# Patient Record
Sex: MALE | Race: WHITE | URBAN - METROPOLITAN AREA
[De-identification: names, ages, dates, MRNs, and addresses within clinical notes are randomized per-mention and may not be internally consistent; named-entity substitution may affect disease eponyms.]

---

## 2017-02-28 ENCOUNTER — TELEPHONE (OUTPATIENT)
Dept: CARDIOLOGY CLINIC | Age: 72
End: 2017-02-28

## 2017-03-01 ENCOUNTER — NURSE ONLY (OUTPATIENT)
Dept: CARDIOLOGY CLINIC | Age: 72
End: 2017-03-01

## 2017-03-01 DIAGNOSIS — Z95.810 AUTOMATIC IMPLANTABLE CARDIOVERTER-DEFIBRILLATOR IN SITU: ICD-10-CM

## 2017-03-01 DIAGNOSIS — I42.9 CARDIOMYOPATHY (HCC): ICD-10-CM

## 2017-03-01 DIAGNOSIS — Z95.810 CARDIAC DEFIBRILLATOR IN SITU: ICD-10-CM

## 2017-03-01 PROCEDURE — 93296 REM INTERROG EVL PM/IDS: CPT | Performed by: INTERNAL MEDICINE

## 2017-03-01 PROCEDURE — 93295 DEV INTERROG REMOTE 1/2/MLT: CPT | Performed by: INTERNAL MEDICINE

## 2019-07-01 ENCOUNTER — HISTORICAL (OUTPATIENT)
Dept: RESPIRATORY THERAPY | Facility: HOSPITAL | Age: 74
End: 2019-07-01

## 2020-01-10 ENCOUNTER — HISTORICAL (OUTPATIENT)
Dept: RADIOLOGY | Facility: HOSPITAL | Age: 75
End: 2020-01-10

## 2021-05-20 ENCOUNTER — HISTORICAL (OUTPATIENT)
Dept: RADIOLOGY | Facility: HOSPITAL | Age: 76
End: 2021-05-20

## 2021-06-21 ENCOUNTER — HISTORICAL (OUTPATIENT)
Dept: RESPIRATORY THERAPY | Facility: HOSPITAL | Age: 76
End: 2021-06-21

## 2022-01-01 ENCOUNTER — HOSPITAL ENCOUNTER (OUTPATIENT)
Facility: HOSPITAL | Age: 77
Discharge: HOME | End: 2022-08-02
Attending: INTERNAL MEDICINE | Admitting: INTERNAL MEDICINE
Payer: MEDICARE

## 2022-01-01 ENCOUNTER — HOSPITAL ENCOUNTER (OUTPATIENT)
Dept: RADIOLOGY | Facility: HOSPITAL | Age: 77
Discharge: HOME | End: 2022-07-15
Attending: FAMILY MEDICINE
Payer: MEDICARE

## 2022-01-01 ENCOUNTER — HOSPITAL ENCOUNTER (OUTPATIENT)
Dept: RADIOLOGY | Facility: HOSPITAL | Age: 77
Discharge: HOME | End: 2022-08-18
Attending: SPECIALIST
Payer: MEDICARE

## 2022-01-01 ENCOUNTER — HOSPITAL ENCOUNTER (OUTPATIENT)
Dept: RADIOLOGY | Facility: HOSPITAL | Age: 77
Discharge: HOME | End: 2022-07-15
Attending: INTERNAL MEDICINE
Payer: MEDICARE

## 2022-01-01 ENCOUNTER — OFFICE VISIT (OUTPATIENT)
Dept: CARDIAC SURGERY | Facility: CLINIC | Age: 77
End: 2022-01-01
Payer: MEDICARE

## 2022-01-01 ENCOUNTER — HOSPITAL ENCOUNTER (INPATIENT)
Facility: HOSPITAL | Age: 77
LOS: 18 days | Discharge: HOSPICE | DRG: 219 | End: 2022-09-09
Attending: SPECIALIST | Admitting: SPECIALIST
Payer: MEDICARE

## 2022-01-01 ENCOUNTER — ANESTHESIA EVENT (OUTPATIENT)
Dept: SURGERY | Facility: HOSPITAL | Age: 77
DRG: 219 | End: 2022-01-01
Payer: MEDICARE

## 2022-01-01 ENCOUNTER — ANESTHESIA (OUTPATIENT)
Dept: SURGERY | Facility: HOSPITAL | Age: 77
DRG: 219 | End: 2022-01-01
Payer: MEDICARE

## 2022-01-01 ENCOUNTER — HISTORICAL (OUTPATIENT)
Dept: ADMINISTRATIVE | Facility: HOSPITAL | Age: 77
End: 2022-01-01

## 2022-01-01 VITALS
HEIGHT: 68 IN | OXYGEN SATURATION: 96 % | WEIGHT: 216 LBS | BODY MASS INDEX: 32.74 KG/M2 | SYSTOLIC BLOOD PRESSURE: 128 MMHG | DIASTOLIC BLOOD PRESSURE: 84 MMHG

## 2022-01-01 VITALS
BODY MASS INDEX: 28.44 KG/M2 | HEART RATE: 60 BPM | SYSTOLIC BLOOD PRESSURE: 97 MMHG | DIASTOLIC BLOOD PRESSURE: 61 MMHG | HEIGHT: 69 IN | WEIGHT: 192 LBS

## 2022-01-01 VITALS
RESPIRATION RATE: 20 BRPM | SYSTOLIC BLOOD PRESSURE: 94 MMHG | WEIGHT: 220.44 LBS | HEIGHT: 69 IN | TEMPERATURE: 97 F | DIASTOLIC BLOOD PRESSURE: 56 MMHG | BODY MASS INDEX: 32.65 KG/M2 | OXYGEN SATURATION: 92 % | HEART RATE: 60 BPM

## 2022-01-01 VITALS
BODY MASS INDEX: 29.37 KG/M2 | SYSTOLIC BLOOD PRESSURE: 105 MMHG | TEMPERATURE: 98 F | RESPIRATION RATE: 17 BRPM | HEIGHT: 68 IN | WEIGHT: 193.81 LBS | HEART RATE: 62 BPM | DIASTOLIC BLOOD PRESSURE: 55 MMHG | OXYGEN SATURATION: 94 %

## 2022-01-01 DIAGNOSIS — R06.02 SOB (SHORTNESS OF BREATH): ICD-10-CM

## 2022-01-01 DIAGNOSIS — R20.2 NUMBNESS AND TINGLING OF BOTH LOWER EXTREMITIES: ICD-10-CM

## 2022-01-01 DIAGNOSIS — I25.5 ISCHEMIC CARDIOMYOPATHY: ICD-10-CM

## 2022-01-01 DIAGNOSIS — Z79.01 ADMISSION FOR LONG-TERM (CURRENT) USE OF ANTICOAGULANTS: ICD-10-CM

## 2022-01-01 DIAGNOSIS — I49.9 ABNORMAL HEART RHYTHM: ICD-10-CM

## 2022-01-01 DIAGNOSIS — I25.10 CAD IN NATIVE ARTERY: ICD-10-CM

## 2022-01-01 DIAGNOSIS — K91.89 ILEUS, POSTOPERATIVE: ICD-10-CM

## 2022-01-01 DIAGNOSIS — L89.320 PRESSURE INJURY OF LEFT BUTTOCK, UNSTAGEABLE: ICD-10-CM

## 2022-01-01 DIAGNOSIS — R29.898 WEAKNESS OF BOTH LOWER EXTREMITIES: ICD-10-CM

## 2022-01-01 DIAGNOSIS — R19.7 DIARRHEA, UNSPECIFIED TYPE: ICD-10-CM

## 2022-01-01 DIAGNOSIS — I25.10 CORONARY ARTERY DISEASE INVOLVING NATIVE HEART, UNSPECIFIED VESSEL OR LESION TYPE, UNSPECIFIED WHETHER ANGINA PRESENT: Primary | ICD-10-CM

## 2022-01-01 DIAGNOSIS — R06.02 SHORTNESS OF BREATH: Primary | ICD-10-CM

## 2022-01-01 DIAGNOSIS — L89.300 PRESSURE INJURY OF BUTTOCK, UNSTAGEABLE, UNSPECIFIED LATERALITY: ICD-10-CM

## 2022-01-01 DIAGNOSIS — Z78.9 PACED RHYTHM ON CARDIAC MONITOR: ICD-10-CM

## 2022-01-01 DIAGNOSIS — R06.02 SHORTNESS OF BREATH: ICD-10-CM

## 2022-01-01 DIAGNOSIS — I95.9 HYPOTENSION: ICD-10-CM

## 2022-01-01 DIAGNOSIS — Z51.81 ADMISSION FOR LONG-TERM (CURRENT) USE OF ANTICOAGULANTS: ICD-10-CM

## 2022-01-01 DIAGNOSIS — I25.118 CORONARY ARTERY DISEASE OF NATIVE ARTERY OF NATIVE HEART WITH STABLE ANGINA PECTORIS: ICD-10-CM

## 2022-01-01 DIAGNOSIS — Z95.2 S/P MVR (MITRAL VALVE REPLACEMENT): ICD-10-CM

## 2022-01-01 DIAGNOSIS — I25.10 CORONARY ARTERY DISEASE INVOLVING NATIVE HEART, UNSPECIFIED VESSEL OR LESION TYPE, UNSPECIFIED WHETHER ANGINA PRESENT: ICD-10-CM

## 2022-01-01 DIAGNOSIS — M54.50 LUMBOSACRAL PAIN: ICD-10-CM

## 2022-01-01 DIAGNOSIS — K56.7 ILEUS, POSTOPERATIVE: ICD-10-CM

## 2022-01-01 DIAGNOSIS — R20.0 NUMBNESS AND TINGLING OF BOTH LOWER EXTREMITIES: ICD-10-CM

## 2022-01-01 DIAGNOSIS — I10 HYPERTENSION, UNSPECIFIED TYPE: ICD-10-CM

## 2022-01-01 DIAGNOSIS — R42 DYSEQUILIBRIUM: ICD-10-CM

## 2022-01-01 DIAGNOSIS — A41.9 SEPSIS, DUE TO UNSPECIFIED ORGANISM, UNSPECIFIED WHETHER ACUTE ORGAN DYSFUNCTION PRESENT: ICD-10-CM

## 2022-01-01 DIAGNOSIS — M79.602 LEFT ARM PAIN: ICD-10-CM

## 2022-01-01 DIAGNOSIS — E78.5 HYPERLIPIDEMIA, UNSPECIFIED HYPERLIPIDEMIA TYPE: Primary | ICD-10-CM

## 2022-01-01 DIAGNOSIS — I25.10 CAD (CORONARY ARTERY DISEASE): ICD-10-CM

## 2022-01-01 DIAGNOSIS — R54 FRAILTY: ICD-10-CM

## 2022-01-01 DIAGNOSIS — I34.0 NONRHEUMATIC MITRAL VALVE REGURGITATION: ICD-10-CM

## 2022-01-01 DIAGNOSIS — R91.1 SOLITARY PULMONARY NODULE: Primary | ICD-10-CM

## 2022-01-01 DIAGNOSIS — M54.50 LUMBOSACRAL PAIN: Primary | ICD-10-CM

## 2022-01-01 DIAGNOSIS — R25.1 TREMOR: Primary | ICD-10-CM

## 2022-01-01 LAB
ABO + RH BLD: NORMAL
ABS NEUT (OLG): 19.41 X10(3)/MCL (ref 2.1–9.2)
ABS NEUT (OLG): 20.95 X10(3)/MCL (ref 2.1–9.2)
ABS NEUT (OLG): 23.42 X10(3)/MCL (ref 2.1–9.2)
ABS NEUT (OLG): 23.5 X10(3)/MCL (ref 2.1–9.2)
ABS NEUT (OLG): 31.33 X10(3)/MCL (ref 2.1–9.2)
ABS NEUT (OLG): 7.28 X10(3)/MCL (ref 2.1–9.2)
ABS NEUT (OLG): 8.4 X10(3)/MCL (ref 2.1–9.2)
ALBUMIN SERPL-MCNC: 1.6 GM/DL (ref 3.4–4.8)
ALBUMIN SERPL-MCNC: 1.7 GM/DL (ref 3.4–4.8)
ALBUMIN SERPL-MCNC: 1.7 GM/DL (ref 3.4–4.8)
ALBUMIN SERPL-MCNC: 1.8 GM/DL (ref 3.4–4.8)
ALBUMIN SERPL-MCNC: 1.9 GM/DL (ref 3.4–4.8)
ALBUMIN SERPL-MCNC: 2 GM/DL (ref 3.4–4.8)
ALBUMIN SERPL-MCNC: 2.5 GM/DL (ref 3.4–4.8)
ALBUMIN SERPL-MCNC: 2.6 GM/DL (ref 3.4–4.8)
ALBUMIN SERPL-MCNC: 2.8 GM/DL (ref 3.4–4.8)
ALBUMIN SERPL-MCNC: 2.9 GM/DL (ref 3.4–4.8)
ALBUMIN/GLOB SERPL: 0.5 RATIO (ref 1.1–2)
ALBUMIN/GLOB SERPL: 0.5 RATIO (ref 1.1–2)
ALBUMIN/GLOB SERPL: 0.6 RATIO (ref 1.1–2)
ALBUMIN/GLOB SERPL: 0.6 RATIO (ref 1.1–2)
ALBUMIN/GLOB SERPL: 0.8 RATIO (ref 1.1–2)
ALBUMIN/GLOB SERPL: 0.8 RATIO (ref 1.1–2)
ALBUMIN/GLOB SERPL: 0.9 RATIO (ref 1.1–2)
ALBUMIN/GLOB SERPL: 0.9 RATIO (ref 1.1–2)
ALBUMIN/GLOB SERPL: 1 RATIO (ref 1.1–2)
ALBUMIN/GLOB SERPL: 1.1 RATIO (ref 1.1–2)
ALBUMIN/GLOB SERPL: 1.5 RATIO (ref 1.1–2)
ALP SERPL-CCNC: 110 UNIT/L (ref 40–150)
ALP SERPL-CCNC: 115 UNIT/L (ref 40–150)
ALP SERPL-CCNC: 70 UNIT/L (ref 40–150)
ALP SERPL-CCNC: 71 UNIT/L (ref 40–150)
ALP SERPL-CCNC: 72 UNIT/L (ref 40–150)
ALP SERPL-CCNC: 76 UNIT/L (ref 40–150)
ALP SERPL-CCNC: 85 UNIT/L (ref 40–150)
ALP SERPL-CCNC: 86 UNIT/L (ref 40–150)
ALP SERPL-CCNC: 87 UNIT/L (ref 40–150)
ALP SERPL-CCNC: 88 UNIT/L (ref 40–150)
ALP SERPL-CCNC: 94 UNIT/L (ref 40–150)
ALP SERPL-CCNC: 97 UNIT/L (ref 40–150)
ALP SERPL-CCNC: 98 UNIT/L (ref 40–150)
ALP SERPL-CCNC: 99 UNIT/L (ref 40–150)
ALT SERPL-CCNC: 36 UNIT/L (ref 0–55)
ALT SERPL-CCNC: 42 UNIT/L (ref 0–55)
ALT SERPL-CCNC: 43 UNIT/L (ref 0–55)
ALT SERPL-CCNC: 47 UNIT/L (ref 0–55)
ALT SERPL-CCNC: 50 UNIT/L (ref 0–55)
ALT SERPL-CCNC: 52 UNIT/L (ref 0–55)
ALT SERPL-CCNC: 52 UNIT/L (ref 0–55)
ALT SERPL-CCNC: 53 UNIT/L (ref 0–55)
ALT SERPL-CCNC: 66 UNIT/L (ref 0–55)
ALT SERPL-CCNC: 72 UNIT/L (ref 0–55)
ALT SERPL-CCNC: 75 UNIT/L (ref 0–55)
ALT SERPL-CCNC: 80 UNIT/L (ref 0–55)
ALT SERPL-CCNC: 84 UNIT/L (ref 0–55)
ALT SERPL-CCNC: 88 UNIT/L (ref 0–55)
AMYLASE SERPL-CCNC: 22 UNIT/L (ref 20–160)
ANION GAP SERPL CALC-SCNC: 10 MEQ/L
ANION GAP SERPL CALC-SCNC: 12 MEQ/L
ANION GAP SERPL CALC-SCNC: 5 MEQ/L
ANION GAP SERPL CALC-SCNC: 6 MEQ/L
ANION GAP SERPL CALC-SCNC: 6 MEQ/L
ANION GAP SERPL CALC-SCNC: 7 MEQ/L
ANION GAP SERPL CALC-SCNC: 8 MEQ/L
ANION GAP SERPL CALC-SCNC: 8 MEQ/L
ANION GAP SERPL CALC-SCNC: 9 MEQ/L
ANISOCYTOSIS BLD QL SMEAR: ABNORMAL
APTT PPP: 35.4 SECONDS (ref 23.2–33.7)
AST SERPL-CCNC: 100 UNIT/L (ref 5–34)
AST SERPL-CCNC: 32 UNIT/L (ref 5–34)
AST SERPL-CCNC: 32 UNIT/L (ref 5–34)
AST SERPL-CCNC: 33 UNIT/L (ref 5–34)
AST SERPL-CCNC: 38 UNIT/L (ref 5–34)
AST SERPL-CCNC: 40 UNIT/L (ref 5–34)
AST SERPL-CCNC: 47 UNIT/L (ref 5–34)
AST SERPL-CCNC: 47 UNIT/L (ref 5–34)
AST SERPL-CCNC: 54 UNIT/L (ref 5–34)
AST SERPL-CCNC: 56 UNIT/L (ref 5–34)
AST SERPL-CCNC: 80 UNIT/L (ref 5–34)
AST SERPL-CCNC: 82 UNIT/L (ref 5–34)
AST SERPL-CCNC: 83 UNIT/L (ref 5–34)
AST SERPL-CCNC: 90 UNIT/L (ref 5–34)
AV INDEX (PROSTH): 1.11
AV MEAN GRADIENT: 3 MMHG
AV PEAK GRADIENT: 6 MMHG
AV VALVE AREA: 3.84 CM2
AV VELOCITY RATIO: 1.06
BACTERIA BLD CULT: NORMAL
BACTERIA SPEC CULT: ABNORMAL
BASE EXCESS ARTERIAL: -2.1 MMOL/L (ref -2–2)
BASOPHILS # BLD AUTO: 0.02 X10(3)/MCL (ref 0–0.2)
BASOPHILS # BLD AUTO: 0.03 X10(3)/MCL (ref 0–0.2)
BASOPHILS # BLD AUTO: 0.04 X10(3)/MCL (ref 0–0.2)
BASOPHILS # BLD AUTO: 0.04 X10(3)/MCL (ref 0–0.2)
BASOPHILS # BLD AUTO: 0.05 X10(3)/MCL (ref 0–0.2)
BASOPHILS # BLD AUTO: 0.06 X10(3)/MCL (ref 0–0.2)
BASOPHILS # BLD AUTO: 0.07 X10(3)/MCL (ref 0–0.2)
BASOPHILS NFR BLD AUTO: 0.1 %
BASOPHILS NFR BLD AUTO: 0.2 %
BASOPHILS NFR BLD AUTO: 0.3 %
BASOPHILS NFR BLD MANUAL: 0.25 X10(3)/MCL (ref 0–0.2)
BASOPHILS NFR BLD MANUAL: 1 %
BILIRUBIN DIRECT+TOT PNL SERPL-MCNC: 0.3 MG/DL (ref 0–0.8)
BILIRUBIN DIRECT+TOT PNL SERPL-MCNC: 0.7 MG/DL
BILIRUBIN DIRECT+TOT PNL SERPL-MCNC: 0.7 MG/DL
BILIRUBIN DIRECT+TOT PNL SERPL-MCNC: 0.8 MG/DL
BILIRUBIN DIRECT+TOT PNL SERPL-MCNC: 0.9 MG/DL
BILIRUBIN DIRECT+TOT PNL SERPL-MCNC: 0.9 MG/DL (ref 0–0.5)
BILIRUBIN DIRECT+TOT PNL SERPL-MCNC: 1.2 MG/DL
BILIRUBIN DIRECT+TOT PNL SERPL-MCNC: 1.3 MG/DL
BILIRUBIN DIRECT+TOT PNL SERPL-MCNC: 1.3 MG/DL
BILIRUBIN DIRECT+TOT PNL SERPL-MCNC: 1.4 MG/DL
BILIRUBIN DIRECT+TOT PNL SERPL-MCNC: 1.6 MG/DL
BILIRUBIN DIRECT+TOT PNL SERPL-MCNC: 1.7 MG/DL
BLD PROD TYP BPU: NORMAL
BLOOD UNIT EXPIRATION DATE: NORMAL
BLOOD UNIT TYPE CODE: 6200
BSA FOR ECHO PROCEDURE: 2.04 M2
BSA FOR ECHO PROCEDURE: 2.09 M2
BSA FOR ECHO PROCEDURE: 2.09 M2
BUN SERPL-MCNC: 12.8 MG/DL (ref 8.4–25.7)
BUN SERPL-MCNC: 14.6 MG/DL (ref 8.4–25.7)
BUN SERPL-MCNC: 18.8 MG/DL (ref 8.4–25.7)
BUN SERPL-MCNC: 20.3 MG/DL (ref 8.4–25.7)
BUN SERPL-MCNC: 26.8 MG/DL (ref 8.4–25.7)
BUN SERPL-MCNC: 27 MG/DL (ref 8.4–25.7)
BUN SERPL-MCNC: 27.4 MG/DL (ref 8.4–25.7)
BUN SERPL-MCNC: 28.9 MG/DL (ref 8.4–25.7)
BUN SERPL-MCNC: 28.9 MG/DL (ref 8.4–25.7)
BUN SERPL-MCNC: 30.5 MG/DL (ref 8.4–25.7)
BUN SERPL-MCNC: 30.6 MG/DL (ref 8.4–25.7)
BUN SERPL-MCNC: 31.1 MG/DL (ref 8.4–25.7)
BUN SERPL-MCNC: 31.2 MG/DL (ref 8.4–25.7)
BUN SERPL-MCNC: 33.4 MG/DL (ref 8.4–25.7)
BUN SERPL-MCNC: 34.1 MG/DL (ref 8.4–25.7)
BUN SERPL-MCNC: 35.2 MG/DL (ref 8.4–25.7)
BUN SERPL-MCNC: 35.5 MG/DL (ref 8.4–25.7)
BUN SERPL-MCNC: 37.9 MG/DL (ref 8.4–25.7)
BUN SERPL-MCNC: 39.1 MG/DL (ref 8.4–25.7)
BUN SERPL-MCNC: 41 MG/DL (ref 8.4–25.7)
BUN SERPL-MCNC: 44 MG/DL (ref 8.4–25.7)
BUN SERPL-MCNC: 55 MG/DL (ref 8.4–25.7)
BURR CELLS (OLG): ABNORMAL
C DIFF TOX GENS STL QL NAA+PROBE: NOT DETECTED
C DIFF TOX GENS STL QL NAA+PROBE: NOT DETECTED
CALCIUM SERPL-MCNC: 10 MG/DL (ref 8.8–10)
CALCIUM SERPL-MCNC: 7.2 MG/DL (ref 8.8–10)
CALCIUM SERPL-MCNC: 7.3 MG/DL (ref 8.8–10)
CALCIUM SERPL-MCNC: 7.6 MG/DL (ref 8.8–10)
CALCIUM SERPL-MCNC: 7.7 MG/DL (ref 8.8–10)
CALCIUM SERPL-MCNC: 7.8 MG/DL (ref 8.8–10)
CALCIUM SERPL-MCNC: 7.9 MG/DL (ref 8.8–10)
CALCIUM SERPL-MCNC: 8 MG/DL (ref 8.8–10)
CALCIUM SERPL-MCNC: 8.1 MG/DL (ref 8.8–10)
CALCIUM SERPL-MCNC: 8.1 MG/DL (ref 8.8–10)
CALCIUM SERPL-MCNC: 8.4 MG/DL (ref 8.8–10)
CALCIUM SERPL-MCNC: 8.5 MG/DL (ref 8.8–10)
CALCIUM SERPL-MCNC: 8.6 MG/DL (ref 8.8–10)
CALCIUM SERPL-MCNC: 8.6 MG/DL (ref 8.8–10)
CALCIUM SERPL-MCNC: 8.7 MG/DL (ref 8.8–10)
CALCIUM SERPL-MCNC: 8.7 MG/DL (ref 8.8–10)
CHLORIDE SERPL-SCNC: 102 MMOL/L (ref 98–107)
CHLORIDE SERPL-SCNC: 104 MMOL/L (ref 98–107)
CHLORIDE SERPL-SCNC: 105 MMOL/L (ref 98–107)
CHLORIDE SERPL-SCNC: 105 MMOL/L (ref 98–107)
CHLORIDE SERPL-SCNC: 106 MMOL/L (ref 98–107)
CHLORIDE SERPL-SCNC: 106 MMOL/L (ref 98–107)
CHLORIDE SERPL-SCNC: 107 MMOL/L (ref 98–107)
CHLORIDE SERPL-SCNC: 107 MMOL/L (ref 98–107)
CHLORIDE SERPL-SCNC: 108 MMOL/L (ref 98–107)
CHLORIDE SERPL-SCNC: 108 MMOL/L (ref 98–107)
CHLORIDE SERPL-SCNC: 109 MMOL/L (ref 98–107)
CHLORIDE SERPL-SCNC: 110 MMOL/L (ref 98–107)
CHLORIDE SERPL-SCNC: 111 MMOL/L (ref 98–107)
CHLORIDE SERPL-SCNC: 112 MMOL/L (ref 98–107)
CHLORIDE SERPL-SCNC: 112 MMOL/L (ref 98–107)
CLOSTRIDIUM DIFFICILE GDH ANTIGEN (OHS): POSITIVE
CLOSTRIDIUM DIFFICILE GDH ANTIGEN (OHS): POSITIVE
CLOSTRIDIUM DIFFICILE TOXIN A/B (OHS): NEGATIVE
CLOSTRIDIUM DIFFICILE TOXIN A/B (OHS): NEGATIVE
CO2 SERPL-SCNC: 20 MMOL/L (ref 23–31)
CO2 SERPL-SCNC: 21 MMOL/L (ref 23–31)
CO2 SERPL-SCNC: 22 MMOL/L (ref 23–31)
CO2 SERPL-SCNC: 22 MMOL/L (ref 23–31)
CO2 SERPL-SCNC: 23 MMOL/L (ref 23–31)
CO2 SERPL-SCNC: 24 MMOL/L (ref 23–31)
CO2 SERPL-SCNC: 25 MMOL/L (ref 23–31)
CO2 SERPL-SCNC: 25 MMOL/L (ref 23–31)
CO2 SERPL-SCNC: 26 MMOL/L (ref 23–31)
CO2 SERPL-SCNC: 27 MMOL/L (ref 23–31)
CO2 SERPL-SCNC: 27 MMOL/L (ref 23–31)
CORRECTED TEMPERATURE (PCO2): 43 MMHG (ref 35–45)
CORRECTED TEMPERATURE (PCO2): 47 MMHG (ref 35–45)
CORRECTED TEMPERATURE (PH): 7.32 (ref 7.35–7.45)
CORRECTED TEMPERATURE (PH): 7.44 (ref 7.35–7.45)
CORRECTED TEMPERATURE (PO2): 62 MMHG (ref 80–100)
CORRECTED TEMPERATURE (PO2): 68 MMHG (ref 80–100)
CREAT SERPL-MCNC: 0.77 MG/DL (ref 0.73–1.18)
CREAT SERPL-MCNC: 0.78 MG/DL (ref 0.73–1.18)
CREAT SERPL-MCNC: 0.79 MG/DL (ref 0.73–1.18)
CREAT SERPL-MCNC: 0.81 MG/DL (ref 0.73–1.18)
CREAT SERPL-MCNC: 0.85 MG/DL (ref 0.73–1.18)
CREAT SERPL-MCNC: 0.89 MG/DL (ref 0.73–1.18)
CREAT SERPL-MCNC: 0.91 MG/DL (ref 0.73–1.18)
CREAT SERPL-MCNC: 0.91 MG/DL (ref 0.73–1.18)
CREAT SERPL-MCNC: 0.92 MG/DL (ref 0.73–1.18)
CREAT SERPL-MCNC: 0.93 MG/DL (ref 0.73–1.18)
CREAT SERPL-MCNC: 0.98 MG/DL (ref 0.73–1.18)
CREAT SERPL-MCNC: 0.98 MG/DL (ref 0.73–1.18)
CREAT SERPL-MCNC: 1 MG/DL (ref 0.73–1.18)
CREAT SERPL-MCNC: 1.03 MG/DL (ref 0.73–1.18)
CREAT SERPL-MCNC: 1.04 MG/DL (ref 0.73–1.18)
CREAT SERPL-MCNC: 1.05 MG/DL (ref 0.73–1.18)
CREAT SERPL-MCNC: 1.06 MG/DL (ref 0.73–1.18)
CREAT SERPL-MCNC: 1.1 MG/DL (ref 0.73–1.18)
CREAT SERPL-MCNC: 1.14 MG/DL (ref 0.73–1.18)
CREAT SERPL-MCNC: 1.15 MG/DL (ref 0.73–1.18)
CREAT SERPL-MCNC: 1.42 MG/DL (ref 0.73–1.18)
CREAT SERPL-MCNC: 1.6 MG/DL (ref 0.73–1.18)
CREAT/UREA NIT SERPL: 15
CREAT/UREA NIT SERPL: 17
CREAT/UREA NIT SERPL: 21
CREAT/UREA NIT SERPL: 23
CREAT/UREA NIT SERPL: 29
CREAT/UREA NIT SERPL: 34
CREAT/UREA NIT SERPL: 34
CREAT/UREA NIT SERPL: 37
CREAT/UREA NIT SERPL: 37
CROSSMATCH INTERPRETATION: NORMAL
CV ECHO LV RWT: 0.49 CM
DISPENSE STATUS: NORMAL
DOP CALC AO PEAK VEL: 1.23 M/S
DOP CALC AO VTI: 15.7 CM
DOP CALC LVOT AREA: 3.5 CM2
DOP CALC LVOT DIAMETER: 2.1 CM
DOP CALC LVOT PEAK VEL: 1.3 M/S
DOP CALC LVOT STROKE VOLUME: 60.24 CM3
DOP CALC MV VTI: 39.9 CM
DOP CALC MV VTI: 58.5 CM
DOP CALCLVOT PEAK VEL VTI: 17.4 CM
E WAVE DECELERATION TIME: 330 MSEC
E/A RATIO: 0.83
E/E' RATIO: 21.5 M/S
ECHO LV POSTERIOR WALL: 1.37 CM (ref 0.6–1.1)
EJECTION FRACTION: 40 %
EOSINOPHIL # BLD AUTO: 0 X10(3)/MCL (ref 0–0.9)
EOSINOPHIL # BLD AUTO: 0.03 X10(3)/MCL (ref 0–0.9)
EOSINOPHIL # BLD AUTO: 0.04 X10(3)/MCL (ref 0–0.9)
EOSINOPHIL # BLD AUTO: 0.04 X10(3)/MCL (ref 0–0.9)
EOSINOPHIL NFR BLD AUTO: 0 %
EOSINOPHIL NFR BLD AUTO: 0.1 %
EOSINOPHIL NFR BLD AUTO: 0.1 %
EOSINOPHIL NFR BLD AUTO: 0.2 %
EOSINOPHIL NFR BLD AUTO: 0.3 %
EOSINOPHIL NFR BLD MANUAL: 0.29 X10(3)/MCL (ref 0–0.9)
EOSINOPHIL NFR BLD MANUAL: 1 %
ERYTHROCYTE [DISTWIDTH] IN BLOOD BY AUTOMATED COUNT: 15.4 % (ref 11.5–17)
ERYTHROCYTE [DISTWIDTH] IN BLOOD BY AUTOMATED COUNT: 15.9 % (ref 11.5–17)
ERYTHROCYTE [DISTWIDTH] IN BLOOD BY AUTOMATED COUNT: 16.5 % (ref 11.5–17)
ERYTHROCYTE [DISTWIDTH] IN BLOOD BY AUTOMATED COUNT: 16.8 % (ref 11.5–17)
ERYTHROCYTE [DISTWIDTH] IN BLOOD BY AUTOMATED COUNT: 17 % (ref 11.5–17)
ERYTHROCYTE [DISTWIDTH] IN BLOOD BY AUTOMATED COUNT: 17 % (ref 11.5–17)
ERYTHROCYTE [DISTWIDTH] IN BLOOD BY AUTOMATED COUNT: 17.5 % (ref 11.5–17)
ERYTHROCYTE [DISTWIDTH] IN BLOOD BY AUTOMATED COUNT: 17.7 % (ref 11.5–17)
ERYTHROCYTE [DISTWIDTH] IN BLOOD BY AUTOMATED COUNT: 17.8 % (ref 11.5–17)
ERYTHROCYTE [DISTWIDTH] IN BLOOD BY AUTOMATED COUNT: 18.1 % (ref 11.5–17)
ERYTHROCYTE [DISTWIDTH] IN BLOOD BY AUTOMATED COUNT: 18.2 % (ref 11.5–17)
ERYTHROCYTE [DISTWIDTH] IN BLOOD BY AUTOMATED COUNT: 18.9 % (ref 11.5–17)
ERYTHROCYTE [DISTWIDTH] IN BLOOD BY AUTOMATED COUNT: 19 % (ref 11.5–17)
ERYTHROCYTE [DISTWIDTH] IN BLOOD BY AUTOMATED COUNT: 19.1 % (ref 11.5–17)
ERYTHROCYTE [DISTWIDTH] IN BLOOD BY AUTOMATED COUNT: 19.9 % (ref 11.5–17)
ESTROGEN SERPL-MCNC: NORMAL PG/ML
FIO2: 100
FIO2: 60
FIO2: 60
FRACTIONAL SHORTENING: 17 % (ref 28–44)
GFR SERPLBLD CREATININE-BSD FMLA CKD-EPI: 45 MLS/MIN/1.73/M2
GFR SERPLBLD CREATININE-BSD FMLA CKD-EPI: 52 MLS/MIN/1.73/M2
GFR SERPLBLD CREATININE-BSD FMLA CKD-EPI: >60 MLS/MIN/1.73/M2
GLOBULIN SER-MCNC: 1.6 GM/DL (ref 2.4–3.5)
GLOBULIN SER-MCNC: 1.8 GM/DL (ref 2.4–3.5)
GLOBULIN SER-MCNC: 1.9 GM/DL (ref 2.4–3.5)
GLOBULIN SER-MCNC: 2 GM/DL (ref 2.4–3.5)
GLOBULIN SER-MCNC: 2.3 GM/DL (ref 2.4–3.5)
GLOBULIN SER-MCNC: 2.4 GM/DL (ref 2.4–3.5)
GLOBULIN SER-MCNC: 2.4 GM/DL (ref 2.4–3.5)
GLOBULIN SER-MCNC: 2.5 GM/DL (ref 2.4–3.5)
GLOBULIN SER-MCNC: 2.6 GM/DL (ref 2.4–3.5)
GLOBULIN SER-MCNC: 2.6 GM/DL (ref 2.4–3.5)
GLOBULIN SER-MCNC: 2.7 GM/DL (ref 2.4–3.5)
GLOBULIN SER-MCNC: 3 GM/DL (ref 2.4–3.5)
GLOBULIN SER-MCNC: 3.7 GM/DL (ref 2.4–3.5)
GLUCOSE SERPL-MCNC: 100 MG/DL (ref 82–115)
GLUCOSE SERPL-MCNC: 101 MG/DL (ref 82–115)
GLUCOSE SERPL-MCNC: 101 MG/DL (ref 82–115)
GLUCOSE SERPL-MCNC: 105 MG/DL (ref 82–115)
GLUCOSE SERPL-MCNC: 106 MG/DL (ref 82–115)
GLUCOSE SERPL-MCNC: 109 MG/DL (ref 82–115)
GLUCOSE SERPL-MCNC: 123 MG/DL (ref 82–115)
GLUCOSE SERPL-MCNC: 124 MG/DL (ref 70–110)
GLUCOSE SERPL-MCNC: 125 MG/DL (ref 82–115)
GLUCOSE SERPL-MCNC: 134 MG/DL (ref 70–110)
GLUCOSE SERPL-MCNC: 140 MG/DL (ref 82–115)
GLUCOSE SERPL-MCNC: 141 MG/DL (ref 70–110)
GLUCOSE SERPL-MCNC: 142 MG/DL (ref 82–115)
GLUCOSE SERPL-MCNC: 147 MG/DL (ref 82–115)
GLUCOSE SERPL-MCNC: 150 MG/DL (ref 82–115)
GLUCOSE SERPL-MCNC: 153 MG/DL (ref 82–115)
GLUCOSE SERPL-MCNC: 173 MG/DL (ref 82–115)
GLUCOSE SERPL-MCNC: 642 MG/DL (ref 82–115)
GLUCOSE SERPL-MCNC: 72 MG/DL (ref 82–115)
GLUCOSE SERPL-MCNC: 72 MG/DL (ref 82–115)
GLUCOSE SERPL-MCNC: 73 MG/DL (ref 70–110)
GLUCOSE SERPL-MCNC: 75 MG/DL (ref 82–115)
GLUCOSE SERPL-MCNC: 774 MG/DL (ref 82–115)
GLUCOSE SERPL-MCNC: 90 MG/DL (ref 82–115)
GLUCOSE SERPL-MCNC: 93 MG/DL (ref 82–115)
GLUCOSE SERPL-MCNC: 97 MG/DL (ref 82–115)
GRAM STN SPEC: ABNORMAL
GROUP & RH: NORMAL
HCO3 ARTERIAL: 24.2 MMOL/L (ref 18–23)
HCO3 UR-SCNC: 24.2 MMOL/L (ref 22–26)
HCO3 UR-SCNC: 27.2 MMOL/L (ref 24–28)
HCO3 UR-SCNC: 27.7 MMOL/L (ref 24–28)
HCO3 UR-SCNC: 29.2 MMOL/L (ref 22–26)
HCO3 UR-SCNC: 31.7 MMOL/L (ref 24–28)
HCT VFR BLD AUTO: 16.7 % (ref 42–52)
HCT VFR BLD AUTO: 27.3 % (ref 42–52)
HCT VFR BLD AUTO: 28.7 % (ref 42–52)
HCT VFR BLD AUTO: 29 % (ref 42–52)
HCT VFR BLD AUTO: 29.4 % (ref 42–52)
HCT VFR BLD AUTO: 29.6 % (ref 42–52)
HCT VFR BLD AUTO: 30.1 % (ref 42–52)
HCT VFR BLD AUTO: 30.1 % (ref 42–52)
HCT VFR BLD AUTO: 31.1 % (ref 42–52)
HCT VFR BLD AUTO: 31.4 % (ref 42–52)
HCT VFR BLD AUTO: 32.3 % (ref 42–52)
HCT VFR BLD AUTO: 32.4 % (ref 42–52)
HCT VFR BLD AUTO: 32.6 % (ref 42–52)
HCT VFR BLD AUTO: 33 % (ref 42–52)
HCT VFR BLD AUTO: 33.1 % (ref 42–52)
HCT VFR BLD AUTO: 33.2 % (ref 42–52)
HCT VFR BLD AUTO: 35.2 % (ref 42–52)
HCT VFR BLD AUTO: 35.8 % (ref 42–52)
HCT VFR BLD AUTO: 35.9 % (ref 42–52)
HCT VFR BLD CALC: 24 %PCV (ref 36–54)
HCT VFR BLD CALC: 25 %PCV (ref 36–54)
HCT VFR BLD CALC: 34 %PCV (ref 36–54)
HGB BLD-MCNC: 10.1 GM/DL (ref 14–18)
HGB BLD-MCNC: 10.2 GM/DL (ref 14–18)
HGB BLD-MCNC: 10.2 GM/DL (ref 14–18)
HGB BLD-MCNC: 10.3 G/DL (ref 12–16)
HGB BLD-MCNC: 10.4 GM/DL (ref 14–18)
HGB BLD-MCNC: 11 GM/DL (ref 14–18)
HGB BLD-MCNC: 11.1 GM/DL (ref 14–18)
HGB BLD-MCNC: 11.8 G/DL (ref 12–16)
HGB BLD-MCNC: 12 G/DL
HGB BLD-MCNC: 4.6 GM/DL (ref 14–18)
HGB BLD-MCNC: 8 G/DL
HGB BLD-MCNC: 8.3 GM/DL (ref 14–18)
HGB BLD-MCNC: 8.7 GM/DL (ref 14–18)
HGB BLD-MCNC: 8.8 GM/DL (ref 14–18)
HGB BLD-MCNC: 8.8 GM/DL (ref 14–18)
HGB BLD-MCNC: 9 G/DL
HGB BLD-MCNC: 9 GM/DL (ref 14–18)
HGB BLD-MCNC: 9 GM/DL (ref 14–18)
HGB BLD-MCNC: 9.1 GM/DL (ref 14–18)
HGB BLD-MCNC: 9.6 GM/DL (ref 14–18)
HGB BLD-MCNC: 9.7 GM/DL (ref 14–18)
HGB BLD-MCNC: 9.9 GM/DL (ref 14–18)
HGB BLD-MCNC: ABNORMAL G/DL
HR MV ECHO: 82 BPM
IMM GRANULOCYTES # BLD AUTO: 0.15 X10(3)/MCL (ref 0–0.04)
IMM GRANULOCYTES # BLD AUTO: 0.22 X10(3)/MCL (ref 0–0.04)
IMM GRANULOCYTES # BLD AUTO: 0.22 X10(3)/MCL (ref 0–0.04)
IMM GRANULOCYTES # BLD AUTO: 0.37 X10(3)/MCL (ref 0–0.04)
IMM GRANULOCYTES # BLD AUTO: 0.4 X10(3)/MCL (ref 0–0.04)
IMM GRANULOCYTES # BLD AUTO: 0.51 X10(3)/MCL (ref 0–0.04)
IMM GRANULOCYTES # BLD AUTO: 0.52 X10(3)/MCL (ref 0–0.04)
IMM GRANULOCYTES # BLD AUTO: 0.65 X10(3)/MCL (ref 0–0.04)
IMM GRANULOCYTES # BLD AUTO: 0.69 X10(3)/MCL (ref 0–0.04)
IMM GRANULOCYTES # BLD AUTO: 0.72 X10(3)/MCL (ref 0–0.04)
IMM GRANULOCYTES # BLD AUTO: 0.79 X10(3)/MCL (ref 0–0.04)
IMM GRANULOCYTES # BLD AUTO: 0.83 X10(3)/MCL (ref 0–0.04)
IMM GRANULOCYTES # BLD AUTO: 0.85 X10(3)/MCL (ref 0–0.04)
IMM GRANULOCYTES # BLD AUTO: 1.08 X10(3)/MCL (ref 0–0.04)
IMM GRANULOCYTES # BLD AUTO: 1.13 X10(3)/MCL (ref 0–0.04)
IMM GRANULOCYTES NFR BLD AUTO: 0.9 %
IMM GRANULOCYTES NFR BLD AUTO: 1.2 %
IMM GRANULOCYTES NFR BLD AUTO: 1.3 %
IMM GRANULOCYTES NFR BLD AUTO: 1.5 %
IMM GRANULOCYTES NFR BLD AUTO: 2.2 %
IMM GRANULOCYTES NFR BLD AUTO: 2.4 %
IMM GRANULOCYTES NFR BLD AUTO: 2.6 %
IMM GRANULOCYTES NFR BLD AUTO: 3.5 %
IMM GRANULOCYTES NFR BLD AUTO: 3.8 %
IMM GRANULOCYTES NFR BLD AUTO: 3.9 %
IMM GRANULOCYTES NFR BLD AUTO: 3.9 %
IMM GRANULOCYTES NFR BLD AUTO: 4.5 %
IMM GRANULOCYTES NFR BLD AUTO: 4.7 %
IMM GRANULOCYTES NFR BLD AUTO: 4.7 %
IMM GRANULOCYTES NFR BLD AUTO: 4.8 %
IMM GRANULOCYTES NFR BLD AUTO: 5 %
IMM GRANULOCYTES NFR BLD AUTO: 5.4 %
INDIRECT COOMBS GEL: NORMAL
INR BLD: 1.44 (ref 0–1.3)
INSTRUMENT WBC (OLG): 10 X10(3)/MCL
INSTRUMENT WBC (OLG): 21.1 X10(3)/MCL
INSTRUMENT WBC (OLG): 23.9 X10(3)/MCL
INSTRUMENT WBC (OLG): 25 X10(3)/MCL
INSTRUMENT WBC (OLG): 29.1 X10(3)/MCL
INSTRUMENT WBC (OLG): 32.3 X10(3)/MCL
INSTRUMENT WBC (OLG): 8 X10(3)/MCL
INSULIN SERPL-ACNC: NORMAL U[IU]/ML
INTERVENTRICULAR SEPTUM: 1.2 CM (ref 0.6–1.1)
LAB AP CLINICAL INFORMATION: NORMAL
LAB AP GROSS DESCRIPTION: NORMAL
LAB AP REPORT FOOTNOTES: NORMAL
LACTATE SERPL-SCNC: 1.2 MMOL/L (ref 0.5–2.2)
LEFT ATRIUM SIZE: 4.1 CM
LEFT ATRIUM VOLUME INDEX MOD: 21.6 ML/M2
LEFT ATRIUM VOLUME MOD: 44.4 CM3
LEFT INTERNAL DIMENSION IN SYSTOLE: 4.68 CM (ref 2.1–4)
LEFT VENTRICLE DIASTOLIC VOLUME INDEX: 62.5 ML/M2
LEFT VENTRICLE DIASTOLIC VOLUME INDEX: 75.73 ML/M2
LEFT VENTRICLE DIASTOLIC VOLUME: 130 ML
LEFT VENTRICLE DIASTOLIC VOLUME: 156 ML
LEFT VENTRICLE MASS INDEX: 151 G/M2
LEFT VENTRICLE SYSTOLIC VOLUME INDEX: 46.5 ML/M2
LEFT VENTRICLE SYSTOLIC VOLUME INDEX: 49 ML/M2
LEFT VENTRICLE SYSTOLIC VOLUME: 101 ML
LEFT VENTRICLE SYSTOLIC VOLUME: 96.7 ML
LEFT VENTRICULAR INTERNAL DIMENSION IN DIASTOLE: 5.64 CM (ref 3.5–6)
LEFT VENTRICULAR MASS: 311.75 G
LIPASE SERPL-CCNC: 19 U/L
LV LATERAL E/E' RATIO: 21.5 M/S
LV SEPTAL E/E' RATIO: 21.5 M/S
LVOT MG: 3 MMHG
LVOT MV: 0.78 CM/S
LYMPHOCYTES # BLD AUTO: 0.44 X10(3)/MCL (ref 0.6–4.6)
LYMPHOCYTES # BLD AUTO: 0.75 X10(3)/MCL (ref 0.6–4.6)
LYMPHOCYTES # BLD AUTO: 0.76 X10(3)/MCL (ref 0.6–4.6)
LYMPHOCYTES # BLD AUTO: 0.79 X10(3)/MCL (ref 0.6–4.6)
LYMPHOCYTES # BLD AUTO: 0.8 X10(3)/MCL (ref 0.6–4.6)
LYMPHOCYTES # BLD AUTO: 0.82 X10(3)/MCL (ref 0.6–4.6)
LYMPHOCYTES # BLD AUTO: 0.82 X10(3)/MCL (ref 0.6–4.6)
LYMPHOCYTES # BLD AUTO: 0.88 X10(3)/MCL (ref 0.6–4.6)
LYMPHOCYTES # BLD AUTO: 0.89 X10(3)/MCL (ref 0.6–4.6)
LYMPHOCYTES # BLD AUTO: 1.07 X10(3)/MCL (ref 0.6–4.6)
LYMPHOCYTES NFR BLD AUTO: 2.5 %
LYMPHOCYTES NFR BLD AUTO: 3.1 %
LYMPHOCYTES NFR BLD AUTO: 3.6 %
LYMPHOCYTES NFR BLD AUTO: 3.9 %
LYMPHOCYTES NFR BLD AUTO: 4.1 %
LYMPHOCYTES NFR BLD AUTO: 4.1 %
LYMPHOCYTES NFR BLD AUTO: 4.7 %
LYMPHOCYTES NFR BLD AUTO: 5.3 %
LYMPHOCYTES NFR BLD AUTO: 5.7 %
LYMPHOCYTES NFR BLD AUTO: 6 %
LYMPHOCYTES NFR BLD MANUAL: 0.16 X10(3)/MCL
LYMPHOCYTES NFR BLD MANUAL: 0.24 X10(3)/MCL
LYMPHOCYTES NFR BLD MANUAL: 0.4 X10(3)/MCL
LYMPHOCYTES NFR BLD MANUAL: 0.5 X10(3)/MCL
LYMPHOCYTES NFR BLD MANUAL: 0.65 X10(3)/MCL
LYMPHOCYTES NFR BLD MANUAL: 1 %
LYMPHOCYTES NFR BLD MANUAL: 2 %
LYMPHOCYTES NFR BLD MANUAL: 25 %
LYMPHOCYTES NFR BLD MANUAL: 4 %
LYMPHOCYTES NFR BLD MANUAL: 7.28 X10(3)/MCL
MACROCYTES BLD QL SMEAR: ABNORMAL
MAGNESIUM SERPL-MCNC: 2 MG/DL (ref 1.6–2.6)
MAGNESIUM SERPL-MCNC: 2.1 MG/DL (ref 1.6–2.6)
MAGNESIUM SERPL-MCNC: 2.2 MG/DL (ref 1.6–2.6)
MAGNESIUM SERPL-MCNC: 2.3 MG/DL (ref 1.6–2.6)
MAGNESIUM SERPL-MCNC: 2.4 MG/DL (ref 1.6–2.6)
MAGNESIUM SERPL-MCNC: 2.5 MG/DL (ref 1.6–2.6)
MAGNESIUM SERPL-MCNC: 3.3 MG/DL (ref 1.6–2.6)
MCH RBC QN AUTO: 29.7 PG (ref 27–31)
MCH RBC QN AUTO: 29.8 PG (ref 27–31)
MCH RBC QN AUTO: 29.9 PG (ref 27–31)
MCH RBC QN AUTO: 30 PG (ref 27–31)
MCH RBC QN AUTO: 30 PG (ref 27–31)
MCH RBC QN AUTO: 30.1 PG (ref 27–31)
MCH RBC QN AUTO: 30.2 PG (ref 27–31)
MCH RBC QN AUTO: 30.3 PG (ref 27–31)
MCH RBC QN AUTO: 30.3 PG (ref 27–31)
MCH RBC QN AUTO: 30.7 PG (ref 27–31)
MCH RBC QN AUTO: 30.9 PG (ref 27–31)
MCHC RBC AUTO-ENTMCNC: 27.5 MG/DL (ref 33–36)
MCHC RBC AUTO-ENTMCNC: 29 MG/DL (ref 33–36)
MCHC RBC AUTO-ENTMCNC: 29.6 MG/DL (ref 33–36)
MCHC RBC AUTO-ENTMCNC: 29.9 MG/DL (ref 33–36)
MCHC RBC AUTO-ENTMCNC: 30.2 MG/DL (ref 33–36)
MCHC RBC AUTO-ENTMCNC: 30.3 MG/DL (ref 33–36)
MCHC RBC AUTO-ENTMCNC: 30.4 MG/DL (ref 33–36)
MCHC RBC AUTO-ENTMCNC: 30.5 MG/DL (ref 33–36)
MCHC RBC AUTO-ENTMCNC: 30.6 MG/DL (ref 33–36)
MCHC RBC AUTO-ENTMCNC: 30.6 MG/DL (ref 33–36)
MCHC RBC AUTO-ENTMCNC: 30.7 MG/DL (ref 33–36)
MCHC RBC AUTO-ENTMCNC: 30.7 MG/DL (ref 33–36)
MCHC RBC AUTO-ENTMCNC: 31.2 MG/DL (ref 33–36)
MCHC RBC AUTO-ENTMCNC: 31.3 MG/DL (ref 33–36)
MCHC RBC AUTO-ENTMCNC: 31.5 MG/DL (ref 33–36)
MCV RBC AUTO: 100 FL (ref 80–94)
MCV RBC AUTO: 101.4 FL (ref 80–94)
MCV RBC AUTO: 104.1 FL (ref 80–94)
MCV RBC AUTO: 111.3 FL (ref 80–94)
MCV RBC AUTO: 95 FL (ref 80–94)
MCV RBC AUTO: 96.6 FL (ref 80–94)
MCV RBC AUTO: 97.3 FL (ref 80–94)
MCV RBC AUTO: 97.9 FL (ref 80–94)
MCV RBC AUTO: 98.4 FL (ref 80–94)
MCV RBC AUTO: 98.7 FL (ref 80–94)
MCV RBC AUTO: 98.7 FL (ref 80–94)
MCV RBC AUTO: 98.8 FL (ref 80–94)
MCV RBC AUTO: 99.3 FL (ref 80–94)
MCV RBC AUTO: 99.6 FL (ref 80–94)
MCV RBC AUTO: 99.7 FL (ref 80–94)
MONOCYTES # BLD AUTO: 1.31 X10(3)/MCL (ref 0.1–1.3)
MONOCYTES # BLD AUTO: 1.35 X10(3)/MCL (ref 0.1–1.3)
MONOCYTES # BLD AUTO: 1.41 X10(3)/MCL (ref 0.1–1.3)
MONOCYTES # BLD AUTO: 1.53 X10(3)/MCL (ref 0.1–1.3)
MONOCYTES # BLD AUTO: 1.55 X10(3)/MCL (ref 0.1–1.3)
MONOCYTES # BLD AUTO: 1.63 X10(3)/MCL (ref 0.1–1.3)
MONOCYTES # BLD AUTO: 1.78 X10(3)/MCL (ref 0.1–1.3)
MONOCYTES # BLD AUTO: 2.02 X10(3)/MCL (ref 0.1–1.3)
MONOCYTES # BLD AUTO: 2.07 X10(3)/MCL (ref 0.1–1.3)
MONOCYTES # BLD AUTO: 2.74 X10(3)/MCL (ref 0.1–1.3)
MONOCYTES NFR BLD AUTO: 10 %
MONOCYTES NFR BLD AUTO: 10 %
MONOCYTES NFR BLD AUTO: 7.2 %
MONOCYTES NFR BLD AUTO: 8.4 %
MONOCYTES NFR BLD AUTO: 8.4 %
MONOCYTES NFR BLD AUTO: 8.8 %
MONOCYTES NFR BLD AUTO: 9.2 %
MONOCYTES NFR BLD AUTO: 9.3 %
MONOCYTES NFR BLD AUTO: 9.4 %
MONOCYTES NFR BLD AUTO: 9.5 %
MONOCYTES NFR BLD MANUAL: 0.24 X10(3)/MCL (ref 0.1–1.3)
MONOCYTES NFR BLD MANUAL: 0.56 X10(3)/MCL (ref 0.1–1.3)
MONOCYTES NFR BLD MANUAL: 0.58 X10(3)/MCL (ref 0.1–1.3)
MONOCYTES NFR BLD MANUAL: 0.65 X10(3)/MCL (ref 0.1–1.3)
MONOCYTES NFR BLD MANUAL: 1 %
MONOCYTES NFR BLD MANUAL: 1 X10(3)/MCL (ref 0.1–1.3)
MONOCYTES NFR BLD MANUAL: 1.3 X10(3)/MCL (ref 0.1–1.3)
MONOCYTES NFR BLD MANUAL: 1.69 X10(3)/MCL (ref 0.1–1.3)
MONOCYTES NFR BLD MANUAL: 13 %
MONOCYTES NFR BLD MANUAL: 2 %
MONOCYTES NFR BLD MANUAL: 2 %
MONOCYTES NFR BLD MANUAL: 4 %
MONOCYTES NFR BLD MANUAL: 7 %
MONOCYTES NFR BLD MANUAL: 8 %
MV MEAN GRADIENT: 4 MMHG
MV MEAN GRADIENT: 8 MMHG
MV PEAK A VEL: 1.56 M/S
MV PEAK E VEL: 1.29 M/S
MV PEAK GRADIENT: 19 MMHG
MV PEAK GRADIENT: 9 MMHG
MV STENOSIS PRESSURE HALF TIME: 115 MS
MV VALVE AREA BY CONTINUITY EQUATION: 1.51 CM2
MV VALVE AREA P 1/2 METHOD: 1.91 CM2
MYELOCYTES NFR BLD MANUAL: 1 %
MYELOCYTES NFR BLD MANUAL: 2 %
NEUTROPHILS # BLD AUTO: 11 X10(3)/MCL (ref 2.1–9.2)
NEUTROPHILS # BLD AUTO: 11.5 X10(3)/MCL (ref 2.1–9.2)
NEUTROPHILS # BLD AUTO: 13.4 X10(3)/MCL (ref 2.1–9.2)
NEUTROPHILS # BLD AUTO: 13.8 X10(3)/MCL (ref 2.1–9.2)
NEUTROPHILS # BLD AUTO: 15.3 X10(3)/MCL (ref 2.1–9.2)
NEUTROPHILS # BLD AUTO: 15.4 X10(3)/MCL (ref 2.1–9.2)
NEUTROPHILS # BLD AUTO: 16.4 X10(3)/MCL (ref 2.1–9.2)
NEUTROPHILS # BLD AUTO: 18.6 X10(3)/MCL (ref 2.1–9.2)
NEUTROPHILS # BLD AUTO: 21.6 X10(3)/MCL (ref 2.1–9.2)
NEUTROPHILS # BLD AUTO: 25.1 X10(3)/MCL (ref 2.1–9.2)
NEUTROPHILS NFR BLD AUTO: 79.7 %
NEUTROPHILS NFR BLD AUTO: 80.2 %
NEUTROPHILS NFR BLD AUTO: 80.4 %
NEUTROPHILS NFR BLD AUTO: 81.8 %
NEUTROPHILS NFR BLD AUTO: 81.8 %
NEUTROPHILS NFR BLD AUTO: 82.8 %
NEUTROPHILS NFR BLD AUTO: 85.6 %
NEUTROPHILS NFR BLD AUTO: 86 %
NEUTROPHILS NFR BLD AUTO: 86.2 %
NEUTROPHILS NFR BLD AUTO: 87.4 %
NEUTROPHILS NFR BLD MANUAL: 70 %
NEUTROPHILS NFR BLD MANUAL: 84 %
NEUTROPHILS NFR BLD MANUAL: 91 %
NEUTROPHILS NFR BLD MANUAL: 91 %
NEUTROPHILS NFR BLD MANUAL: 93 %
NEUTROPHILS NFR BLD MANUAL: 97 %
NEUTROPHILS NFR BLD MANUAL: 97 %
NRBC BLD AUTO-RTO: 0 %
NRBC BLD AUTO-RTO: 0.1 %
NRBC BLD AUTO-RTO: 0.2 %
NRBC BLD AUTO-RTO: 0.3 %
NRBC BLD AUTO-RTO: 0.3 %
NRBC BLD MANUAL-RTO: 1 %
PATH REV: NORMAL
PCO2 BLDA: 38 MMHG
PCO2 BLDA: 39 MMHG
PCO2 BLDA: 43 MMHG (ref 35–45)
PCO2 BLDA: 46.1 MMHG (ref 35–45)
PCO2 BLDA: 47 MMHG (ref 35–45)
PCO2 BLDA: 51.5 MMHG (ref 35–45)
PCO2 BLDA: 57.3 MMHG (ref 35–45)
PCO2 BLDA: ABNORMAL MM[HG]
PCO2 BLDA: ABNORMAL MM[HG]
PH SMN: 7.29 [PH] (ref 7.35–7.45)
PH SMN: 7.32 [PH] (ref 7.35–7.45)
PH SMN: 7.33 [PH] (ref 7.35–7.45)
PH SMN: 7.35 [PH] (ref 7.35–7.45)
PH SMN: 7.38 [PH]
PH SMN: 7.39 [PH] (ref 7.35–7.45)
PH SMN: 7.44 [PH] (ref 7.35–7.45)
PH SMN: ABNORMAL [PH]
PHOSPHATE SERPL-MCNC: 2.4 MG/DL (ref 2.3–4.7)
PHOSPHATE SERPL-MCNC: 2.9 MG/DL (ref 2.3–4.7)
PHOSPHATE SERPL-MCNC: 3.3 MG/DL (ref 2.3–4.7)
PHOSPHATE SERPL-MCNC: 3.4 MG/DL (ref 2.3–4.7)
PHOSPHATE SERPL-MCNC: 3.9 MG/DL (ref 2.3–4.7)
PHOSPHATE SERPL-MCNC: 4.5 MG/DL (ref 2.3–4.7)
PHOSPHATE SERPL-MCNC: 4.6 MG/DL (ref 2.3–4.7)
PHOSPHATE SERPL-MCNC: 4.7 MG/DL (ref 2.3–4.7)
PISA TR MAX VEL: 2.87 M/S
PLATELET # BLD AUTO: 106 X10(3)/MCL (ref 130–400)
PLATELET # BLD AUTO: 122 X10(3)/MCL (ref 130–400)
PLATELET # BLD AUTO: 123 X10(3)/MCL (ref 130–400)
PLATELET # BLD AUTO: 125 X10(3)/MCL (ref 130–400)
PLATELET # BLD AUTO: 129 X10(3)/MCL (ref 130–400)
PLATELET # BLD AUTO: 130 X10(3)/MCL (ref 130–400)
PLATELET # BLD AUTO: 131 X10(3)/MCL (ref 130–400)
PLATELET # BLD AUTO: 135 X10(3)/MCL (ref 130–400)
PLATELET # BLD AUTO: 136 X10(3)/MCL (ref 130–400)
PLATELET # BLD AUTO: 137 X10(3)/MCL (ref 130–400)
PLATELET # BLD AUTO: 148 X10(3)/MCL (ref 130–400)
PLATELET # BLD AUTO: 151 X10(3)/MCL (ref 130–400)
PLATELET # BLD AUTO: 152 X10(3)/MCL (ref 130–400)
PLATELET # BLD AUTO: 189 X10(3)/MCL (ref 130–400)
PLATELET # BLD AUTO: 211 X10(3)/MCL (ref 130–400)
PLATELET # BLD AUTO: 252 X10(3)/MCL (ref 130–400)
PLATELET # BLD AUTO: 64 X10(3)/MCL (ref 130–400)
PLATELET # BLD EST: ABNORMAL 10*3/UL
PLATELET # BLD EST: ADEQUATE 10*3/UL
PLATELET # BLD EST: NORMAL 10*3/UL
PMV BLD AUTO: 10.4 FL (ref 7.4–10.4)
PMV BLD AUTO: 10.6 FL (ref 7.4–10.4)
PMV BLD AUTO: 10.9 FL (ref 7.4–10.4)
PMV BLD AUTO: 11 FL (ref 7.4–10.4)
PMV BLD AUTO: 11.2 FL (ref 7.4–10.4)
PMV BLD AUTO: 11.2 FL (ref 7.4–10.4)
PMV BLD AUTO: 11.4 FL (ref 7.4–10.4)
PMV BLD AUTO: 11.5 FL (ref 7.4–10.4)
PMV BLD AUTO: 11.6 FL (ref 7.4–10.4)
PMV BLD AUTO: 11.9 FL (ref 7.4–10.4)
PMV BLD AUTO: 12 FL (ref 7.4–10.4)
PMV BLD AUTO: 12.2 FL (ref 7.4–10.4)
PMV BLD AUTO: 9.9 FL (ref 7.4–10.4)
PO2 BLDA: 102 MMHG
PO2 BLDA: 351 MMHG (ref 80–100)
PO2 BLDA: 36 MMHG (ref 40–60)
PO2 BLDA: 59 MMHG (ref 40–60)
PO2 BLDA: 62 MMHG (ref 80–100)
PO2 BLDA: 68 MMHG (ref 80–100)
PO2 BLDA: 92 MMHG
PO2 BLDA: ABNORMAL MM[HG]
POC BASE DEFICIT: -1.8 MMOL/L
POC BASE DEFICIT: -2.1 MMOL/L (ref -2–2)
POC BASE DEFICIT: -7.7 MMOL/L
POC BASE DEFICIT: 4.5 MMOL/L (ref -2–2)
POC BE: 1 MMOL/L
POC BE: 3 MMOL/L
POC BE: 6 MMOL/L
POC COHB: 1.5 %
POC COHB: 2.1 %
POC COHB: ABNORMAL
POC HCO3: 18.3 MMOL/L
POC HCO3: 23.1 MMOL/L
POC IONIZED CALCIUM: 0.96 MMOL/L (ref 1.06–1.42)
POC IONIZED CALCIUM: 1.09 MMOL/L (ref 1.12–1.23)
POC IONIZED CALCIUM: 1.13 MMOL/L (ref 1.06–1.42)
POC IONIZED CALCIUM: 1.14 MMOL/L
POC IONIZED CALCIUM: 1.15 MMOL/L (ref 1.06–1.42)
POC IONIZED CALCIUM: 1.15 MMOL/L (ref 1.12–1.23)
POC IONIZED CALCIUM: 1.3 MMOL/L (ref 1.12–1.23)
POC IONIZED CALCIUM: ABNORMAL
POC METHB: 0.4 % (ref 0.4–1.5)
POC METHB: 1.1 % (ref 0.4–1.5)
POC METHB: ABNORMAL
POC O2HB: 91.3 % (ref 94–97)
POC O2HB: 92.3 % (ref 94–97)
POC O2HB: ABNORMAL
POC PCO2 TEMP: 40.4 MMHG
POC PCO2 TEMP: 50.3 MMHG
POC PCO2 TEMP: 51.5 MMHG
POC PH TEMP: 7.33
POC PH TEMP: 7.39
POC PH TEMP: 7.43
POC PO2 TEMP: 29 MMHG
POC PO2 TEMP: 336 MMHG
POC PO2 TEMP: 59 MMHG
POC SATURATED O2: 100 % (ref 95–100)
POC SATURATED O2: 67 % (ref 95–100)
POC SATURATED O2: 88 % (ref 95–100)
POC SATURATED O2: 91.6 %
POC SATURATED O2: 92.3 %
POC SATURATED O2: 96 %
POC SATURATED O2: 98 %
POC TCO2: 29 MMOL/L (ref 24–29)
POC TCO2: 29 MMOL/L (ref 24–29)
POC TCO2: 33 MMOL/L (ref 23–27)
POC TEMPERATURE: 37 C
POC TEMPERATURE: 37 C
POC TEMPERATURE: 37 °C
POC TEMPERATURE: 37 °C
POC TEMPERATURE: ABNORMAL
POCT GLUCOSE: 100 MG/DL (ref 70–110)
POCT GLUCOSE: 107 MG/DL (ref 70–110)
POCT GLUCOSE: 108 MG/DL (ref 70–110)
POCT GLUCOSE: 114 MG/DL (ref 70–110)
POCT GLUCOSE: 118 MG/DL (ref 70–110)
POCT GLUCOSE: 125 MG/DL (ref 70–110)
POCT GLUCOSE: 129 MG/DL (ref 70–110)
POCT GLUCOSE: 138 MG/DL (ref 70–110)
POCT GLUCOSE: 142 MG/DL (ref 70–110)
POCT GLUCOSE: 147 MG/DL (ref 70–110)
POCT GLUCOSE: 149 MG/DL (ref 70–110)
POCT GLUCOSE: 151 MG/DL (ref 70–110)
POCT GLUCOSE: 152 MG/DL (ref 70–110)
POCT GLUCOSE: 153 MG/DL (ref 70–110)
POCT GLUCOSE: 153 MG/DL (ref 70–110)
POCT GLUCOSE: 155 MG/DL (ref 70–110)
POCT GLUCOSE: 159 MG/DL (ref 70–110)
POCT GLUCOSE: 165 MG/DL (ref 70–110)
POCT GLUCOSE: 172 MG/DL (ref 70–110)
POCT GLUCOSE: 188 MG/DL (ref 70–110)
POCT GLUCOSE: 197 MG/DL (ref 70–110)
POCT GLUCOSE: 205 MG/DL (ref 70–110)
POCT GLUCOSE: 73 MG/DL (ref 70–110)
POCT GLUCOSE: 82 MG/DL (ref 70–110)
POCT GLUCOSE: 87 MG/DL (ref 70–110)
POCT GLUCOSE: 90 MG/DL (ref 70–110)
POCT GLUCOSE: 91 MG/DL (ref 70–110)
POIKILOCYTOSIS BLD QL SMEAR: ABNORMAL
POLYCHROMASIA BLD QL SMEAR: ABNORMAL
POTASSIUM BLD-SCNC: 3.9 MMOL/L (ref 3.5–5)
POTASSIUM BLD-SCNC: 3.9 MMOL/L (ref 3.5–5.1)
POTASSIUM BLD-SCNC: 4.1 MMOL/L
POTASSIUM BLD-SCNC: 4.1 MMOL/L (ref 3.5–5)
POTASSIUM BLD-SCNC: 4.5 MMOL/L
POTASSIUM BLD-SCNC: 5.1 MMOL/L (ref 3.5–5.1)
POTASSIUM BLD-SCNC: 6.5 MMOL/L (ref 3.5–5.1)
POTASSIUM BLD-SCNC: ABNORMAL MMOL/L
POTASSIUM SERPL-SCNC: 3.8 MMOL/L (ref 3.5–5.1)
POTASSIUM SERPL-SCNC: 3.9 MMOL/L (ref 3.5–5.1)
POTASSIUM SERPL-SCNC: 3.9 MMOL/L (ref 3.5–5.1)
POTASSIUM SERPL-SCNC: 4 MMOL/L (ref 3.5–5.1)
POTASSIUM SERPL-SCNC: 4.3 MMOL/L (ref 3.5–5.1)
POTASSIUM SERPL-SCNC: 4.4 MMOL/L (ref 3.5–5.1)
POTASSIUM SERPL-SCNC: 4.5 MMOL/L (ref 3.5–5.1)
POTASSIUM SERPL-SCNC: 4.6 MMOL/L (ref 3.5–5.1)
POTASSIUM SERPL-SCNC: 4.7 MMOL/L (ref 3.5–5.1)
POTASSIUM SERPL-SCNC: 4.8 MMOL/L (ref 3.5–5.1)
POTASSIUM SERPL-SCNC: 5 MMOL/L (ref 3.5–5.1)
POTASSIUM SERPL-SCNC: 5 MMOL/L (ref 3.5–5.1)
POTASSIUM SERPL-SCNC: 5.1 MMOL/L (ref 3.5–5.1)
POTASSIUM SERPL-SCNC: 5.2 MMOL/L (ref 3.5–5.1)
POTASSIUM SERPL-SCNC: 5.2 MMOL/L (ref 3.5–5.1)
POTASSIUM SERPL-SCNC: 5.6 MMOL/L (ref 3.5–5.1)
PREALB SERPL-MCNC: 6.9 MG/DL (ref 16–42)
PROT SERPL-MCNC: 3.3 GM/DL (ref 5.8–7.6)
PROT SERPL-MCNC: 3.7 GM/DL (ref 5.8–7.6)
PROT SERPL-MCNC: 3.8 GM/DL (ref 5.8–7.6)
PROT SERPL-MCNC: 4.2 GM/DL (ref 5.8–7.6)
PROT SERPL-MCNC: 4.3 GM/DL (ref 5.8–7.6)
PROT SERPL-MCNC: 4.3 GM/DL (ref 5.8–7.6)
PROT SERPL-MCNC: 4.4 GM/DL (ref 5.8–7.6)
PROT SERPL-MCNC: 4.6 GM/DL (ref 5.8–7.6)
PROT SERPL-MCNC: 4.8 GM/DL (ref 5.8–7.6)
PROT SERPL-MCNC: 5.2 GM/DL (ref 5.8–7.6)
PROT SERPL-MCNC: 5.3 GM/DL (ref 5.8–7.6)
PROT SERPL-MCNC: 5.4 GM/DL (ref 5.8–7.6)
PROTHROMBIN TIME: 17.3 SECONDS (ref 12.5–14.5)
RBC # BLD AUTO: 1.5 X10(6)/MCL (ref 4.7–6.1)
RBC # BLD AUTO: 2.74 X10(6)/MCL (ref 4.7–6.1)
RBC # BLD AUTO: 2.9 X10(6)/MCL (ref 4.7–6.1)
RBC # BLD AUTO: 2.92 X10(6)/MCL (ref 4.7–6.1)
RBC # BLD AUTO: 2.95 X10(6)/MCL (ref 4.7–6.1)
RBC # BLD AUTO: 3 X10(6)/MCL (ref 4.7–6.1)
RBC # BLD AUTO: 3.01 X10(6)/MCL (ref 4.7–6.1)
RBC # BLD AUTO: 3.02 X10(6)/MCL (ref 4.7–6.1)
RBC # BLD AUTO: 3.18 X10(6)/MCL (ref 4.7–6.1)
RBC # BLD AUTO: 3.22 X10(6)/MCL (ref 4.7–6.1)
RBC # BLD AUTO: 3.27 X10(6)/MCL (ref 4.7–6.1)
RBC # BLD AUTO: 3.37 X10(6)/MCL (ref 4.7–6.1)
RBC # BLD AUTO: 3.38 X10(6)/MCL (ref 4.7–6.1)
RBC # BLD AUTO: 3.38 X10(6)/MCL (ref 4.7–6.1)
RBC # BLD AUTO: 3.39 X10(6)/MCL (ref 4.7–6.1)
RBC # BLD AUTO: 3.43 X10(6)/MCL (ref 4.7–6.1)
RBC # BLD AUTO: 3.65 X10(6)/MCL (ref 4.7–6.1)
RBC MORPH BLD: ABNORMAL
RIGHT VENTRICULAR END-DIASTOLIC DIMENSION: 3.79 CM
SAMPLE: ABNORMAL
SATURATED O2 ARTERIAL, I-STAT: ABNORMAL
SODIUM BLD-SCNC: 135 MMOL/L (ref 137–145)
SODIUM BLD-SCNC: 136 MMOL/L (ref 136–145)
SODIUM BLD-SCNC: 137 MMOL/L
SODIUM BLD-SCNC: 137 MMOL/L (ref 136–145)
SODIUM BLD-SCNC: 140 MMOL/L (ref 136–145)
SODIUM BLD-SCNC: ABNORMAL MMOL/L
SODIUM SERPL-SCNC: 133 MMOL/L (ref 136–145)
SODIUM SERPL-SCNC: 134 MMOL/L (ref 136–145)
SODIUM SERPL-SCNC: 135 MMOL/L (ref 136–145)
SODIUM SERPL-SCNC: 136 MMOL/L (ref 136–145)
SODIUM SERPL-SCNC: 136 MMOL/L (ref 136–145)
SODIUM SERPL-SCNC: 137 MMOL/L (ref 136–145)
SODIUM SERPL-SCNC: 138 MMOL/L (ref 136–145)
SODIUM SERPL-SCNC: 139 MMOL/L (ref 136–145)
SODIUM SERPL-SCNC: 140 MMOL/L (ref 136–145)
SODIUM SERPL-SCNC: 140 MMOL/L (ref 136–145)
SPECIMEN SOURCE: ABNORMAL
STOMATOCYTES (OLG): ABNORMAL
T3RU NFR SERPL: NORMAL %
TARGETS BLD QL SMEAR: ABNORMAL
TDI LATERAL: 0.06 M/S
TDI SEPTAL: 0.06 M/S
TDI: 0.06 M/S
TR MAX PG: 33 MMHG
TRICUSPID ANNULAR PLANE SYSTOLIC EXCURSION: 1.55 CM
UNIT NUMBER: NORMAL
WBC # SPEC AUTO: 10.3 X10(3)/MCL (ref 4.5–11.5)
WBC # SPEC AUTO: 13.7 X10(3)/MCL (ref 4.5–11.5)
WBC # SPEC AUTO: 14.4 X10(3)/MCL (ref 4.5–11.5)
WBC # SPEC AUTO: 16.7 X10(3)/MCL (ref 4.5–11.5)
WBC # SPEC AUTO: 16.9 X10(3)/MCL (ref 4.5–11.5)
WBC # SPEC AUTO: 17.8 X10(3)/MCL (ref 4.5–11.5)
WBC # SPEC AUTO: 18.4 X10(3)/MCL (ref 4.5–11.5)
WBC # SPEC AUTO: 20.1 X10(3)/MCL (ref 4.5–11.5)
WBC # SPEC AUTO: 21.1 X10(3)/MCL (ref 4.5–11.5)
WBC # SPEC AUTO: 21.6 X10(3)/MCL (ref 4.5–11.5)
WBC # SPEC AUTO: 23.9 X10(3)/MCL (ref 4.5–11.5)
WBC # SPEC AUTO: 24.5 X10(3)/MCL (ref 4.5–11.5)
WBC # SPEC AUTO: 24.7 X10(3)/MCL (ref 4.5–11.5)
WBC # SPEC AUTO: 29.1 X10(3)/MCL (ref 4.5–11.5)
WBC # SPEC AUTO: 29.4 X10(3)/MCL (ref 4.5–11.5)
WBC # SPEC AUTO: 32.3 X10(3)/MCL (ref 4.5–11.5)
WBC # SPEC AUTO: 8 X10(3)/MCL (ref 4.5–11.5)

## 2022-01-01 PROCEDURE — 94640 AIRWAY INHALATION TREATMENT: CPT

## 2022-01-01 PROCEDURE — 63600175 PHARM REV CODE 636 W HCPCS: Performed by: SPECIALIST

## 2022-01-01 PROCEDURE — 94761 N-INVAS EAR/PLS OXIMETRY MLT: CPT

## 2022-01-01 PROCEDURE — 85025 COMPLETE CBC W/AUTO DIFF WBC: CPT | Performed by: STUDENT IN AN ORGANIZED HEALTH CARE EDUCATION/TRAINING PROGRAM

## 2022-01-01 PROCEDURE — C1751 CATH, INF, PER/CENT/MIDLINE: HCPCS

## 2022-01-01 PROCEDURE — 63600175 PHARM REV CODE 636 W HCPCS: Performed by: INTERNAL MEDICINE

## 2022-01-01 PROCEDURE — C9113 INJ PANTOPRAZOLE SODIUM, VIA: HCPCS | Performed by: INTERNAL MEDICINE

## 2022-01-01 PROCEDURE — 99024 PR POST-OP FOLLOW-UP VISIT: ICD-10-PCS | Mod: POP,,, | Performed by: PHYSICIAN ASSISTANT

## 2022-01-01 PROCEDURE — 27000221 HC OXYGEN, UP TO 24 HOURS

## 2022-01-01 PROCEDURE — 97162 PT EVAL MOD COMPLEX 30 MIN: CPT

## 2022-01-01 PROCEDURE — 25000003 PHARM REV CODE 250: Performed by: INTERNAL MEDICINE

## 2022-01-01 PROCEDURE — 83735 ASSAY OF MAGNESIUM: CPT | Performed by: SPECIALIST

## 2022-01-01 PROCEDURE — 25500020 PHARM REV CODE 255: Performed by: SPECIALIST

## 2022-01-01 PROCEDURE — A4216 STERILE WATER/SALINE, 10 ML: HCPCS | Performed by: SPECIALIST

## 2022-01-01 PROCEDURE — 80053 COMPREHEN METABOLIC PANEL: CPT | Performed by: STUDENT IN AN ORGANIZED HEALTH CARE EDUCATION/TRAINING PROGRAM

## 2022-01-01 PROCEDURE — 25000003 PHARM REV CODE 250: Performed by: THORACIC SURGERY (CARDIOTHORACIC VASCULAR SURGERY)

## 2022-01-01 PROCEDURE — 63600175 PHARM REV CODE 636 W HCPCS: Performed by: NURSE PRACTITIONER

## 2022-01-01 PROCEDURE — 93460 R&L HRT ART/VENTRICLE ANGIO: CPT | Performed by: INTERNAL MEDICINE

## 2022-01-01 PROCEDURE — 36620 INSERTION CATHETER ARTERY: CPT | Performed by: ANESTHESIOLOGY

## 2022-01-01 PROCEDURE — 25000003 PHARM REV CODE 250: Performed by: NURSE PRACTITIONER

## 2022-01-01 PROCEDURE — 63600175 PHARM REV CODE 636 W HCPCS: Performed by: PHYSICIAN ASSISTANT

## 2022-01-01 PROCEDURE — 25000003 PHARM REV CODE 250

## 2022-01-01 PROCEDURE — 85025 COMPLETE CBC W/AUTO DIFF WBC: CPT | Performed by: SPECIALIST

## 2022-01-01 PROCEDURE — 33517 PR CABG, ARTERY-VEIN, SINGLE: ICD-10-PCS | Mod: AS,,, | Performed by: PHYSICIAN ASSISTANT

## 2022-01-01 PROCEDURE — 36415 COLL VENOUS BLD VENIPUNCTURE: CPT | Performed by: NURSE PRACTITIONER

## 2022-01-01 PROCEDURE — 83735 ASSAY OF MAGNESIUM: CPT | Performed by: STUDENT IN AN ORGANIZED HEALTH CARE EDUCATION/TRAINING PROGRAM

## 2022-01-01 PROCEDURE — 63600175 PHARM REV CODE 636 W HCPCS: Performed by: STUDENT IN AN ORGANIZED HEALTH CARE EDUCATION/TRAINING PROGRAM

## 2022-01-01 PROCEDURE — P9016 RBC LEUKOCYTES REDUCED: HCPCS | Performed by: SPECIALIST

## 2022-01-01 PROCEDURE — B4185 PARENTERAL SOL 10 GM LIPIDS: HCPCS | Performed by: THORACIC SURGERY (CARDIOTHORACIC VASCULAR SURGERY)

## 2022-01-01 PROCEDURE — 99153 MOD SED SAME PHYS/QHP EA: CPT | Performed by: INTERNAL MEDICINE

## 2022-01-01 PROCEDURE — 83605 ASSAY OF LACTIC ACID: CPT | Performed by: PHYSICIAN ASSISTANT

## 2022-01-01 PROCEDURE — C1769 GUIDE WIRE: HCPCS | Performed by: INTERNAL MEDICINE

## 2022-01-01 PROCEDURE — 25000242 PHARM REV CODE 250 ALT 637 W/ HCPCS: Performed by: NURSE PRACTITIONER

## 2022-01-01 PROCEDURE — 27201423 OPTIME MED/SURG SUP & DEVICES STERILE SUPPLY: Performed by: SPECIALIST

## 2022-01-01 PROCEDURE — 51798 US URINE CAPACITY MEASURE: CPT

## 2022-01-01 PROCEDURE — 82150 ASSAY OF AMYLASE: CPT | Performed by: PHYSICIAN ASSISTANT

## 2022-01-01 PROCEDURE — 92610 EVALUATE SWALLOWING FUNCTION: CPT

## 2022-01-01 PROCEDURE — 25000003 PHARM REV CODE 250: Performed by: SPECIALIST

## 2022-01-01 PROCEDURE — 31622 DX BRONCHOSCOPE/WASH: CPT

## 2022-01-01 PROCEDURE — 33430 PR REPLACEMENT OF MITRAL VALVE: ICD-10-PCS | Mod: AS,,, | Performed by: PHYSICIAN ASSISTANT

## 2022-01-01 PROCEDURE — 87040 BLOOD CULTURE FOR BACTERIA: CPT | Performed by: INTERNAL MEDICINE

## 2022-01-01 PROCEDURE — 93010 ELECTROCARDIOGRAM REPORT: CPT | Mod: ,,, | Performed by: INTERNAL MEDICINE

## 2022-01-01 PROCEDURE — 84100 ASSAY OF PHOSPHORUS: CPT | Performed by: THORACIC SURGERY (CARDIOTHORACIC VASCULAR SURGERY)

## 2022-01-01 PROCEDURE — 97116 GAIT TRAINING THERAPY: CPT

## 2022-01-01 PROCEDURE — 37799 UNLISTED PX VASCULAR SURGERY: CPT

## 2022-01-01 PROCEDURE — 11000001 HC ACUTE MED/SURG PRIVATE ROOM

## 2022-01-01 PROCEDURE — 25000242 PHARM REV CODE 250 ALT 637 W/ HCPCS: Performed by: INTERNAL MEDICINE

## 2022-01-01 PROCEDURE — C1894 INTRO/SHEATH, NON-LASER: HCPCS | Performed by: INTERNAL MEDICINE

## 2022-01-01 PROCEDURE — 63600175 PHARM REV CODE 636 W HCPCS

## 2022-01-01 PROCEDURE — 80048 BASIC METABOLIC PNL TOTAL CA: CPT | Performed by: SPECIALIST

## 2022-01-01 PROCEDURE — 25000003 PHARM REV CODE 250: Performed by: PHYSICIAN ASSISTANT

## 2022-01-01 PROCEDURE — 20000000 HC ICU ROOM

## 2022-01-01 PROCEDURE — 93010 EKG 12-LEAD: ICD-10-PCS | Mod: ,,, | Performed by: INTERNAL MEDICINE

## 2022-01-01 PROCEDURE — 83735 ASSAY OF MAGNESIUM: CPT | Performed by: NURSE PRACTITIONER

## 2022-01-01 PROCEDURE — 94799 UNLISTED PULMONARY SVC/PX: CPT

## 2022-01-01 PROCEDURE — 80048 BASIC METABOLIC PNL TOTAL CA: CPT | Performed by: THORACIC SURGERY (CARDIOTHORACIC VASCULAR SURGERY)

## 2022-01-01 PROCEDURE — 99204 OFFICE O/P NEW MOD 45 MIN: CPT | Mod: ,,, | Performed by: SPECIALIST

## 2022-01-01 PROCEDURE — 86920 COMPATIBILITY TEST SPIN: CPT | Performed by: SPECIALIST

## 2022-01-01 PROCEDURE — 99222 1ST HOSP IP/OBS MODERATE 55: CPT | Mod: ,,, | Performed by: NURSE PRACTITIONER

## 2022-01-01 PROCEDURE — 94003 VENT MGMT INPAT SUBQ DAY: CPT

## 2022-01-01 PROCEDURE — 94660 CPAP INITIATION&MGMT: CPT

## 2022-01-01 PROCEDURE — 71046 X-RAY EXAM CHEST 2 VIEWS: CPT | Mod: TC

## 2022-01-01 PROCEDURE — 99024 POSTOP FOLLOW-UP VISIT: CPT | Mod: POP,,, | Performed by: PHYSICIAN ASSISTANT

## 2022-01-01 PROCEDURE — 36415 COLL VENOUS BLD VENIPUNCTURE: CPT | Performed by: STUDENT IN AN ORGANIZED HEALTH CARE EDUCATION/TRAINING PROGRAM

## 2022-01-01 PROCEDURE — P9045 ALBUMIN (HUMAN), 5%, 250 ML: HCPCS | Mod: JG | Performed by: SPECIALIST

## 2022-01-01 PROCEDURE — C1887 CATHETER, GUIDING: HCPCS | Performed by: SPECIALIST

## 2022-01-01 PROCEDURE — 83690 ASSAY OF LIPASE: CPT | Performed by: PHYSICIAN ASSISTANT

## 2022-01-01 PROCEDURE — 97530 THERAPEUTIC ACTIVITIES: CPT | Mod: CQ

## 2022-01-01 PROCEDURE — 99900026 HC AIRWAY MAINTENANCE (STAT)

## 2022-01-01 PROCEDURE — 99900035 HC TECH TIME PER 15 MIN (STAT)

## 2022-01-01 PROCEDURE — 93010 EKG 12-LEAD: ICD-10-PCS | Mod: ,,, | Performed by: STUDENT IN AN ORGANIZED HEALTH CARE EDUCATION/TRAINING PROGRAM

## 2022-01-01 PROCEDURE — 99152 MOD SED SAME PHYS/QHP 5/>YRS: CPT | Performed by: INTERNAL MEDICINE

## 2022-01-01 PROCEDURE — 80076 HEPATIC FUNCTION PANEL: CPT | Performed by: NURSE PRACTITIONER

## 2022-01-01 PROCEDURE — 25000003 PHARM REV CODE 250: Performed by: STUDENT IN AN ORGANIZED HEALTH CARE EDUCATION/TRAINING PROGRAM

## 2022-01-01 PROCEDURE — 82803 BLOOD GASES ANY COMBINATION: CPT

## 2022-01-01 PROCEDURE — 85007 BL SMEAR W/DIFF WBC COUNT: CPT

## 2022-01-01 PROCEDURE — 27201423 OPTIME MED/SURG SUP & DEVICES STERILE SUPPLY: Performed by: INTERNAL MEDICINE

## 2022-01-01 PROCEDURE — 72040 X-RAY EXAM NECK SPINE 2-3 VW: CPT | Mod: TC

## 2022-01-01 PROCEDURE — S0030 INJECTION, METRONIDAZOLE: HCPCS | Performed by: INTERNAL MEDICINE

## 2022-01-01 PROCEDURE — C1729 CATH, DRAINAGE: HCPCS | Performed by: SPECIALIST

## 2022-01-01 PROCEDURE — 37000009 HC ANESTHESIA EA ADD 15 MINS: Performed by: SPECIALIST

## 2022-01-01 PROCEDURE — 80053 COMPREHEN METABOLIC PANEL: CPT | Performed by: NURSE PRACTITIONER

## 2022-01-01 PROCEDURE — 27000190 HC CPAP FULL FACE MASK W/VALVE

## 2022-01-01 PROCEDURE — 83735 ASSAY OF MAGNESIUM: CPT | Performed by: THORACIC SURGERY (CARDIOTHORACIC VASCULAR SURGERY)

## 2022-01-01 PROCEDURE — 36415 COLL VENOUS BLD VENIPUNCTURE: CPT | Performed by: SPECIALIST

## 2022-01-01 PROCEDURE — 97164 PT RE-EVAL EST PLAN CARE: CPT

## 2022-01-01 PROCEDURE — 84134 ASSAY OF PREALBUMIN: CPT | Performed by: SPECIALIST

## 2022-01-01 PROCEDURE — 33430 REPLACEMENT OF MITRAL VALVE: CPT | Mod: AS,,, | Performed by: PHYSICIAN ASSISTANT

## 2022-01-01 PROCEDURE — 93571 IV DOP VEL&/PRESS C FLO 1ST: CPT | Mod: 74,LD | Performed by: INTERNAL MEDICINE

## 2022-01-01 PROCEDURE — 84132 ASSAY OF SERUM POTASSIUM: CPT | Performed by: SPECIALIST

## 2022-01-01 PROCEDURE — 21400001 HC TELEMETRY ROOM

## 2022-01-01 PROCEDURE — 87493 C DIFF AMPLIFIED PROBE: CPT | Performed by: SPECIALIST

## 2022-01-01 PROCEDURE — 36415 COLL VENOUS BLD VENIPUNCTURE: CPT | Performed by: PHYSICIAN ASSISTANT

## 2022-01-01 PROCEDURE — 36000713 HC OR TIME LEV V EA ADD 15 MIN: Performed by: SPECIALIST

## 2022-01-01 PROCEDURE — 63600175 PHARM REV CODE 636 W HCPCS: Mod: JG | Performed by: PHYSICIAN ASSISTANT

## 2022-01-01 PROCEDURE — 99900025 HC BRONCHOSCOPY-ASST (STAT)

## 2022-01-01 PROCEDURE — 86318 IA INFECTIOUS AGENT ANTIBODY: CPT | Performed by: INTERNAL MEDICINE

## 2022-01-01 PROCEDURE — 33533 PR CABG, ARTERIAL, SINGLE: ICD-10-PCS | Mod: 51,,, | Performed by: SPECIALIST

## 2022-01-01 PROCEDURE — 25500020 PHARM REV CODE 255: Performed by: INTERNAL MEDICINE

## 2022-01-01 PROCEDURE — 36000712 HC OR TIME LEV V 1ST 15 MIN: Performed by: SPECIALIST

## 2022-01-01 PROCEDURE — 63600175 PHARM REV CODE 636 W HCPCS: Performed by: SURGERY

## 2022-01-01 PROCEDURE — 93005 ELECTROCARDIOGRAM TRACING: CPT

## 2022-01-01 PROCEDURE — 33517 CABG ARTERY-VEIN SINGLE: CPT | Mod: AS,,, | Performed by: PHYSICIAN ASSISTANT

## 2022-01-01 PROCEDURE — 33508 ENDOSCOPIC VEIN HARVEST: CPT | Mod: 59,,, | Performed by: SPECIALIST

## 2022-01-01 PROCEDURE — 27202055 HC BRONCHOSCOPE, DISP

## 2022-01-01 PROCEDURE — 63600175 PHARM REV CODE 636 W HCPCS: Mod: JG | Performed by: SPECIALIST

## 2022-01-01 PROCEDURE — 85025 COMPLETE CBC W/AUTO DIFF WBC: CPT | Performed by: NURSE PRACTITIONER

## 2022-01-01 PROCEDURE — 99900031 HC PATIENT EDUCATION (STAT)

## 2022-01-01 PROCEDURE — C1887 CATHETER, GUIDING: HCPCS | Performed by: INTERNAL MEDICINE

## 2022-01-01 PROCEDURE — 33517 CABG ARTERY-VEIN SINGLE: CPT | Mod: ,,, | Performed by: SPECIALIST

## 2022-01-01 PROCEDURE — 84100 ASSAY OF PHOSPHORUS: CPT | Performed by: STUDENT IN AN ORGANIZED HEALTH CARE EDUCATION/TRAINING PROGRAM

## 2022-01-01 PROCEDURE — 84075 ASSAY ALKALINE PHOSPHATASE: CPT | Performed by: PHYSICIAN ASSISTANT

## 2022-01-01 PROCEDURE — 85610 PROTHROMBIN TIME: CPT | Performed by: SPECIALIST

## 2022-01-01 PROCEDURE — 84100 ASSAY OF PHOSPHORUS: CPT | Performed by: SPECIALIST

## 2022-01-01 PROCEDURE — 85014 HEMATOCRIT: CPT | Performed by: STUDENT IN AN ORGANIZED HEALTH CARE EDUCATION/TRAINING PROGRAM

## 2022-01-01 PROCEDURE — 51702 INSERT TEMP BLADDER CATH: CPT

## 2022-01-01 PROCEDURE — 99204 PR OFFICE/OUTPT VISIT, NEW, LEVL IV, 45-59 MIN: ICD-10-PCS | Mod: ,,, | Performed by: SPECIALIST

## 2022-01-01 PROCEDURE — 99222 PR INITIAL HOSPITAL CARE,LEVL II: ICD-10-PCS | Mod: ,,, | Performed by: NURSE PRACTITIONER

## 2022-01-01 PROCEDURE — 36410 VNPNXR 3YR/> PHY/QHP DX/THER: CPT

## 2022-01-01 PROCEDURE — 80048 BASIC METABOLIC PNL TOTAL CA: CPT | Performed by: NURSE PRACTITIONER

## 2022-01-01 PROCEDURE — 86850 RBC ANTIBODY SCREEN: CPT | Performed by: SPECIALIST

## 2022-01-01 PROCEDURE — 33517 PR CABG, ARTERY-VEIN, SINGLE: ICD-10-PCS | Mod: ,,, | Performed by: SPECIALIST

## 2022-01-01 PROCEDURE — 25500020 PHARM REV CODE 255: Performed by: STUDENT IN AN ORGANIZED HEALTH CARE EDUCATION/TRAINING PROGRAM

## 2022-01-01 PROCEDURE — 36415 COLL VENOUS BLD VENIPUNCTURE: CPT | Performed by: THORACIC SURGERY (CARDIOTHORACIC VASCULAR SURGERY)

## 2022-01-01 PROCEDURE — 33430 PR REPLACEMENT OF MITRAL VALVE: ICD-10-PCS | Mod: ,,, | Performed by: SPECIALIST

## 2022-01-01 PROCEDURE — 97530 THERAPEUTIC ACTIVITIES: CPT

## 2022-01-01 PROCEDURE — 27200966 HC CLOSED SUCTION SYSTEM

## 2022-01-01 PROCEDURE — 33430 REPLACEMENT OF MITRAL VALVE: CPT | Mod: ,,, | Performed by: SPECIALIST

## 2022-01-01 PROCEDURE — 80048 BASIC METABOLIC PNL TOTAL CA: CPT | Performed by: STUDENT IN AN ORGANIZED HEALTH CARE EDUCATION/TRAINING PROGRAM

## 2022-01-01 PROCEDURE — 51701 INSERT BLADDER CATHETER: CPT

## 2022-01-01 PROCEDURE — 36569 INSJ PICC 5 YR+ W/O IMAGING: CPT

## 2022-01-01 PROCEDURE — 80053 COMPREHEN METABOLIC PANEL: CPT | Performed by: PHYSICIAN ASSISTANT

## 2022-01-01 PROCEDURE — 27800903 OPTIME MED/SURG SUP & DEVICES OTHER IMPLANTS: Performed by: SPECIALIST

## 2022-01-01 PROCEDURE — 93010 ELECTROCARDIOGRAM REPORT: CPT | Mod: ,,, | Performed by: STUDENT IN AN ORGANIZED HEALTH CARE EDUCATION/TRAINING PROGRAM

## 2022-01-01 PROCEDURE — 33533 CABG ARTERIAL SINGLE: CPT | Mod: 51,AS,, | Performed by: PHYSICIAN ASSISTANT

## 2022-01-01 PROCEDURE — 36415 COLL VENOUS BLD VENIPUNCTURE: CPT

## 2022-01-01 PROCEDURE — 85025 COMPLETE CBC W/AUTO DIFF WBC: CPT | Performed by: THORACIC SURGERY (CARDIOTHORACIC VASCULAR SURGERY)

## 2022-01-01 PROCEDURE — G0278 ILIAC ART ANGIO,CARDIAC CATH: HCPCS | Performed by: INTERNAL MEDICINE

## 2022-01-01 PROCEDURE — 27000207 HC ISOLATION

## 2022-01-01 PROCEDURE — 33533 CABG ARTERIAL SINGLE: CPT | Mod: 51,,, | Performed by: SPECIALIST

## 2022-01-01 PROCEDURE — 33508 PR ENDOSCOPY W/VIDEO-ASST VEIN HARVEST,CABG: ICD-10-PCS | Mod: 59,,, | Performed by: SPECIALIST

## 2022-01-01 PROCEDURE — 85027 COMPLETE CBC AUTOMATED: CPT

## 2022-01-01 PROCEDURE — 86318 IA INFECTIOUS AGENT ANTIBODY: CPT | Performed by: SPECIALIST

## 2022-01-01 PROCEDURE — 94002 VENT MGMT INPAT INIT DAY: CPT

## 2022-01-01 PROCEDURE — 87493 C DIFF AMPLIFIED PROBE: CPT | Performed by: INTERNAL MEDICINE

## 2022-01-01 PROCEDURE — C1894 INTRO/SHEATH, NON-LASER: HCPCS | Performed by: SPECIALIST

## 2022-01-01 PROCEDURE — P9045 ALBUMIN (HUMAN), 5%, 250 ML: HCPCS | Mod: JG | Performed by: PHYSICIAN ASSISTANT

## 2022-01-01 PROCEDURE — 72100 X-RAY EXAM L-S SPINE 2/3 VWS: CPT | Mod: TC

## 2022-01-01 PROCEDURE — 37000008 HC ANESTHESIA 1ST 15 MINUTES: Performed by: SPECIALIST

## 2022-01-01 PROCEDURE — 87077 CULTURE AEROBIC IDENTIFY: CPT | Performed by: NURSE PRACTITIONER

## 2022-01-01 PROCEDURE — 85014 HEMATOCRIT: CPT | Performed by: SPECIALIST

## 2022-01-01 PROCEDURE — 85730 THROMBOPLASTIN TIME PARTIAL: CPT | Performed by: SPECIALIST

## 2022-01-01 PROCEDURE — 33533 PR CABG, ARTERIAL, SINGLE: ICD-10-PCS | Mod: 51,AS,, | Performed by: PHYSICIAN ASSISTANT

## 2022-01-01 DEVICE — VALVE MOSAIC MITRAL CINCH 29MM: Type: IMPLANTABLE DEVICE | Site: HEART | Status: FUNCTIONAL

## 2022-01-01 RX ORDER — PHENYLEPHRINE HYDROCHLORIDE 10 MG/ML
INJECTION INTRAVENOUS
Status: DISCONTINUED | OUTPATIENT
Start: 2022-01-01 | End: 2022-01-01

## 2022-01-01 RX ORDER — FAMOTIDINE 10 MG/ML
20 INJECTION INTRAVENOUS 2 TIMES DAILY
Status: DISCONTINUED | OUTPATIENT
Start: 2022-01-01 | End: 2022-01-01

## 2022-01-01 RX ORDER — HYDROCODONE BITARTRATE AND ACETAMINOPHEN 5; 325 MG/1; MG/1
1 TABLET ORAL EVERY 4 HOURS PRN
Status: DISCONTINUED | OUTPATIENT
Start: 2022-01-01 | End: 2022-01-01

## 2022-01-01 RX ORDER — MUPIROCIN 20 MG/G
OINTMENT TOPICAL 2 TIMES DAILY
Status: DISPENSED | OUTPATIENT
Start: 2022-01-01 | End: 2022-01-01

## 2022-01-01 RX ORDER — SODIUM CHLORIDE 450 MG/100ML
75 INJECTION, SOLUTION INTRAVENOUS CONTINUOUS
Status: DISCONTINUED | OUTPATIENT
Start: 2022-01-01 | End: 2022-01-01

## 2022-01-01 RX ORDER — SODIUM CHLORIDE 0.9 % (FLUSH) 0.9 %
10 SYRINGE (ML) INJECTION EVERY 6 HOURS
Status: DISCONTINUED | OUTPATIENT
Start: 2022-01-01 | End: 2022-01-01 | Stop reason: HOSPADM

## 2022-01-01 RX ORDER — PAPAVERINE HYDROCHLORIDE 30 MG/ML
INJECTION INTRAMUSCULAR; INTRAVENOUS
Status: DISCONTINUED | OUTPATIENT
Start: 2022-01-01 | End: 2022-01-01 | Stop reason: HOSPADM

## 2022-01-01 RX ORDER — LIDOCAINE HYDROCHLORIDE 40 MG/ML
4 SOLUTION TOPICAL
Status: DISCONTINUED | OUTPATIENT
Start: 2022-01-01 | End: 2022-01-01 | Stop reason: HOSPADM

## 2022-01-01 RX ORDER — METOPROLOL SUCCINATE 25 MG/1
1 TABLET, EXTENDED RELEASE ORAL DAILY
COMMUNITY
Start: 2022-01-01

## 2022-01-01 RX ORDER — SPIRONOLACTONE 25 MG/1
12.5 TABLET ORAL DAILY
COMMUNITY

## 2022-01-01 RX ORDER — SUCRALFATE 1 G/1
1 TABLET ORAL
Status: DISCONTINUED | OUTPATIENT
Start: 2022-01-01 | End: 2022-01-01

## 2022-01-01 RX ORDER — SODIUM CHLORIDE, SODIUM LACTATE, POTASSIUM CHLORIDE, CALCIUM CHLORIDE 600; 310; 30; 20 MG/100ML; MG/100ML; MG/100ML; MG/100ML
INJECTION, SOLUTION INTRAVENOUS CONTINUOUS
Status: ACTIVE | OUTPATIENT
Start: 2022-01-01 | End: 2022-01-01

## 2022-01-01 RX ORDER — DIPHENHYDRAMINE HCL 25 MG
25 CAPSULE ORAL ONCE
Status: COMPLETED | OUTPATIENT
Start: 2022-01-01 | End: 2022-01-01

## 2022-01-01 RX ORDER — ACETAMINOPHEN 10 MG/ML
1000 INJECTION, SOLUTION INTRAVENOUS EVERY 6 HOURS
Status: COMPLETED | OUTPATIENT
Start: 2022-01-01 | End: 2022-01-01

## 2022-01-01 RX ORDER — AMIODARONE HYDROCHLORIDE 200 MG/1
200 TABLET ORAL DAILY
Qty: 30 TABLET | Refills: 11 | Status: SHIPPED | OUTPATIENT
Start: 2022-09-10 | End: 2023-09-10

## 2022-01-01 RX ORDER — ETOMIDATE 2 MG/ML
INJECTION INTRAVENOUS
Status: DISCONTINUED | OUTPATIENT
Start: 2022-01-01 | End: 2022-01-01

## 2022-01-01 RX ORDER — AMIODARONE HYDROCHLORIDE 200 MG/1
200 TABLET ORAL DAILY
Status: DISCONTINUED | OUTPATIENT
Start: 2022-09-10 | End: 2022-01-01 | Stop reason: HOSPADM

## 2022-01-01 RX ORDER — HEPARIN SODIUM 1000 [USP'U]/ML
INJECTION, SOLUTION INTRAVENOUS; SUBCUTANEOUS
Status: DISCONTINUED | OUTPATIENT
Start: 2022-01-01 | End: 2022-01-01 | Stop reason: HOSPADM

## 2022-01-01 RX ORDER — ATORVASTATIN CALCIUM 80 MG/1
80 TABLET, FILM COATED ORAL NIGHTLY
COMMUNITY

## 2022-01-01 RX ORDER — ATORVASTATIN CALCIUM 40 MG/1
80 TABLET, FILM COATED ORAL NIGHTLY
Status: DISCONTINUED | OUTPATIENT
Start: 2022-01-01 | End: 2022-01-01 | Stop reason: HOSPADM

## 2022-01-01 RX ORDER — ALBUMIN HUMAN 50 G/1000ML
12.5 SOLUTION INTRAVENOUS ONCE
Status: COMPLETED | OUTPATIENT
Start: 2022-01-01 | End: 2022-01-01

## 2022-01-01 RX ORDER — DIPHENHYDRAMINE HCL 25 MG
50 CAPSULE ORAL
Status: DISPENSED | OUTPATIENT
Start: 2022-01-01

## 2022-01-01 RX ORDER — FENTANYL CITRATE 50 UG/ML
50 INJECTION, SOLUTION INTRAMUSCULAR; INTRAVENOUS ONCE AS NEEDED
Status: COMPLETED | OUTPATIENT
Start: 2022-01-01 | End: 2022-01-01

## 2022-01-01 RX ORDER — METHYLPREDNISOLONE SOD SUCC 125 MG
125 VIAL (EA) INJECTION ONCE
Status: COMPLETED | OUTPATIENT
Start: 2022-01-01 | End: 2022-01-01

## 2022-01-01 RX ORDER — MUPIROCIN 20 MG/G
OINTMENT TOPICAL 2 TIMES DAILY
Status: DISCONTINUED | OUTPATIENT
Start: 2022-01-01 | End: 2022-01-01

## 2022-01-01 RX ORDER — ASPIRIN 81 MG/1
81 TABLET ORAL DAILY
Status: DISCONTINUED | OUTPATIENT
Start: 2022-01-01 | End: 2022-01-01

## 2022-01-01 RX ORDER — AMIODARONE HYDROCHLORIDE 200 MG/1
400 TABLET ORAL 2 TIMES DAILY
Status: COMPLETED | OUTPATIENT
Start: 2022-01-01 | End: 2022-01-01

## 2022-01-01 RX ORDER — FUROSEMIDE 10 MG/ML
40 INJECTION INTRAMUSCULAR; INTRAVENOUS ONCE
Status: COMPLETED | OUTPATIENT
Start: 2022-01-01 | End: 2022-01-01

## 2022-01-01 RX ORDER — FENTANYL CITRATE 50 UG/ML
INJECTION, SOLUTION INTRAMUSCULAR; INTRAVENOUS
Status: DISCONTINUED | OUTPATIENT
Start: 2022-01-01 | End: 2022-01-01 | Stop reason: HOSPADM

## 2022-01-01 RX ORDER — AMIODARONE HYDROCHLORIDE 200 MG/1
200 TABLET ORAL 2 TIMES DAILY
Status: DISCONTINUED | OUTPATIENT
Start: 2022-01-01 | End: 2022-01-01

## 2022-01-01 RX ORDER — DIAZEPAM 5 MG/1
10 TABLET ORAL ONCE
Status: COMPLETED | OUTPATIENT
Start: 2022-01-01 | End: 2022-01-01

## 2022-01-01 RX ORDER — FENTANYL CITRATE 50 UG/ML
INJECTION, SOLUTION INTRAMUSCULAR; INTRAVENOUS
Status: DISCONTINUED | OUTPATIENT
Start: 2022-01-01 | End: 2022-01-01

## 2022-01-01 RX ORDER — MUPIROCIN 20 MG/G
OINTMENT TOPICAL
Status: CANCELLED | OUTPATIENT
Start: 2022-01-01 | End: 2022-01-01

## 2022-01-01 RX ORDER — FUROSEMIDE 10 MG/ML
40 INJECTION INTRAMUSCULAR; INTRAVENOUS DAILY
Status: DISCONTINUED | OUTPATIENT
Start: 2022-01-01 | End: 2022-01-01 | Stop reason: HOSPADM

## 2022-01-01 RX ORDER — MIDODRINE HYDROCHLORIDE 5 MG/1
10 TABLET ORAL EVERY 8 HOURS
Status: DISCONTINUED | OUTPATIENT
Start: 2022-01-01 | End: 2022-01-01 | Stop reason: HOSPADM

## 2022-01-01 RX ORDER — LIDOCAINE HYDROCHLORIDE 40 MG/ML
4 INJECTION, SOLUTION RETROBULBAR ONCE
Status: COMPLETED | OUTPATIENT
Start: 2022-01-01 | End: 2022-01-01

## 2022-01-01 RX ORDER — AMIODARONE HYDROCHLORIDE 200 MG/1
200 TABLET ORAL 2 TIMES DAILY
Status: ON HOLD | COMMUNITY
End: 2022-01-01 | Stop reason: HOSPADM

## 2022-01-01 RX ORDER — LIDOCAINE HYDROCHLORIDE 20 MG/ML
15 SOLUTION OROPHARYNGEAL ONCE
Status: COMPLETED | OUTPATIENT
Start: 2022-01-01 | End: 2022-01-01

## 2022-01-01 RX ORDER — METOCLOPRAMIDE HYDROCHLORIDE 5 MG/ML
5 INJECTION INTRAMUSCULAR; INTRAVENOUS EVERY 6 HOURS PRN
Status: DISCONTINUED | OUTPATIENT
Start: 2022-01-01 | End: 2022-01-01

## 2022-01-01 RX ORDER — DOCUSATE SODIUM 100 MG/1
100 CAPSULE, LIQUID FILLED ORAL 2 TIMES DAILY
Status: DISCONTINUED | OUTPATIENT
Start: 2022-01-01 | End: 2022-01-01 | Stop reason: HOSPADM

## 2022-01-01 RX ORDER — PROTAMINE SULFATE 10 MG/ML
INJECTION, SOLUTION INTRAVENOUS
Status: DISCONTINUED | OUTPATIENT
Start: 2022-01-01 | End: 2022-01-01

## 2022-01-01 RX ORDER — LOPERAMIDE HYDROCHLORIDE 2 MG/1
4 CAPSULE ORAL ONCE
Status: DISCONTINUED | OUTPATIENT
Start: 2022-01-01 | End: 2022-01-01 | Stop reason: HOSPADM

## 2022-01-01 RX ORDER — ALBUMIN HUMAN 50 G/1000ML
25 SOLUTION INTRAVENOUS ONCE
Status: COMPLETED | OUTPATIENT
Start: 2022-01-01 | End: 2022-01-01

## 2022-01-01 RX ORDER — 3% SODIUM CHLORIDE 3 G/100ML
INJECTION, SOLUTION INTRAVENOUS
Status: COMPLETED | OUTPATIENT
Start: 2022-01-01 | End: 2022-01-01

## 2022-01-01 RX ORDER — FOLIC ACID 1 MG/1
1 TABLET ORAL DAILY
Status: DISCONTINUED | OUTPATIENT
Start: 2022-01-01 | End: 2022-01-01 | Stop reason: HOSPADM

## 2022-01-01 RX ORDER — METOPROLOL SUCCINATE 25 MG/1
25 TABLET, EXTENDED RELEASE ORAL DAILY
Status: DISCONTINUED | OUTPATIENT
Start: 2022-01-01 | End: 2022-01-01

## 2022-01-01 RX ORDER — EPHEDRINE SULFATE 50 MG/ML
INJECTION, SOLUTION INTRAVENOUS
Status: DISCONTINUED | OUTPATIENT
Start: 2022-01-01 | End: 2022-01-01

## 2022-01-01 RX ORDER — GLYCOPYRROLATE 0.2 MG/ML
INJECTION INTRAMUSCULAR; INTRAVENOUS
Status: DISCONTINUED | OUTPATIENT
Start: 2022-01-01 | End: 2022-01-01

## 2022-01-01 RX ORDER — NAPROXEN SODIUM 220 MG/1
81 TABLET, FILM COATED ORAL DAILY
Qty: 30 TABLET | Refills: 11 | Status: SHIPPED | OUTPATIENT
Start: 2022-09-10 | End: 2023-09-10

## 2022-01-01 RX ORDER — SODIUM CHLORIDE, SODIUM LACTATE, POTASSIUM CHLORIDE, CALCIUM CHLORIDE 600; 310; 30; 20 MG/100ML; MG/100ML; MG/100ML; MG/100ML
INJECTION, SOLUTION INTRAVENOUS CONTINUOUS
Status: DISCONTINUED | OUTPATIENT
Start: 2022-01-01 | End: 2022-01-01

## 2022-01-01 RX ORDER — ENOXAPARIN SODIUM 100 MG/ML
1 INJECTION SUBCUTANEOUS
Status: DISCONTINUED | OUTPATIENT
Start: 2022-01-01 | End: 2022-01-01 | Stop reason: HOSPADM

## 2022-01-01 RX ORDER — CALCIUM CHLORIDE INJECTION 100 MG/ML
INJECTION, SOLUTION INTRAVENOUS
Status: DISCONTINUED | OUTPATIENT
Start: 2022-01-01 | End: 2022-01-01

## 2022-01-01 RX ORDER — METRONIDAZOLE 500 MG/100ML
500 INJECTION, SOLUTION INTRAVENOUS
Status: DISCONTINUED | OUTPATIENT
Start: 2022-01-01 | End: 2022-01-01

## 2022-01-01 RX ORDER — SODIUM CHLORIDE 0.9 % (FLUSH) 0.9 %
10 SYRINGE (ML) INJECTION
Status: ACTIVE | OUTPATIENT
Start: 2022-01-01

## 2022-01-01 RX ORDER — VERAPAMIL HYDROCHLORIDE 2.5 MG/ML
INJECTION, SOLUTION INTRAVENOUS
Status: DISCONTINUED | OUTPATIENT
Start: 2022-01-01 | End: 2022-01-01 | Stop reason: HOSPADM

## 2022-01-01 RX ORDER — ACETAMINOPHEN 325 MG/1
650 TABLET ORAL EVERY 4 HOURS PRN
Status: DISCONTINUED | OUTPATIENT
Start: 2022-01-01 | End: 2022-01-01 | Stop reason: HOSPADM

## 2022-01-01 RX ORDER — BUDESONIDE 0.5 MG/2ML
0.5 INHALANT ORAL EVERY 12 HOURS
Status: DISCONTINUED | OUTPATIENT
Start: 2022-01-01 | End: 2022-01-01 | Stop reason: HOSPADM

## 2022-01-01 RX ORDER — HYDRALAZINE HYDROCHLORIDE 20 MG/ML
10 INJECTION INTRAMUSCULAR; INTRAVENOUS EVERY 6 HOURS PRN
Status: DISCONTINUED | OUTPATIENT
Start: 2022-01-01 | End: 2022-01-01 | Stop reason: HOSPADM

## 2022-01-01 RX ORDER — BISACODYL 10 MG
10 SUPPOSITORY, RECTAL RECTAL ONCE
Status: COMPLETED | OUTPATIENT
Start: 2022-01-01 | End: 2022-01-01

## 2022-01-01 RX ORDER — LOPERAMIDE HYDROCHLORIDE 2 MG/1
4 CAPSULE ORAL ONCE
Qty: 30 CAPSULE | Refills: 0 | Status: SHIPPED | OUTPATIENT
Start: 2022-01-01 | End: 2022-01-01

## 2022-01-01 RX ORDER — POTASSIUM CHLORIDE 14.9 MG/ML
20 INJECTION INTRAVENOUS
Status: DISCONTINUED | OUTPATIENT
Start: 2022-01-01 | End: 2022-01-01 | Stop reason: HOSPADM

## 2022-01-01 RX ORDER — MORPHINE SULFATE 4 MG/ML
2 INJECTION, SOLUTION INTRAMUSCULAR; INTRAVENOUS
Status: DISCONTINUED | OUTPATIENT
Start: 2022-01-01 | End: 2022-01-01 | Stop reason: HOSPADM

## 2022-01-01 RX ORDER — CALCIUM GLUCONATE 20 MG/ML
1 INJECTION, SOLUTION INTRAVENOUS
Status: DISCONTINUED | OUTPATIENT
Start: 2022-01-01 | End: 2022-01-01 | Stop reason: HOSPADM

## 2022-01-01 RX ORDER — METOPROLOL TARTRATE 25 MG/1
25 TABLET, FILM COATED ORAL 2 TIMES DAILY
Qty: 60 TABLET | Refills: 11 | Status: SHIPPED | OUTPATIENT
Start: 2022-01-01 | End: 2023-09-09

## 2022-01-01 RX ORDER — NAPROXEN SODIUM 220 MG/1
81 TABLET, FILM COATED ORAL DAILY
Status: DISCONTINUED | OUTPATIENT
Start: 2022-01-01 | End: 2022-01-01 | Stop reason: HOSPADM

## 2022-01-01 RX ORDER — METOPROLOL TARTRATE 25 MG/1
12.5 TABLET ORAL 2 TIMES DAILY
Status: DISCONTINUED | OUTPATIENT
Start: 2022-01-01 | End: 2022-01-01

## 2022-01-01 RX ORDER — VANCOMYCIN HYDROCHLORIDE 125 MG/1
125 CAPSULE ORAL EVERY 6 HOURS
Status: DISCONTINUED | OUTPATIENT
Start: 2022-01-01 | End: 2022-01-01

## 2022-01-01 RX ORDER — VANCOMYCIN HCL IN 5 % DEXTROSE 1G/250ML
1000 PLASTIC BAG, INJECTION (ML) INTRAVENOUS
Status: DISCONTINUED | OUTPATIENT
Start: 2022-01-01 | End: 2022-01-01

## 2022-01-01 RX ORDER — MUPIROCIN 20 MG/G
OINTMENT TOPICAL
Status: COMPLETED | OUTPATIENT
Start: 2022-01-01 | End: 2022-01-01

## 2022-01-01 RX ORDER — ALBUMIN HUMAN 50 G/1000ML
50 SOLUTION INTRAVENOUS ONCE
Status: COMPLETED | OUTPATIENT
Start: 2022-01-01 | End: 2022-01-01

## 2022-01-01 RX ORDER — VANCOMYCIN HYDROCHLORIDE 125 MG/1
500 CAPSULE ORAL EVERY 6 HOURS
Status: DISCONTINUED | OUTPATIENT
Start: 2022-01-01 | End: 2022-01-01

## 2022-01-01 RX ORDER — ROCURONIUM BROMIDE 10 MG/ML
INJECTION, SOLUTION INTRAVENOUS
Status: DISCONTINUED | OUTPATIENT
Start: 2022-01-01 | End: 2022-01-01

## 2022-01-01 RX ORDER — ONDANSETRON 4 MG/1
8 TABLET, ORALLY DISINTEGRATING ORAL EVERY 8 HOURS PRN
Status: DISCONTINUED | OUTPATIENT
Start: 2022-01-01 | End: 2022-01-01 | Stop reason: HOSPADM

## 2022-01-01 RX ORDER — METOPROLOL TARTRATE 25 MG/1
25 TABLET, FILM COATED ORAL 2 TIMES DAILY
Status: DISCONTINUED | OUTPATIENT
Start: 2022-01-01 | End: 2022-01-01 | Stop reason: HOSPADM

## 2022-01-01 RX ORDER — POLYETHYLENE GLYCOL 3350 17 G/17G
17 POWDER, FOR SOLUTION ORAL 2 TIMES DAILY
Status: DISCONTINUED | OUTPATIENT
Start: 2022-01-01 | End: 2022-01-01 | Stop reason: HOSPADM

## 2022-01-01 RX ORDER — MORPHINE SULFATE 10 MG/ML
2 INJECTION INTRAMUSCULAR; INTRAVENOUS; SUBCUTANEOUS
Status: DISCONTINUED | OUTPATIENT
Start: 2022-01-01 | End: 2022-01-01

## 2022-01-01 RX ORDER — MAGNESIUM SULFATE HEPTAHYDRATE 40 MG/ML
4 INJECTION, SOLUTION INTRAVENOUS
Status: DISCONTINUED | OUTPATIENT
Start: 2022-01-01 | End: 2022-01-01 | Stop reason: HOSPADM

## 2022-01-01 RX ORDER — ACETYLCYSTEINE 200 MG/ML
2 SOLUTION ORAL; RESPIRATORY (INHALATION) 3 TIMES DAILY
Status: DISCONTINUED | OUTPATIENT
Start: 2022-01-01 | End: 2022-01-01

## 2022-01-01 RX ORDER — UMECLIDINIUM BROMIDE AND VILANTEROL TRIFENATATE 62.5; 25 UG/1; UG/1
1 POWDER RESPIRATORY (INHALATION) DAILY
COMMUNITY

## 2022-01-01 RX ORDER — SODIUM CHLORIDE 0.9 % (FLUSH) 0.9 %
10 SYRINGE (ML) INJECTION
Status: DISCONTINUED | OUTPATIENT
Start: 2022-01-01 | End: 2022-01-01

## 2022-01-01 RX ORDER — IPRATROPIUM BROMIDE AND ALBUTEROL SULFATE 2.5; .5 MG/3ML; MG/3ML
3 SOLUTION RESPIRATORY (INHALATION) EVERY 6 HOURS
Status: DISCONTINUED | OUTPATIENT
Start: 2022-01-01 | End: 2022-01-01 | Stop reason: HOSPADM

## 2022-01-01 RX ORDER — TRANEXAMIC ACID 100 MG/ML
INJECTION, SOLUTION INTRAVENOUS
Status: DISCONTINUED | OUTPATIENT
Start: 2022-01-01 | End: 2022-01-01

## 2022-01-01 RX ORDER — FENTANYL CITRATE 50 UG/ML
25 INJECTION, SOLUTION INTRAMUSCULAR; INTRAVENOUS ONCE
Status: COMPLETED | OUTPATIENT
Start: 2022-01-01 | End: 2022-01-01

## 2022-01-01 RX ORDER — SODIUM CHLORIDE 0.9 % (FLUSH) 0.9 %
10 SYRINGE (ML) INJECTION EVERY 6 HOURS
Status: DISCONTINUED | OUTPATIENT
Start: 2022-01-01 | End: 2022-01-01

## 2022-01-01 RX ORDER — VANCOMYCIN HYDROCHLORIDE 1 G/20ML
INJECTION, POWDER, LYOPHILIZED, FOR SOLUTION INTRAVENOUS
Status: DISCONTINUED | OUTPATIENT
Start: 2022-01-01 | End: 2022-01-01 | Stop reason: HOSPADM

## 2022-01-01 RX ORDER — POLYETHYLENE GLYCOL 3350 17 G/17G
17 POWDER, FOR SOLUTION ORAL DAILY
Status: DISCONTINUED | OUTPATIENT
Start: 2022-01-01 | End: 2022-01-01

## 2022-01-01 RX ORDER — ALBUMIN HUMAN 50 G/1000ML
12.5 SOLUTION INTRAVENOUS
Status: COMPLETED | OUTPATIENT
Start: 2022-01-01 | End: 2022-01-01

## 2022-01-01 RX ORDER — LIDOCAINE HYDROCHLORIDE 10 MG/ML
1 INJECTION, SOLUTION EPIDURAL; INFILTRATION; INTRACAUDAL; PERINEURAL ONCE
Status: CANCELLED | OUTPATIENT
Start: 2022-01-01 | End: 2022-01-01

## 2022-01-01 RX ORDER — MIDAZOLAM HYDROCHLORIDE 5 MG/ML
5 INJECTION INTRAMUSCULAR; INTRAVENOUS ONCE AS NEEDED
Status: DISCONTINUED | OUTPATIENT
Start: 2022-01-01 | End: 2022-01-01

## 2022-01-01 RX ORDER — DEXMEDETOMIDINE HYDROCHLORIDE 4 UG/ML
0-1.4 INJECTION, SOLUTION INTRAVENOUS CONTINUOUS
Status: DISCONTINUED | OUTPATIENT
Start: 2022-01-01 | End: 2022-01-01

## 2022-01-01 RX ORDER — FUROSEMIDE 10 MG/ML
20 INJECTION INTRAMUSCULAR; INTRAVENOUS
Status: COMPLETED | OUTPATIENT
Start: 2022-01-01 | End: 2022-01-01

## 2022-01-01 RX ORDER — CALCIUM GLUCONATE 20 MG/ML
3 INJECTION, SOLUTION INTRAVENOUS
Status: DISCONTINUED | OUTPATIENT
Start: 2022-01-01 | End: 2022-01-01 | Stop reason: HOSPADM

## 2022-01-01 RX ORDER — ENOXAPARIN SODIUM 100 MG/ML
40 INJECTION SUBCUTANEOUS EVERY 24 HOURS
Status: DISCONTINUED | OUTPATIENT
Start: 2022-01-01 | End: 2022-01-01

## 2022-01-01 RX ORDER — POTASSIUM CHLORIDE 14.9 MG/ML
40 INJECTION INTRAVENOUS
Status: DISCONTINUED | OUTPATIENT
Start: 2022-01-01 | End: 2022-01-01 | Stop reason: HOSPADM

## 2022-01-01 RX ORDER — ONDANSETRON 2 MG/ML
4 INJECTION INTRAMUSCULAR; INTRAVENOUS EVERY 12 HOURS PRN
Status: DISCONTINUED | OUTPATIENT
Start: 2022-01-01 | End: 2022-01-01 | Stop reason: HOSPADM

## 2022-01-01 RX ORDER — MAGNESIUM SULFATE HEPTAHYDRATE 40 MG/ML
2 INJECTION, SOLUTION INTRAVENOUS
Status: DISCONTINUED | OUTPATIENT
Start: 2022-01-01 | End: 2022-01-01 | Stop reason: HOSPADM

## 2022-01-01 RX ORDER — LIDOCAINE HYDROCHLORIDE 20 MG/ML
INJECTION, SOLUTION INFILTRATION; PERINEURAL
Status: DISCONTINUED | OUTPATIENT
Start: 2022-01-01 | End: 2022-01-01 | Stop reason: HOSPADM

## 2022-01-01 RX ORDER — ONDANSETRON 2 MG/ML
INJECTION INTRAMUSCULAR; INTRAVENOUS
Status: DISCONTINUED | OUTPATIENT
Start: 2022-01-01 | End: 2022-01-01 | Stop reason: HOSPADM

## 2022-01-01 RX ORDER — MIDODRINE HYDROCHLORIDE 10 MG/1
10 TABLET ORAL EVERY 8 HOURS
Qty: 90 TABLET | Refills: 11 | Status: SHIPPED | OUTPATIENT
Start: 2022-01-01 | End: 2023-09-09

## 2022-01-01 RX ORDER — PROPOFOL 10 MG/ML
VIAL (ML) INTRAVENOUS
Status: DISCONTINUED | OUTPATIENT
Start: 2022-01-01 | End: 2022-01-01

## 2022-01-01 RX ORDER — METOCLOPRAMIDE HYDROCHLORIDE 5 MG/ML
5 INJECTION INTRAMUSCULAR; INTRAVENOUS EVERY 6 HOURS
Status: COMPLETED | OUTPATIENT
Start: 2022-01-01 | End: 2022-01-01

## 2022-01-01 RX ORDER — HYDROCODONE BITARTRATE AND ACETAMINOPHEN 500; 5 MG/1; MG/1
TABLET ORAL
Status: DISCONTINUED | OUTPATIENT
Start: 2022-01-01 | End: 2022-01-01 | Stop reason: HOSPADM

## 2022-01-01 RX ORDER — MIDODRINE HYDROCHLORIDE 5 MG/1
10 TABLET ORAL
Status: DISCONTINUED | OUTPATIENT
Start: 2022-01-01 | End: 2022-01-01

## 2022-01-01 RX ORDER — NALOXONE HCL 0.4 MG/ML
0.4 VIAL (ML) INJECTION
Status: DISCONTINUED | OUTPATIENT
Start: 2022-01-01 | End: 2022-01-01 | Stop reason: HOSPADM

## 2022-01-01 RX ORDER — EZETIMIBE 10 MG/1
10 TABLET ORAL NIGHTLY
COMMUNITY

## 2022-01-01 RX ORDER — SODIUM CHLORIDE, SODIUM GLUCONATE, SODIUM ACETATE, POTASSIUM CHLORIDE AND MAGNESIUM CHLORIDE 30; 37; 368; 526; 502 MG/100ML; MG/100ML; MG/100ML; MG/100ML; MG/100ML
1000 INJECTION, SOLUTION INTRAVENOUS CONTINUOUS
Status: ACTIVE | OUTPATIENT
Start: 2022-01-01 | End: 2022-01-01

## 2022-01-01 RX ORDER — LIDOCAINE HYDROCHLORIDE 20 MG/ML
5 SOLUTION OROPHARYNGEAL ONCE
Status: COMPLETED | OUTPATIENT
Start: 2022-01-01 | End: 2022-01-01

## 2022-01-01 RX ORDER — ACETAMINOPHEN 650 MG/20.3ML
650 LIQUID ORAL EVERY 6 HOURS PRN
Status: DISCONTINUED | OUTPATIENT
Start: 2022-01-01 | End: 2022-01-01 | Stop reason: HOSPADM

## 2022-01-01 RX ORDER — SODIUM CHLORIDE 9 MG/ML
INJECTION, SOLUTION INTRAVENOUS CONTINUOUS
Status: ACTIVE | OUTPATIENT
Start: 2022-01-01 | End: 2022-01-01

## 2022-01-01 RX ORDER — MIDAZOLAM HYDROCHLORIDE 1 MG/ML
INJECTION INTRAMUSCULAR; INTRAVENOUS
Status: COMPLETED
Start: 2022-01-01 | End: 2022-01-01

## 2022-01-01 RX ORDER — SODIUM CHLORIDE, SODIUM GLUCONATE, SODIUM ACETATE, POTASSIUM CHLORIDE AND MAGNESIUM CHLORIDE 30; 37; 368; 526; 502 MG/100ML; MG/100ML; MG/100ML; MG/100ML; MG/100ML
1000 INJECTION, SOLUTION INTRAVENOUS CONTINUOUS
Status: CANCELLED | OUTPATIENT
Start: 2022-01-01 | End: 2022-10-07

## 2022-01-01 RX ORDER — CLOPIDOGREL BISULFATE 75 MG/1
75 TABLET ORAL NIGHTLY
COMMUNITY

## 2022-01-01 RX ORDER — NOREPINEPHRINE BITARTRATE/D5W 8 MG/250ML
0-3 PLASTIC BAG, INJECTION (ML) INTRAVENOUS CONTINUOUS
Status: DISCONTINUED | OUTPATIENT
Start: 2022-01-01 | End: 2022-01-01

## 2022-01-01 RX ORDER — HEPARIN SODIUM 1000 [USP'U]/ML
INJECTION, SOLUTION INTRAVENOUS; SUBCUTANEOUS
Status: DISCONTINUED | OUTPATIENT
Start: 2022-01-01 | End: 2022-01-01

## 2022-01-01 RX ORDER — IODIXANOL 320 MG/ML
INJECTION, SOLUTION INTRAVASCULAR
Status: DISCONTINUED | OUTPATIENT
Start: 2022-01-01 | End: 2022-01-01 | Stop reason: HOSPADM

## 2022-01-01 RX ORDER — CEFAZOLIN SODIUM 2 G/50ML
2 SOLUTION INTRAVENOUS
Status: DISCONTINUED | OUTPATIENT
Start: 2022-01-01 | End: 2022-01-01

## 2022-01-01 RX ORDER — SODIUM CHLORIDE 450 MG/100ML
INJECTION, SOLUTION INTRAVENOUS CONTINUOUS
Status: DISCONTINUED | OUTPATIENT
Start: 2022-01-01 | End: 2022-01-01

## 2022-01-01 RX ORDER — PANTOPRAZOLE SODIUM 40 MG/10ML
40 INJECTION, POWDER, LYOPHILIZED, FOR SOLUTION INTRAVENOUS 2 TIMES DAILY
Status: DISCONTINUED | OUTPATIENT
Start: 2022-01-01 | End: 2022-01-01 | Stop reason: HOSPADM

## 2022-01-01 RX ORDER — VASOPRESSIN 20 [USP'U]/ML
INJECTION, SOLUTION INTRAMUSCULAR; SUBCUTANEOUS
Status: DISCONTINUED | OUTPATIENT
Start: 2022-01-01 | End: 2022-01-01

## 2022-01-01 RX ORDER — ASPIRIN 81 MG/1
81 TABLET ORAL NIGHTLY
Status: ON HOLD | COMMUNITY
End: 2022-01-01 | Stop reason: HOSPADM

## 2022-01-01 RX ORDER — FLUTICASONE PROPIONATE 50 MCG
1 SPRAY, SUSPENSION (ML) NASAL DAILY PRN
COMMUNITY

## 2022-01-01 RX ORDER — MIDAZOLAM HYDROCHLORIDE 1 MG/ML
INJECTION INTRAMUSCULAR; INTRAVENOUS
Status: DISCONTINUED | OUTPATIENT
Start: 2022-01-01 | End: 2022-01-01

## 2022-01-01 RX ORDER — HEPARIN SODIUM 5000 [USP'U]/ML
INJECTION, SOLUTION INTRAVENOUS; SUBCUTANEOUS
Status: DISCONTINUED | OUTPATIENT
Start: 2022-01-01 | End: 2022-01-01 | Stop reason: HOSPADM

## 2022-01-01 RX ORDER — MIDODRINE HYDROCHLORIDE 5 MG/1
5 TABLET ORAL
Status: DISCONTINUED | OUTPATIENT
Start: 2022-01-01 | End: 2022-01-01

## 2022-01-01 RX ORDER — MIDAZOLAM HYDROCHLORIDE 1 MG/ML
INJECTION, SOLUTION INTRAMUSCULAR; INTRAVENOUS
Status: DISCONTINUED | OUTPATIENT
Start: 2022-01-01 | End: 2022-01-01 | Stop reason: HOSPADM

## 2022-01-01 RX ORDER — AMIODARONE HYDROCHLORIDE 200 MG/1
200 TABLET ORAL 2 TIMES DAILY
Status: DISCONTINUED | OUTPATIENT
Start: 2022-01-01 | End: 2022-01-01 | Stop reason: HOSPADM

## 2022-01-01 RX ORDER — FUROSEMIDE 20 MG/1
20 TABLET ORAL DAILY
COMMUNITY
Start: 2022-01-01

## 2022-01-01 RX ORDER — HYDROCODONE BITARTRATE AND ACETAMINOPHEN 500; 5 MG/1; MG/1
TABLET ORAL
Status: CANCELLED | OUTPATIENT
Start: 2022-01-01

## 2022-01-01 RX ORDER — SODIUM BICARBONATE 1 MEQ/ML
SYRINGE (ML) INTRAVENOUS
Status: COMPLETED
Start: 2022-01-01 | End: 2022-01-01

## 2022-01-01 RX ORDER — METOCLOPRAMIDE HYDROCHLORIDE 5 MG/5ML
5 SOLUTION ORAL EVERY 8 HOURS
Status: DISCONTINUED | OUTPATIENT
Start: 2022-01-01 | End: 2022-01-01 | Stop reason: HOSPADM

## 2022-01-01 RX ORDER — SODIUM CHLORIDE 9 MG/ML
INJECTION, SOLUTION INTRAVENOUS ONCE
Status: COMPLETED | OUTPATIENT
Start: 2022-01-01 | End: 2022-01-01

## 2022-01-01 RX ORDER — OXYCODONE HYDROCHLORIDE 5 MG/1
5 TABLET ORAL EVERY 4 HOURS PRN
Status: DISCONTINUED | OUTPATIENT
Start: 2022-01-01 | End: 2022-01-01

## 2022-01-01 RX ADMIN — SUCRALFATE 1 G: 1 TABLET ORAL at 11:08

## 2022-01-01 RX ADMIN — PROTAMINE SULFATE 400 MG: 10 INJECTION, SOLUTION INTRAVENOUS at 02:08

## 2022-01-01 RX ADMIN — METOCLOPRAMIDE HYDROCHLORIDE 5 MG: 5 SOLUTION ORAL at 05:09

## 2022-01-01 RX ADMIN — ACETYLCYSTEINE 2 ML: 200 INHALANT RESPIRATORY (INHALATION) at 08:09

## 2022-01-01 RX ADMIN — IPRATROPIUM BROMIDE AND ALBUTEROL SULFATE 3 ML: 2.5; .5 SOLUTION RESPIRATORY (INHALATION) at 02:09

## 2022-01-01 RX ADMIN — MIDODRINE HYDROCHLORIDE 10 MG: 5 TABLET ORAL at 09:09

## 2022-01-01 RX ADMIN — SODIUM CHLORIDE, PRESERVATIVE FREE 10 ML: 5 INJECTION INTRAVENOUS at 06:09

## 2022-01-01 RX ADMIN — FOLIC ACID 1 MG: 1 TABLET ORAL at 10:09

## 2022-01-01 RX ADMIN — PANTOPRAZOLE SODIUM 40 MG: 40 INJECTION, POWDER, FOR SOLUTION INTRAVENOUS at 08:08

## 2022-01-01 RX ADMIN — DOCUSATE SODIUM 100 MG: 100 CAPSULE, LIQUID FILLED ORAL at 09:09

## 2022-01-01 RX ADMIN — POTASSIUM CHLORIDE AND SODIUM CHLORIDE 125 ML/HR: 450; 150 INJECTION, SOLUTION INTRAVENOUS at 08:08

## 2022-01-01 RX ADMIN — IPRATROPIUM BROMIDE AND ALBUTEROL SULFATE 3 ML: 2.5; .5 SOLUTION RESPIRATORY (INHALATION) at 07:09

## 2022-01-01 RX ADMIN — HYDROCODONE BITARTRATE AND ACETAMINOPHEN 1 TABLET: 5; 325 TABLET ORAL at 01:09

## 2022-01-01 RX ADMIN — SODIUM CHLORIDE, PRESERVATIVE FREE 10 ML: 5 INJECTION INTRAVENOUS at 12:09

## 2022-01-01 RX ADMIN — METOPROLOL TARTRATE 25 MG: 25 TABLET, FILM COATED ORAL at 09:09

## 2022-01-01 RX ADMIN — CEFEPIME 2 G: 2 INJECTION, POWDER, FOR SOLUTION INTRAVENOUS at 04:09

## 2022-01-01 RX ADMIN — SODIUM CHLORIDE, PRESERVATIVE FREE 10 ML: 5 INJECTION INTRAVENOUS at 12:08

## 2022-01-01 RX ADMIN — HYDROCODONE BITARTRATE AND ACETAMINOPHEN 1 TABLET: 5; 325 TABLET ORAL at 08:09

## 2022-01-01 RX ADMIN — ENOXAPARIN SODIUM 90 MG: 100 INJECTION SUBCUTANEOUS at 05:09

## 2022-01-01 RX ADMIN — BUDESONIDE 0.5 MG: 0.5 INHALANT ORAL at 07:09

## 2022-01-01 RX ADMIN — ETOMIDATE 20 MG: 2 INJECTION INTRAVENOUS at 11:08

## 2022-01-01 RX ADMIN — ENOXAPARIN SODIUM 90 MG: 100 INJECTION SUBCUTANEOUS at 06:09

## 2022-01-01 RX ADMIN — AMIODARONE HYDROCHLORIDE 200 MG: 200 TABLET ORAL at 09:08

## 2022-01-01 RX ADMIN — BUDESONIDE 0.5 MG: 0.5 INHALANT ORAL at 08:08

## 2022-01-01 RX ADMIN — BUDESONIDE 0.5 MG: 0.5 INHALANT ORAL at 07:08

## 2022-01-01 RX ADMIN — ASPIRIN 81 MG: 81 TABLET, COATED ORAL at 08:08

## 2022-01-01 RX ADMIN — CEFEPIME 2 G: 2 INJECTION, POWDER, FOR SOLUTION INTRAVENOUS at 08:09

## 2022-01-01 RX ADMIN — HYDROCODONE BITARTRATE AND ACETAMINOPHEN 1 TABLET: 5; 325 TABLET ORAL at 11:09

## 2022-01-01 RX ADMIN — ALBUMIN (HUMAN) 50 G: 2.5 SOLUTION INTRAVENOUS at 08:08

## 2022-01-01 RX ADMIN — ENOXAPARIN SODIUM 90 MG: 100 INJECTION SUBCUTANEOUS at 04:09

## 2022-01-01 RX ADMIN — BUDESONIDE 0.5 MG: 0.5 INHALANT ORAL at 09:09

## 2022-01-01 RX ADMIN — ASPIRIN 81 MG: 81 TABLET, COATED ORAL at 10:09

## 2022-01-01 RX ADMIN — SODIUM CHLORIDE 3 UNITS/HR: 9 INJECTION, SOLUTION INTRAVENOUS at 12:08

## 2022-01-01 RX ADMIN — ASPIRIN 81 MG: 81 TABLET, COATED ORAL at 11:09

## 2022-01-01 RX ADMIN — DEXTROSE MONOHYDRATE 12.5 G: 25 INJECTION, SOLUTION INTRAVENOUS at 10:08

## 2022-01-01 RX ADMIN — METOCLOPRAMIDE 5 MG: 5 INJECTION, SOLUTION INTRAMUSCULAR; INTRAVENOUS at 12:08

## 2022-01-01 RX ADMIN — METHYLNALTREXONE BROMIDE 12 MG: 12 INJECTION, SOLUTION SUBCUTANEOUS at 08:09

## 2022-01-01 RX ADMIN — VANCOMYCIN HYDROCHLORIDE 125 MG: 125 CAPSULE ORAL at 10:09

## 2022-01-01 RX ADMIN — SODIUM CHLORIDE, SODIUM GLUCONATE, SODIUM ACETATE, POTASSIUM CHLORIDE AND MAGNESIUM CHLORIDE: 526; 502; 368; 37; 30 INJECTION, SOLUTION INTRAVENOUS at 11:08

## 2022-01-01 RX ADMIN — SODIUM CHLORIDE, PRESERVATIVE FREE 10 ML: 5 INJECTION INTRAVENOUS at 06:08

## 2022-01-01 RX ADMIN — PHENYLEPHRINE HYDROCHLORIDE 100 MCG: 10 INJECTION INTRAVENOUS at 11:08

## 2022-01-01 RX ADMIN — BUDESONIDE 0.5 MG: 0.5 INHALANT ORAL at 08:09

## 2022-01-01 RX ADMIN — CEFEPIME 2 G: 2 INJECTION, POWDER, FOR SOLUTION INTRAVENOUS at 12:09

## 2022-01-01 RX ADMIN — AMIODARONE HYDROCHLORIDE 200 MG: 200 TABLET ORAL at 09:09

## 2022-01-01 RX ADMIN — MUPIROCIN: 20 OINTMENT TOPICAL at 08:08

## 2022-01-01 RX ADMIN — SODIUM CHLORIDE, PRESERVATIVE FREE 10 ML: 5 INJECTION INTRAVENOUS at 05:08

## 2022-01-01 RX ADMIN — ENOXAPARIN SODIUM 40 MG: 40 INJECTION SUBCUTANEOUS at 05:08

## 2022-01-01 RX ADMIN — POLYETHYLENE GLYCOL 3350 17 G: 17 POWDER, FOR SOLUTION ORAL at 10:09

## 2022-01-01 RX ADMIN — MUPIROCIN: 20 OINTMENT TOPICAL at 09:08

## 2022-01-01 RX ADMIN — METOCLOPRAMIDE HYDROCHLORIDE 5 MG: 5 SOLUTION ORAL at 09:09

## 2022-01-01 RX ADMIN — MIDODRINE HYDROCHLORIDE 10 MG: 5 TABLET ORAL at 04:09

## 2022-01-01 RX ADMIN — FOLIC ACID 1 MG: 1 TABLET ORAL at 08:08

## 2022-01-01 RX ADMIN — PANTOPRAZOLE SODIUM 40 MG: 40 INJECTION, POWDER, FOR SOLUTION INTRAVENOUS at 09:09

## 2022-01-01 RX ADMIN — COLLAGENASE SANTYL: 250 OINTMENT TOPICAL at 08:08

## 2022-01-01 RX ADMIN — IPRATROPIUM BROMIDE AND ALBUTEROL SULFATE 3 ML: 2.5; .5 SOLUTION RESPIRATORY (INHALATION) at 02:08

## 2022-01-01 RX ADMIN — ONDANSETRON 4 MG: 2 INJECTION INTRAMUSCULAR; INTRAVENOUS at 01:08

## 2022-01-01 RX ADMIN — EPHEDRINE SULFATE 5 MG: 50 INJECTION INTRAVENOUS at 12:08

## 2022-01-01 RX ADMIN — NOREPINEPHRINE BITARTRATE 0.02 MCG/KG/MIN: 8 INJECTION, SOLUTION INTRAVENOUS at 02:09

## 2022-01-01 RX ADMIN — IPRATROPIUM BROMIDE AND ALBUTEROL SULFATE 3 ML: 2.5; .5 SOLUTION RESPIRATORY (INHALATION) at 07:08

## 2022-01-01 RX ADMIN — ALBUMIN (HUMAN) 12.5 G: 2.5 SOLUTION INTRAVENOUS at 07:08

## 2022-01-01 RX ADMIN — LIDOCAINE HYDROCHLORIDE 4 ML: 40 INJECTION, SOLUTION RETROBULBAR; TOPICAL at 10:09

## 2022-01-01 RX ADMIN — PANTOPRAZOLE SODIUM 40 MG: 40 INJECTION, POWDER, FOR SOLUTION INTRAVENOUS at 08:09

## 2022-01-01 RX ADMIN — POLYETHYLENE GLYCOL 3350 17 G: 17 POWDER, FOR SOLUTION ORAL at 08:09

## 2022-01-01 RX ADMIN — SODIUM CHLORIDE, POTASSIUM CHLORIDE, SODIUM LACTATE AND CALCIUM CHLORIDE 500 ML: 600; 310; 30; 20 INJECTION, SOLUTION INTRAVENOUS at 04:08

## 2022-01-01 RX ADMIN — FENTANYL CITRATE 200 MCG: 50 INJECTION, SOLUTION INTRAMUSCULAR; INTRAVENOUS at 11:08

## 2022-01-01 RX ADMIN — IPRATROPIUM BROMIDE AND ALBUTEROL SULFATE 3 ML: 2.5; .5 SOLUTION RESPIRATORY (INHALATION) at 08:08

## 2022-01-01 RX ADMIN — DOCUSATE SODIUM 100 MG: 100 CAPSULE, LIQUID FILLED ORAL at 10:09

## 2022-01-01 RX ADMIN — POLYETHYLENE GLYCOL 3350 17 G: 17 POWDER, FOR SOLUTION ORAL at 09:09

## 2022-01-01 RX ADMIN — DIATRIZOATE MEGLUMINE AND DIATRIZOATE SODIUM 360 ML: 660; 100 LIQUID ORAL; RECTAL at 09:08

## 2022-01-01 RX ADMIN — FOLIC ACID 1 MG: 1 TABLET ORAL at 09:09

## 2022-01-01 RX ADMIN — DOCUSATE SODIUM 100 MG: 100 CAPSULE, LIQUID FILLED ORAL at 08:08

## 2022-01-01 RX ADMIN — CEFUROXIME SODIUM 1.5 G: 1.5 INJECTION, POWDER, FOR SOLUTION INTRAVENOUS at 07:08

## 2022-01-01 RX ADMIN — METOCLOPRAMIDE HYDROCHLORIDE 5 MG: 5 SOLUTION ORAL at 03:09

## 2022-01-01 RX ADMIN — MORPHINE SULFATE 2 MG: 10 INJECTION, SOLUTION INTRAMUSCULAR; INTRAVENOUS at 08:08

## 2022-01-01 RX ADMIN — ACETAMINOPHEN 1000 MG: 10 INJECTION, SOLUTION INTRAVENOUS at 05:08

## 2022-01-01 RX ADMIN — I.V. FAT EMULSION 250 ML: 20 EMULSION INTRAVENOUS at 08:09

## 2022-01-01 RX ADMIN — IPRATROPIUM BROMIDE AND ALBUTEROL SULFATE 3 ML: 2.5; .5 SOLUTION RESPIRATORY (INHALATION) at 08:09

## 2022-01-01 RX ADMIN — METOCLOPRAMIDE HYDROCHLORIDE 5 MG: 5 SOLUTION ORAL at 02:09

## 2022-01-01 RX ADMIN — AMIODARONE HYDROCHLORIDE 400 MG: 200 TABLET ORAL at 08:08

## 2022-01-01 RX ADMIN — AMIODARONE HYDROCHLORIDE 400 MG: 200 TABLET ORAL at 11:08

## 2022-01-01 RX ADMIN — AMIODARONE HYDROCHLORIDE 200 MG: 200 TABLET ORAL at 10:09

## 2022-01-01 RX ADMIN — SODIUM CHLORIDE, POTASSIUM CHLORIDE, SODIUM LACTATE AND CALCIUM CHLORIDE 500 ML: 600; 310; 30; 20 INJECTION, SOLUTION INTRAVENOUS at 11:08

## 2022-01-01 RX ADMIN — METOCLOPRAMIDE 5 MG: 5 INJECTION, SOLUTION INTRAMUSCULAR; INTRAVENOUS at 06:08

## 2022-01-01 RX ADMIN — PROPOFOL 10 MG: 10 INJECTION, EMULSION INTRAVENOUS at 11:08

## 2022-01-01 RX ADMIN — Medication 81 MG: at 09:09

## 2022-01-01 RX ADMIN — CEFUROXIME SODIUM 1.5 G: 1.5 INJECTION, POWDER, FOR SOLUTION INTRAVENOUS at 11:08

## 2022-01-01 RX ADMIN — SODIUM CHLORIDE, POTASSIUM CHLORIDE, SODIUM LACTATE AND CALCIUM CHLORIDE 1000 ML: 600; 310; 30; 20 INJECTION, SOLUTION INTRAVENOUS at 09:08

## 2022-01-01 RX ADMIN — MIDODRINE HYDROCHLORIDE 10 MG: 5 TABLET ORAL at 06:09

## 2022-01-01 RX ADMIN — AMIODARONE HYDROCHLORIDE 200 MG: 200 TABLET ORAL at 08:08

## 2022-01-01 RX ADMIN — DIPHENHYDRAMINE HYDROCHLORIDE 50 MG: 25 CAPSULE ORAL at 07:08

## 2022-01-01 RX ADMIN — NOREPINEPHRINE BITARTRATE 0.02 MCG/KG/MIN: 8 INJECTION, SOLUTION INTRAVENOUS at 11:08

## 2022-01-01 RX ADMIN — ONDANSETRON 4 MG: 2 INJECTION INTRAMUSCULAR; INTRAVENOUS at 03:08

## 2022-01-01 RX ADMIN — CEFEPIME 2 G: 2 INJECTION, POWDER, FOR SOLUTION INTRAVENOUS at 07:09

## 2022-01-01 RX ADMIN — MIDODRINE HYDROCHLORIDE 10 MG: 5 TABLET ORAL at 03:09

## 2022-01-01 RX ADMIN — FAMOTIDINE 20 MG: 10 INJECTION, SOLUTION INTRAVENOUS at 08:08

## 2022-01-01 RX ADMIN — AMIODARONE HYDROCHLORIDE 400 MG: 200 TABLET ORAL at 08:09

## 2022-01-01 RX ADMIN — PHENYLEPHRINE HYDROCHLORIDE 200 MCG: 10 INJECTION INTRAVENOUS at 12:08

## 2022-01-01 RX ADMIN — AMIODARONE HYDROCHLORIDE 0.5 MG/MIN: 1.8 INJECTION, SOLUTION INTRAVENOUS at 12:08

## 2022-01-01 RX ADMIN — PHENYLEPHRINE HYDROCHLORIDE 50 MCG: 10 INJECTION INTRAVENOUS at 12:08

## 2022-01-01 RX ADMIN — METOPROLOL TARTRATE 25 MG: 25 TABLET, FILM COATED ORAL at 10:09

## 2022-01-01 RX ADMIN — OXYCODONE 5 MG: 5 TABLET ORAL at 08:08

## 2022-01-01 RX ADMIN — FOLIC ACID 1 MG: 1 TABLET ORAL at 08:09

## 2022-01-01 RX ADMIN — INSULIN HUMAN 3 UNITS/HR: 1 INJECTION, SOLUTION INTRAVENOUS at 04:08

## 2022-01-01 RX ADMIN — DOCUSATE SODIUM 100 MG: 100 CAPSULE, LIQUID FILLED ORAL at 08:09

## 2022-01-01 RX ADMIN — IPRATROPIUM BROMIDE AND ALBUTEROL SULFATE 3 ML: 2.5; .5 SOLUTION RESPIRATORY (INHALATION) at 12:08

## 2022-01-01 RX ADMIN — CEFEPIME 2 G: 2 INJECTION, POWDER, FOR SOLUTION INTRAVENOUS at 06:09

## 2022-01-01 RX ADMIN — METOCLOPRAMIDE HYDROCHLORIDE 5 MG: 5 SOLUTION ORAL at 06:09

## 2022-01-01 RX ADMIN — SODIUM CHLORIDE, POTASSIUM CHLORIDE, SODIUM LACTATE AND CALCIUM CHLORIDE: 600; 310; 30; 20 INJECTION, SOLUTION INTRAVENOUS at 04:09

## 2022-01-01 RX ADMIN — COLLAGENASE SANTYL: 250 OINTMENT TOPICAL at 08:09

## 2022-01-01 RX ADMIN — AMIODARONE HYDROCHLORIDE 200 MG: 200 TABLET ORAL at 11:09

## 2022-01-01 RX ADMIN — DOCUSATE SODIUM 100 MG: 100 CAPSULE, LIQUID FILLED ORAL at 09:08

## 2022-01-01 RX ADMIN — AMIODARONE HYDROCHLORIDE 200 MG: 200 TABLET ORAL at 08:09

## 2022-01-01 RX ADMIN — CEFEPIME 2 G: 2 INJECTION, POWDER, FOR SOLUTION INTRAVENOUS at 11:09

## 2022-01-01 RX ADMIN — CEFEPIME 2 G: 2 INJECTION, POWDER, FOR SOLUTION INTRAVENOUS at 12:08

## 2022-01-01 RX ADMIN — ACETYLCYSTEINE 2 ML: 200 INHALANT RESPIRATORY (INHALATION) at 02:09

## 2022-01-01 RX ADMIN — HYDROCODONE BITARTRATE AND ACETAMINOPHEN 1 TABLET: 5; 325 TABLET ORAL at 06:08

## 2022-01-01 RX ADMIN — CALCIUM CHLORIDE INJECTION 250 MG: 100 INJECTION, SOLUTION INTRAVENOUS at 11:08

## 2022-01-01 RX ADMIN — PANTOPRAZOLE SODIUM 40 MG: 40 INJECTION, POWDER, FOR SOLUTION INTRAVENOUS at 11:09

## 2022-01-01 RX ADMIN — ATORVASTATIN CALCIUM 80 MG: 40 TABLET, FILM COATED ORAL at 08:09

## 2022-01-01 RX ADMIN — CEFEPIME 2 G: 2 INJECTION, POWDER, FOR SOLUTION INTRAVENOUS at 03:09

## 2022-01-01 RX ADMIN — NALXONE HYDROCHLORIDE 0.4 MG: 0.4 INJECTION INTRAMUSCULAR; INTRAVENOUS; SUBCUTANEOUS at 11:09

## 2022-01-01 RX ADMIN — SODIUM BICARBONATE 100 MEQ: 84 INJECTION INTRAVENOUS at 07:08

## 2022-01-01 RX ADMIN — IPRATROPIUM BROMIDE AND ALBUTEROL SULFATE 3 ML: 2.5; .5 SOLUTION RESPIRATORY (INHALATION) at 09:09

## 2022-01-01 RX ADMIN — ENOXAPARIN SODIUM 40 MG: 40 INJECTION SUBCUTANEOUS at 06:08

## 2022-01-01 RX ADMIN — NOREPINEPHRINE BITARTRATE 0.05 MCG/KG/MIN: 8 INJECTION, SOLUTION INTRAVENOUS at 08:08

## 2022-01-01 RX ADMIN — COLLAGENASE SANTYL: 250 OINTMENT TOPICAL at 09:09

## 2022-01-01 RX ADMIN — SUCRALFATE 1 G: 1 TABLET ORAL at 04:08

## 2022-01-01 RX ADMIN — CALCIUM CHLORIDE INJECTION 500 MG: 100 INJECTION, SOLUTION INTRAVENOUS at 02:08

## 2022-01-01 RX ADMIN — MORPHINE SULFATE 2 MG: 10 INJECTION, SOLUTION INTRAMUSCULAR; INTRAVENOUS at 02:09

## 2022-01-01 RX ADMIN — BISACODYL 10 MG: 10 SUPPOSITORY RECTAL at 08:08

## 2022-01-01 RX ADMIN — ONDANSETRON 4 MG: 2 INJECTION INTRAMUSCULAR; INTRAVENOUS at 12:08

## 2022-01-01 RX ADMIN — METOCLOPRAMIDE 5 MG: 5 INJECTION, SOLUTION INTRAMUSCULAR; INTRAVENOUS at 05:08

## 2022-01-01 RX ADMIN — ASCORBIC ACID, VITAMIN A PALMITATE, CHOLECALCIFEROL, THIAMINE HYDROCHLORIDE, RIBOFLAVIN-5 PHOSPHATE SODIUM, PYRIDOXINE HYDROCHLORIDE, NIACINAMIDE, DEXPANTHENOL, ALPHA-TOCOPHEROL ACETATE, VITAMIN K1, FOLIC ACID, BIOTIN, CYANOCOBALAMIN: 200; 3300; 200; 6; 3.6; 6; 40; 15; 10; 150; 600; 60; 5 INJECTION, SOLUTION INTRAVENOUS at 03:09

## 2022-01-01 RX ADMIN — FENTANYL CITRATE 25 MCG: 50 INJECTION, SOLUTION INTRAMUSCULAR; INTRAVENOUS at 11:09

## 2022-01-01 RX ADMIN — SODIUM CHLORIDE, POTASSIUM CHLORIDE, SODIUM LACTATE AND CALCIUM CHLORIDE: 600; 310; 30; 20 INJECTION, SOLUTION INTRAVENOUS at 11:09

## 2022-01-01 RX ADMIN — ASCORBIC ACID, VITAMIN A PALMITATE, CHOLECALCIFEROL, THIAMINE HYDROCHLORIDE, RIBOFLAVIN-5 PHOSPHATE SODIUM, PYRIDOXINE HYDROCHLORIDE, NIACINAMIDE, DEXPANTHENOL, ALPHA-TOCOPHEROL ACETATE, VITAMIN K1, FOLIC ACID, BIOTIN, CYANOCOBALAMIN: 200; 3300; 200; 6; 3.6; 6; 40; 15; 10; 150; 600; 60; 5 INJECTION, SOLUTION INTRAVENOUS at 08:09

## 2022-01-01 RX ADMIN — PANTOPRAZOLE SODIUM 40 MG: 40 INJECTION, POWDER, FOR SOLUTION INTRAVENOUS at 10:08

## 2022-01-01 RX ADMIN — VANCOMYCIN HYDROCHLORIDE 125 MG: 125 CAPSULE ORAL at 04:09

## 2022-01-01 RX ADMIN — ATORVASTATIN CALCIUM 80 MG: 40 TABLET, FILM COATED ORAL at 08:08

## 2022-01-01 RX ADMIN — IOPAMIDOL 100 ML: 755 INJECTION, SOLUTION INTRAVENOUS at 12:08

## 2022-01-01 RX ADMIN — ACETAMINOPHEN 1000 MG: 10 INJECTION, SOLUTION INTRAVENOUS at 11:08

## 2022-01-01 RX ADMIN — ALBUMIN (HUMAN) 12.5 G: 2.5 SOLUTION INTRAVENOUS at 10:08

## 2022-01-01 RX ADMIN — SUCRALFATE 1 G: 1 TABLET ORAL at 06:08

## 2022-01-01 RX ADMIN — ACETYLCYSTEINE 2 ML: 200 INHALANT RESPIRATORY (INHALATION) at 09:09

## 2022-01-01 RX ADMIN — NOREPINEPHRINE BITARTRATE 0.08 MCG/KG/MIN: 8 INJECTION, SOLUTION INTRAVENOUS at 03:09

## 2022-01-01 RX ADMIN — PANTOPRAZOLE SODIUM 40 MG: 40 INJECTION, POWDER, FOR SOLUTION INTRAVENOUS at 12:08

## 2022-01-01 RX ADMIN — MORPHINE SULFATE 2 MG: 10 INJECTION, SOLUTION INTRAMUSCULAR; INTRAVENOUS at 03:09

## 2022-01-01 RX ADMIN — ONDANSETRON 4 MG: 2 INJECTION INTRAMUSCULAR; INTRAVENOUS at 08:08

## 2022-01-01 RX ADMIN — AMIODARONE HYDROCHLORIDE 150 MG: 1.5 INJECTION, SOLUTION INTRAVENOUS at 10:08

## 2022-01-01 RX ADMIN — POTASSIUM CHLORIDE AND SODIUM CHLORIDE 125 ML/HR: 450; 150 INJECTION, SOLUTION INTRAVENOUS at 12:08

## 2022-01-01 RX ADMIN — BISACODYL 10 MG: 10 SUPPOSITORY RECTAL at 07:09

## 2022-01-01 RX ADMIN — CEFEPIME 2 G: 2 INJECTION, POWDER, FOR SOLUTION INTRAVENOUS at 08:08

## 2022-01-01 RX ADMIN — ENOXAPARIN SODIUM 40 MG: 40 INJECTION SUBCUTANEOUS at 04:08

## 2022-01-01 RX ADMIN — TRANEXAMIC ACID 850 MG: 100 INJECTION, SOLUTION INTRAVENOUS at 02:08

## 2022-01-01 RX ADMIN — AMIODARONE HYDROCHLORIDE 200 MG: 200 TABLET ORAL at 11:08

## 2022-01-01 RX ADMIN — FUROSEMIDE 40 MG: 10 INJECTION, SOLUTION INTRAMUSCULAR; INTRAVENOUS at 09:09

## 2022-01-01 RX ADMIN — SUCRALFATE 1 G: 1 TABLET ORAL at 08:08

## 2022-01-01 RX ADMIN — ALBUMIN (HUMAN) 12.5 G: 2.5 SOLUTION INTRAVENOUS at 05:08

## 2022-01-01 RX ADMIN — METOCLOPRAMIDE HYDROCHLORIDE 5 MG: 5 SOLUTION ORAL at 10:09

## 2022-01-01 RX ADMIN — AMIODARONE HYDROCHLORIDE 1 MG/MIN: 1.8 INJECTION, SOLUTION INTRAVENOUS at 10:08

## 2022-01-01 RX ADMIN — MIDODRINE HYDROCHLORIDE 10 MG: 5 TABLET ORAL at 05:09

## 2022-01-01 RX ADMIN — SUCRALFATE 1 G: 1 TABLET ORAL at 05:08

## 2022-01-01 RX ADMIN — MIDAZOLAM HYDROCHLORIDE 2 MG: 1 INJECTION, SOLUTION INTRAMUSCULAR; INTRAVENOUS at 11:09

## 2022-01-01 RX ADMIN — CEFEPIME 2 G: 2 INJECTION, POWDER, FOR SOLUTION INTRAVENOUS at 09:09

## 2022-01-01 RX ADMIN — OXYCODONE 5 MG: 5 TABLET ORAL at 03:09

## 2022-01-01 RX ADMIN — DEXMEDETOMIDINE HYDROCHLORIDE 0.4 MCG/KG/HR: 400 INJECTION INTRAVENOUS at 11:08

## 2022-01-01 RX ADMIN — SODIUM CHLORIDE, POTASSIUM CHLORIDE, SODIUM LACTATE AND CALCIUM CHLORIDE 1000 ML: 600; 310; 30; 20 INJECTION, SOLUTION INTRAVENOUS at 03:08

## 2022-01-01 RX ADMIN — HYDROCODONE BITARTRATE AND ACETAMINOPHEN 1 TABLET: 5; 325 TABLET ORAL at 02:08

## 2022-01-01 RX ADMIN — LIDOCAINE HYDROCHLORIDE 4 ML: 40 SOLUTION TOPICAL at 11:09

## 2022-01-01 RX ADMIN — GLYCOPYRROLATE 0.2 MG: 0.2 INJECTION INTRAMUSCULAR; INTRAVENOUS at 11:08

## 2022-01-01 RX ADMIN — NOREPINEPHRINE BITARTRATE 0.04 MCG/KG/MIN: 8 INJECTION, SOLUTION INTRAVENOUS at 08:08

## 2022-01-01 RX ADMIN — ASCORBIC ACID, VITAMIN A PALMITATE, CHOLECALCIFEROL, THIAMINE HYDROCHLORIDE, RIBOFLAVIN-5 PHOSPHATE SODIUM, PYRIDOXINE HYDROCHLORIDE, NIACINAMIDE, DEXPANTHENOL, ALPHA-TOCOPHEROL ACETATE, VITAMIN K1, FOLIC ACID, BIOTIN, CYANOCOBALAMIN: 200; 3300; 200; 6; 3.6; 6; 40; 15; 10; 150; 600; 60; 5 INJECTION, SOLUTION INTRAVENOUS at 06:09

## 2022-01-01 RX ADMIN — FUROSEMIDE 20 MG: 10 INJECTION, SOLUTION INTRAMUSCULAR; INTRAVENOUS at 10:09

## 2022-01-01 RX ADMIN — OXYCODONE 5 MG: 5 TABLET ORAL at 10:09

## 2022-01-01 RX ADMIN — ALBUMIN (HUMAN) 12.5 G: 12.5 SOLUTION INTRAVENOUS at 09:09

## 2022-01-01 RX ADMIN — AMIODARONE HYDROCHLORIDE 0.5 MG/MIN: 1.8 INJECTION, SOLUTION INTRAVENOUS at 02:08

## 2022-01-01 RX ADMIN — MUPIROCIN: 20 OINTMENT TOPICAL at 07:08

## 2022-01-01 RX ADMIN — OXYCODONE 5 MG: 5 TABLET ORAL at 05:08

## 2022-01-01 RX ADMIN — HYDROCODONE BITARTRATE AND ACETAMINOPHEN 1 TABLET: 5; 325 TABLET ORAL at 03:09

## 2022-01-01 RX ADMIN — IPRATROPIUM BROMIDE AND ALBUTEROL SULFATE 3 ML: 2.5; .5 SOLUTION RESPIRATORY (INHALATION) at 01:09

## 2022-01-01 RX ADMIN — MIDODRINE HYDROCHLORIDE 10 MG: 5 TABLET ORAL at 10:09

## 2022-01-01 RX ADMIN — MIDODRINE HYDROCHLORIDE 10 MG: 5 TABLET ORAL at 02:09

## 2022-01-01 RX ADMIN — VASOPRESSIN 1 UNITS: 20 INJECTION INTRAVENOUS at 11:08

## 2022-01-01 RX ADMIN — ENOXAPARIN SODIUM 90 MG: 100 INJECTION SUBCUTANEOUS at 09:09

## 2022-01-01 RX ADMIN — FAMOTIDINE 20 MG: 10 INJECTION, SOLUTION INTRAVENOUS at 09:08

## 2022-01-01 RX ADMIN — IPRATROPIUM BROMIDE AND ALBUTEROL SULFATE 3 ML: 2.5; .5 SOLUTION RESPIRATORY (INHALATION) at 01:08

## 2022-01-01 RX ADMIN — MIDAZOLAM HYDROCHLORIDE 2.5 MG: 1 INJECTION, SOLUTION INTRAMUSCULAR; INTRAVENOUS at 12:08

## 2022-01-01 RX ADMIN — HYDROCODONE BITARTRATE AND ACETAMINOPHEN 1 TABLET: 5; 325 TABLET ORAL at 12:09

## 2022-01-01 RX ADMIN — DIPHENHYDRAMINE HYDROCHLORIDE 25 MG: 25 CAPSULE ORAL at 08:08

## 2022-01-01 RX ADMIN — HEPARIN SODIUM 30000 UNITS: 1000 INJECTION, SOLUTION INTRAVENOUS; SUBCUTANEOUS at 12:08

## 2022-01-01 RX ADMIN — MIDAZOLAM HYDROCHLORIDE 2.5 MG: 1 INJECTION, SOLUTION INTRAMUSCULAR; INTRAVENOUS at 11:08

## 2022-01-01 RX ADMIN — EPINEPHRINE 0.1 MCG/KG/MIN: 1 INJECTION INTRAMUSCULAR; INTRAVENOUS; SUBCUTANEOUS at 08:08

## 2022-01-01 RX ADMIN — ROCURONIUM BROMIDE 25 MG: 10 INJECTION, SOLUTION INTRAVENOUS at 02:08

## 2022-01-01 RX ADMIN — SODIUM CHLORIDE: 4.5 INJECTION, SOLUTION INTRAVENOUS at 08:08

## 2022-01-01 RX ADMIN — METOCLOPRAMIDE 5 MG: 5 INJECTION, SOLUTION INTRAMUSCULAR; INTRAVENOUS at 08:08

## 2022-01-01 RX ADMIN — ALBUMIN (HUMAN) 25 G: 2.5 SOLUTION INTRAVENOUS at 11:08

## 2022-01-01 RX ADMIN — POLYETHYLENE GLYCOL 3350 17 G: 17 POWDER, FOR SOLUTION ORAL at 11:09

## 2022-01-01 RX ADMIN — TRANEXAMIC ACID 850 MG: 100 INJECTION, SOLUTION INTRAVENOUS at 11:08

## 2022-01-01 RX ADMIN — ROCURONIUM BROMIDE 70 MG: 10 INJECTION, SOLUTION INTRAVENOUS at 11:08

## 2022-01-01 RX ADMIN — CEFEPIME 2 G: 2 INJECTION, POWDER, FOR SOLUTION INTRAVENOUS at 05:09

## 2022-01-01 RX ADMIN — SODIUM CHLORIDE, PRESERVATIVE FREE 10 ML: 5 INJECTION INTRAVENOUS at 05:09

## 2022-01-01 RX ADMIN — PHENYLEPHRINE HYDROCHLORIDE 50 MCG: 10 INJECTION INTRAVENOUS at 11:08

## 2022-01-01 RX ADMIN — EPINEPHRINE 0.02 MCG/KG/MIN: 1 INJECTION INTRAMUSCULAR; INTRAVENOUS; SUBCUTANEOUS at 04:08

## 2022-01-01 RX ADMIN — ROCURONIUM BROMIDE 30 MG: 10 INJECTION, SOLUTION INTRAVENOUS at 11:08

## 2022-01-01 RX ADMIN — SODIUM CHLORIDE, SODIUM GLUCONATE, SODIUM ACETATE, POTASSIUM CHLORIDE AND MAGNESIUM CHLORIDE 1000 ML: 526; 502; 368; 37; 30 INJECTION, SOLUTION INTRAVENOUS at 06:08

## 2022-01-01 RX ADMIN — HYDROCODONE BITARTRATE AND ACETAMINOPHEN 1 TABLET: 5; 325 TABLET ORAL at 05:08

## 2022-01-01 RX ADMIN — PANTOPRAZOLE SODIUM 40 MG: 40 INJECTION, POWDER, FOR SOLUTION INTRAVENOUS at 09:08

## 2022-01-01 RX ADMIN — PANTOPRAZOLE SODIUM 40 MG: 40 INJECTION, POWDER, FOR SOLUTION INTRAVENOUS at 10:09

## 2022-01-01 RX ADMIN — SODIUM CHLORIDE, PRESERVATIVE FREE 10 ML: 5 INJECTION INTRAVENOUS at 11:09

## 2022-01-01 RX ADMIN — SUCRALFATE 1 G: 1 TABLET ORAL at 09:08

## 2022-01-01 RX ADMIN — ASPIRIN 81 MG: 81 TABLET, COATED ORAL at 09:09

## 2022-01-01 RX ADMIN — COLLAGENASE SANTYL: 250 OINTMENT TOPICAL at 01:08

## 2022-01-01 RX ADMIN — EPINEPHRINE 0.02 MCG/KG/MIN: 1 INJECTION INTRAMUSCULAR; INTRAVENOUS; SUBCUTANEOUS at 02:08

## 2022-01-01 RX ADMIN — MORPHINE SULFATE 2 MG: 10 INJECTION, SOLUTION INTRAMUSCULAR; INTRAVENOUS at 06:09

## 2022-01-01 RX ADMIN — CEFUROXIME SODIUM 1.5 G: 1.5 INJECTION, POWDER, FOR SOLUTION INTRAVENOUS at 03:08

## 2022-01-01 RX ADMIN — COLLAGENASE SANTYL: 250 OINTMENT TOPICAL at 10:09

## 2022-01-01 RX ADMIN — LIDOCAINE HYDROCHLORIDE 15 ML: 20 SOLUTION ORAL; TOPICAL at 10:09

## 2022-01-01 RX ADMIN — IPRATROPIUM BROMIDE AND ALBUTEROL SULFATE 3 ML: 2.5; .5 SOLUTION RESPIRATORY (INHALATION) at 12:09

## 2022-01-01 RX ADMIN — COLLAGENASE SANTYL: 250 OINTMENT TOPICAL at 09:08

## 2022-01-01 RX ADMIN — ATORVASTATIN CALCIUM 80 MG: 40 TABLET, FILM COATED ORAL at 09:09

## 2022-01-01 RX ADMIN — FOLIC ACID 1 MG: 1 TABLET ORAL at 11:09

## 2022-01-01 RX ADMIN — SODIUM CHLORIDE, POTASSIUM CHLORIDE, SODIUM LACTATE AND CALCIUM CHLORIDE: 600; 310; 30; 20 INJECTION, SOLUTION INTRAVENOUS at 10:08

## 2022-01-01 RX ADMIN — MIDODRINE HYDROCHLORIDE 5 MG: 5 TABLET ORAL at 04:08

## 2022-01-01 RX ADMIN — DOCUSATE SODIUM 100 MG: 100 CAPSULE, LIQUID FILLED ORAL at 11:09

## 2022-01-01 RX ADMIN — OXYCODONE 5 MG: 5 TABLET ORAL at 11:08

## 2022-01-01 RX ADMIN — METHYLPREDNISOLONE SODIUM SUCCINATE 125 MG: 125 INJECTION, POWDER, FOR SOLUTION INTRAMUSCULAR; INTRAVENOUS at 08:08

## 2022-01-01 RX ADMIN — ROCURONIUM BROMIDE 25 MG: 10 INJECTION, SOLUTION INTRAVENOUS at 12:08

## 2022-01-01 RX ADMIN — METOCLOPRAMIDE 5 MG: 5 INJECTION, SOLUTION INTRAMUSCULAR; INTRAVENOUS at 02:08

## 2022-01-01 RX ADMIN — HYDROCODONE BITARTRATE AND ACETAMINOPHEN 1 TABLET: 5; 325 TABLET ORAL at 04:09

## 2022-01-01 RX ADMIN — DIAZEPAM 10 MG: 5 TABLET ORAL at 07:08

## 2022-01-01 RX ADMIN — ASPIRIN 81 MG: 81 TABLET, COATED ORAL at 08:09

## 2022-01-01 RX ADMIN — MIDAZOLAM HYDROCHLORIDE 2 MG: 1 INJECTION, SOLUTION INTRAMUSCULAR; INTRAVENOUS at 10:08

## 2022-01-01 RX ADMIN — MORPHINE SULFATE 2 MG: 4 INJECTION INTRAVENOUS at 07:09

## 2022-01-01 RX ADMIN — LIDOCAINE HYDROCHLORIDE 4 ML: 40 SOLUTION TOPICAL at 08:09

## 2022-01-01 RX ADMIN — POTASSIUM CHLORIDE AND SODIUM CHLORIDE 125 ML/HR: 450; 150 INJECTION, SOLUTION INTRAVENOUS at 04:08

## 2022-01-01 RX ADMIN — SODIUM CHLORIDE, POTASSIUM CHLORIDE, SODIUM LACTATE AND CALCIUM CHLORIDE: 600; 310; 30; 20 INJECTION, SOLUTION INTRAVENOUS at 06:08

## 2022-01-01 RX ADMIN — CALCIUM CHLORIDE INJECTION 500 MG: 100 INJECTION, SOLUTION INTRAVENOUS at 12:08

## 2022-01-01 RX ADMIN — ENOXAPARIN SODIUM 90 MG: 100 INJECTION SUBCUTANEOUS at 04:08

## 2022-01-01 RX ADMIN — SODIUM CHLORIDE, POTASSIUM CHLORIDE, SODIUM LACTATE AND CALCIUM CHLORIDE 500 ML: 600; 310; 30; 20 INJECTION, SOLUTION INTRAVENOUS at 03:08

## 2022-01-01 RX ADMIN — LIDOCAINE HYDROCHLORIDE 15 ML: 20 SOLUTION ORAL; TOPICAL at 11:09

## 2022-01-01 RX ADMIN — ACETYLCYSTEINE 2 ML: 200 INHALANT RESPIRATORY (INHALATION) at 07:09

## 2022-01-01 RX ADMIN — ATORVASTATIN CALCIUM 80 MG: 40 TABLET, FILM COATED ORAL at 09:08

## 2022-01-01 RX ADMIN — SODIUM CHLORIDE, POTASSIUM CHLORIDE, SODIUM LACTATE AND CALCIUM CHLORIDE: 600; 310; 30; 20 INJECTION, SOLUTION INTRAVENOUS at 09:09

## 2022-01-01 RX ADMIN — FUROSEMIDE 40 MG: 10 INJECTION, SOLUTION INTRAMUSCULAR; INTRAVENOUS at 11:09

## 2022-01-01 RX ADMIN — EPINEPHRINE 0.02 MCG/KG/MIN: 1 INJECTION INTRAMUSCULAR; INTRAVENOUS; SUBCUTANEOUS at 06:08

## 2022-01-01 RX ADMIN — HYDROCODONE BITARTRATE AND ACETAMINOPHEN 1 TABLET: 5; 325 TABLET ORAL at 06:09

## 2022-01-01 RX ADMIN — AMIODARONE HYDROCHLORIDE 1 MG/MIN: 1.8 INJECTION, SOLUTION INTRAVENOUS at 03:08

## 2022-01-01 RX ADMIN — METOPROLOL TARTRATE 25 MG: 25 TABLET, FILM COATED ORAL at 08:09

## 2022-01-01 RX ADMIN — FENTANYL CITRATE 100 MCG: 50 INJECTION, SOLUTION INTRAMUSCULAR; INTRAVENOUS at 11:08

## 2022-01-01 RX ADMIN — HYDRALAZINE HYDROCHLORIDE 10 MG: 20 INJECTION, SOLUTION INTRAMUSCULAR; INTRAVENOUS at 03:09

## 2022-01-01 RX ADMIN — METHYLNALTREXONE BROMIDE 12 MG: 12 INJECTION, SOLUTION SUBCUTANEOUS at 10:09

## 2022-01-01 RX ADMIN — METRONIDAZOLE 500 MG: 500 INJECTION, SOLUTION INTRAVENOUS at 08:09

## 2022-01-01 RX ADMIN — AMIODARONE HYDROCHLORIDE 0.5 MG/MIN: 1.8 INJECTION, SOLUTION INTRAVENOUS at 03:08

## 2022-01-01 RX ADMIN — AMIODARONE HYDROCHLORIDE 400 MG: 200 TABLET ORAL at 10:09

## 2022-01-01 RX ADMIN — MIDODRINE HYDROCHLORIDE 5 MG: 5 TABLET ORAL at 03:08

## 2022-01-01 RX ADMIN — CEFEPIME 2 G: 2 INJECTION, POWDER, FOR SOLUTION INTRAVENOUS at 01:09

## 2022-01-01 RX ADMIN — MIDODRINE HYDROCHLORIDE 5 MG: 5 TABLET ORAL at 11:08

## 2022-01-01 RX ADMIN — MORPHINE SULFATE 2 MG: 10 INJECTION, SOLUTION INTRAMUSCULAR; INTRAVENOUS at 01:09

## 2022-01-01 RX ADMIN — SODIUM CHLORIDE, POTASSIUM CHLORIDE, SODIUM LACTATE AND CALCIUM CHLORIDE: 600; 310; 30; 20 INJECTION, SOLUTION INTRAVENOUS at 12:09

## 2022-01-01 RX ADMIN — MORPHINE SULFATE 2 MG: 10 INJECTION, SOLUTION INTRAMUSCULAR; INTRAVENOUS at 06:08

## 2022-01-01 RX ADMIN — LIDOCAINE HYDROCHLORIDE 15 ML: 20 SOLUTION ORAL; TOPICAL at 09:09

## 2022-01-01 RX ADMIN — HYDROCODONE BITARTRATE AND ACETAMINOPHEN 1 TABLET: 5; 325 TABLET ORAL at 05:09

## 2022-01-01 RX ADMIN — ACETAMINOPHEN 1000 MG: 10 INJECTION, SOLUTION INTRAVENOUS at 12:08

## 2022-01-01 RX ADMIN — METHYLNALTREXONE BROMIDE 12 MG: 12 INJECTION, SOLUTION SUBCUTANEOUS at 09:09

## 2022-01-01 RX ADMIN — SODIUM CHLORIDE, POTASSIUM CHLORIDE, SODIUM LACTATE AND CALCIUM CHLORIDE 1000 ML: 600; 310; 30; 20 INJECTION, SOLUTION INTRAVENOUS at 08:08

## 2022-01-01 RX ADMIN — METHYLNALTREXONE BROMIDE 8 MG: 12 INJECTION, SOLUTION SUBCUTANEOUS at 06:08

## 2022-01-01 RX ADMIN — SODIUM CHLORIDE 75 ML/HR: 4.5 INJECTION, SOLUTION INTRAVENOUS at 04:08

## 2022-01-01 RX ADMIN — MIDODRINE HYDROCHLORIDE 5 MG: 5 TABLET ORAL at 08:08

## 2022-01-01 RX ADMIN — FUROSEMIDE 20 MG: 10 INJECTION, SOLUTION INTRAMUSCULAR; INTRAVENOUS at 09:09

## 2022-01-01 RX ADMIN — SODIUM CHLORIDE 75 ML/HR: 4.5 INJECTION, SOLUTION INTRAVENOUS at 05:08

## 2022-01-01 RX ADMIN — SODIUM CHLORIDE: 9 INJECTION, SOLUTION INTRAVENOUS at 07:08

## 2022-01-01 RX ADMIN — POTASSIUM CHLORIDE AND SODIUM CHLORIDE 125 ML/HR: 450; 150 INJECTION, SOLUTION INTRAVENOUS at 03:08

## 2022-01-01 RX ADMIN — VANCOMYCIN HYDROCHLORIDE 500 MG: 125 CAPSULE ORAL at 12:09

## 2022-01-01 RX ADMIN — COLLAGENASE SANTYL: 250 OINTMENT TOPICAL at 11:09

## 2022-01-01 RX ADMIN — TRANEXAMIC ACID 851 MG: 100 INJECTION, SOLUTION INTRAVENOUS at 11:08

## 2022-01-01 RX ADMIN — ALBUMIN (HUMAN) 12.5 G: 2.5 SOLUTION INTRAVENOUS at 06:08

## 2022-01-01 RX ADMIN — ASCORBIC ACID, VITAMIN A PALMITATE, CHOLECALCIFEROL, THIAMINE HYDROCHLORIDE, RIBOFLAVIN-5 PHOSPHATE SODIUM, PYRIDOXINE HYDROCHLORIDE, NIACINAMIDE, DEXPANTHENOL, ALPHA-TOCOPHEROL ACETATE, VITAMIN K1, FOLIC ACID, BIOTIN, CYANOCOBALAMIN: 200; 3300; 200; 6; 3.6; 6; 40; 15; 10; 150; 600; 60; 5 INJECTION, SOLUTION INTRAVENOUS at 05:09

## 2022-08-02 NOTE — Clinical Note
The catheter was inserted into the, was removed from the and was inserted over the wire into the ostium   right coronary artery. Hemodynamics were performed.  An angiography was performed of the right coronary arteries. Multiple views were taken. The angiography was performed via power injection.

## 2022-08-02 NOTE — Clinical Note
The catheter was inserted into the, was removed from the and was inserted over the wire into the Illiacs. Hemodynamics were performed.  An angiography was performed of the Abd AO/ILLIACS.

## 2022-08-02 NOTE — Clinical Note
The catheter was inserted into the, was removed from the and was inserted over the wire into the left ventricle. Hemodynamics were performed.  and Pullback was recorded.

## 2022-08-02 NOTE — DISCHARGE SUMMARY
[facility] - Cath Lab Services  Discharge Note  Short Stay    Procedure(s) (LRB):  CATHETERIZATION, HEART, LEFT (Left)    OUTCOME: Patient tolerated treatment/procedure well without complication and is now ready for discharge.    DISPOSITION: Home or Self Care    FINAL DIAGNOSIS:  <principal problem not specified>    FOLLOWUP: In clinic    DISCHARGE INSTRUCTIONS:  No discharge procedures on file.     TIME SPENT ON DISCHARGE: 0 minutes

## 2022-08-02 NOTE — INTERVAL H&P NOTE
Patient name: Zackery Osullivan Jr.  MRN: [id number]  : 1945  Cath Lab Procedure H&P Update    Pre-Procedure Assessment:    I saw and examined the patient face to face. The patient has been re-evaluated and his condition is unchanged. The reason for admission, procedure and care is still present.  Based on the patients H&P, pre-procedure physical exam, relevant diagnostic studies, NPO status and information obtained from the patient, I determined the patient is an appropriate candidate for the proposed procedure and anesthesia planned. I further certify the anesthesia risks, benefits and options have been explained to the patient to which he agrees as documented on the procedural consent.

## 2022-08-02 NOTE — Clinical Note
The right groin, right radial and right brachial was prepped. The site was prepped with ChloraPrep. The site was clipped. The patient was draped. The patient was positioned supine.

## 2022-08-02 NOTE — DISCHARGE INSTRUCTIONS
Go to ER if unusual symptoms occur. Remove dressing & armboards in 24 hours; may also take a shower at this time. Do not apply any other bandage or any lotion, ointment, or powder to site. If either site starts bleeding, hold pressure, & call 911; do not attempt to drive. No heavy lifting with affected arm for 3-5 days. No driving for 48 hours.

## 2022-08-02 NOTE — Clinical Note
The catheter was inserted into the, was removed from the and was inserted over the wire into the ostium   left main. Hemodynamics were performed.  An angiography was performed of the left coronary arteries. Multiple views were taken. The angiography was performed via power injection.

## 2022-08-02 NOTE — Clinical Note
The catheter was inserted into the, was removed from the and was inserted over the wire into the ostium   left main. Hemodynamics were performed.  An angiography was performed of the left coronary arteries. Multiple views were taken.

## 2022-08-02 NOTE — CONSULTS
[facility]  Cardiothoracic Surgery  Consult Note    Patient Name: Zackery Osullivan Jr.  MRN: [id number]  Admission Date: 8/2/2022  Attending Physician: Reinaldo Gerardo MD  Referring Provider: Reinaldo Gerardo MD    Patient information was obtained from patient, past medical records, ER records and primary team.     Inpatient consult to Cardiothoracic Surgery  Consult performed by: NINA Alves  Consult ordered by: Reinaldo Gerardo MD        Subjective:     Chief Complaint/Reason for Admission: Cardiac Catherterization    History of Present Illness: Patient is a 78yo male with PMH of COPD, venous insufficiency of LE, current tobacco use, and SOB in CVSS for a cardiac cath. CV surgery was consutled after LHC found showed Left main-50% distal LM. Patient has been seen, examined, and set up for outpatient follow up for CABG eval. Currently on plavix.    No current facility-administered medications on file prior to encounter.     Current Outpatient Medications on File Prior to Encounter   Medication Sig    amiodarone (PACERONE) 200 MG Tab Take 100 mg by mouth 2 (two) times daily.    aspirin (ECOTRIN) 81 MG EC tablet Take 81 mg by mouth nightly.    atorvastatin (LIPITOR) 80 MG tablet Take 80 mg by mouth nightly.    clopidogreL (PLAVIX) 75 mg tablet Take 75 mg by mouth nightly.    ezetimibe (ZETIA) 10 mg tablet Take 10 mg by mouth nightly.    furosemide (LASIX) 20 MG tablet Take 20 mg by mouth once daily.    metoprolol succinate (TOPROL-XL) 25 MG 24 hr tablet Take 1 tablet by mouth once daily.    sacubitriL-valsartan (ENTRESTO) 24-26 mg per tablet Take 0.5 tablets by mouth 2 (two) times daily.    spironolactone (ALDACTONE) 25 MG tablet Take 25 mg by mouth once daily.    umeclidinium-vilanteroL (ANORO ELLIPTA) 62.5-25 mcg/actuation DsDv Inhale 1 puff into the lungs once daily. Controller       Review of patient's allergies indicates:  No Known Allergies    Past Medical  History:   Diagnosis Date    CAD (coronary artery disease)     COPD (chronic obstructive pulmonary disease)     HLD (hyperlipidemia)     HTN (hypertension)     Ischemic cardiomyopathy     Mitral regurgitation     HEATHER on CPAP     Venous insufficiency     Ventricular tachycardia      Past Surgical History:   Procedure Laterality Date    CATHETERIZATION OF BOTH LEFT AND RIGHT HEART  2022    Diagnostic Only; Dr. Gerardo    REMOVAL OF IMPLANTED CARDIOVERTER-DEFIBRILLATOR (ICD)      TONSILLECTOMY       Family History    None       Tobacco Use    Smoking status: Former Smoker     Years: 55.00     Start date:      Quit date:      Years since quittin.5    Smokeless tobacco: Never Used   Substance and Sexual Activity    Alcohol use: Yes     Alcohol/week: 3.0 standard drinks     Types: 3 Glasses of wine per week    Drug use: Never    Sexual activity: Not on file     Review of Systems   Constitutional: Negative.    HENT: Negative.    Respiratory: Negative for cough, chest tightness and shortness of breath.    Cardiovascular: Negative for chest pain, palpitations and leg swelling.   Gastrointestinal: Negative.    Genitourinary: Negative for difficulty urinating and dysuria.   Skin: Negative for color change, pallor and rash.     Objective:     Vital Signs (Most Recent):  Temp: 97.9 °F (36.6 °C) (22 0608)  Pulse: 61 (22 1330)  Resp: 17 (22 1115)  BP: (!) 105/55 (22 1330)  SpO2: (!) 93 % (22 1330) Vital Signs (24h Range):  Temp:  [97.9 °F (36.6 °C)] 97.9 °F (36.6 °C)  Pulse:  [60-64] 61  Resp:  [15-23] 17  SpO2:  [92 %-97 %] 93 %  BP: ()/(47-70) 105/55     Weight: 87.9 kg (193 lb 12.6 oz)  Body mass index is 29.46 kg/m².    SpO2: (!) 93 %  O2 Device (Oxygen Therapy): room air     Intake/Output - Last 3 Shifts        0700  08 0659  0700   0659  07/ 0659    Urine (mL/kg/hr)   325 (0.6)    Total Output   325    Net   -325                   Lines/Drains/Airways     Peripheral Intravenous Line  Duration                Peripheral IV - Single Lumen 08/02/22 0645 20 G Left;Posterior Hand <1 day                Physical Exam  Vitals and nursing note reviewed.   Constitutional:       General: He is awake. He is not in acute distress.     Appearance: Normal appearance. He is not toxic-appearing or diaphoretic.   HENT:      Head: Normocephalic and atraumatic.   Eyes:      Extraocular Movements: Extraocular movements intact.      Pupils: Pupils are equal, round, and reactive to light.   Cardiovascular:      Rate and Rhythm: Normal rate and regular rhythm.      Pulses: Normal pulses.           Radial pulses are 2+ on the right side and 2+ on the left side.        Dorsalis pedis pulses are 2+ on the right side and 2+ on the left side.      Heart sounds: Normal heart sounds.   Pulmonary:      Effort: Pulmonary effort is normal.      Breath sounds: Normal breath sounds.   Musculoskeletal:      Right lower leg: No edema.      Left lower leg: No edema.   Skin:     General: Skin is warm and dry.   Neurological:      General: No focal deficit present.      Mental Status: He is alert and oriented to person, place, and time.   Psychiatric:         Mood and Affect: Mood and affect normal.         Significant Labs:  All pertinent labs from the last 24 hours have been reviewed.    Significant Diagnostics:  I have reviewed all pertinent imaging results/findings within the past 24 hours.    Assessment/Plan:     NYHA Score: NYHA III: marked limitation of physical activity, comfortable at rest    CAD with LHC showing 50% LM disease  - Patient has been set up for outpatient evaluation for CABg    Thank you for your consult. I will follow-up with patient. Please contact us if you have any additional questions.    NINA Alves  Cardiothoracic Surgery  [facility] - Cath Lab Services

## 2022-08-02 NOTE — Clinical Note
The left radial pulse was 2+. The right radial pulse was +2 and allens test negative. The brachial pulses were 2+ bilaterally.

## 2022-08-09 NOTE — H&P (VIEW-ONLY)
[facility]  History & Physical  Cardiothoracic Surgery    SUBJECTIVE:     Chief Complaint/Reason for Visit:   Chief Complaint   Patient presents with    Follow-up     CVSS f/u        History of Present Illness:  Patient is a 77 y.o. male presents referred by Dr. Gerardo for evaluation for possible coronary bypass grafting.  He gets a fair amount of dyspnea on exertion and has a substantial amount of fatigue and weakness.  He also had a near syncopal episode not too long ago.  He has an AICD and has about a 40% EF.  Left heart cath reveals 60% lad stenosis with a significant IFR.  He has a completely occluded circ with a reasonable om collateral that fills.  The right coronary artery is completely occluded but the PDA is extremely small.  In 2018 he had a MARIJA that revealed mild-to-moderate MR.  His current transthoracic echo also suggest mild-to-moderate MR. At this point I do think he will benefit from coronary bypass grafting to the LAD and the obtuse marginal.  We would also ligated left atrial appendage as he has had a stroke in the past.  In addition we will evaluate his mitral valve at the time of surgery as he may benefit from a mitral valve replacement if this is significant.  I discussed the risk, benefits, and alternatives in great detail with the patient and his wife.  He is a higher than usual risk with a about a 5% mortality.  They understand and would like to proceed.  He may need a rehab stay afterwards due to his weakness.    Review of patient's allergies indicates:  No Known Allergies    Past Medical History:   Diagnosis Date    CAD (coronary artery disease)     COPD (chronic obstructive pulmonary disease)     HLD (hyperlipidemia)     HTN (hypertension)     Ischemic cardiomyopathy     Mitral regurgitation     HEATHER on CPAP     Venous insufficiency     Ventricular tachycardia      Past Surgical History:   Procedure Laterality Date    CATHETERIZATION OF BOTH LEFT  AND RIGHT HEART  2022    Diagnostic Only; Dr. Gerardo    LEFT HEART CATHETERIZATION Left 2022    Procedure: CATHETERIZATION, HEART, LEFT;  Surgeon: Reinaldo Gerardo MD;  Location: [facility] CATH LAB;  Service: Cardiology;  Laterality: Left;  LHC +/- PCI    REMOVAL OF IMPLANTED CARDIOVERTER-DEFIBRILLATOR (ICD)      TONSILLECTOMY       No family history on file.  Social History     Tobacco Use    Smoking status: Former Smoker     Years: 55.00     Start date:      Quit date:      Years since quittin.6    Smokeless tobacco: Never Used   Substance Use Topics    Alcohol use: Yes     Alcohol/week: 3.0 standard drinks     Types: 3 Glasses of wine per week    Drug use: Never        Review of Systems:  Review of Systems   Constitutional: Positive for malaise/fatigue.   HENT: Negative.    Eyes: Negative.    Cardiovascular: Positive for dyspnea on exertion.   Respiratory: Positive for shortness of breath.    Endocrine: Negative.    Skin: Negative.    Musculoskeletal: Positive for falls and muscle weakness.   Gastrointestinal: Negative.    Genitourinary: Negative.    Neurological: Positive for loss of balance and weakness.        Episode of near-syncope, frequent falls   Psychiatric/Behavioral: Negative.    Allergic/Immunologic: Negative.        OBJECTIVE:     Vital Signs (Most Recent):  Pulse: 60 (22 1025)  BP: 97/61 (22 1025)    Admission: Weight: 87.1 kg (192 lb) (22 1025)   Most Recent: Weight: 87.1 kg (192 lb) (22 1025)    Physical Exam:  Physical Exam  Vitals reviewed.   Constitutional:       Appearance: Normal appearance. He is normal weight.   HENT:      Head: Normocephalic and atraumatic.   Eyes:      Pupils: Pupils are equal, round, and reactive to light.   Neck:      Vascular: No carotid bruit.      Comments: Normal carotids per the patient  Cardiovascular:      Rate and Rhythm: Normal rate and regular rhythm.      Pulses: Normal pulses.      Heart sounds: Normal  heart sounds.   Pulmonary:      Effort: Pulmonary effort is normal.      Breath sounds: Normal breath sounds.   Abdominal:      General: Abdomen is flat.      Palpations: Abdomen is soft.   Musculoskeletal:         General: Normal range of motion.      Cervical back: Normal range of motion.   Skin:     General: Skin is warm.   Neurological:      General: No focal deficit present.      Mental Status: He is alert and oriented to person, place, and time.      Gait: Gait abnormal.   Psychiatric:         Mood and Affect: Mood normal.         Behavior: Behavior normal.         Diagnostic Results:  Echo: Reviewed  Cardiac Cath: Reviewed      ASSESSMENT/PLAN:     No diagnosis found.  Multivessel coronary artery disease  Ischemic cardiomyopathy  AICD in place  Previous cerebrovascular accident  Mild-to-moderate mitral regurgitation  Generalized weakness and frailty  Hypertension  Hyperlipidemia  History of sustained ventricular tachycardia  COPD  Planning coronary bypass grafting to the LAD and obtuse marginal, ligation of left atrial appendage, possible mitral valve replacement

## 2022-08-09 NOTE — PROGRESS NOTES
Heart and Vascular Center [facility]  History & Physical  Cardiothoracic Surgery    SUBJECTIVE:     Chief Complaint/Reason for Visit:   Chief Complaint   Patient presents with    Follow-up     CVSS f/u        History of Present Illness:  Patient is a 77 y.o. male presents referred by Dr. Gerardo for evaluation for possible coronary bypass grafting.  He gets a fair amount of dyspnea on exertion and has a substantial amount of fatigue and weakness.  He also had a near syncopal episode not too long ago.  He has an AICD and has about a 40% EF.  Left heart cath reveals 60% lad stenosis with a significant IFR.  He has a completely occluded circ with a reasonable om collateral that fills.  The right coronary artery is completely occluded but the PDA is extremely small.  In 2018 he had a MARIJA that revealed mild-to-moderate MR.  His current transthoracic echo also suggest mild-to-moderate MR. At this point I do think he will benefit from coronary bypass grafting to the LAD and the obtuse marginal.  We would also ligated left atrial appendage as he has had a stroke in the past.  In addition we will evaluate his mitral valve at the time of surgery as he may benefit from a mitral valve replacement if this is significant.  I discussed the risk, benefits, and alternatives in great detail with the patient and his wife.  He is a higher than usual risk with a about a 5% mortality.  They understand and would like to proceed.  He may need a rehab stay afterwards due to his weakness.    Review of patient's allergies indicates:  No Known Allergies    Past Medical History:   Diagnosis Date    CAD (coronary artery disease)     COPD (chronic obstructive pulmonary disease)     HLD (hyperlipidemia)     HTN (hypertension)     Ischemic cardiomyopathy     Mitral regurgitation     HEATHER on CPAP     Venous insufficiency     Ventricular tachycardia      Past Surgical History:   Procedure Laterality Date    CATHETERIZATION OF BOTH LEFT  AND RIGHT HEART  2022    Diagnostic Only; Dr. Gerardo    LEFT HEART CATHETERIZATION Left 2022    Procedure: CATHETERIZATION, HEART, LEFT;  Surgeon: Reinaldo Gerardo MD;  Location: [facility] CATH LAB;  Service: Cardiology;  Laterality: Left;  LHC +/- PCI    REMOVAL OF IMPLANTED CARDIOVERTER-DEFIBRILLATOR (ICD)      TONSILLECTOMY       No family history on file.  Social History     Tobacco Use    Smoking status: Former Smoker     Years: 55.00     Start date:      Quit date:      Years since quittin.6    Smokeless tobacco: Never Used   Substance Use Topics    Alcohol use: Yes     Alcohol/week: 3.0 standard drinks     Types: 3 Glasses of wine per week    Drug use: Never        Review of Systems:  Review of Systems   Constitutional: Positive for malaise/fatigue.   HENT: Negative.    Eyes: Negative.    Cardiovascular: Positive for dyspnea on exertion.   Respiratory: Positive for shortness of breath.    Endocrine: Negative.    Skin: Negative.    Musculoskeletal: Positive for falls and muscle weakness.   Gastrointestinal: Negative.    Genitourinary: Negative.    Neurological: Positive for loss of balance and weakness.        Episode of near-syncope, frequent falls   Psychiatric/Behavioral: Negative.    Allergic/Immunologic: Negative.        OBJECTIVE:     Vital Signs (Most Recent):  Pulse: 60 (22 1025)  BP: 97/61 (22 1025)    Admission: Weight: 87.1 kg (192 lb) (22 1025)   Most Recent: Weight: 87.1 kg (192 lb) (22 1025)    Physical Exam:  Physical Exam  Vitals reviewed.   Constitutional:       Appearance: Normal appearance. He is normal weight.   HENT:      Head: Normocephalic and atraumatic.   Eyes:      Pupils: Pupils are equal, round, and reactive to light.   Neck:      Vascular: No carotid bruit.      Comments: Normal carotids per the patient  Cardiovascular:      Rate and Rhythm: Normal rate and regular rhythm.      Pulses: Normal pulses.      Heart sounds: Normal  heart sounds.   Pulmonary:      Effort: Pulmonary effort is normal.      Breath sounds: Normal breath sounds.   Abdominal:      General: Abdomen is flat.      Palpations: Abdomen is soft.   Musculoskeletal:         General: Normal range of motion.      Cervical back: Normal range of motion.   Skin:     General: Skin is warm.   Neurological:      General: No focal deficit present.      Mental Status: He is alert and oriented to person, place, and time.      Gait: Gait abnormal.   Psychiatric:         Mood and Affect: Mood normal.         Behavior: Behavior normal.         Diagnostic Results:  Echo: Reviewed  Cardiac Cath: Reviewed      ASSESSMENT/PLAN:     No diagnosis found.  Multivessel coronary artery disease  Ischemic cardiomyopathy  AICD in place  Previous cerebrovascular accident  Mild-to-moderate mitral regurgitation  Generalized weakness and frailty  Hypertension  Hyperlipidemia  History of sustained ventricular tachycardia  COPD  Planning coronary bypass grafting to the LAD and obtuse marginal, ligation of left atrial appendage, possible mitral valve replacement

## 2022-08-22 NOTE — ANESTHESIA PROCEDURE NOTES
MARIJA    Diagnosis: Coronary artery disease  Patient location during procedure: OR  Surgery related to: Post-Operative Surgical Evaluation  Exam type: Final    Staffing  Performed: anesthesiologist     Anesthesiologist: Jyoti Taylor MD        Anesthesiologist Present  Yes      Setup & InductionDoppler Echo: color flow mapping, 2D and 3D.      After adequate de-airing of the left ventricle, the patient was weaned from cardiopulmonary bypass with trivial inotropic support using epinephrine.    1. Right ventricular function is normal, unchanged from the preoperative evaluation.      2. There remains a mild amount of tricuspid regurgitation, unchanged from preoperative evaluation     3. A bioprosthetic valve is noted in the mitral position, it appears to be functioning normally no paravalvular leak is noted no significant gradient across the valve.  Maximum gradient is 3 mmHg, mean less than 1.3    4. Left ventricular function is mildly improved ejection fraction is 50% distal anterior wall appears to be slightly improved.    After reversal of the heparin with protamine and closure of the chest with sternal wires, the exam was once again repeated.  No interval changes were noted at the conclusion of the operation the transesophageal echo probe was removed atraumatically and the patient was brought uneventfully to the intensive care unit for further management.

## 2022-08-22 NOTE — ANESTHESIA PROCEDURE NOTES
Central Line    Diagnosis: Coronary Artery Bypass  Patient location during procedure: done in OR  Timeout: 8/22/2022 11:14 AM  Procedure end time: 8/22/2022 11:20 AM    Staffing  Authorizing Provider: Jyoti Taylor MD  Performing Provider: Jyoti Taylor MD    Staffing  Performed: anesthesiologist   Anesthesiologist: Jyoti Taylor MD  Anesthesiologist was present at the time of the procedure.  Preanesthetic Checklist  Completed: patient identified, risks and benefits discussed, monitors and equipment checked, timeout performed and anesthesia consent given  Indication   Indication: hemodynamic monitoring, vascular access, med administration     Anesthesia   general anesthesia    Central Line   Skin Prep: skin prepped with ChloraPrep, skin prep agent completely dried prior to procedure  Sterile Barriers Followed: Yes    All five maximal barriers used- gloves, gown, cap, mask, and large sterile sheet    hand hygiene performed prior to central venous catheter insertion  Location: right internal jugular.   Catheter type: introducer  Catheter Size: 10 Fr  Inserted Catheter Length: 16 cm  Ultrasound: vascular probe with ultrasound   Vessel Caliber: medium, patent, compressibility normal  Needle advanced into vessel with real time Ultrasound guidance.  Image recorded and saved.  sterile gel and probe cover used in ultrasound-guided central venous catheter insertion  Manometry: none  Insertion Attempts: 1   Securement:line sutured, chlorhexidine patch, sterile dressing applied and blood return through all ports    Post-Procedure   X-Ray: successful placement   Adverse Events:none      Guidewire Guidewire removed intact.

## 2022-08-22 NOTE — ANESTHESIA PROCEDURE NOTES
Arterial    Diagnosis: CAD    Patient location during procedure: holding area  Procedure start time: 8/22/2022 10:11 AM  Timeout: 8/22/2022 10:11 AM  Procedure end time: 8/22/2022 10:13 AM    Staffing  Authorizing Provider: Jyoti Taylor MD  Performing Provider: Jyoti Taylor MD    Anesthesiologist was present at the time of the procedure.  Arterial  Skin Prep: chlorhexidine gluconate  Local Infiltration: lidocaine  Orientation: right  Location: radial    Catheter Size: 20 G  Catheter placement by Anatomical landmarks. Heme positive aspiration all ports. Insertion Attempts: 1  Assessment  Dressing: secured with tape and tegaderm  Patient: Tolerated well

## 2022-08-22 NOTE — ANESTHESIA PROCEDURE NOTES
Intubation    Date/Time: 8/22/2022 11:12 AM  Performed by: Misael Stewart CRNA  Authorized by: Jyoti Taylor MD     Intubation:     Induction:  Intravenous    Intubated:  Postinduction    Mask Ventilation:  Easy mask    Attempts:  1    Attempted By:  CRNA    Method of Intubation:  Direct    Blade:  Owen 3    Laryngeal View Grade: Grade I - full view of cords      Difficult Airway Encountered?: No      Complications:  None    Airway Device:  Oral endotracheal tube    Airway Device Size:  7.5    Style/Cuff Inflation:  Cuffed (inflated to minimal occlusive pressure)    Tube secured:  23    Secured at:  The teeth    Placement Verified By:  Capnometry    Complicating Factors:  None    Findings Post-Intubation:  BS equal bilateral and atraumatic/condition of teeth unchanged

## 2022-08-22 NOTE — H&P
[facility] - [facility]  Pulmonary/Critical Care - Medicine  History and Physical    Patient Name: Zackery Osullivan Jr.  MRN: [id number]  Admission Date: 8/22/2022  Hospital Length of Stay: 0 days  Code Status: Full Code  Attending Provider: Rowdy Davis IV, MD  Primary Care Provider: Isaiah Meeks MD     Subjective:     HPI:  This is a 77-year-old  male with past medical history of coronary disease, hypertension, hyperlipidemia, HFrEF who presented to [facility] for a CABG x2 and mitral valve replacement.  At this time patient is ventilated on PRVC (20 RR, 500 TV, PEEP 5, 60% fio2).  Patient has chest tube with approximately 40 cc bloody drainage and a urinary catheter with approximately 200 cc of bloody urine.  Patient is currently on minimal pressure support with epinephrine. S/p 2 prbc     Past Medical History:   Diagnosis Date    Arthritis     spine    CAD (coronary artery disease)     COPD (chronic obstructive pulmonary disease)     HLD (hyperlipidemia)     HTN (hypertension)     Ischemic cardiomyopathy     Mitral regurgitation     HEATHER on CPAP     Stroke     Venous insufficiency     Ventricular tachycardia        Past Surgical History:   Procedure Laterality Date    bilateral inguinal stents      CARDIAC DEFIBRILLATOR PLACEMENT Left     CATARACT EXTRACTION Bilateral     CATHETERIZATION OF BOTH LEFT AND RIGHT HEART  08/02/2022    Diagnostic Only; Dr. Gerardo    COLONOSCOPY      LEFT HEART CATHETERIZATION Left 08/02/2022    Procedure: CATHETERIZATION, HEART, LEFT;  Surgeon: Reinaldo Gerardo MD;  Location: [facility] CATH LAB;  Service: Cardiology;  Laterality: Left;  [facility] +/- PCI    REMOVAL OF IMPLANTED CARDIOVERTER-DEFIBRILLATOR (ICD)      TONSILLECTOMY         Social History:     Social History     Socioeconomic History    Marital status:    Tobacco Use    Smoking status: Former Smoker     Years: 55.00     Start date: 1965     Quit date: 2020     Years since  quittin.6    Smokeless tobacco: Never Used   Substance and Sexual Activity    Alcohol use: Yes     Alcohol/week: 3.0 standard drinks     Types: 3 Glasses of wine per week    Drug use: Never       History reviewed. No pertinent family history.      Objective:     Drug Allergies:   Review of patient's allergies indicates:  No Known Allergies    Current Infusions:   sodium chloride 0.45%      dexmedetomidine (PRECEDEX) infusion      insulin regular 1 units/mL infusion orderable (CTS POST-OP)         Scheduled Medications:     acetaminophen  1,000 mg Intravenous Q6H    aspirin  81 mg Oral Daily    cefUROXime (ZINACEF) IVPB  1.5 g Intravenous Q8H    docusate sodium  100 mg Oral BID    famotidine (PF)  20 mg Intravenous BID    folic acid  1 mg Oral Daily    loperamide  4 mg Oral Once    metoprolol tartrate  12.5 mg Oral BID    mupirocin   Nasal BID    sucralfate  1 g Oral QID (AC & HS)       PRN Medications:   sodium chloride, acetaminophen, albumin human 5%, calcium gluconate IVPB, calcium gluconate IVPB, dextrose 10%, dextrose 50%, HYDROcodone-acetaminophen, magnesium sulfate IVPB, magnesium sulfate IVPB, metoclopramide HCl, morphine, ondansetron, oxyCODONE, potassium chloride in water, potassium chloride in water, potassium chloride in water, sodium phosphate IVPB, sodium phosphate IVPB, sodium phosphate IVPB    Input/output:    Intake/Output Summary (Last 24 hours) at 2022 1612  Last data filed at 2022 1507  Gross per 24 hour   Intake 753 ml   Output 1125 ml   Net -372 ml       Vital Signs (Most Recent):  Temp: 97.8 °F (36.6 °C) (22 0700)  Pulse: 60 (22 0934)  Resp: 20 (22 0934)  BP: 123/64 (22 0934)  SpO2: 98 % (22 1545)  Body mass index is 27.71 kg/m².  Weight: 85.1 kg (187 lb 9.8 oz) Vital Signs (24h Range):  Temp:  [97.8 °F (36.6 °C)] 97.8 °F (36.6 °C)  Pulse:  [60-69] 60  Resp:  [20-21] 20  SpO2:  [94 %-98 %] 98 %  BP: (112-123)/(64-67) 123/64      Physical Exam:   General: Ventilated   HEENT: Ventilated unable to assess   Neck: Right sided central line   Cardiac: Regular rate, normal sinus rhythm, S1,S2 heard, no murmurs, rubs, or gallops, with brisk cap refill <2 sec, and symmetric pulses at 2+ in distal extremities.  Respiratory:  Clear to auscultation bilaterally with no wheezes or rhonchi.  Abdomen: Soft, NT/ND. +BS. No palpable masses. No hepatosplenomegaly.   Lymphatic: No palpable LAD.   Extremities: No visible atrophy. No clubbing or cyanosis on nail exam.   Skin: No visible rashes   Neuro: unable to assess         Vent:  Vent Mode: SIMV (PRVC) + PS (08/22/22 1545)  Ventilator Initiated: Yes (08/22/22 1545)  Set Rate: 20 BPM (08/22/22 1545)  Vt Set: 500 mL (08/22/22 1545)  Pressure Support: 10 cmH20 (08/22/22 1545)  PEEP/CPAP: 5 cmH20 (08/22/22 1545)  Oxygen Concentration (%): 60 (08/22/22 1545)  Peak Airway Pressure: 20 cmH2O (08/22/22 1545)  Total Ve: 11.7 mL (08/22/22 1545)    ABGs:  Lab Results   Component Value Date    PH 7.44 08/22/2022    PO2 62 (A) 08/22/2022    PCO2 43 08/22/2022           Significant Labs:    Laboratory Studies:   Recent Labs   Lab 08/22/22  1011   PH 7.44   PCO2 43   PO2 62*   HCO3 29.2*   POCSATURATED 92.3     Recent Labs   Lab 08/22/22  1519   WBC 29.1*   RBC 3.37*   HGB 10.4*   HCT 33.0*      MCV 97.9*   MCH 30.9   MCHC 31.5*     Recent Labs   Lab 08/22/22  1519   GLUCOSE 142*      K 4.5   CO2 24   BUN 12.8   CREATININE 0.77   MG 3.30*       Microbiology Data:   Microbiology Results (last 7 days)     ** No results found for the last 168 hours. **                Imaging:      GI prophylaxis: carafate, famotidine      Assessment/Plan:     Assessment:  Coronary artery disease (left circumflex complete total occlusion, RCA dominant complete total occlusion, lad proximal 30%, LAD main 50%.  CABG x 2 (LIMA to LAD, Reverse saphenous to obtuse marginal)  Mitral valve regurgitation (porcine  replacement)  COPD  HTN  HLD     Plan:  Continue ICU protocol for CABG  Will monitor H&H along with PT/INR .  Patient remains on pressor support wean as tolerated.       Greater than 30 minutes of critical care was time spent personally by me on the following activities: development of treatment plan with patient or surrogate and bedside caregivers, discussions with consultants, evaluation of patient's response to treatment, examination of patient, ordering and performing treatments and interventions, ordering and review of laboratory studies, ordering and review of radiographic studies, pulse oximetry, re-evaluation of patient's condition.  This critical care time did not overlap with that of any other provider or involve time for any procedures.     Eloina Fraser MD  Pulmonary/Critical Care  [facility] - [facility]

## 2022-08-22 NOTE — ANESTHESIA PROCEDURE NOTES
MARIJA    Diagnosis: Coronary artery disease  Patient location during procedure: OR  Exam type: Baseline    Staffing  Performed: anesthesiologist     Anesthesiologist: Jyoti Taylor MD        Anesthesiologist Present  Yes      Setup & Induction  Patient preparation: bite block inserted  Probe Insertion: easy  Exam: completeDoppler Echo: 2D, 3D and color flow mapping.      After induction of general endotracheal anesthesia in the operating room, transesophageal echo probe was placed atraumatically and the esophagus for evaluation of cardiovascular structures.      1. Ascending aorta transverse arch and descending thoracic aorta to the level of diaphragm are well visualized.  The ascending aorta is generous measuring 32 mm in diameter, arch and descending thoracic aorta are otherwise normal in caliber with only a few grade 2 atherosclerotic disease noted in the thoracic aorta only.      2. Right atrium, left atrium, left atrial appendage are well visualized.  There was no thrombus, tumor evidence of an interatrial septal defect.  There is moderate left atrial enlargement.      3. Tricuspid valve is normal trileaflet valve without stenosis and only a mild amount of insufficiency with a pacer/defibrillator lead noted.    4. Normal pulmonic valve without stenosis or insufficiency.      5. Normal right ventricular size and function with an RV pacemaker/defibrillator lead noted.    6. Borderline dilated mitral annulus, anterior leaflet appears normal.  The A1 A2 segment appears to be prolapsing and writing over the anterior leaflet with a failure to coapt.  This is resulting in significant regurgitant jet.  The E Cathy is 0.39 cm2, the regurgitant volume is measured at 77 mL.  The vena contracta is approximately 6 mm.  This is consistent with borderline to severe mitral regurgitation.  There is reversal of flow intermittently in the left upper pulmonary vein as well.  Can not exclude a small secondary cord ruptured subtending  the P2  segment.  No calcifications are noted to the annulus or leaflets.    7. Aortic valve is normal trileaflet valve without stenosis or insufficiency.      8. Asymmetric septal hypertrophy, as well as some inferior wall hypertrophy.  No outflow tract obstruction is seen.  Overall systolic function is mildly depressed with ejection fraction 45-50%.  There is some distal anterior wall hypokinesis seen.  There is some septal dyskinesis also seen likely due to right ventricular pacing.    9. Normal pericardium and pleura without effusions    The above findings were discussed with the surgeon and images reviewed prior to surgical incision.  Surgeon elected to proceed with a coronary artery bypass and mitral valve replacement.

## 2022-08-22 NOTE — OP NOTE
Date of service:  08/22/2022   Preop diagnosis: Coronary artery disease, mitral regurgitation  Postop diagnosis:  Same   Procedure:   mitral valve replacement with a 29 mm mosaic porcine valve; coronary bypass grafting x2; left internal mammary artery to left anterior descending; reverse saphenous vein to the obtuse marginal; endoscopic vein harvest of the left greater saphenous vein; ligation of left atrial appendage with Endo stapler  Surgeon:  Ryan   Assistant:  Jorge Alberto   Anesthesia:  General endotracheal with cardiopulmonary bypass   Drains:  Chest tube x1     Procedure in detail:  The patient was taken to the operating room under informed consent placed on the table in a supine position.  General endotracheal anesthesia was induced by the anesthesia department.  He was prepped and draped in usual sterile fashion from the chin to the toes.  Transesophageal echo placed by anesthesia during the case revealed a moderately severe mitral regurgitation.  As such decision made to go ahead and replace the mitral valve.  Incision made the left knee in the left greater saphenous vein exposed and harvested from the entire leg with an endoscopic vein harvester appears gently dilated and all branches ligated and noted to be of good caliber for bypass.  These wounds closed with 2-0 Vicryl the skin with 3-0 subcuticular stitch.  Incision made in the chest from the sternal notch to the xiphoid and carried down sharply to the sternum.  The sternum was transected with a sternal saw and the left internal mammary artery harvested along with the surrounding soft tissue pedicle.  The pericardium incised and the patient fully heparinized.  Aortic and venous cannulae were placed in the patient placed on bypass and cooled to 32° systemic.  The aorta was crossclamped and cold blood cardioplegia infused in an antegrade fashion to the aortic root.  Additional doses given about every 20-30 minutes.  The obtuse marginal was dissected and  entered.  This was a 1.75 mm vessel and a reversed saphenous vein anastomosed in end-to-side fashion with running 7 0 Prolene suture.  There was excellent flow through this vessel after completion anastomosis and the vein measured to reach the aorta and transected.  The posterior descending artery was too small to bypass.  The left anterior descending was dissected and entered this was a 2.25 mm vessel.  The left internal mammary artery was anastomosed in an end-to-side fashion with a running 7 0 Prolene suture.  There was brisk flow through the distal distribution of this vessel after completion of anastomosis and the pedicle sutured to the epicardium with an interrupted 5 0 Prolene suture.  The left atrial appendage ligated and completely excised with a Endo stapler.  CO2 infused into the pericardial well and the left atrium opened at the junction with the right superior pulmonary vein.  Mitral valve was examined.  There was some posterior leaflet prolapse but also some thickened and fused chordae from early rheumatic disease.  Decision made to replace the valve.  The 2-0 pledgeted Ethibond sutures were placed circumferentially around the annulus in a horizontal mattress fashion.  The anterior leaflet excised and the posterior leaflet preserved.  The annulus measured and a 29 mm mosaic porcine valve selected and washed.  Sutures were attached to the sewing ring on the valve the valve parachuted into position.  Sutures tied with the cor knot system.  It was noted to seat nicely.  The left atrium closed with a running 4-0 Prolene suture in multiple layers with de-airing carried out prior to completion of the suture line.  Cross-clamp was removed and additional de-airing carried out through the apex of the aorta with an aortic sump.  Aortic exclusion clamp was placed and a single aortotomy was made with an 11 blade and enlarged with a 4.8 mm punch.  The proximal venous anastomosis performed in an end-to-side fashion  with a running 5 0 Prolene suture.  Vein marker was placed on the ostium and vein graft.  The patient has been on returned to a paced rhythm and subsequently was easily weaned from cardiopulmonary bypass.  Protamine given to reverse the heparin and the arterial and venous cannulae removed and their pursestring sutures secured.  The wound was copiously irrigated with a 3% saline antibiotic solution.  A mediastinal tube was placed in both the anterior and posterior space brought out through a separate stab wound and secured to the skin.  Platelet concentrated aggregated growth factor was infused throughout the wound and sternum.  The sternum was closed with sternal wires and the linea alba and sternal fascia with a running 1. Vicryl.  The subcutaneous tissue closed with 2-0 Vicryl and the skin with a 3-0 subcuticular stitch.  Transesophageal echo and indications of a good result with no evidence of mitral regurgitation, no aortic insufficiency, and unchanged left ventricular function at about 40%.  The patient tolerated this procedure well and left operating room in stable condition.

## 2022-08-22 NOTE — ANESTHESIA PREPROCEDURE EVALUATION
08/22/2022  Zackery Osullivan Jr. is a 77 y.o., male.  Smoker, with h/o VT, now on AMio with AICD  ICM, EF 30-40%, mild-mid MR per cards  CAD  Here for CABG, eval MR in OR    Pre-op Assessment    I have reviewed the Patient Summary Reports.     I have reviewed the Nursing Notes. I have reviewed the NPO Status.   I have reviewed the Medications.     Review of Systems  Anesthesia Hx:  No problems with previous Anesthesia    Social:  Smoker    Cardiovascular:   Hypertension CAD   LEONARDO NYHA Classification III    Pulmonary:   COPD Sleep Apnea        Physical Exam  General: Well nourished, Cooperative, Alert and Oriented    Airway:  Mallampati: II / II  Mouth Opening: Normal  TM Distance: Normal  Tongue: Normal  Neck ROM: Normal ROM    Dental:  Intact    Heart:  Rate: Normal  Rhythm: Regular Rhythm        Anesthesia Plan  Type of Anesthesia, risks & benefits discussed:    Anesthesia Type: Gen ETT  Intra-op Monitoring Plan: Standard ASA Monitors, Art Line, Central Line, MARIJA and CO  Post Op Pain Control Plan: multimodal analgesia and IV/PO Opioids PRN  Induction:  IV  Airway Plan: Direct, Post-Induction  Informed Consent: Informed consent signed with the Patient and all parties understand the risks and agree with anesthesia plan.  All questions answered. Patient consented to blood products? Yes  ASA Score: 4    Ready For Surgery From Anesthesia Perspective.     .

## 2022-08-22 NOTE — ANESTHESIA POSTPROCEDURE EVALUATION
Anesthesia Post Evaluation    Patient: Zackery Osullivan Jr.    Procedure(s) Performed: Procedure(s) (LRB):  CORONARY ARTERY BYPASS GRAFT (CABG) (N/A)  REPLACEMENT, MITRAL VALVE (N/A)    Final Anesthesia Type: general      Patient location during evaluation: ICU  Patient participation: No - Unable to Participate, Intubation  Level of consciousness: sedated  Post-procedure vital signs: reviewed and stable  Pain management: adequate  Airway patency: patent  HEATHER mitigation strategies: Extubation while patient is awake  PONV status at discharge: No PONV  Anesthetic complications: no      Cardiovascular status: stable and hemodynamically stable  Respiratory status: ETT  Hydration status: euvolemic  Follow-up needed   Comments: Patient connected to monitors. Vent connected and reported to Respiratory for vent settings. Report to ICU nurse.          Vitals Value Taken Time   /82 08/22/22 1545   Temp 36.8 08/22/22 1551   Pulse 66 08/22/22 1550   Resp 18 08/22/22 1550   SpO2 98 % 08/22/22 1550   Vitals shown include unvalidated device data.      No case tracking events are documented in the log.      Pain/Juana Score: No data recorded

## 2022-08-23 NOTE — PROGRESS NOTES
Postop day 1  Patient remains intubated, fully responsive  Afebrile  Vital signs stable on some epinephrine  Chest is clear  Heart sinus  Abdomen is soft  Hematocrit 35%  Creatinine 0.9  A little acidotic on ABGs  Chest x-ray reveals some left upper lobe possible postobstructive consolidation  Review of preop chest x-ray reveals what appears to be a left hilar lung mass  Will give some volume to help wean from press SARs, also bicarb  Should be able to extubate today  Will get CT of chest prior to discharge to evaluate left upper lobe lung lesion, and right lower lobe lung nodule

## 2022-08-23 NOTE — PT/OT/SLP PROGRESS
Physical Therapy      Patient Name:  Zackery Osullivan Jr.   MRN:  [id number]    Patient not seen today for PT eval. Spoke to RN who reported that pt is not appropriate at this time for mobility. Will follow-up tomorrow.

## 2022-08-23 NOTE — PLAN OF CARE
08/23/22 1149   Discharge Assessment   Assessment Type Discharge Planning Assessment   Confirmed/corrected address, phone number and insurance Yes   Confirmed Demographics Correct on Facesheet   Source of Information patient   Reason For Admission s/p CAB   Lives With spouse;child(teri), adult  (pt lives with pt's wifeLia and 48y/o sonBraeden in a single story home w one step to enter)   Do you expect to return to your current living situation? Yes   Do you have help at home or someone to help you manage your care at home? Yes   Who are your caregiver(s) and their phone number(s)? pt's wifeLia--[phone] and 48y/o son   Prior to hospitilization cognitive status: Alert/Oriented   Current cognitive status: Alert/Oriented   Walking or Climbing Stairs Difficulty none   Dressing/Bathing Difficulty none   Home Layout Able to live on 1st floor   Equipment Currently Used at Home none  (pt has a RW, cane, and WC; does not use)   Patient currently being followed by outpatient case management? No   Do you currently have service(s) that help you manage your care at home? No   Who is going to help you get home at discharge? pt's wifeLia   How do you get to doctors appointments? car, drives self   Discharge Plan A   (Home with home health)   Discharge Plan B   (Home with home health)   DME Needed Upon Discharge  other (see comments)  (TBD)   Discharge Plan discussed with: Patient   Discharge Barriers Identified None   Relationship/Environment   Name(s) of Who Lives With Patient pt's wife, Lia and 48y/o sonBraeden       Pt's PCP is Dr Meeks. His  is his wife, Lia ([phone]). He has never HH services. He uses [facility] off [address] [facility].     Rec consult for HH services. FOC obtained. Kyra sent referral to [facility].

## 2022-08-23 NOTE — PROGRESS NOTES
[facility] - [facility]  Pulmonary Critical Care Note    Patient Name: Zackery Osullivan Jr.  MRN: [id number]  Admission Date: 8/22/2022  Hospital Length of Stay: 1 days  Code Status: Full Code  Attending Provider: Rowdy Davis IV, MD  Primary Care Provider: Isaiah Meeks MD     Subjective:     HPI:   77-year-old  male with past medical history of CAD, HTN, HLD, and HFrEF who presented to [facility] for a CABG x2 and mitral valve replacement. Patient presented intubated and  with chest tube with approximately 40 cc bloody drainage and a urinary catheter with approximately 200 cc of bloody urine.  Patient presented on minimal pressure support with epinephrine. S/p 2 prbc intraop.     Hospital Course/Significant events:  8/22/22: CABGx2    24 Hour Interval History:  Remains intubated PRVC 12/500/5/10/30%, requiring levophed for pressure support, I/Os +3075 cc, -2825 cc, +250 cc      Past Medical History:   Diagnosis Date    Arthritis     spine    CAD (coronary artery disease)     COPD (chronic obstructive pulmonary disease)     HLD (hyperlipidemia)     HTN (hypertension)     Ischemic cardiomyopathy     Mitral regurgitation     HEATHER on CPAP     Stroke     Venous insufficiency     Ventricular tachycardia        Past Surgical History:   Procedure Laterality Date    bilateral inguinal stents      CARDIAC DEFIBRILLATOR PLACEMENT Left     CATARACT EXTRACTION Bilateral     CATHETERIZATION OF BOTH LEFT AND RIGHT HEART  08/02/2022    Diagnostic Only; Dr. Gerardo    COLONOSCOPY      LEFT HEART CATHETERIZATION Left 08/02/2022    Procedure: CATHETERIZATION, HEART, LEFT;  Surgeon: Reinaldo Gerardo MD;  Location: [facility] CATH LAB;  Service: Cardiology;  Laterality: Left;  LHC +/- PCI    REMOVAL OF IMPLANTED CARDIOVERTER-DEFIBRILLATOR (ICD)      TONSILLECTOMY         Social History     Socioeconomic History    Marital status:    Tobacco Use    Smoking status: Former Smoker      Years: 55.00     Start date:      Quit date:      Years since quittin.6    Smokeless tobacco: Never Used   Substance and Sexual Activity    Alcohol use: Yes     Alcohol/week: 3.0 standard drinks     Types: 3 Glasses of wine per week    Drug use: Never           Current Outpatient Medications   Medication Instructions    amiodarone (PACERONE) 200 mg, Oral, 2 times daily    aspirin (ECOTRIN) 81 mg, Oral, Nightly    atorvastatin (LIPITOR) 80 mg, Oral, Nightly    clopidogreL (PLAVIX) 75 mg, Oral, Nightly    ezetimibe (ZETIA) 10 mg, Oral, Nightly    fluticasone propionate (FLONASE) 50 mcg/actuation nasal spray 1 spray, Each Nostril, Daily PRN    furosemide (LASIX) 20 mg, Oral, Daily    metoprolol succinate (TOPROL-XL) 25 MG 24 hr tablet 1 tablet, Oral, Daily    mometasone (ASMANEX TWISTHALER) 220 mcg/ actuation (30) inhaler 2 puffs, Inhalation, Daily, Controller    sacubitriL-valsartan (ENTRESTO) 49-51 mg per tablet 0.5 tablets, Oral, 2 times daily    spironolactone (ALDACTONE) 12.5 mg, Oral, Daily    umeclidinium-vilanteroL (ANORO ELLIPTA) 62.5-25 mcg/actuation DsDv 1 puff, Inhalation, Daily, Controller       Current Inpatient Medications   acetaminophen  1,000 mg Intravenous Q6H    aspirin  81 mg Oral Daily    cefUROXime (ZINACEF) IVPB  1.5 g Intravenous Q8H    docusate sodium  100 mg Oral BID    famotidine (PF)  20 mg Intravenous BID    folic acid  1 mg Oral Daily    loperamide  4 mg Oral Once    metoprolol tartrate  12.5 mg Oral BID    mupirocin   Nasal BID    sucralfate  1 g Oral QID (AC & HS)       Current Intravenous Infusions   sodium chloride 0.45% 75 mL/hr (22 1600)    dexmedetomidine (PRECEDEX) infusion      electrolyte-A 1,000 mL (22 181)    EPINEPHrine 0.1 mcg/kg/min (22 0415)    insulin regular 1 units/mL infusion orderable (CTS POST-OP) Stopped (22 190)    NORepinephrine bitartrate-D5W           Review of Systems   Unable to perform ROS:  Intubated          Objective:       Intake/Output Summary (Last 24 hours) at 8/23/2022 0519  Last data filed at 8/23/2022 0000  Gross per 24 hour   Intake 3075.28 ml   Output 2825 ml   Net 250.28 ml         Vital Signs (Most Recent):  Temp: 97.3 °F (36.3 °C) (08/23/22 0400)  Pulse: 61 (08/23/22 0420)  Resp: 16 (08/23/22 0516)  BP: 116/63 (08/23/22 0300)  SpO2: 97 % (08/23/22 0420)  Body mass index is 27.71 kg/m².  Weight: 85.1 kg (187 lb 9.8 oz) Vital Signs (24h Range):  Temp:  [96.1 °F (35.6 °C)-98.6 °F (37 °C)] 97.3 °F (36.3 °C)  Pulse:  [57-69] 61  Resp:  [7-25] 16  SpO2:  [87 %-100 %] 97 %  BP: ()/(55-82) 116/63  Arterial Line BP: ()/(38-66) 123/48       Physical Exam:  General: patient in bed, NAD, ET tube in place, sedated  Eye: PERRLA, clear conjunctiva, eyelids normal  HENT: Head-normocephalic and atraumatic, ET tube in place  Neck: supple, Right IJ in place  Respiratory: clear to auscultation bilaterally without wheezes, rales, rhonchi, mechanically ventelated  Cardiovascular: regular rate and rhythm without murmurs.  No gallops or rubs no JVD.  Capillary refill within normal limits.  Gastrointestinal: soft, non-tender, non-distended with normal bowel sounds, without masses to palpation  Musculoskeletal: no gross deformities, no peripheral edema  Integumentary: no rashes or skin lesions present  Neurologic: intubated, sedated      Lines/Drains/Airways     Central Venous Catheter Line  Duration            Introducer with Double Lumen 08/22/22 1454 <1 day          Drain  Duration                Chest Tube 08/22/22 1429 Mediastinal 24 Fr. <1 day         Urethral Catheter 08/22/22 1118 Temperature probe;Non-latex 16 Fr. <1 day          Airway  Duration                Airway - Non-Surgical 08/22/22 1112 <1 day          Arterial Line  Duration           Arterial Line 08/22/22 0953 Radial <1 day          Peripheral Intravenous Line  Duration                Peripheral IV - Single Lumen 08/22/22 0740 18  G Anterior;Distal;Left Upper Arm <1 day                Significant Labs:    Lab Results   Component Value Date    WBC 29.4 (H) 08/23/2022    HGB 11.0 (L) 08/23/2022    HCT 35.9 (L) 08/23/2022    MCV 98.4 (H) 08/23/2022     08/23/2022         BMP  Lab Results   Component Value Date     08/23/2022    K 5.0 08/23/2022    CO2 20 (L) 08/23/2022    BUN 14.6 08/23/2022    CREATININE 0.98 08/23/2022    CALCIUM 8.4 (L) 08/23/2022    EGFRNONAA >60 05/14/2020       ABG  Recent Labs   Lab 08/22/22  1627   PH 7.32*   PO2 68*   PCO2 47*   HCO3 24.2       Mechanical Ventilation Support:  Vent Mode: CPAP PSV (08/23/22 0420)  Ventilator Initiated: Yes (08/22/22 1545)  Set Rate: 12 BPM (08/23/22 0225)  Vt Set: 500 mL (08/23/22 0225)  Pressure Support: 10 cmH20 (08/23/22 0420)  PEEP/CPAP: 5 cmH20 (08/23/22 0420)  Oxygen Concentration (%): 30 (08/23/22 0420)  Peak Airway Pressure: 17 cmH2O (08/23/22 0420)  Total Ve: 10.3 mL (08/23/22 0420)  F/VT Ratio<105 (RSBI): (!) 16.46 (08/23/22 0420)      Significant Imaging:  I have reviewed the pertinent imaging within the past 24 hours.    CXR on 08/23/2022 to PACS by my read portable CXR, slightly rotated, ET tube placed mid trachea, the surgery sternotomy wires, bilateral opacification, mediastinal chest tube placed, cardiac device present to left chest,      Assessment/Plan:     Assessment  1. CAD s/p CABG x2 to LAD and obtuse marginal  2. MVR s/p porcine replacement   3. COPD   4. HTN   5. HLD      Plan  -continue ICU care post CABG protocol   -continue mechanical ventilation, can weakness tolerates   -continue norepinephrine, epinephrine for hemodynamic support, can weakness tolerates   -continue monitoring electrolytes, replete as needed   -CT surgery following, appreciate their assistance  -cardiology following, appreciate their assistance  -chest tube in place, draining bloody discharge  -continue Precedex for sedation, can weakness tolerates  -continue metoprolol tartrate  12.5 mg b.i.d.    DVT Prophylaxis: SCDs  GI Prophylaxis: famotidine     33 minutes of critical care was time spent personally by me on the following activities: development of treatment plan with patient or surrogate and bedside caregivers, discussions with consultants, evaluation of patient's response to treatment, examination of patient, ordering and performing treatments and interventions, ordering and review of laboratory studies, ordering and review of radiographic studies, pulse oximetry, re-evaluation of patient's condition.  This critical care time did not overlap with that of any other provider or involve time for any procedures.     Hina Lewis, DO  Pulmonary Critical Care Medicine  [facility] - [facility]

## 2022-08-24 NOTE — PT/OT/SLP EVAL
Physical Therapy Evaluation    Patient Name:  Zackery Osullivan Jr.   MRN:  [id number]    Recommendations:     Discharge Recommendations:  rehabilitation facility   Discharge Equipment Recommendations:     Barriers to discharge: medical diagnosis    Assessment:     Zackery Osullivan Jr. is a 77 y.o. male admitted with a medical diagnosis of s/p CABG x2.  He presents with the following impairments/functional limitations:  weakness, gait instability, impaired endurance, impaired balance, impaired functional mobility, impaired self care skills, decreased lower extremity function, impaired cardiopulmonary response to activity.    Rehab Prognosis: Good; patient would benefit from acute skilled PT services to address these deficits and reach maximum level of function.    Recent Surgery: Procedure(s) (LRB):  CORONARY ARTERY BYPASS GRAFT (CABG) (N/A)  REPLACEMENT, MITRAL VALVE (N/A) 2 Days Post-Op    Plan:     During this hospitalization, patient to be seen 6 x/week to address the identified rehab impairments via gait training, therapeutic activities, therapeutic exercises and progress toward the following goals:    · Plan of Care Expires:  09/24/22    Subjective     Chief Complaint: chest discomfort, LH, nausea  Patient/Family Comments/goals: to get stronger to get home  Pain/Comfort:  · Pain Rating 1: 5/10  · Location - Orientation 1: medial  · Location 1: chest    Patients cultural, spiritual, [employer] conflicts given the current situation: no    Living Environment:  Pt reports living in a [facility] with his spouse  Prior to admission, patients level of function was independent.   Equipment used at home: none.  DME owned (not currently used): rolling walker, single point cane and crutches.    Upon discharge, patient will have assistance from unknown-- pt reported that his spouse will not be able to assist him as she is awaiting TKA .    Objective:     Communicated with NSG prior to session.  Patient found up in chair with  blood pressure cuff, pulse ox (continuous), telemetry, chest tube  upon PT entry to room.    General Precautions: Standard, fall, sternal-- educated on and demonstrated understanding   Orthopedic Precautions:N/A   Braces: N/A  Respiratory Status: Nasal cannula, flow 3 L/min  Vitals   BP: 110/58 (prior to ax)   BP: 121/62 (after ax)   SpO2: 97% at rest   SpO2: 92% w ax    Exams:  · Cognitive Exam:  Patient is oriented to Person, Place, Time and Situation  · RLE Strength: grossly 4/5  · LLE Strength: grossly 4/5   · Of note, pt did complain of B LE and B finger numbness/tingling. Stated this was starting to happen prior to sx    Functional Mobility:  · Bed Mobility:     · Sit to Supine: maximum assistance  · Transfers:     · Sit to Stand:  moderate assistance with rolling walker  · Gait: pt ambulated approx 22 feet with use of RW and ModA; poor RW management and proximity toward it; decreased step length initially demo'd shuffle gait but able to progress to step-to pattern; VCs provided for all deficits; limited by c/o SOB and lightheadedness + nausea      AM-PAC 6 CLICK MOBILITY  Total Score:15     Patient left HOB elevated with all lines intact, call button in reach, RN notified and RN present.    GOALS:   Multidisciplinary Problems     Physical Therapy Goals        Problem: Physical Therapy    Goal Priority Disciplines Outcome Goal Variances Interventions   Physical Therapy Goal     PT, PT/OT Ongoing, Progressing     Description: Goals to be met by: 22     Patient will increase functional independence with mobility by performin. Supine to sit with MInimal Assistance  2. Sit to supine with MInimal Assistance  3. Sit to stand transfer with Minimal Assistance  4. Gait  x 150 feet with Minimal Assistance using Rolling Walker.                      History:     Past Medical History:   Diagnosis Date    Arthritis     spine    CAD (coronary artery disease)     COPD (chronic obstructive pulmonary disease)      HLD (hyperlipidemia)     HTN (hypertension)     Ischemic cardiomyopathy     Mitral regurgitation     HEATHER on CPAP     Stroke     Venous insufficiency     Ventricular tachycardia        Past Surgical History:   Procedure Laterality Date    bilateral inguinal stents      CARDIAC DEFIBRILLATOR PLACEMENT Left     CATARACT EXTRACTION Bilateral     CATHETERIZATION OF BOTH LEFT AND RIGHT HEART  08/02/2022    Diagnostic Only; Dr. Gerardo    COLONOSCOPY      CORONARY ARTERY BYPASS GRAFT (CABG) N/A 8/22/2022    Procedure: CORONARY ARTERY BYPASS GRAFT (CABG);  Surgeon: Rowdy Davis IV, MD;  Location: [facility] OR;  Service: Cardiovascular;  Laterality: N/A;  possible MVR & LLAA  //  ECHO NOTIFIED    LEFT HEART CATHETERIZATION Left 08/02/2022    Procedure: CATHETERIZATION, HEART, LEFT;  Surgeon: Reinaldo Gerardo MD;  Location: [facility] CATH LAB;  Service: Cardiology;  Laterality: Left;  LHC +/- PCI    MITRAL VALVE REPLACEMENT N/A 8/22/2022    Procedure: REPLACEMENT, MITRAL VALVE;  Surgeon: Rowdy Davis IV, MD;  Location: [facility] OR;  Service: Cardiovascular;  Laterality: N/A;  possible MVR and LLAA    REMOVAL OF IMPLANTED CARDIOVERTER-DEFIBRILLATOR (ICD)      TONSILLECTOMY         Time Tracking:     PT Received On: 08/24/22  PT Start Time: 1441     PT Stop Time: 1509  PT Total Time (min): 28 min     Billable Minutes: Evaluation mod      08/24/2022

## 2022-08-24 NOTE — PROGRESS NOTES
In chair  On epi  vss  Heart sinus  Wounds c/d/i  m ct 260cc  hct 31  Albumin, ambulate    As above  CT of chest prior to discharge

## 2022-08-24 NOTE — PROGRESS NOTES
[facility] - [facility]  Pulmonary Critical Care Note    Patient Name: Zackery Osullivan Jr.  MRN: [id number]  Admission Date: 8/22/2022  Hospital Length of Stay: 2 days  Code Status: Full Code  Attending Provider: Rowdy Davis IV, MD  Primary Care Provider: Isaiah Meeks MD     Subjective:     HPI:   77-year-old  male with past medical history of CAD, HTN, HLD, and HFrEF who presented to [facility] for a CABG x2 and mitral valve replacement. Patient presented intubated and  with chest tube with approximately 40 cc bloody drainage and a urinary catheter with approximately 200 cc of bloody urine.  Patient presented on minimal pressure support with epinephrine. S/p 2 prbc intraop.     Pre-op CXR revealed what appears to be a left hilar lung mass.     Hospital Course/Significant events:  8/22/22: CABGx2    24 Hour Interval History:  Extubated yesterday morning to 2 L nasal cannula. Remains on epinephrine, currently receiving albumin. Sitting up in a chair this AM.      Past Medical History:   Diagnosis Date    Arthritis     spine    CAD (coronary artery disease)     COPD (chronic obstructive pulmonary disease)     HLD (hyperlipidemia)     HTN (hypertension)     Ischemic cardiomyopathy     Mitral regurgitation     HEATHER on CPAP     Stroke     Venous insufficiency     Ventricular tachycardia        Past Surgical History:   Procedure Laterality Date    bilateral inguinal stents      CARDIAC DEFIBRILLATOR PLACEMENT Left     CATARACT EXTRACTION Bilateral     CATHETERIZATION OF BOTH LEFT AND RIGHT HEART  08/02/2022    Diagnostic Only; Dr. Gerardo    COLONOSCOPY      CORONARY ARTERY BYPASS GRAFT (CABG) N/A 8/22/2022    Procedure: CORONARY ARTERY BYPASS GRAFT (CABG);  Surgeon: Rowdy Davis IV, MD;  Location: [facility];  Service: Cardiovascular;  Laterality: N/A;  possible MVR & LLAA  //  ECHO NOTIFIED    LEFT HEART CATHETERIZATION Left 08/02/2022    Procedure: CATHETERIZATION,  HEART, LEFT;  Surgeon: Reinaldo Gerardo MD;  Location: [facility] CATH LAB;  Service: Cardiology;  Laterality: Left;  LHC +/- PCI    MITRAL VALVE REPLACEMENT N/A 2022    Procedure: REPLACEMENT, MITRAL VALVE;  Surgeon: Rowdy Davis IV, MD;  Location: [facility] OR;  Service: Cardiovascular;  Laterality: N/A;  possible MVR and LLAA    REMOVAL OF IMPLANTED CARDIOVERTER-DEFIBRILLATOR (ICD)      TONSILLECTOMY         Social History     Socioeconomic History    Marital status:    Tobacco Use    Smoking status: Former Smoker     Years: 55.00     Start date:      Quit date:      Years since quittin.6    Smokeless tobacco: Never Used   Substance and Sexual Activity    Alcohol use: Yes     Alcohol/week: 3.0 standard drinks     Types: 3 Glasses of wine per week    Drug use: Never           Current Outpatient Medications   Medication Instructions    amiodarone (PACERONE) 200 mg, Oral, 2 times daily    aspirin (ECOTRIN) 81 mg, Oral, Nightly    atorvastatin (LIPITOR) 80 mg, Oral, Nightly    clopidogreL (PLAVIX) 75 mg, Oral, Nightly    ezetimibe (ZETIA) 10 mg, Oral, Nightly    fluticasone propionate (FLONASE) 50 mcg/actuation nasal spray 1 spray, Each Nostril, Daily PRN    furosemide (LASIX) 20 mg, Oral, Daily    metoprolol succinate (TOPROL-XL) 25 MG 24 hr tablet 1 tablet, Oral, Daily    mometasone (ASMANEX TWISTHALER) 220 mcg/ actuation (30) inhaler 2 puffs, Inhalation, Daily, Controller    sacubitriL-valsartan (ENTRESTO) 49-51 mg per tablet 0.5 tablets, Oral, 2 times daily    spironolactone (ALDACTONE) 12.5 mg, Oral, Daily    umeclidinium-vilanteroL (ANORO ELLIPTA) 62.5-25 mcg/actuation DsDv 1 puff, Inhalation, Daily, Controller       Current Inpatient Medications   albuterol-ipratropium  3 mL Nebulization Q6H    aspirin  81 mg Oral Daily    budesonide  0.5 mg Nebulization Q12H    docusate sodium  100 mg Oral BID    enoxaparin  40 mg Subcutaneous Daily    famotidine (PF)  20 mg  Intravenous BID    folic acid  1 mg Oral Daily    loperamide  4 mg Oral Once    metoprolol tartrate  12.5 mg Oral BID    mupirocin   Nasal BID    sucralfate  1 g Oral QID (AC & HS)       Current Intravenous Infusions   sodium chloride 0.45% 75 mL/hr (08/23/22 0537)    EPINEPHrine 0.04 mcg/kg/min (08/24/22 0543)    insulin regular 1 units/mL infusion orderable (CTS POST-OP) Stopped (08/23/22 1400)    NORepinephrine bitartrate-D5W           Review of Systems   Unable to perform ROS: Intubated          Objective:       Intake/Output Summary (Last 24 hours) at 8/24/2022 1009  Last data filed at 8/24/2022 0800  Gross per 24 hour   Intake 1928.76 ml   Output 1170 ml   Net 758.76 ml         Vital Signs (Most Recent):  Temp: 97.9 °F (36.6 °C) (08/24/22 0800)  Pulse: 67 (08/24/22 0841)  Resp: (!) 22 (08/24/22 0841)  BP: (!) 114/59 (08/24/22 0800)  SpO2: 96 % (08/24/22 0841)  Body mass index is 27.25 kg/m².  Weight: 83.7 kg (184 lb 8.4 oz) Vital Signs (24h Range):  Temp:  [97.9 °F (36.6 °C)-98.6 °F (37 °C)] 97.9 °F (36.6 °C)  Pulse:  [59-67] 67  Resp:  [4-26] 22  SpO2:  [86 %-100 %] 96 %  BP: ()/(48-73) 114/59  Arterial Line BP: ()/(27-53) 82/47       Physical Exam:  General: Sitting up in chair, NAD  Eye: PERRLA, clear conjunctiva, eyelids normal  HENT: normocephalic and atraumatic  Neck: supple, Right IJ in place  Respiratory: Diminished throughout  Cardiovascular: regular rate and rhythm without murmurs.    Gastrointestinal: soft, non-tender, non-distended with normal bowel sounds, without masses to palpation  Neurologic: AAOx3      Lines/Drains/Airways     Central Venous Catheter Line  Duration            Introducer with Double Lumen 08/22/22 1454 1 day          Drain  Duration                Chest Tube 08/22/22 1429 Mediastinal 24 Fr. 1 day         Urethral Catheter 08/22/22 1118 Temperature probe;Non-latex 16 Fr. 1 day          Airway  Duration                Airway - Non-Surgical 08/22/22 1112 1  day          Arterial Line  Duration           Arterial Line 08/22/22 0953 Radial 2 days          Peripheral Intravenous Line  Duration                Peripheral IV - Single Lumen 08/22/22 0740 18 G Anterior;Distal;Left Upper Arm 2 days                Significant Labs:    Lab Results   Component Value Date    WBC 24.7 (H) 08/24/2022    HGB 9.7 (L) 08/24/2022    HCT 31.1 (L) 08/24/2022    MCV 96.6 (H) 08/24/2022     08/24/2022         BMP  Lab Results   Component Value Date     08/24/2022    K 4.6 08/24/2022    CO2 22 (L) 08/24/2022    BUN 18.8 08/24/2022    CREATININE 0.89 08/24/2022    CALCIUM 8.1 (L) 08/24/2022    EGFRNONAA >60 05/14/2020       ABG  Recent Labs   Lab 08/23/22  0745   PH 7.38   PO2 102*   PCO2 39   HCO3 23.1       Mechanical Ventilation Support:  Vent Mode: CPAP PSV (08/23/22 0550)  Ventilator Initiated: Yes (08/22/22 1545)  Set Rate: 12 BPM (08/23/22 0225)  Vt Set: 500 mL (08/23/22 0225)  Pressure Support: 10 cmH20 (08/23/22 0550)  PEEP/CPAP: 5 cmH20 (08/23/22 0550)  Oxygen Concentration (%): 30 (08/23/22 0550)  Peak Airway Pressure: 16 cmH2O (08/23/22 0550)  Total Ve: 13.6 mL (08/23/22 0550)  F/VT Ratio<105 (RSBI): (!) 18.89 (08/23/22 0550)      Significant Imaging:  I have reviewed the pertinent imaging within the past 24 hours.    CXR on 08/23/2022 to PACS by my read portable CXR, slightly rotated, ET tube placed mid trachea, the surgery sternotomy wires, bilateral opacification, mediastinal chest tube placed, cardiac device present to left chest,      Assessment/Plan:     Assessment  1. CAD s/p CABG x2 to LAD and obtuse marginal  2. MVR s/p porcine replacement   3. COPD   4. HTN   5. HLD      Plan  -Continue postoperative management per CABG protocol   -Continue supplemental oxygen, wean as tolerated  -Wean Epi as tolerated, receiving albumin with hopes to be able to wean off  - Monitor chest tube output  - Multimodal pain control  -Discontinued metoprolol, not appropriate at this  time.      DVT Prophylaxis: SCDs  GI Prophylaxis: famotidine     33 minutes of critical care was time spent personally by me on the following activities: development of treatment plan with patient or surrogate and bedside caregivers, discussions with consultants, evaluation of patient's response to treatment, examination of patient, ordering and performing treatments and interventions, ordering and review of laboratory studies, ordering and review of radiographic studies, pulse oximetry, re-evaluation of patient's condition.  This critical care time did not overlap with that of any other provider or involve time for any procedures.     KIMBERLI Leos  Pulmonary Critical Care Medicine  [facility] - [facility]

## 2022-08-24 NOTE — CONSULTS
Inpatient consult to Cardiology  Consult performed by: KIMBERLI Pratt  Consult ordered by: Rowdy Davis IV, MD  Reason for consult: CAD/CABG/MVR        [facility] - [facility]  Cardiology  Consult Note    Patient Name: Zackery Osullivan Jr.  MRN: [id number]  Admission Date: 8/22/2022  Hospital Length of Stay: 2 days  Code Status: Full Code   Attending Provider: Rowdy Davis IV, MD   Consulting Provider: KIMBERLI Pratt  Primary Care Physician: Isaiah Meeks MD  Principal Problem:<principal problem not specified>    Patient information was obtained from patient, past medical records and ER records.     Subjective:   Consultation Reason: CAD/CABG & MVR (LISA Ligation)    HPI:  Mr. Osullivan is a 77 year old male, known to Dr. Gerardo and Dr. Flowers, who presented to the hospital and underwent CAD/CABG and MVR due to diagnosis of MV CAD and significant MR. Also underwent LISA Ligation. Patient tolerated the surgery well, and was transferred to intensive care unit for further close post operative monitoring. [facility] is consulted for post op surgical cardiac management.    PMH: CAD (Multivessel), Ischemic Cardiomyopathy (ICD), VHD- MR, Hypertension, Hyperlipidemia, Chronic VI, COPD, Smoker, Obstructive Sleep Apnea, NSVT, Obesity  PSH: CABG/MVR, Skin Lesion, ICD Placement ([employer]), Tonsillectomy  Family History: Father- CAD, Brother- CAD, Sister- CAD  Social History: Tobacco- Former Smoker (Quit 3 Years Ago), Alcohol- Negative, Substance Abuse- Negative    Previous Cardiac Diagnostics:   CABG/MVR (8.22.22): MVR 29 mm Mosaic Porcine Valve; CABG LIMA to LAD & SVG to OM, LISA Ligation with Endo Stapler.    Left Heart Catheterization (8.2.22):  Left Main- 50% Distal Disease, LAD- 60% Proximal IFR 0.81, LCx- Nondominant (, Diagonal to OM Collateral), RCA- Dominant with , Bilateral Common Iliacs 30% Calcified Stenosis.    Echocardiogram (4.18.22):  The study quality is average.   The  left ventricle is normal in size. Global left ventricular systolic function is mildly decreased. The left ventricular ejection fraction is 40%. Left ventricular diastolic function is normal. Noted left ventricular hypertrophy. Asymmetric septal left ventricular hypertrophy is present. It is mild.   Mild to moderate (1-2+) mitral regurgitation. Somewhat eccentric jet noted, chordae appear hyperechoic and thickened.    Review of patient's allergies indicates:  No Known Allergies    Current Facility-Administered Medications on File Prior to Encounter   Medication    diphenhydrAMINE capsule 50 mg    sodium chloride 0.9% flush 10 mL     Current Outpatient Medications on File Prior to Encounter   Medication Sig    amiodarone (PACERONE) 200 MG Tab Take 200 mg by mouth 2 (two) times daily.    aspirin (ECOTRIN) 81 MG EC tablet Take 81 mg by mouth nightly.    atorvastatin (LIPITOR) 80 MG tablet Take 80 mg by mouth nightly.    ezetimibe (ZETIA) 10 mg tablet Take 10 mg by mouth nightly.    fluticasone propionate (FLONASE) 50 mcg/actuation nasal spray 1 spray by Each Nostril route daily as needed for Rhinitis.    furosemide (LASIX) 20 MG tablet Take 20 mg by mouth once daily.    metoprolol succinate (TOPROL-XL) 25 MG 24 hr tablet Take 1 tablet by mouth once daily.    sacubitriL-valsartan (ENTRESTO) 49-51 mg per tablet Take 0.5 tablets by mouth 2 (two) times daily.    spironolactone (ALDACTONE) 25 MG tablet Take 12.5 mg by mouth once daily.    umeclidinium-vilanteroL (ANORO ELLIPTA) 62.5-25 mcg/actuation DsDv Inhale 1 puff into the lungs once daily. Controller    clopidogreL (PLAVIX) 75 mg tablet Take 75 mg by mouth nightly.    mometasone (ASMANEX TWISTHALER) 220 mcg/ actuation (30) inhaler Inhale 2 puffs into the lungs once daily. Controller     Review of Systems   Respiratory: Negative for chest tightness and shortness of breath.    Cardiovascular: Negative for chest pain.   All other systems reviewed and are  negative.    Objective:     Vital Signs (Most Recent):  Temp: 98.3 °F (36.8 °C) (08/24/22 0415)  Pulse: 67 (08/24/22 0530)  Resp: (!) 21 (08/24/22 0530)  BP: 96/67 (08/24/22 0530)  SpO2: (!) 91 % (08/24/22 0530) Vital Signs (24h Range):  Temp:  [98.2 °F (36.8 °C)-98.6 °F (37 °C)] 98.3 °F (36.8 °C)  Pulse:  [59-67] 67  Resp:  [4-26] 21  SpO2:  [86 %-100 %] 91 %  BP: ()/(49-73) 96/67  Arterial Line BP: ()/(27-53) 113/46     Weight: 83.7 kg (184 lb 8.4 oz)  Body mass index is 27.25 kg/m².    SpO2: (!) 91 %  O2 Device (Oxygen Therapy): nasal cannula      Intake/Output Summary (Last 24 hours) at 8/24/2022 0757  Last data filed at 8/24/2022 0543  Gross per 24 hour   Intake 2178.76 ml   Output 1335 ml   Net 843.76 ml       Lines/Drains/Airways       Central Venous Catheter Line  Duration              Introducer with Double Lumen 08/22/22 1454 1 day              Drain  Duration                  Chest Tube 08/22/22 1429 Mediastinal 24 Fr. 1 day         Urethral Catheter 08/22/22 1118 Temperature probe;Non-latex 16 Fr. 1 day              Airway  Duration                  Airway - Non-Surgical 08/22/22 1112 1 day              Arterial Line  Duration             Arterial Line 08/22/22 0953 Radial 1 day              Peripheral Intravenous Line  Duration                  Peripheral IV - Single Lumen 08/22/22 0740 18 G Anterior;Distal;Left Upper Arm 2 days                    Significant Labs:  Recent Results (from the past 72 hour(s))   Prepare RBC 4 Units; Surgery - stay ahead 2 units    Collection Time: 08/22/22  7:39 AM   Result Value Ref Range    UNIT NUMBER [id number]     UNIT ABO/RH A POS     DISPENSE STATUS Transfused     Unit Expiration [id number]     Product Code Z3154Z22     Unit Blood Type Code 6200     CROSSMATCH INTERPRETATION Compatible     UNIT NUMBER [id number]     UNIT ABO/RH A POS     DISPENSE STATUS Selected     Unit Expiration [id number]     Product Code Q4330Y34     Unit Blood Type Code  6200     CROSSMATCH INTERPRETATION Compatible     UNIT NUMBER [id number]     UNIT ABO/RH A POS     DISPENSE STATUS Released     Unit Expiration [id number]     Product Code A3754P45     Unit Blood Type Code 6200     CROSSMATCH INTERPRETATION Compatible     UNIT NUMBER [id number]     UNIT ABO/RH A POS     DISPENSE STATUS Selected     Unit Expiration [id number]     Product Code Z6980R70     Unit Blood Type Code 6200     CROSSMATCH INTERPRETATION Compatible    Type & Screen    Collection Time: 08/22/22  7:39 AM   Result Value Ref Range    Group & Rh A POS     Indirect Annette GEL NEG    POCT glucose    Collection Time: 08/22/22  7:53 AM   Result Value Ref Range    POCT Glucose 118 (H) 70 - 110 mg/dL   POCT ARTERIAL BLOOD GAS    Collection Time: 08/22/22 10:11 AM   Result Value Ref Range    POC PH 7.44 7.35 - 7.45    POC PCO2 43 35 - 45 mmHg    POC PO2 62 (A) 80 - 100 mmHg    POC HEMOGLOBIN 10.3 (A) 12.0 - 16.0 g/dL    POC SATURATED O2 92.3 %    POC O2Hb 91.3 (A) 94.0 - 97.0 %    POC COHb 1.5 %    POC MetHb 1.1 0.40 - 1.5 %    POC Potassium 3.9 3.5 - 5.0 mmol/l    POC Sodium 135 (A) 137 - 145 mmol/l    POC Ionized Calcium 1.15 1.12 - 1.23 mmol/l    Correct Temperature (PH) 7.44 7.35 - 7.45    Corrected Temperature (pCO2) 43 35 - 45 mmHg    Corrected Temperature (pO2) 62 (A) 80 - 100 mmHg    POC HCO3 29.2 (A) 22.0 - 26.0 mmol/l    Base Deficit 4.5 (A) -2.0 - 2.0 mmol/l    POC Temp 37.0 °C    Specimen source Arterial sample    Transesophageal echo (MARIJA)    Collection Time: 08/22/22  3:09 PM   Result Value Ref Range    BSA 2.04 m2   Magnesium    Collection Time: 08/22/22  3:19 PM   Result Value Ref Range    Magnesium Level 3.30 (H) 1.60 - 2.60 mg/dL   Phosphorus    Collection Time: 08/22/22  3:19 PM   Result Value Ref Range    Phosphorus Level 3.4 2.3 - 4.7 mg/dL   Basic metabolic panel    Collection Time: 08/22/22  3:19 PM   Result Value Ref Range    Sodium Level 136 136 - 145 mmol/L    Potassium Level 4.5 3.5 -  5.1 mmol/L    Chloride 106 98 - 107 mmol/L    Carbon Dioxide 24 23 - 31 mmol/L    Glucose Level 142 (H) 82 - 115 mg/dL    Blood Urea Nitrogen 12.8 8.4 - 25.7 mg/dL    Creatinine 0.77 0.73 - 1.18 mg/dL    BUN/Creatinine Ratio 17     Calcium Level Total 10.0 8.8 - 10.0 mg/dL    Anion Gap 6.0 mEq/L    eGFR >60 mls/min/1.73/m2   Protime-INR    Collection Time: 08/22/22  3:19 PM   Result Value Ref Range    PT 17.3 (H) 12.5 - 14.5 seconds    INR 1.44 (H) 0.00 - 1.30   APTT    Collection Time: 08/22/22  3:19 PM   Result Value Ref Range    PTT 35.4 (H) 23.2 - 33.7 seconds   CBC with Differential    Collection Time: 08/22/22  3:19 PM   Result Value Ref Range    WBC 29.1 (H) 4.5 - 11.5 x10(3)/mcL    RBC 3.37 (L) 4.70 - 6.10 x10(6)/mcL    Hgb 10.4 (L) 14.0 - 18.0 gm/dL    Hct 33.0 (L) 42.0 - 52.0 %    MCV 97.9 (H) 80.0 - 94.0 fL    MCH 30.9 27.0 - 31.0 pg    MCHC 31.5 (L) 33.0 - 36.0 mg/dL    RDW 15.4 11.5 - 17.0 %    Platelet 211 130 - 400 x10(3)/mcL    MPV 9.9 7.4 - 10.4 fL    IG# 0.65 (H) 0 - 0.04 x10(3)/mcL    IG% 2.2 %    NRBC% 0.0 %   Manual Differential    Collection Time: 08/22/22  3:19 PM   Result Value Ref Range    Neut Man 70 %    Lymph Man 25 %    Monocyte Man 2 %    Eos Man 1 %    Myelo Man 2 %    Instr WBC 29.1 x10(3)/mcL    Abs Mono 0.582 0.1 - 1.3 x10(3)/mcL    Abs Eos  0.291 0 - 0.9 x10(3)/mcL    Abs Lymp 7.275 (H) 0.6 - 4.6 x10(3)/mcL    Abs Neut 20.952 (H) 2.1 - 9.2 x10(3)/mcL    Platelet Est Normal Normal, Adequate   POCT glucose    Collection Time: 08/22/22  3:49 PM   Result Value Ref Range    POCT Glucose 138 (H) 70 - 110 mg/dL   POCT ARTERIAL BLOOD GAS Blood Gas    Collection Time: 08/22/22  4:25 PM   Result Value Ref Range    POC PH      POC PCO2      POC PO2      POC Sodium      POC Potassium      POC Ionized Calcium      POC HEMOGLOBIN      POC O2Hb      POC COHb      POC MetHb      POC PCO2      Base Excess, Arterial -2.1 (A) -2.0 - 2.0 mmol/L    O2 Sat, Art      HCO3, Arterial 24.2 (A) 18.0 - 23.0  MMOL/L   POCT ARTERIAL BLOOD GAS    Collection Time: 08/22/22  4:27 PM   Result Value Ref Range    POC PH 7.32 (A) 7.35 - 7.45    POC PCO2 47 (A) 35 - 45 mmHg    POC PO2 68 (A) 80 - 100 mmHg    POC HEMOGLOBIN 11.8 (A) 12.0 - 16.0 g/dL    POC SATURATED O2 91.6 %    POC O2Hb 92.3 (A) 94.0 - 97.0 %    POC COHb 2.1 %    POC MetHb 0.40 0.40 - 1.5 %    POC Potassium 4.1 3.5 - 5.0 mmol/l    POC Sodium 135 (A) 137 - 145 mmol/l    POC Ionized Calcium 1.30 (A) 1.12 - 1.23 mmol/l    Correct Temperature (PH) 7.32 (A) 7.35 - 7.45    Corrected Temperature (pCO2) 47 (A) 35 - 45 mmHg    Corrected Temperature (pO2) 68 (A) 80 - 100 mmHg    POC HCO3 24.2 22.0 - 26.0 mmol/l    Base Deficit -2.1 (A) -2.0 - 2.0 mmol/l    POC Temp 37.0 °C    Specimen source Arterial sample    POCT glucose    Collection Time: 08/22/22  5:09 PM   Result Value Ref Range    POCT Glucose 108 70 - 110 mg/dL   POCT glucose    Collection Time: 08/22/22  6:05 PM   Result Value Ref Range    POCT Glucose 100 70 - 110 mg/dL   POCT glucose    Collection Time: 08/22/22  7:06 PM   Result Value Ref Range    POCT Glucose 90 70 - 110 mg/dL   Potassium    Collection Time: 08/22/22  7:48 PM   Result Value Ref Range    Potassium Level 4.3 3.5 - 5.1 mmol/L   Hemoglobin and Hematocrit    Collection Time: 08/22/22  7:48 PM   Result Value Ref Range    Hgb 11.1 (L) 14.0 - 18.0 gm/dL    Hct 35.8 (L) 42.0 - 52.0 %   POCT glucose    Collection Time: 08/22/22  7:55 PM   Result Value Ref Range    POCT Glucose 129 (H) 70 - 110 mg/dL   POCT glucose    Collection Time: 08/22/22  9:03 PM   Result Value Ref Range    POCT Glucose 153 (H) 70 - 110 mg/dL   POCT glucose    Collection Time: 08/22/22 10:06 PM   Result Value Ref Range    POCT Glucose 149 (H) 70 - 110 mg/dL   POCT glucose    Collection Time: 08/22/22 11:17 PM   Result Value Ref Range    POCT Glucose 142 (H) 70 - 110 mg/dL   POCT glucose    Collection Time: 08/23/22 12:10 AM   Result Value Ref Range    POCT Glucose 153 (H) 70 - 110  mg/dL   Basic metabolic panel    Collection Time: 08/23/22  1:52 AM   Result Value Ref Range    Sodium Level 137 136 - 145 mmol/L    Potassium Level 5.0 3.5 - 5.1 mmol/L    Chloride 109 (H) 98 - 107 mmol/L    Carbon Dioxide 20 (L) 23 - 31 mmol/L    Glucose Level 173 (H) 82 - 115 mg/dL    Blood Urea Nitrogen 14.6 8.4 - 25.7 mg/dL    Creatinine 0.98 0.73 - 1.18 mg/dL    BUN/Creatinine Ratio 15     Calcium Level Total 8.4 (L) 8.8 - 10.0 mg/dL    Anion Gap 8.0 mEq/L    eGFR >60 mls/min/1.73/m2   CBC with Differential    Collection Time: 08/23/22  1:52 AM   Result Value Ref Range    WBC 29.4 (H) 4.5 - 11.5 x10(3)/mcL    RBC 3.65 (L) 4.70 - 6.10 x10(6)/mcL    Hgb 11.0 (L) 14.0 - 18.0 gm/dL    Hct 35.9 (L) 42.0 - 52.0 %    MCV 98.4 (H) 80.0 - 94.0 fL    MCH 30.1 27.0 - 31.0 pg    MCHC 30.6 (L) 33.0 - 36.0 mg/dL    RDW 15.9 11.5 - 17.0 %    Platelet 252 130 - 400 x10(3)/mcL    MPV 10.4 7.4 - 10.4 fL    Neut % 85.6 %    Lymph % 3.6 %    Mono % 9.3 %    Eos % 0.0 %    Basophil % 0.2 %    Lymph # 1.07 0.6 - 4.6 x10(3)/mcL    Neut # 25.1 (H) 2.1 - 9.2 x10(3)/mcL    Mono # 2.74 (H) 0.1 - 1.3 x10(3)/mcL    Eos # 0.00 0 - 0.9 x10(3)/mcL    Baso # 0.06 0 - 0.2 x10(3)/mcL    IG# 0.37 (H) 0 - 0.04 x10(3)/mcL    IG% 1.3 %    NRBC% 0.0 %   POCT glucose    Collection Time: 08/23/22  1:53 AM   Result Value Ref Range    POCT Glucose 159 (H) 70 - 110 mg/dL   POCT glucose    Collection Time: 08/23/22  4:06 AM   Result Value Ref Range    POCT Glucose 205 (H) 70 - 110 mg/dL   POCT glucose    Collection Time: 08/23/22  4:54 AM   Result Value Ref Range    POCT Glucose 188 (H) 70 - 110 mg/dL   POCT glucose    Collection Time: 08/23/22  5:55 AM   Result Value Ref Range    POCT Glucose 197 (H) 70 - 110 mg/dL   POCT ARTERIAL BLOOD GAS    Collection Time: 08/23/22  6:03 AM   Result Value Ref Range    POC PH 7.29 (A) 7.35 - 7.45    POC PCO2 38 mmHg    POC PO2 92 mmHg    POC SATURATED O2 96 %    POC Potassium 4.5 mmol/l    POC Sodium 135 (A) 137 - 145  mmol/l    POC Ionized Calcium 1.14 mmol/l    POC HCO3 18.3 mmol/l    Base Deficit -7.7 mmol/l    POC Temp 37.0 C    Specimen source Arterial sample    POCT glucose    Collection Time: 08/23/22  6:59 AM   Result Value Ref Range    POCT Glucose 172 (H) 70 - 110 mg/dL   POCT ARTERIAL BLOOD GAS    Collection Time: 08/23/22  7:45 AM   Result Value Ref Range    POC PH 7.38     POC PCO2 39 mmHg    POC PO2 102 (A) mmHg    POC SATURATED O2 98 %    POC Potassium 4.1 mmol/l    POC Sodium 137 mmol/l    POC Ionized Calcium 1.09 (A) 1.12 - 1.23 mmol/l    POC HCO3 23.1 mmol/l    Base Deficit -1.8 mmol/l    POC Temp 37.0 C    Specimen source Arterial sample    POCT glucose    Collection Time: 08/23/22  8:23 AM   Result Value Ref Range    POCT Glucose 151 (H) 70 - 110 mg/dL   POCT glucose    Collection Time: 08/23/22  9:17 AM   Result Value Ref Range    POCT Glucose 152 (H) 70 - 110 mg/dL   POCT glucose    Collection Time: 08/23/22 10:11 AM   Result Value Ref Range    POCT Glucose 147 (H) 70 - 110 mg/dL   POCT glucose    Collection Time: 08/23/22 12:12 PM   Result Value Ref Range    POCT Glucose 125 (H) 70 - 110 mg/dL   POCT glucose    Collection Time: 08/23/22  2:57 PM   Result Value Ref Range    POCT Glucose 91 70 - 110 mg/dL   POCT glucose    Collection Time: 08/23/22  8:39 PM   Result Value Ref Range    POCT Glucose 107 70 - 110 mg/dL   Basic metabolic panel    Collection Time: 08/24/22  1:32 AM   Result Value Ref Range    Sodium Level 137 136 - 145 mmol/L    Potassium Level 4.6 3.5 - 5.1 mmol/L    Chloride 105 98 - 107 mmol/L    Carbon Dioxide 22 (L) 23 - 31 mmol/L    Glucose Level 100 82 - 115 mg/dL    Blood Urea Nitrogen 18.8 8.4 - 25.7 mg/dL    Creatinine 0.89 0.73 - 1.18 mg/dL    BUN/Creatinine Ratio 21     Calcium Level Total 8.1 (L) 8.8 - 10.0 mg/dL    Anion Gap 10.0 mEq/L    eGFR >60 mls/min/1.73/m2   CBC with Differential    Collection Time: 08/24/22  1:32 AM   Result Value Ref Range    WBC 24.7 (H) 4.5 - 11.5  x10(3)/mcL    RBC 3.22 (L) 4.70 - 6.10 x10(6)/mcL    Hgb 9.7 (L) 14.0 - 18.0 gm/dL    Hct 31.1 (L) 42.0 - 52.0 %    MCV 96.6 (H) 80.0 - 94.0 fL    MCH 30.1 27.0 - 31.0 pg    MCHC 31.2 (L) 33.0 - 36.0 mg/dL    RDW 16.5 11.5 - 17.0 %    Platelet 189 130 - 400 x10(3)/mcL    MPV 10.6 (H) 7.4 - 10.4 fL    Neut % 87.4 %    Lymph % 3.1 %    Mono % 8.4 %    Eos % 0.0 %    Basophil % 0.2 %    Lymph # 0.76 0.6 - 4.6 x10(3)/mcL    Neut # 21.6 (H) 2.1 - 9.2 x10(3)/mcL    Mono # 2.07 (H) 0.1 - 1.3 x10(3)/mcL    Eos # 0.00 0 - 0.9 x10(3)/mcL    Baso # 0.04 0 - 0.2 x10(3)/mcL    IG# 0.22 (H) 0 - 0.04 x10(3)/mcL    IG% 0.9 %    NRBC% 0.0 %   POCT glucose    Collection Time: 08/24/22  5:37 AM   Result Value Ref Range    POCT Glucose 114 (H) 70 - 110 mg/dL           EKG:      Telemetry: Sinus Rhythm    Physical Exam  Vitals reviewed.   Constitutional:       Appearance: Normal appearance.   HENT:      Head: Normocephalic.      Mouth/Throat:      Mouth: Mucous membranes are moist.      Pharynx: Oropharynx is clear.   Cardiovascular:      Rate and Rhythm: Normal rate and regular rhythm.      Pulses: Normal pulses.      Heart sounds: Normal heart sounds.   Pulmonary:      Effort: Pulmonary effort is normal. No respiratory distress.      Breath sounds: Normal breath sounds.   Abdominal:      Palpations: Abdomen is soft.   Musculoskeletal:         General: Normal range of motion.      Cervical back: Neck supple.      Comments: Chest Tube in Place. Midsternal Incision with Dressing in Place.   Skin:     General: Skin is warm and dry.      Capillary Refill: Capillary refill takes less than 2 seconds.   Neurological:      General: No focal deficit present.      Mental Status: He is alert and oriented to person, place, and time. Mental status is at baseline.   Psychiatric:         Mood and Affect: Mood normal.         Behavior: Behavior normal.         Home Medications:   Current Facility-Administered Medications on File Prior to Encounter    Medication Dose Route Frequency Provider Last Rate Last Admin    diphenhydrAMINE capsule 50 mg  50 mg Oral On Call Procedure Reinaldo Gerardo MD   50 mg at 08/02/22 0739    sodium chloride 0.9% flush 10 mL  10 mL Intravenous PRN Reinaldo Gerardo MD         Current Outpatient Medications on File Prior to Encounter   Medication Sig Dispense Refill    amiodarone (PACERONE) 200 MG Tab Take 200 mg by mouth 2 (two) times daily.      aspirin (ECOTRIN) 81 MG EC tablet Take 81 mg by mouth nightly.      atorvastatin (LIPITOR) 80 MG tablet Take 80 mg by mouth nightly.      ezetimibe (ZETIA) 10 mg tablet Take 10 mg by mouth nightly.      fluticasone propionate (FLONASE) 50 mcg/actuation nasal spray 1 spray by Each Nostril route daily as needed for Rhinitis.      furosemide (LASIX) 20 MG tablet Take 20 mg by mouth once daily.      metoprolol succinate (TOPROL-XL) 25 MG 24 hr tablet Take 1 tablet by mouth once daily.      sacubitriL-valsartan (ENTRESTO) 49-51 mg per tablet Take 0.5 tablets by mouth 2 (two) times daily.      spironolactone (ALDACTONE) 25 MG tablet Take 12.5 mg by mouth once daily.      umeclidinium-vilanteroL (ANORO ELLIPTA) 62.5-25 mcg/actuation DsDv Inhale 1 puff into the lungs once daily. Controller      clopidogreL (PLAVIX) 75 mg tablet Take 75 mg by mouth nightly.      mometasone (ASMANEX TWISTHALER) 220 mcg/ actuation (30) inhaler Inhale 2 puffs into the lungs once daily. Controller         Current Inpatient Medications:    Current Facility-Administered Medications:     0.45% NaCl infusion, 75 mL/hr, Intravenous, Continuous, Rowdy Davis IV, MD, Last Rate: 75 mL/hr at 08/23/22 0537, 75 mL/hr at 08/23/22 0537    0.9%  NaCl infusion (for blood administration), , Intravenous, Q24H PRN, Rowdy Davis IV, MD    acetaminophen oral solution 650 mg, 650 mg, Per OG tube, Q6H PRN, Rowdy Davis IV, MD    albumin human 5% bottle 50 g, 50 g, Intravenous, Once, NINA Mancilla    aspirin EC tablet  81 mg, 81 mg, Oral, Daily, Rowdy Davis IV, MD    calcium gluconate 1 g in NS IVPB (premixed), 1 g, Intravenous, PRN, Rowdy Davis IV, MD    calcium gluconate 1 g in NS IVPB (premixed), 3 g, Intravenous, PRN, Rowdy Davis IV, MD    dextrose 10% bolus 250 mL, 25 g, Intravenous, PRN, Rowdy Davis IV, MD    dextrose 50% injection 12.5 g, 12.5 g, Intravenous, PRN, Rowdy Davis IV, MD    diphenhydrAMINE capsule 25 mg, 25 mg, Oral, Once, NINA Mnacilla    docusate sodium capsule 100 mg, 100 mg, Oral, BID, Rowdy Davis IV, MD, 100 mg at 08/23/22 2100    enoxaparin injection 40 mg, 40 mg, Subcutaneous, Daily, NINA Mancilla, 40 mg at 08/23/22 1700    EPINEPHrine (ADRENALIN) 5 mg in dextrose 5 % 250 mL infusion, 0-2 mcg/kg/min, Intravenous, Continuous, Rowdy Davis IV, MD, Last Rate: 10.2 mL/hr at 08/24/22 0543, 0.04 mcg/kg/min at 08/24/22 0543    famotidine (PF) injection 20 mg, 20 mg, Intravenous, BID, Rowdy Davis IV, MD, 20 mg at 08/23/22 2042    folic acid tablet 1 mg, 1 mg, Oral, Daily, Rowdy Davis IV, MD    HYDROcodone-acetaminophen 5-325 mg per tablet 1 tablet, 1 tablet, Oral, Q4H PRN, Rowdy Davis IV, MD, 1 tablet at 08/24/22 0500    insulin regular in 0.9 % NaCl 100 unit/100 mL (1 unit/mL) infusion, 4 Units/hr, Intravenous, Continuous, Rowdy Davis IV, MD, Stopped at 08/23/22 1400    loperamide capsule 4 mg, 4 mg, Oral, Once, Rowdy Davis IV, MD    magnesium sulfate 2g in water 50mL IVPB (premix), 2 g, Intravenous, PRN, Rowdy Davis IV, MD    magnesium sulfate 2g in water 50mL IVPB (premix), 4 g, Intravenous, PRN, Rowdy Davis IV, MD    methylPREDNISolone sodium succinate injection 125 mg, 125 mg, Intravenous, Once, NINA Mancilla    metoclopramide HCl injection 5 mg, 5 mg, Intravenous, Q6H PRN, Rowdy Davis IV, MD    metoprolol tartrate (LOPRESSOR) split tablet 12.5 mg, 12.5 mg, Oral, BID, Rowdy Davis IV, MD    morphine  injection 2 mg, 2 mg, Intravenous, PRN, Rowdy Davis IV, MD, 2 mg at 08/23/22 0817    mupirocin 2 % ointment, , Nasal, BID, Rowdy Davis IV, MD, Given at 08/23/22 2100    NORepinephrine 8 mg in dextrose 5% 250 mL infusion, 0-3 mcg/kg/min, Intravenous, Continuous, Rowdy Davis IV, MD    ondansetron injection 4 mg, 4 mg, Intravenous, Q12H PRN, Rowdy Davis IV, MD, 4 mg at 08/23/22 2042    oxyCODONE immediate release tablet 5 mg, 5 mg, Oral, Q4H PRN, Rowdy Davis IV, MD, 5 mg at 08/23/22 2044    potassium chloride 20 mEq in 100 mL IVPB (FOR CENTRAL LINE ADMINISTRATION ONLY), 20 mEq, Intravenous, PRN, Rowdy Davis IV, MD    potassium chloride 20 mEq in 100 mL IVPB (FOR CENTRAL LINE ADMINISTRATION ONLY), 40 mEq, Intravenous, PRN, Rowdy Davis IV, MD    potassium chloride 20 mEq in 100 mL IVPB (FOR CENTRAL LINE ADMINISTRATION ONLY), 20 mEq, Intravenous, PRN, Rowdy Davis IV, MD    sodium phosphate 15 mmol in dextrose 5 % 250 mL IVPB, 15 mmol, Intravenous, PRN, Rowdy Davis IV, MD    sodium phosphate 20.01 mmol in dextrose 5 % 250 mL IVPB, 20.01 mmol, Intravenous, PRN, Rowdy Davis IV, MD    sodium phosphate 30 mmol in dextrose 5 % 250 mL IVPB, 30 mmol, Intravenous, PRN, Rowdy Davis IV, MD    sucralfate tablet 1 g, 1 g, Oral, QID (AC & HS), Rowdy Davis IV, MD, 1 g at 08/24/22 0645    Facility-Administered Medications Ordered in Other Encounters:     diphenhydrAMINE capsule 50 mg, 50 mg, Oral, On Call Procedure, Reinaldo Gerardo MD, 50 mg at 08/02/22 0739    sodium chloride 0.9% flush 10 mL, 10 mL, Intravenous, PRN, Reinaldo Gerardo MD    VTE Risk Mitigation (From admission, onward)           Ordered     enoxaparin injection 40 mg  Daily         08/23/22 1032     IP VTE HIGH RISK PATIENT  Once         08/23/22 1032                  Assessment:   CAD (Multivessel)- Status Post CABG (LIMA to LAD, SVG to OM) (8.22.22)    - S/P LISA Ligation  Hypotension (Post Op) Requiring  Vasopressor Support    -  H/O Hypertension   Valvular Heart Disease- MR Status Post Bio MVR (#29 mm Mosaic Porcine) (8.22.22)  Ischemic Cardiomyopathy EF 40% Status Post ICD ([employer])  History of VT    - On Amiodarone Outpatient  Hyperlipidemia  Chronic Venous Insufficiency  Elevated BMI/Obesity  COPD  Former Smoker    - Quit 3 Years Ago  Anemia  Leukocytosis  Elevated LFT(S) (Mild)    Plan:   Continue Aspirin 81 MG Daily. Resume Statin.  Will resume/optimize GDMT for CMO as Able. Currently Requiring Vasopressor Support  Resume Oral Amiodarone  Mobilize as Able & Continue Regular IS Usage    Thank you for your consult.     KIMBERLI Pratt  Cardiology  [facility] - [address] [place] [address] ICU  08/24/2022 7:57 AM

## 2022-08-24 NOTE — PLAN OF CARE
Problem: Physical Therapy  Goal: Physical Therapy Goal  Description: Goals to be met by: 22     Patient will increase functional independence with mobility by performin. Supine to sit with MInimal Assistance  2. Sit to supine with MInimal Assistance  3. Sit to stand transfer with Minimal Assistance  4. Gait  x 150 feet with Minimal Assistance using Rolling Walker.     Outcome: Ongoing, Progressing

## 2022-08-25 NOTE — PROGRESS NOTES
[facility] - [facility] ICU  Cardiology  Progress Note    Patient Name: Zackery Osullivan Jr.  MRN: [id number]  Admission Date: 8/22/2022  Hospital Length of Stay: 3 days  Code Status: Full Code   Attending Physician: Rowdy Davis IV, MD   Primary Care Physician: Isaiah Meeks MD  Expected Discharge Date:   Principal Problem:<principal problem not specified>    Subjective:     Brief HPI:   Mr. Osullivan is a 77 year old male, known to Dr. Gerardo and Dr. Flowers, who presented to the hospital and underwent CAD/CABG and MVR due to diagnosis of MV CAD and significant MR. Also underwent LISA Ligation. Patient tolerated the surgery well, and was transferred to intensive care unit for further close post operative monitoring. [facility] is consulted for post op surgical cardiac management.    Hospital Course:   8.25.22: NAD noted. VSS with low normal BP. SR on tele. Denies SOB/Palps, +incisional CP.     PMH: CAD (Multivessel), Ischemic Cardiomyopathy (ICD), VHD- MR, Hypertension, Hyperlipidemia, Chronic VI, COPD, Smoker, Obstructive Sleep Apnea, NSVT, Obesity  PSH: CABG/MVR, Skin Lesion, ICD Placement ([employer]), Tonsillectomy  Family History: Father- CAD, Brother- CAD, Sister- CAD  Social History: Tobacco- Former Smoker (Quit 3 Years Ago), Alcohol- Negative, Substance Abuse- Negative     Previous Cardiac Diagnostics:   CABG/MVR (8.22.22): MVR 29 mm Mosaic Porcine Valve; CABG LIMA to LAD & SVG to OM, LISA Ligation with Endo Stapler.     Left Heart Catheterization (8.2.22):  Left Main- 50% Distal Disease, LAD- 60% Proximal IFR 0.81, LCx- Nondominant (, Diagonal to OM Collateral), RCA- Dominant with , Bilateral Common Iliacs 30% Calcified Stenosis.     Echocardiogram (4.18.22):  The study quality is average.   The left ventricle is normal in size. Global left ventricular systolic function is mildly decreased. The left ventricular ejection fraction is 40%. Left ventricular diastolic function is normal.  Noted left ventricular hypertrophy. Asymmetric septal left ventricular hypertrophy is present. It is mild.   Mild to moderate (1-2+) mitral regurgitation. Somewhat eccentric jet noted, chordae appear hyperechoic and thickened.      Review of Systems   Constitutional: Negative for chills and fever.   Cardiovascular: Positive for chest pain. Negative for palpitations.        Incisional Chest pain   Respiratory: Negative for shortness of breath.    Psychiatric/Behavioral: Negative for altered mental status.           Objective:     Vital Signs (Most Recent):  Temp: 97.5 °F (36.4 °C) (08/25/22 0800)  Pulse: 67 (08/25/22 0800)  Resp: 18 (08/25/22 0800)  BP: (!) 100/55 (08/25/22 0800)  SpO2: (!) 87 % (08/25/22 0800) Vital Signs (24h Range):  Temp:  [97.5 °F (36.4 °C)-98.1 °F (36.7 °C)] 97.5 °F (36.4 °C)  Pulse:  [63-75] 67  Resp:  [13-23] 18  SpO2:  [85 %-99 %] 87 %  BP: ()/(51-79) 100/55     Weight: 83.9 kg (184 lb 15.5 oz)  Body mass index is 27.31 kg/m².    SpO2: (!) 87 %  O2 Device (Oxygen Therapy): nasal cannula      Intake/Output Summary (Last 24 hours) at 8/25/2022 0827  Last data filed at 8/25/2022 0800  Gross per 24 hour   Intake 2625.89 ml   Output 2050 ml   Net 575.89 ml       Lines/Drains/Airways     Central Venous Catheter Line  Duration            Introducer with Double Lumen 08/22/22 1454 2 days          Drain  Duration                Chest Tube 08/22/22 1429 Mediastinal 24 Fr. 2 days          Airway  Duration                Airway - Non-Surgical 08/22/22 1112 2 days          Peripheral Intravenous Line  Duration                Peripheral IV - Single Lumen 08/22/22 0740 18 G Anterior;Distal;Left Upper Arm 3 days                Significant Labs:   CMP   Recent Labs   Lab 08/24/22  0132 08/25/22  0229    135*   K 4.6 4.3   CO2 22* 24   BUN 18.8 20.3   CREATININE 0.89 0.85   CALCIUM 8.1* 8.7*   ALBUMIN  --  2.9*   BILITOT  --  1.3   ALKPHOS  --  76   AST  --  83*   ALT  --  72*    and CBC   Recent  Labs   Lab 08/24/22  0132 08/25/22  0229   WBC 24.7* 17.8*   HGB 9.7* 9.9*   HCT 31.1* 32.3*    152       Telemetry:  SR    Physical Exam:  Physical Exam  Vitals reviewed.   Constitutional:       Appearance: Normal appearance.   HENT:      Head: Normocephalic.      Mouth/Throat:      Mouth: Mucous membranes are moist.      Pharynx: Oropharynx is clear.   Cardiovascular:      Rate and Rhythm: Normal rate and regular rhythm.      Pulses: Normal pulses.      Heart sounds: Normal heart sounds.   Pulmonary:      Effort: Pulmonary effort is normal. No respiratory distress.      Breath sounds: Normal breath sounds.   Abdominal:      Palpations: Abdomen is soft.   Musculoskeletal:         General: Normal range of motion.      Cervical back: Neck supple.      Comments: Midsternal Incision with Dressing in Place.   Skin:     General: Skin is warm and dry.      Capillary Refill: Capillary refill takes less than 2 seconds.   Neurological:      General: No focal deficit present.      Mental Status: He is alert and oriented to person, place, and time. Mental status is at baseline.   Psychiatric:         Mood and Affect: Mood normal.         Behavior: Behavior normal.         Current Inpatient Medications:    Current Facility-Administered Medications:     0.45% NaCl infusion, 75 mL/hr, Intravenous, Continuous, Rowdy Davis IV, MD, Last Rate: 75 mL/hr at 08/23/22 0537, 75 mL/hr at 08/23/22 0537    0.9%  NaCl infusion (for blood administration), , Intravenous, Q24H PRN, Rowdy Davis IV, MD    acetaminophen oral solution 650 mg, 650 mg, Per OG tube, Q6H PRN, Rowdy Davis IV, MD    albuterol-ipratropium 2.5 mg-0.5 mg/3 mL nebulizer solution 3 mL, 3 mL, Nebulization, Q6H, Marlon Humphreys MD, 3 mL at 08/25/22 0041    amiodarone tablet 200 mg, 200 mg, Oral, BID, Jeniffer Gale, KIMBERLI, 200 mg at 08/25/22 0805    aspirin EC tablet 81 mg, 81 mg, Oral, Daily, Rowdy Davis IV, MD, 81 mg at 08/25/22 0804     atorvastatin tablet 80 mg, 80 mg, Oral, QHS, Jeniffer Gale, FNP, 80 mg at 08/24/22 2016    budesonide nebulizer solution 0.5 mg, 0.5 mg, Nebulization, Q12H, Marlon Humphreys MD, 0.5 mg at 08/24/22 0841    calcium gluconate 1 g in NS IVPB (premixed), 1 g, Intravenous, PRN, Rowdy Davis IV, MD    calcium gluconate 1 g in NS IVPB (premixed), 3 g, Intravenous, PRN, Rowdy Davis IV, MD    dextrose 10% bolus 250 mL, 25 g, Intravenous, PRN, Rowdy Davis IV, MD    dextrose 50% injection 12.5 g, 12.5 g, Intravenous, PRN, Rowdy Davis IV, MD    docusate sodium capsule 100 mg, 100 mg, Oral, BID, Rowdy Davis IV, MD, 100 mg at 08/25/22 0805    enoxaparin injection 40 mg, 40 mg, Subcutaneous, Daily, NINA Mancilla, 40 mg at 08/24/22 1608    EPINEPHrine (ADRENALIN) 5 mg in dextrose 5 % 250 mL infusion, 0-2 mcg/kg/min, Intravenous, Continuous, Rowdy Davis IV, MD, Last Rate: 0 mL/hr at 08/25/22 0500, 0 mcg/kg/min at 08/25/22 0500    famotidine (PF) injection 20 mg, 20 mg, Intravenous, BID, Rowdy Davis IV, MD, 20 mg at 08/25/22 0804    folic acid tablet 1 mg, 1 mg, Oral, Daily, Rowdy Davis IV, MD, 1 mg at 08/25/22 0805    HYDROcodone-acetaminophen 5-325 mg per tablet 1 tablet, 1 tablet, Oral, Q4H PRN, Rowdy Davis IV, MD, 1 tablet at 08/24/22 1811    insulin regular in 0.9 % NaCl 100 unit/100 mL (1 unit/mL) infusion, 4 Units/hr, Intravenous, Continuous, Rowdy Davis IV, MD, Stopped at 08/23/22 1400    loperamide capsule 4 mg, 4 mg, Oral, Once, Rowdy Davis IV, MD    magnesium sulfate 2g in water 50mL IVPB (premix), 2 g, Intravenous, PRN, Rowdy Davis IV, MD    magnesium sulfate 2g in water 50mL IVPB (premix), 4 g, Intravenous, PRN, Rowdy Davis IV, MD    metoclopramide HCl injection 5 mg, 5 mg, Intravenous, Q6H PRN, Rowdy Davis IV, MD    morphine injection 2 mg, 2 mg, Intravenous, PRN, Rowdy Davis IV, MD, 2 mg at 08/23/22 0817    mupirocin 2 %  ointment, , Nasal, BID, Rowdy Davis IV, MD, Given at 08/25/22 0804    NORepinephrine 8 mg in dextrose 5% 250 mL infusion, 0-3 mcg/kg/min, Intravenous, Continuous, Rowdy Davis IV, MD    ondansetron injection 4 mg, 4 mg, Intravenous, Q12H PRN, Rowdy Davis IV, MD, 4 mg at 08/24/22 0806    oxyCODONE immediate release tablet 5 mg, 5 mg, Oral, Q4H PRN, Rowdy Davis IV, MD, 5 mg at 08/24/22 2016    potassium chloride 20 mEq in 100 mL IVPB (FOR CENTRAL LINE ADMINISTRATION ONLY), 20 mEq, Intravenous, PRN, Rowdy Davis IV, MD    potassium chloride 20 mEq in 100 mL IVPB (FOR CENTRAL LINE ADMINISTRATION ONLY), 40 mEq, Intravenous, PRN, Rowdy Davis IV, MD    potassium chloride 20 mEq in 100 mL IVPB (FOR CENTRAL LINE ADMINISTRATION ONLY), 20 mEq, Intravenous, PRN, Rowdy Davis IV, MD    sodium phosphate 15 mmol in dextrose 5 % 250 mL IVPB, 15 mmol, Intravenous, PRN, Rowdy Davis IV, MD    sodium phosphate 20.01 mmol in dextrose 5 % 250 mL IVPB, 20.01 mmol, Intravenous, PRN, Rowdy Davis IV, MD    sodium phosphate 30 mmol in dextrose 5 % 250 mL IVPB, 30 mmol, Intravenous, PRN, Rowdy Davis IV, MD    sucralfate tablet 1 g, 1 g, Oral, QID (AC & HS), Rowdy Davis IV, MD, 1 g at 08/25/22 0645    Facility-Administered Medications Ordered in Other Encounters:     diphenhydrAMINE capsule 50 mg, 50 mg, Oral, On Call Procedure, Reinaldo Gerardo MD, 50 mg at 08/02/22 0739    sodium chloride 0.9% flush 10 mL, 10 mL, Intravenous, PRN, Reinaldo Gerardo MD        Assessment:     IMPRESSION:  CAD (Multivessel)- Status Post CABG (LIMA to LAD, SVG to OM) (8.22.22)    - S/P LISA Ligation  Hypotension (Post Op), Improved now off of pressors    -  H/O Hypertension   Valvular Heart Disease- MR Status Post Bio MVR (#29 mm Mosaic Porcine) (8.22.22)  Ischemic Cardiomyopathy EF 40% Status Post ICD ([employer])  History of VT    - On Amiodarone Outpatient  Hyperlipidemia  Chronic Venous  Insufficiency  Elevated BMI/Obesity  COPD  Former Smoker    - Quit 3 Years Ago  Anemia  Leukocytosis (improving)  Elevated LFT(S) (Mild)      Plan:     PLAN:  Continue Aspirin 81 MG Daily. Resume Statin.  Will resume/optimize GDMT for CMO as Able. Vasopressors DCd this AM  Resume Oral Amiodarone  Mobilize as Able & Continue Regular IS Usage    Ruddy Walton, AGACNP-BC  Cardiology  [facility] - [address] [place] [address]  08/25/2022

## 2022-08-25 NOTE — PT/OT/SLP EVAL
"Speech Language Pathology Department  Clinical Swallow Evaluation    Patient Name:  Zackery Osullivan Jr.   MRN:  [id number]  Admitting Diagnosis: <principal problem not specified>    Recommendations:     General recommendations:  SLP intervention not indicated  Diet recommendations: Regular and thin liquids   Swallow strategies/precautions: small bites/sips and slow rate  Precautions: Standard,      History:     Past Medical History:   Diagnosis Date    Arthritis     spine    CAD (coronary artery disease)     COPD (chronic obstructive pulmonary disease)     HLD (hyperlipidemia)     HTN (hypertension)     Ischemic cardiomyopathy     Mitral regurgitation     HEATHER on CPAP     Stroke     Venous insufficiency     Ventricular tachycardia        Past Surgical History:   Procedure Laterality Date    bilateral inguinal stents      CARDIAC DEFIBRILLATOR PLACEMENT Left     CATARACT EXTRACTION Bilateral     CATHETERIZATION OF BOTH LEFT AND RIGHT HEART  08/02/2022    Diagnostic Only; Dr. Gerardo    COLONOSCOPY      CORONARY ARTERY BYPASS GRAFT (CABG) N/A 8/22/2022    Procedure: CORONARY ARTERY BYPASS GRAFT (CABG);  Surgeon: Rowdy Davis IV, MD;  Location: [facility] OR;  Service: Cardiovascular;  Laterality: N/A;  possible MVR & LLAA  //  ECHO NOTIFIED    LEFT HEART CATHETERIZATION Left 08/02/2022    Procedure: CATHETERIZATION, HEART, LEFT;  Surgeon: Reinaldo Gerardo MD;  Location: [facility] CATH LAB;  Service: Cardiology;  Laterality: Left;  LHC +/- PCI    MITRAL VALVE REPLACEMENT N/A 8/22/2022    Procedure: REPLACEMENT, MITRAL VALVE;  Surgeon: Rowdy Davis IV, MD;  Location: [facility] OR;  Service: Cardiovascular;  Laterality: N/A;  possible MVR and LLAA    REMOVAL OF IMPLANTED CARDIOVERTER-DEFIBRILLATOR (ICD)      TONSILLECTOMY         Home Diet: Regular and thin liquids  Current Method of Nutrition: PO diet      Patient complaint: "food gets stuck" after the swallow     Subjective     Patient awake and " alert.      Objective:     Oral Musculature Evaluation  ·  WFL    Consistency Fed By Oral Symptoms Pharyngeal Symptoms   Thin liquids via cup slp none none   Thin liquids via straw slp none none   puree slp none none   solids slp none none                         Assessment:     No signs/sx of aspiration observed with PO intake. NO skilled SLP intervention is warranted at this time. If symptoms persist consider OP GI consult.     Goals:   Multidisciplinary Problems     SLP Goals     Not on file                Plan:     Patient to be seen:      Plan of Care expires:     Plan of Care reviewed with:      SLP Follow-Up:         Time Tracking:     SLP Treatment Date:    8/25/22  Speech Start Time:     Speech Stop Time:        Speech Total Time (min):       Billable minutes:  Swallow and Oral Function Evaluation, 15     08/25/2022

## 2022-08-25 NOTE — PROGRESS NOTES
[facility] - [facility] ICU  Pulmonary Critical Care Note    Patient Name: Zackery Osullivan Jr.  MRN: [id number]  Admission Date: 8/22/2022  Hospital Length of Stay: 3 days  Code Status: Full Code  Attending Provider: Rowdy Davis IV, MD  Primary Care Provider: Isaiah Meeks MD     Subjective:     HPI:   77-year-old  male with past medical history of CAD, HTN, HLD, and HFrEF who presented to [facility] for a CABG x2 and mitral valve replacement. Patient presented intubated and  with chest tube with approximately 40 cc bloody drainage and a urinary catheter with approximately 200 cc of bloody urine.  Patient presented on minimal pressure support with epinephrine. S/p 2 prbc intraop.     Pre-op CXR revealed what appears to be a left hilar lung mass.     Hospital Course/Significant events:  8/22/22: CABGx2  8/23/22: extubated    24 Hour Interval History:  Successfully weaned off epinephrine, VSS, NAEO, Sitting up in a chair this AM.  Denies chest pain, shortness a breath, nausea, vomiting, diaphoresis, palpitations.  I/Os +2625 cc, -2010 cc, +615 cc        Past Medical History:   Diagnosis Date    Arthritis     spine    CAD (coronary artery disease)     COPD (chronic obstructive pulmonary disease)     HLD (hyperlipidemia)     HTN (hypertension)     Ischemic cardiomyopathy     Mitral regurgitation     HEATHER on CPAP     Stroke     Venous insufficiency     Ventricular tachycardia        Past Surgical History:   Procedure Laterality Date    bilateral inguinal stents      CARDIAC DEFIBRILLATOR PLACEMENT Left     CATARACT EXTRACTION Bilateral     CATHETERIZATION OF BOTH LEFT AND RIGHT HEART  08/02/2022    Diagnostic Only; Dr. Gerardo    COLONOSCOPY      CORONARY ARTERY BYPASS GRAFT (CABG) N/A 8/22/2022    Procedure: CORONARY ARTERY BYPASS GRAFT (CABG);  Surgeon: Rowdy Davis IV, MD;  Location: [facility];  Service: Cardiovascular;  Laterality: N/A;  possible MVR & LLAA  //  ECHO  NOTIFIED    LEFT HEART CATHETERIZATION Left 2022    Procedure: CATHETERIZATION, HEART, LEFT;  Surgeon: Reinaldo Gerardo MD;  Location: [facility] CATH LAB;  Service: Cardiology;  Laterality: Left;  LHC +/- PCI    MITRAL VALVE REPLACEMENT N/A 2022    Procedure: REPLACEMENT, MITRAL VALVE;  Surgeon: Rowdy Davis IV, MD;  Location: [facility] OR;  Service: Cardiovascular;  Laterality: N/A;  possible MVR and LLAA    REMOVAL OF IMPLANTED CARDIOVERTER-DEFIBRILLATOR (ICD)      TONSILLECTOMY         Social History     Socioeconomic History    Marital status:    Tobacco Use    Smoking status: Former Smoker     Years: 55.00     Start date:      Quit date:      Years since quittin.6    Smokeless tobacco: Never Used   Substance and Sexual Activity    Alcohol use: Yes     Alcohol/week: 3.0 standard drinks     Types: 3 Glasses of wine per week    Drug use: Never           Current Outpatient Medications   Medication Instructions    amiodarone (PACERONE) 200 mg, Oral, 2 times daily    aspirin (ECOTRIN) 81 mg, Oral, Nightly    atorvastatin (LIPITOR) 80 mg, Oral, Nightly    clopidogreL (PLAVIX) 75 mg, Oral, Nightly    ezetimibe (ZETIA) 10 mg, Oral, Nightly    fluticasone propionate (FLONASE) 50 mcg/actuation nasal spray 1 spray, Each Nostril, Daily PRN    furosemide (LASIX) 20 mg, Oral, Daily    metoprolol succinate (TOPROL-XL) 25 MG 24 hr tablet 1 tablet, Oral, Daily    mometasone (ASMANEX TWISTHALER) 220 mcg/ actuation (30) inhaler 2 puffs, Inhalation, Daily, Controller    sacubitriL-valsartan (ENTRESTO) 49-51 mg per tablet 0.5 tablets, Oral, 2 times daily    spironolactone (ALDACTONE) 12.5 mg, Oral, Daily    umeclidinium-vilanteroL (ANORO ELLIPTA) 62.5-25 mcg/actuation DsDv 1 puff, Inhalation, Daily, Controller       Current Inpatient Medications   albuterol-ipratropium  3 mL Nebulization Q6H    amiodarone  200 mg Oral BID    aspirin  81 mg Oral Daily    atorvastatin  80 mg Oral QHS     budesonide  0.5 mg Nebulization Q12H    docusate sodium  100 mg Oral BID    enoxaparin  40 mg Subcutaneous Daily    famotidine (PF)  20 mg Intravenous BID    folic acid  1 mg Oral Daily    loperamide  4 mg Oral Once    mupirocin   Nasal BID    sucralfate  1 g Oral QID (AC & HS)       Current Intravenous Infusions   sodium chloride 0.45% 75 mL/hr (08/23/22 0537)    EPINEPHrine 0 mcg/kg/min (08/25/22 0500)    insulin regular 1 units/mL infusion orderable (CTS POST-OP) Stopped (08/23/22 1400)    NORepinephrine bitartrate-D5W           Review of Systems   All other systems reviewed and are negative.         Objective:       Intake/Output Summary (Last 24 hours) at 8/25/2022 0650  Last data filed at 8/25/2022 0500  Gross per 24 hour   Intake 2625.89 ml   Output 2010 ml   Net 615.89 ml         Vital Signs (Most Recent):  Temp: 98.1 °F (36.7 °C) (08/24/22 2100)  Pulse: 68 (08/25/22 0545)  Resp: 16 (08/25/22 0545)  BP: (!) 98/59 (08/25/22 0545)  SpO2: 99 % (08/25/22 0545)  Body mass index is 27.31 kg/m².  Weight: 83.9 kg (184 lb 15.5 oz) Vital Signs (24h Range):  Temp:  [97.7 °F (36.5 °C)-98.1 °F (36.7 °C)] 98.1 °F (36.7 °C)  Pulse:  [63-75] 68  Resp:  [13-23] 16  SpO2:  [85 %-99 %] 99 %  BP: ()/(48-79) 98/59  Arterial Line BP: (63-88)/(35-47) 82/47       Physical Exam:  General: Sitting up in chair, NAD  Eye: PERRLA, clear conjunctiva, eyelids normal  HENT: normocephalic and atraumatic  Neck: supple, Right IJ in place  Respiratory: Diminished throughout  Cardiovascular: regular rate and rhythm without murmurs.    Gastrointestinal: soft, non-tender, non-distended with normal bowel sounds, without masses to palpation  Neurologic: AAOx3      Lines/Drains/Airways     Central Venous Catheter Line  Duration            Introducer with Double Lumen 08/22/22 1454 2 days          Drain  Duration                Chest Tube 08/22/22 1429 Mediastinal 24 Fr. 2 days         Urethral Catheter 08/22/22 1118 Temperature  probe;Non-latex 16 Fr. 2 days          Airway  Duration                Airway - Non-Surgical 08/22/22 1112 2 days          Peripheral Intravenous Line  Duration                Peripheral IV - Single Lumen 08/22/22 0740 18 G Anterior;Distal;Left Upper Arm 2 days                Significant Labs:    Lab Results   Component Value Date    WBC 17.8 (H) 08/25/2022    HGB 9.9 (L) 08/25/2022    HCT 32.3 (L) 08/25/2022    MCV 98.8 (H) 08/25/2022     08/25/2022         BMP  Lab Results   Component Value Date     (L) 08/25/2022    K 4.3 08/25/2022    CO2 24 08/25/2022    BUN 20.3 08/25/2022    CREATININE 0.85 08/25/2022    CALCIUM 8.7 (L) 08/25/2022    EGFRNONAA >60 05/14/2020       ABG  Recent Labs   Lab 08/23/22  0745   PH 7.38   PO2 102*   PCO2 39   HCO3 23.1       Mechanical Ventilation Support:  2L via nasal canula    Significant Imaging:  I have reviewed the pertinent imaging within the past 24 hours.    CXR on 08/23/2022 to PACS by my read portable CXR, slightly rotated, ET tube placed mid trachea, the surgery sternotomy wires, bilateral opacification, mediastinal chest tube placed, cardiac device present to left chest,      Assessment/Plan:     Assessment  1. CAD s/p CABG x2 to LAD and obtuse marginal  2. MVR s/p porcine replacement   3. COPD   4. HTN   5. HLD      Plan  -Continue postoperative management per CABG protocol   -Continue supplemental oxygen, wean as tolerated  -off of epi, can downgrade to floor should remain hemodynamically stable  - Monitor chest tube output  - Multimodal pain control  -Discontinued metoprolol, not appropriate at this time.      DVT Prophylaxis: SCDs  GI Prophylaxis: famotidine     33 minutes of critical care was time spent personally by me on the following activities: development of treatment plan with patient or surrogate and bedside caregivers, discussions with consultants, evaluation of patient's response to treatment, examination of patient, ordering and performing  treatments and interventions, ordering and review of laboratory studies, ordering and review of radiographic studies, pulse oximetry, re-evaluation of patient's condition.  This critical care time did not overlap with that of any other provider or involve time for any procedures.     Hina Lewis, DO  [facility], -2

## 2022-08-25 NOTE — PT/OT/SLP PROGRESS
Physical Therapy         Treatment        Zackery Osullivan Jr.   MRN: [id number]     PT Received On: 22  PT Start Time: 1310     PT Stop Time: 1322    PT Total Time (min): 12 min       Billable Minutes:  Gait Training1  Total Minutes: 12    Treatment Type: Treatment  PT/PTA: PT     PTA Visit Number: 1       General Precautions: Standard, fall, sternal  Orthopedic Precautions: Orthopedic Precautions : N/A   Braces: Braces: N/A    Spiritual, Cultural Beliefs, Scientologist Practices, Values that Affect Care: no    Subjective:  Communicated with NSG prior to session.    Pain/Comfort  Pain Rating 1: 4/10  Location 1: chest    Objective:  Patient found sitting [facility], with: blood pressure cuff, pulse ox (continuous), telemetry    Vitals   Blood pressure   Sittin/59 (prior to ax)   Standin/56    Semi-supine: 97/60 (after ax)    Functional Mobility:  Bed Mobility:   Sit to supine: Moderate Assistance    Transfer Training:   Sit to stand:Minimal Assistance with Rolling Walker     Gait Training:   Pt ambulated approx 70 feet with use of Rolling walker   Constant VC/TC 2/2 poor RW management/proximity and excessive forward flexed trunk; poor carryover   Limited by decreased endurance/fatigue   Pt only agreeable to one gait trial and then requested to get B2B      Activity Tolerance:  Patient tolerated treatment well and Patient limited by fatigue    Patient left HOB elevated with all lines intact, call button in reach and RN notified.    Assessment:  Zackery Osullivan Jr. is a 77 y.o. male with a medical diagnosis of s/p CABG x2    Rehab potential is good.    Activity tolerance: Good    Discharge recommendations: Discharge Facility/Level of Care Needs: rehabilitation facility     Equipment recommendations: Equipment Needed After Discharge: walker, rolling     GOALS:   Multidisciplinary Problems     Physical Therapy Goals        Problem: Physical Therapy    Goal Priority Disciplines Outcome Goal Variances Interventions    Physical Therapy Goal     PT, PT/OT Ongoing, Progressing     Description: Goals to be met by: 22     Patient will increase functional independence with mobility by performin. Supine to sit with MInimal Assistance  2. Sit to supine with MInimal Assistance  3. Sit to stand transfer with Minimal Assistance  4. Gait  x 150 feet with Minimal Assistance using Rolling Walker.                      PLAN:    Patient to be seen 6 x/week  to address the above listed problems via gait training, therapeutic activities, therapeutic exercises  Plan of Care expires: 22  Plan of Care reviewed with: patient         2022

## 2022-08-25 NOTE — PLAN OF CARE
[facility]

## 2022-08-25 NOTE — PROGRESS NOTES
Pod 3  Having some trouble swallowing, was present before sx  vss  Heart sinus  Wounds c/d/i  Ct min drainage  Dc cts, speech eval

## 2022-08-26 NOTE — PROGRESS NOTES
[facility] - [facility] ICU  Cardiology  Progress Note    Patient Name: Zackery Osullivan Jr.  MRN: [id number]  Admission Date: 8/22/2022  Hospital Length of Stay: 4 days  Code Status: Full Code   Attending Physician: Rowdy Davis IV, MD   Primary Care Physician: Isaiah Meeks MD  Expected Discharge Date:   Principal Problem:<principal problem not specified>    Subjective:     Brief HPI:   Mr. Osullivan is a 77 year old male, known to Dr. Gerardo and Dr. Flowers, who presented to the hospital and underwent CAD/CABG and MVR due to diagnosis of MV CAD and significant MR. Also underwent LISA Ligation. Patient tolerated the surgery well, and was transferred to intensive care unit for further close post operative monitoring. [facility] is consulted for post op surgical cardiac management.    Hospital Course:   8.25.22: NAD noted. VSS with low normal BP. SR on tele. Denies SOB/Palps, +incisional CP.  8.26.22: NAD noted. VSS with low normal BP. SR on tele. Sitting in chair at bedside. Denies SOB/Palps, endorses incisional CP.     PMH: CAD (Multivessel), Ischemic Cardiomyopathy (ICD), VHD- MR, Hypertension, Hyperlipidemia, Chronic VI, COPD, Smoker, Obstructive Sleep Apnea, NSVT, Obesity  PSH: CABG/MVR, Skin Lesion, ICD Placement ([employer]), Tonsillectomy  Family History: Father- CAD, Brother- CAD, Sister- CAD  Social History: Tobacco- Former Smoker (Quit 3 Years Ago), Alcohol- Negative, Substance Abuse- Negative     Previous Cardiac Diagnostics:   CABG/MVR (8.22.22): MVR 29 mm Mosaic Porcine Valve; CABG LIMA to LAD & SVG to OM, LISA Ligation with Endo Stapler.     Left Heart Catheterization (8.2.22):  Left Main- 50% Distal Disease, LAD- 60% Proximal IFR 0.81, LCx- Nondominant (, Diagonal to OM Collateral), RCA- Dominant with , Bilateral Common Iliacs 30% Calcified Stenosis.     Echocardiogram (4.18.22):  The study quality is average.   The left ventricle is normal in size. Global left ventricular  systolic function is mildly decreased. The left ventricular ejection fraction is 40%. Left ventricular diastolic function is normal. Noted left ventricular hypertrophy. Asymmetric septal left ventricular hypertrophy is present. It is mild.   Mild to moderate (1-2+) mitral regurgitation. Somewhat eccentric jet noted, chordae appear hyperechoic and thickened.      Review of Systems   Constitutional: Negative for chills and fever.   Cardiovascular: Positive for chest pain. Negative for palpitations.        Incisional Chest pain   Respiratory: Negative for shortness of breath.    Psychiatric/Behavioral: Negative for altered mental status.           Objective:     Vital Signs (Most Recent):  Temp: 97.8 °F (36.6 °C) (08/26/22 0400)  Pulse: 78 (08/26/22 0801)  Resp: (!) 22 (08/26/22 0801)  BP: (!) 122/100 (08/26/22 0600)  SpO2: 99 % (08/26/22 0801) Vital Signs (24h Range):  Temp:  [97.4 °F (36.3 °C)-97.8 °F (36.6 °C)] 97.8 °F (36.6 °C)  Pulse:  [67-83] 78  Resp:  [14-26] 22  SpO2:  [90 %-100 %] 99 %  BP: ()/() 122/100     Weight: 83.9 kg (184 lb 15.5 oz)  Body mass index is 27.31 kg/m².    SpO2: 99 %  O2 Device (Oxygen Therapy): nasal cannula      Intake/Output Summary (Last 24 hours) at 8/26/2022 0820  Last data filed at 8/26/2022 0600  Gross per 24 hour   Intake 200 ml   Output 450 ml   Net -250 ml       Lines/Drains/Airways     Peripheral Intravenous Line  Duration                Peripheral IV - Single Lumen 08/22/22 0740 18 G Anterior;Distal;Left Upper Arm 4 days                Significant Labs:   CMP   Recent Labs   Lab 08/25/22 0229   *   K 4.3   CO2 24   BUN 20.3   CREATININE 0.85   CALCIUM 8.7*   ALBUMIN 2.9*   BILITOT 1.3   ALKPHOS 76   AST 83*   ALT 72*    and CBC   Recent Labs   Lab 08/25/22 0229   WBC 17.8*   HGB 9.9*   HCT 32.3*          Telemetry:  SR    Physical Exam:  Physical Exam  Vitals reviewed.   Constitutional:       Appearance: Normal appearance.   HENT:      Head:  Normocephalic.      Mouth/Throat:      Mouth: Mucous membranes are moist.      Pharynx: Oropharynx is clear.   Cardiovascular:      Rate and Rhythm: Normal rate and regular rhythm.      Pulses: Normal pulses.      Heart sounds: Normal heart sounds.   Pulmonary:      Effort: Pulmonary effort is normal. No respiratory distress.      Breath sounds: Normal breath sounds.   Abdominal:      Palpations: Abdomen is soft.   Musculoskeletal:         General: Normal range of motion.      Cervical back: Neck supple.      Comments: Midsternal Incision with Dressing in Place.   Skin:     General: Skin is warm and dry.      Capillary Refill: Capillary refill takes less than 2 seconds.   Neurological:      General: No focal deficit present.      Mental Status: He is alert and oriented to person, place, and time. Mental status is at baseline.   Psychiatric:         Mood and Affect: Mood normal.         Behavior: Behavior normal.         Current Inpatient Medications:    Current Facility-Administered Medications:     0.45% NaCl infusion, 75 mL/hr, Intravenous, Continuous, Rowdy Davis IV, MD, Last Rate: 75 mL/hr at 08/23/22 0537, 75 mL/hr at 08/23/22 0537    0.9%  NaCl infusion (for blood administration), , Intravenous, Q24H PRN, Rowdy Davis IV, MD    acetaminophen oral solution 650 mg, 650 mg, Per OG tube, Q6H PRN, Rowdy Davis IV, MD    albumin human 5% bottle 25 g, 25 g, Intravenous, Once, Rowdy Davis IV, MD    albuterol-ipratropium 2.5 mg-0.5 mg/3 mL nebulizer solution 3 mL, 3 mL, Nebulization, Q6H, Marlon Humphreys MD, 3 mL at 08/26/22 0801    amiodarone tablet 200 mg, 200 mg, Oral, BID, Jeniffer Gale, LAYNEP, 200 mg at 08/26/22 0813    aspirin EC tablet 81 mg, 81 mg, Oral, Daily, Rowdy Davis IV, MD, 81 mg at 08/26/22 0813    atorvastatin tablet 80 mg, 80 mg, Oral, QHS, Jeniffer Gale, FNP, 80 mg at 08/25/22 2119    budesonide nebulizer solution 0.5 mg, 0.5 mg, Nebulization, Q12H, Marlon Humphreys MD,  0.5 mg at 08/26/22 0801    calcium gluconate 1 g in NS IVPB (premixed), 1 g, Intravenous, PRN, Rowdy Davis IV, MD    calcium gluconate 1 g in NS IVPB (premixed), 3 g, Intravenous, PRN, Rowdy Davis IV, MD    dextrose 10% bolus 250 mL, 25 g, Intravenous, PRN, Rowdy Davis IV, MD    dextrose 50% injection 12.5 g, 12.5 g, Intravenous, PRN, Rowdy Davis IV, MD    docusate sodium capsule 100 mg, 100 mg, Oral, BID, Rowdy Davis IV, MD, 100 mg at 08/26/22 0813    enoxaparin injection 40 mg, 40 mg, Subcutaneous, Daily, NINA Mancilla, 40 mg at 08/25/22 1713    EPINEPHrine (ADRENALIN) 5 mg in dextrose 5 % 250 mL infusion, 0-2 mcg/kg/min, Intravenous, Continuous, Rowdy Davis IV, MD, Last Rate: 0 mL/hr at 08/25/22 0500, 0 mcg/kg/min at 08/25/22 0500    famotidine (PF) injection 20 mg, 20 mg, Intravenous, BID, Rowdy Davis IV, MD, 20 mg at 08/26/22 0900    folic acid tablet 1 mg, 1 mg, Oral, Daily, Rowdy Davis IV, MD, 1 mg at 08/26/22 0813    HYDROcodone-acetaminophen 5-325 mg per tablet 1 tablet, 1 tablet, Oral, Q4H PRN, Rowdy Davis IV, MD, 1 tablet at 08/24/22 1811    insulin regular in 0.9 % NaCl 100 unit/100 mL (1 unit/mL) infusion, 4 Units/hr, Intravenous, Continuous, Rowdy Davis IV, MD, Stopped at 08/23/22 1400    loperamide capsule 4 mg, 4 mg, Oral, Once, Rowdy Davis IV, MD    magnesium sulfate 2g in water 50mL IVPB (premix), 2 g, Intravenous, PRN, Rowdy Davis IV, MD    magnesium sulfate 2g in water 50mL IVPB (premix), 4 g, Intravenous, PRN, Rowdy Davis IV, MD    metoclopramide HCl injection 5 mg, 5 mg, Intravenous, Q6H PRN, Rowdy Davis IV, MD    morphine injection 2 mg, 2 mg, Intravenous, PRN, Rowdy Davis IV, MD, 2 mg at 08/23/22 0817    mupirocin 2 % ointment, , Nasal, BID, NINA Mancilla, Given at 08/26/22 0900    NORepinephrine 8 mg in dextrose 5% 250 mL infusion, 0-3 mcg/kg/min, Intravenous, Continuous, Rowdy HENRY  Ryan HILLMAN MD    ondansetron injection 4 mg, 4 mg, Intravenous, Q12H PRN, Rowdy Davis IV, MD, 4 mg at 08/24/22 0806    oxyCODONE immediate release tablet 5 mg, 5 mg, Oral, Q4H PRN, Rowdy Davis IV, MD, 5 mg at 08/24/22 2016    potassium chloride 20 mEq in 100 mL IVPB (FOR CENTRAL LINE ADMINISTRATION ONLY), 20 mEq, Intravenous, PRN, Rowdy Davis IV, MD    potassium chloride 20 mEq in 100 mL IVPB (FOR CENTRAL LINE ADMINISTRATION ONLY), 40 mEq, Intravenous, PRN, Rowdy Davis IV, MD    potassium chloride 20 mEq in 100 mL IVPB (FOR CENTRAL LINE ADMINISTRATION ONLY), 20 mEq, Intravenous, PRN, Rowdy Davis IV, MD    sodium phosphate 15 mmol in dextrose 5 % 250 mL IVPB, 15 mmol, Intravenous, PRN, Rowdy Davis IV, MD    sodium phosphate 20.01 mmol in dextrose 5 % 250 mL IVPB, 20.01 mmol, Intravenous, PRN, Rowdy Davis IV, MD    sodium phosphate 30 mmol in dextrose 5 % 250 mL IVPB, 30 mmol, Intravenous, PRN, Rowdy Davis IV, MD    Facility-Administered Medications Ordered in Other Encounters:     diphenhydrAMINE capsule 50 mg, 50 mg, Oral, On Call Procedure, Reinaldo Gerardo MD, 50 mg at 08/02/22 0739    sodium chloride 0.9% flush 10 mL, 10 mL, Intravenous, PRN, Reinaldo Gerardo MD        Assessment:     IMPRESSION:  CAD (Multivessel)- Status Post CABG (LIMA to LAD, SVG to OM) (8.22.22)    - S/P LISA Ligation  Hypotension (Post Op), off pressors but remains borderline    -  H/O Hypertension   Valvular Heart Disease- MR Status Post Bio MVR (#29 mm Mosaic Porcine) (8.22.22)  Ischemic Cardiomyopathy EF 40% Status Post ICD (RABT)  History of VT    - On Amiodarone Outpatient  Hyperlipidemia  Chronic Venous Insufficiency  Elevated BMI/Obesity  COPD  Former Smoker    - Quit 3 Years Ago  Anemia  Leukocytosis (improving)  Elevated LFT(S) (Mild)      Plan:     PLAN:  Continue Aspirin 81 MG Daily. Resume Statin.  Start Toprol 12.5mg daily with hold parameters  Continue  ASA/Statin  Resume Oral Amiodarone  No ACEi/ARB/ARNI at this time secondary to borderline BP, will attempt to resume at DC.  Mobilize as Able & Continue Regular IS Usage    KYA HatchCNP-BC  Cardiology  [facility] - [facility] ICU  08/26/2022

## 2022-08-26 NOTE — CARE UPDATE
[id number] Rec consult for rehab. Spoke with pt re rehab. FOC obtained. Kyra sent referral to [facility] rehab thru care port.

## 2022-08-26 NOTE — PROCEDURES
"Zackery Osullivan Jr. is a 77 y.o. male patient.    Temp: 97.9 °F (36.6 °C) (08/26/22 0800)  Pulse: 77 (08/26/22 1100)  Resp: 14 (08/26/22 1100)  BP: (!) 89/54 (08/26/22 1100)  SpO2: 97 % (08/26/22 1100)  Weight: 83.9 kg (184 lb 15.5 oz) (08/25/22 0500)  Height: 5' 9" (175.3 cm) (08/22/22 0803)    PICC  Date/Time: 8/26/2022 11:43 AM  Performed by: Guadalupe Reynoso RN  Consent Done: Yes  Time out: Immediately prior to procedure a time out was called to verify the correct patient, procedure, equipment, support staff and site/side marked as required  Indications: med administration and vascular access  Local anesthetic: lidocaine 1% without epinephrine  Anesthetic Total (mL): 5  Preparation: skin prepped with ChloraPrep  Skin prep agent dried: skin prep agent completely dried prior to procedure  Sterile barriers: all five maximum sterile barriers used - cap, mask, sterile gown, sterile gloves, and large sterile sheet  Hand hygiene: hand hygiene performed prior to central venous catheter insertion  Location details: left brachial  Catheter type: single lumen  Catheter size: 4 Fr  Catheter Length: 12cm    Ultrasound guidance: yes  Vessel Caliber: medium and patent, compressibility normal  Needle advanced into vessel with real time Ultrasound guidance.  Guidewire confirmed in vessel.  Sterile sheath used.  Number of attempts: 1  Post-procedure: blood return through all ports, chlorhexidine patch and sterile dressing applied    Complications: none  Comments: Arm circumference 30cm          Guadalupe Reynoso RN  8/26/2022  "

## 2022-08-26 NOTE — PT/OT/SLP PROGRESS
Physical Therapy      Patient Name:  Zackery Osullivan Jr.   MRN:  [id number]    Patient not seen today for PT tx session. Pt still with decreased blood pressure (80/50s). RN requesting to hold therapy. Will follow-up tomorrow/as appropriate.

## 2022-08-26 NOTE — PLAN OF CARE
Problem: Adult Inpatient Plan of Care  Goal: Plan of Care Review  Outcome: Ongoing, Progressing  Goal: Patient-Specific Goal (Individualized)  Outcome: Ongoing, Progressing  Goal: Absence of Hospital-Acquired Illness or Injury  Outcome: Ongoing, Progressing  Goal: Optimal Comfort and Wellbeing  Outcome: Ongoing, Progressing  Goal: Readiness for Transition of Care  Outcome: Ongoing, Progressing     Problem: Infection  Goal: Absence of Infection Signs and Symptoms  Outcome: Ongoing, Progressing     Problem: Device-Related Complication Risk (Mechanical Ventilation, Invasive)  Goal: Optimal Device Function  Outcome: Ongoing, Progressing     Problem: Nutrition Impairment (Mechanical Ventilation, Invasive)  Goal: Optimal Nutrition Delivery  Outcome: Ongoing, Progressing     Problem: Skin and Tissue Injury (Mechanical Ventilation, Invasive)  Goal: Absence of Device-Related Skin and Tissue Injury  Outcome: Ongoing, Progressing     Problem: Skin and Tissue Injury (Artificial Airway)  Goal: Absence of Device-Related Skin or Tissue Injury  Outcome: Ongoing, Progressing     Problem: Fall Injury Risk  Goal: Absence of Fall and Fall-Related Injury  Outcome: Ongoing, Progressing

## 2022-08-27 NOTE — PROGRESS NOTES
[facility] - [facility]  Cardiology  Progress Note    Patient Name: Zackery Osullivan Jr.  MRN: [id number]  Admission Date: 8/22/2022  Hospital Length of Stay: 5 days  Code Status: Full Code   Attending Physician: Rowdy Davis IV, MD   Primary Care Physician: Isaiah Meeks MD  Expected Discharge Date:   Principal Problem:<principal problem not specified>    Subjective:     Brief HPI:   Mr. Osullivan is a 77 year old male, known to Dr. Gerardo and Dr. Flowers, who presented to the hospital and underwent CAD/CABG and MVR due to diagnosis of MV CAD and significant MR. Also underwent LISA Ligation. Patient tolerated the surgery well, and was transferred to intensive care unit for further close post operative monitoring. [facility] is consulted for post op surgical cardiac management.    Hospital Course:   8.25.22: NAD noted. VSS with low normal BP. SR on tele. Denies SOB/Palps, +incisional CP.  8.26.22: NAD noted. VSS with low normal BP. SR on tele. Sitting in chair at bedside. Denies SOB/Palps, endorses incisional CP.  8.27.22: NAD Noted. Reports throat discomfort. SR on Tele. BP Low Normal.     PMH: CAD (Multivessel), Ischemic Cardiomyopathy (ICD), VHD- MR, Hypertension, Hyperlipidemia, Chronic VI, COPD, Smoker, Obstructive Sleep Apnea, NSVT, Obesity  PSH: CABG/MVR, Skin Lesion, ICD Placement ([employer]), Tonsillectomy  Family History: Father- CAD, Brother- CAD, Sister- CAD  Social History: Tobacco- Former Smoker (Quit 3 Years Ago), Alcohol- Negative, Substance Abuse- Negative     Previous Cardiac Diagnostics:   CABG/MVR (8.22.22): MVR 29 mm Mosaic Porcine Valve; CABG LIMA to LAD & SVG to OM, LISA Ligation with Endo Stapler.     Left Heart Catheterization (8.2.22):  Left Main- 50% Distal Disease, LAD- 60% Proximal IFR 0.81, LCx- Nondominant (, Diagonal to OM Collateral), RCA- Dominant with , Bilateral Common Iliacs 30% Calcified Stenosis.     Echocardiogram (4.18.22):  The study quality is  average.   The left ventricle is normal in size. Global left ventricular systolic function is mildly decreased. The left ventricular ejection fraction is 40%. Left ventricular diastolic function is normal. Noted left ventricular hypertrophy. Asymmetric septal left ventricular hypertrophy is present. It is mild.   Mild to moderate (1-2+) mitral regurgitation. Somewhat eccentric jet noted, chordae appear hyperechoic and thickened.      Review of Systems   Cardiovascular:  Negative for chest pain.   Respiratory:  Negative for shortness of breath.    Gastrointestinal:         Sore Throat     Objective:     Vital Signs (Most Recent):  Temp: 97.7 °F (36.5 °C) (08/27/22 0819)  Pulse: 84 (08/27/22 0819)  Resp: 17 (08/27/22 0819)  BP: (!) 85/62 (08/27/22 0819)  SpO2: 96 % (08/27/22 0819) Vital Signs (24h Range):  Temp:  [97.7 °F (36.5 °C)-98.6 °F (37 °C)] 97.7 °F (36.5 °C)  Pulse:  [73-84] 84  Resp:  [15-22] 17  SpO2:  [90 %-99 %] 96 %  BP: ()/(52-72) 85/62     Weight: 92 kg (202 lb 13.2 oz)  Body mass index is 29.95 kg/m².    SpO2: 96 %  O2 Device (Oxygen Therapy): nasal cannula      Intake/Output Summary (Last 24 hours) at 8/27/2022 1105  Last data filed at 8/27/2022 0700  Gross per 24 hour   Intake 1120 ml   Output 750 ml   Net 370 ml         Lines/Drains/Airways       Peripheral Intravenous Line  Duration                  Midline Catheter Insertion/Assessment  - Single Lumen 08/26/22 1130 Left brachial vein <1 day                    Significant Labs:   CMP   Recent Labs   Lab 08/27/22  0120      K 4.3   CO2 21*   BUN 35.2*   CREATININE 1.10   CALCIUM 8.7*   ALBUMIN 2.8*   BILITOT 1.6*   ALKPHOS 71   AST 56*   ALT 52      and CBC   Recent Labs   Lab 08/27/22  0000   WBC 10.3   HGB 10.2*   HCT 32.6*            Telemetry:  Sinus Rhythm     Physical Exam:  Physical Exam  Vitals reviewed.   Constitutional:       Appearance: Normal appearance.   HENT:      Head: Normocephalic.      Mouth/Throat:      Mouth:  Mucous membranes are moist.      Pharynx: Oropharynx is clear.   Cardiovascular:      Rate and Rhythm: Normal rate and regular rhythm.      Pulses: Normal pulses.      Heart sounds: Normal heart sounds.   Pulmonary:      Effort: Pulmonary effort is normal. No respiratory distress.      Breath sounds: Normal breath sounds.   Abdominal:      Palpations: Abdomen is soft.   Musculoskeletal:         General: Normal range of motion.      Cervical back: Neck supple.      Comments: Midsternal Incision intact   Skin:     General: Skin is warm and dry.      Capillary Refill: Capillary refill takes less than 2 seconds.   Neurological:      General: No focal deficit present.      Mental Status: He is alert and oriented to person, place, and time. Mental status is at baseline.   Psychiatric:         Mood and Affect: Mood normal.         Behavior: Behavior normal.       Current Inpatient Medications:    Current Facility-Administered Medications:     0.45% NaCl infusion, 75 mL/hr, Intravenous, Continuous, Rowdy Davis IV, MD, Last Rate: 75 mL/hr at 08/23/22 0537, 75 mL/hr at 08/23/22 0537    0.9%  NaCl infusion (for blood administration), , Intravenous, Q24H PRN, Rowdy Davis IV, MD    acetaminophen oral solution 650 mg, 650 mg, Per OG tube, Q6H PRN, Rowdy Davis IV, MD    albuterol-ipratropium 2.5 mg-0.5 mg/3 mL nebulizer solution 3 mL, 3 mL, Nebulization, Q6H, Marlon Humphreys MD, 3 mL at 08/27/22 0819    amiodarone tablet 200 mg, 200 mg, Oral, BID, KIMBERLI Pratt, 200 mg at 08/27/22 0801    aspirin EC tablet 81 mg, 81 mg, Oral, Daily, Rowdy Davis IV, MD, 81 mg at 08/27/22 0800    atorvastatin tablet 80 mg, 80 mg, Oral, QHS, KIMBERLI Pratt, 80 mg at 08/26/22 2014    budesonide nebulizer solution 0.5 mg, 0.5 mg, Nebulization, Q12H, Marlon Humphreys MD, 0.5 mg at 08/27/22 0819    calcium gluconate 1 g in NS IVPB (premixed), 1 g, Intravenous, PRN, Rowdy Davis IV, MD    calcium gluconate 1 g in NS IVPB  (premixed), 3 g, Intravenous, PRN, Rowdy Davis IV, MD    dextrose 10% bolus 250 mL, 25 g, Intravenous, PRN, Rowdy Davis IV, MD    dextrose 50% injection 12.5 g, 12.5 g, Intravenous, PRN, Rowdy Davis IV, MD    docusate sodium capsule 100 mg, 100 mg, Oral, BID, Rowdy Davis IV, MD, 100 mg at 08/27/22 0800    enoxaparin injection 40 mg, 40 mg, Subcutaneous, Daily, NINA Mancilla, 40 mg at 08/26/22 1700    EPINEPHrine (ADRENALIN) 5 mg in dextrose 5 % 250 mL infusion, 0-2 mcg/kg/min, Intravenous, Continuous, Rowdy Davis IV, MD, Last Rate: 0 mL/hr at 08/25/22 0500, 0 mcg/kg/min at 08/25/22 0500    famotidine (PF) injection 20 mg, 20 mg, Intravenous, BID, Rowdy Davis IV, MD, 20 mg at 08/27/22 0801    folic acid tablet 1 mg, 1 mg, Oral, Daily, Rowdy Davis IV, MD, 1 mg at 08/27/22 0800    HYDROcodone-acetaminophen 5-325 mg per tablet 1 tablet, 1 tablet, Oral, Q4H PRN, Rowdy Davis IV, MD, 1 tablet at 08/24/22 1811    insulin regular in 0.9 % NaCl 100 unit/100 mL (1 unit/mL) infusion, 4 Units/hr, Intravenous, Continuous, Rowdy Davis IV, MD, Stopped at 08/23/22 1400    loperamide capsule 4 mg, 4 mg, Oral, Once, Rowdy Davis IV, MD    magnesium sulfate 2g in water 50mL IVPB (premix), 2 g, Intravenous, PRN, Rowdy Davis IV, MD    magnesium sulfate 2g in water 50mL IVPB (premix), 4 g, Intravenous, PRN, Rowdy Davis IV, MD    metoclopramide HCl injection 5 mg, 5 mg, Intravenous, Q6H PRN, Rowdy Davis IV, MD    metoprolol succinate (TOPROL-XL) 24 hr split tablet 12.5 mg, 12.5 mg, Oral, Daily, YAA HatchP-BC    morphine injection 2 mg, 2 mg, Intravenous, PRN, Rowdy Davis IV, MD, 2 mg at 08/23/22 0817    mupirocin 2 % ointment, , Nasal, BID, NINA Mancilla, Given at 08/27/22 0801    NORepinephrine 8 mg in dextrose 5% 250 mL infusion, 0-3 mcg/kg/min, Intravenous, Continuous, Rowdy Davis IV, MD    ondansetron injection 4 mg, 4 mg,  Intravenous, Q12H PRN, Rowdy Davis IV, MD, 4 mg at 08/27/22 0049    oxyCODONE immediate release tablet 5 mg, 5 mg, Oral, Q4H PRN, Rowdy Davis IV, MD, 5 mg at 08/24/22 2016    potassium chloride 20 mEq in 100 mL IVPB (FOR CENTRAL LINE ADMINISTRATION ONLY), 20 mEq, Intravenous, PRN, Rowdy Davis IV, MD    potassium chloride 20 mEq in 100 mL IVPB (FOR CENTRAL LINE ADMINISTRATION ONLY), 40 mEq, Intravenous, PRN, Rowdy Davis IV, MD    potassium chloride 20 mEq in 100 mL IVPB (FOR CENTRAL LINE ADMINISTRATION ONLY), 20 mEq, Intravenous, PRN, Rowdy Davis IV, MD    Flushing PICC Protocol, , , Until Discontinued **AND** sodium chloride 0.9% flush 10 mL, 10 mL, Intravenous, Q6H, 10 mL at 08/27/22 0600 **AND** sodium chloride 0.9% flush 10 mL, 10 mL, Intravenous, PRN, Rowdy Davis IV, MD    sodium phosphate 15 mmol in dextrose 5 % 250 mL IVPB, 15 mmol, Intravenous, PRN, Rowdy Davis IV, MD    sodium phosphate 20.01 mmol in dextrose 5 % 250 mL IVPB, 20.01 mmol, Intravenous, PRN, Rowdy Davis IV, MD    sodium phosphate 30 mmol in dextrose 5 % 250 mL IVPB, 30 mmol, Intravenous, PRN, Rowdy Davis IV, MD    Facility-Administered Medications Ordered in Other Encounters:     diphenhydrAMINE capsule 50 mg, 50 mg, Oral, On Call Procedure, Reinaldo Gerardo MD, 50 mg at 08/02/22 0739    sodium chloride 0.9% flush 10 mL, 10 mL, Intravenous, PRN, Reinaldo Gerardo MD        Assessment:   IMPRESSION:  CAD (Multivessel)- Status Post CABG (LIMA to LAD, SVG to OM) (8.22.22)    - S/P LISA Ligation  Hypotension (Post Op), off pressors but remains borderline    -  H/O Hypertension   Valvular Heart Disease- MR Status Post Bio MVR (#29 mm Mosaic Porcine) (8.22.22)  Ischemic Cardiomyopathy EF 40% Status Post ICD (Oversi)  History of VT    - On Amiodarone Outpatient  Hyperlipidemia  Chronic Venous Insufficiency  Elevated BMI/Obesity  COPD  Former Smoker    - Quit 3 Years Ago  Anemia  Leukocytosis  (improving)  Elevated LFT(S) (Mild)  Sore Throat    Plan:   PLAN:  Continue Aspirin 81 MG Daily & Statin.  Discontinue Toprol given low BP  Continue ASA/Statin; Continue Oral Amiodarone (Home Dose)  Mobilize as Able & Continue Regular IS Usage  Consult GI Re: Throat Pain Rule out Esophagitis    KIMBERLI Pratt  Cardiology  [facility] - [facility]  08/27/2022

## 2022-08-27 NOTE — CONSULTS
GI CONSULT      Reason for Consultation:  Esophagitis    HPI:    77-year-old man with recent CABG and mitral valve repair who started having some epigastric discomfort along with lower chest discomfort along with hiccups and belching.  He then proceeded to have vomiting.  Now his throat hurts.  He did have some mild dysphagia prior to all of this.  He used to take Tums p.r.n..  He was not on a PPI.  He denies any prior EGDs.  He thinks he had a colonoscopy many years ago.    Review of patient's allergies indicates:  No Known Allergies       Current Facility-Administered Medications   Medication Dose Route Frequency Provider Last Rate Last Admin    0.45% NaCl infusion  75 mL/hr Intravenous Continuous Rowdy Davis IV, MD 75 mL/hr at 08/23/22 0537 75 mL/hr at 08/23/22 0537    0.9%  NaCl infusion (for blood administration)   Intravenous Q24H PRN Rowyd Davis IV, MD        acetaminophen oral solution 650 mg  650 mg Per OG tube Q6H PRN Rowdy Davis IV, MD        albuterol-ipratropium 2.5 mg-0.5 mg/3 mL nebulizer solution 3 mL  3 mL Nebulization Q6H Marlon Humphreys MD   3 mL at 08/27/22 1209    amiodarone tablet 200 mg  200 mg Oral BID KIMBERLI Pratt   200 mg at 08/27/22 0801    aspirin EC tablet 81 mg  81 mg Oral Daily Rowdy Davis IV, MD   81 mg at 08/27/22 0800    atorvastatin tablet 80 mg  80 mg Oral QHS KIMBERLI Pratt   80 mg at 08/26/22 2014    budesonide nebulizer solution 0.5 mg  0.5 mg Nebulization Q12H Marlon Humphreys MD   0.5 mg at 08/27/22 0819    calcium gluconate 1 g in NS IVPB (premixed)  1 g Intravenous PRN Rowdy Davis IV, MD        calcium gluconate 1 g in NS IVPB (premixed)  3 g Intravenous PRN Rowdy Davis IV, MD        dextrose 10% bolus 250 mL  25 g Intravenous PRN Rowdy Davis IV, MD        dextrose 50% injection 12.5 g  12.5 g Intravenous PRN Rowdy Davis IV, MD        docusate sodium capsule 100 mg  100 mg Oral BID Rowdy Davis IV, MD   100 mg at  08/27/22 0800    enoxaparin injection 40 mg  40 mg Subcutaneous Daily NINA Mancilla   40 mg at 08/26/22 1700    EPINEPHrine (ADRENALIN) 5 mg in dextrose 5 % 250 mL infusion  0-2 mcg/kg/min Intravenous Continuous Rowdy Davis IV, MD 0 mL/hr at 08/25/22 0500 0 mcg/kg/min at 08/25/22 0500    folic acid tablet 1 mg  1 mg Oral Daily Rowdy Davis IV, MD   1 mg at 08/27/22 0800    HYDROcodone-acetaminophen 5-325 mg per tablet 1 tablet  1 tablet Oral Q4H PRN Rowdy Davis IV, MD   1 tablet at 08/24/22 1811    insulin regular in 0.9 % NaCl 100 unit/100 mL (1 unit/mL) infusion  4 Units/hr Intravenous Continuous Rowdy Davis IV, MD   Stopped at 08/23/22 1400    loperamide capsule 4 mg  4 mg Oral Once Rowdy Davis IV, MD        magnesium sulfate 2g in water 50mL IVPB (premix)  2 g Intravenous PRN Rowdy Davis IV, MD        magnesium sulfate 2g in water 50mL IVPB (premix)  4 g Intravenous PRN Rowdy Davis IV, MD        metoclopramide HCl injection 5 mg  5 mg Intravenous Q6H PRN Rowdy Davis IV, MD        morphine injection 2 mg  2 mg Intravenous PRN Rowdy Davis IV, MD   2 mg at 08/23/22 0817    mupirocin 2 % ointment   Nasal BID NINA Mancilla   Given at 08/27/22 0801    NORepinephrine 8 mg in dextrose 5% 250 mL infusion  0-3 mcg/kg/min Intravenous Continuous Rowdy Davis IV, MD        ondansetron injection 4 mg  4 mg Intravenous Q12H PRN Rowdy Davis IV, MD   4 mg at 08/27/22 0049    oxyCODONE immediate release tablet 5 mg  5 mg Oral Q4H PRN Rowdy Davis IV, MD   5 mg at 08/24/22 2016    pantoprazole injection 40 mg  40 mg Intravenous BID Isaiah Levine MD   40 mg at 08/27/22 1242    potassium chloride 20 mEq in 100 mL IVPB (FOR CENTRAL LINE ADMINISTRATION ONLY)  20 mEq Intravenous PRN Rowdy Davis IV, MD        potassium chloride 20 mEq in 100 mL IVPB (FOR CENTRAL LINE ADMINISTRATION ONLY)  40 mEq Intravenous PRN Rowdy Davis IV, MD         potassium chloride 20 mEq in 100 mL IVPB (FOR CENTRAL LINE ADMINISTRATION ONLY)  20 mEq Intravenous PRN Rowdy Davis IV, MD        sodium chloride 0.9% flush 10 mL  10 mL Intravenous Q6H Rowdy Davis IV, MD   10 mL at 08/27/22 1242    And    sodium chloride 0.9% flush 10 mL  10 mL Intravenous PRN Rowdy Davis IV, MD        sodium phosphate 15 mmol in dextrose 5 % 250 mL IVPB  15 mmol Intravenous PRN Rowdy Davis IV, MD        sodium phosphate 20.01 mmol in dextrose 5 % 250 mL IVPB  20.01 mmol Intravenous PRN Rowdy Davis IV, MD        sodium phosphate 30 mmol in dextrose 5 % 250 mL IVPB  30 mmol Intravenous PRN Rowdy Davis IV, MD         Facility-Administered Medications Ordered in Other Encounters   Medication Dose Route Frequency Provider Last Rate Last Admin    diphenhydrAMINE capsule 50 mg  50 mg Oral On Call Procedure Reinaldo Gerardo MD   50 mg at 08/02/22 0739    sodium chloride 0.9% flush 10 mL  10 mL Intravenous PRN Reinaldo Gerardo MD           Medications Prior to Admission   Medication Sig Dispense Refill Last Dose    amiodarone (PACERONE) 200 MG Tab Take 200 mg by mouth 2 (two) times daily.   8/21/2022 at 2000    aspirin (ECOTRIN) 81 MG EC tablet Take 81 mg by mouth nightly.   8/21/2022 at 2000    atorvastatin (LIPITOR) 80 MG tablet Take 80 mg by mouth nightly.   8/21/2022 at 2000    ezetimibe (ZETIA) 10 mg tablet Take 10 mg by mouth nightly.   8/21/2022 at 2000    fluticasone propionate (FLONASE) 50 mcg/actuation nasal spray 1 spray by Each Nostril route daily as needed for Rhinitis.   Past Month at Unknown time    furosemide (LASIX) 20 MG tablet Take 20 mg by mouth once daily.   8/21/2022 at 0800    metoprolol succinate (TOPROL-XL) 25 MG 24 hr tablet Take 1 tablet by mouth once daily.   8/22/2022 at 0500    sacubitriL-valsartan (ENTRESTO) 49-51 mg per tablet Take 0.5 tablets by mouth 2 (two) times daily.   8/21/2022 at 2000    spironolactone (ALDACTONE) 25 MG tablet Take  12.5 mg by mouth once daily.   8/21/2022 at 0800    umeclidinium-vilanteroL (ANORO ELLIPTA) 62.5-25 mcg/actuation DsDv Inhale 1 puff into the lungs once daily. Controller   8/22/2022 at 0500    clopidogreL (PLAVIX) 75 mg tablet Take 75 mg by mouth nightly.   8/14/2022    mometasone (ASMANEX TWISTHALER) 220 mcg/ actuation (30) inhaler Inhale 2 puffs into the lungs once daily. Controller   More than a month at Unknown time         Past Medical History:    Past Medical History:   Diagnosis Date    Arthritis     spine    CAD (coronary artery disease)     COPD (chronic obstructive pulmonary disease)     HLD (hyperlipidemia)     HTN (hypertension)     Ischemic cardiomyopathy     Mitral regurgitation     HEATHER on CPAP     Stroke     Venous insufficiency     Ventricular tachycardia         Past Surgical History:    Past Surgical History:   Procedure Laterality Date    bilateral inguinal stents      CARDIAC DEFIBRILLATOR PLACEMENT Left     CATARACT EXTRACTION Bilateral     CATHETERIZATION OF BOTH LEFT AND RIGHT HEART  08/02/2022    Diagnostic Only; Dr. Akin PETERSEN      CORONARY ARTERY BYPASS GRAFT (CABG) N/A 8/22/2022    Procedure: CORONARY ARTERY BYPASS GRAFT (CABG);  Surgeon: Rowdy Davis IV, MD;  Location: [facility] OR;  Service: Cardiovascular;  Laterality: N/A;  possible MVR & LLAA  //  ECHO NOTIFIED    LEFT HEART CATHETERIZATION Left 08/02/2022    Procedure: CATHETERIZATION, HEART, LEFT;  Surgeon: Reinaldo Gerardo MD;  Location: [facility] CATH LAB;  Service: Cardiology;  Laterality: Left;  LHC +/- PCI    MITRAL VALVE REPLACEMENT N/A 8/22/2022    Procedure: REPLACEMENT, MITRAL VALVE;  Surgeon: Rowdy Davis IV, MD;  Location: [facility] OR;  Service: Cardiovascular;  Laterality: N/A;  possible MVR and LLAA    REMOVAL OF IMPLANTED CARDIOVERTER-DEFIBRILLATOR (ICD)      TONSILLECTOMY          Family History:    History reviewed. No pertinent family history.    Social History:    Social History     Tobacco Use     Smoking status: Former     Years: 55.00     Types: Cigarettes     Start date:      Quit date:      Years since quittin.6    Smokeless tobacco: Never   Substance Use Topics    Alcohol use: Yes     Alcohol/week: 3.0 standard drinks     Types: 3 Glasses of wine per week            Review of Systems:    Review of Systems   Constitutional:  Negative for fever, malaise/fatigue and weight loss.   Respiratory:  Negative for cough and shortness of breath.    Cardiovascular:  Negative for chest pain (this is incisional pain), palpitations and leg swelling.   Gastrointestinal:  Negative for abdominal pain, blood in stool, constipation, diarrhea, heartburn, melena, nausea and vomiting.   Musculoskeletal:  Negative for back pain and myalgias.   Skin:  Negative for rash.   Neurological:  Negative for speech change and focal weakness.   All other systems reviewed and are negative.      Objective:        VITALS:     Temp Readings from Last 3 Encounters:   22 97.7 °F (36.5 °C)   22 97.9 °F (36.6 °C) (Oral)     BP Readings from Last 3 Encounters:   22 115/71   22 97/61   22 (!) 105/55     Pulse Readings from Last 3 Encounters:   22 85   22 60   22 62            Intake/Output Summary (Last 24 hours) at 2022 1420  Last data filed at 2022 1300  Gross per 24 hour   Intake 510 ml   Output 700 ml   Net -190 ml         Physical Exam  Vitals reviewed.   Constitutional:       Appearance: He is obese.   HENT:      Head: Atraumatic.      Nose: Nose normal.      Mouth/Throat:      Mouth: Mucous membranes are moist.   Eyes:      General: No scleral icterus.     Extraocular Movements: Extraocular movements intact.   Cardiovascular:      Rate and Rhythm: Normal rate.      Pulses: Normal pulses.      Comments: Incision on the chest present.  Pulmonary:      Effort: Pulmonary effort is normal.      Breath sounds: Normal breath sounds.   Abdominal:      General: Abdomen is flat.  Bowel sounds are normal. There is no distension.      Palpations: Abdomen is soft.      Tenderness: There is no abdominal tenderness. There is no guarding.   Skin:     Capillary Refill: Capillary refill takes less than 2 seconds.   Neurological:      General: No focal deficit present.      Mental Status: He is alert and oriented to person, place, and time.   Psychiatric:         Mood and Affect: Mood normal.         Behavior: Behavior normal.           Recent Results (from the past 48 hour(s))   CBC with Differential    Collection Time: 08/27/22 12:00 AM   Result Value Ref Range    WBC 10.3 4.5 - 11.5 x10(3)/mcL    RBC 3.43 (L) 4.70 - 6.10 x10(6)/mcL    Hgb 10.2 (L) 14.0 - 18.0 gm/dL    Hct 32.6 (L) 42.0 - 52.0 %    MCV 95.0 (H) 80.0 - 94.0 fL    MCH 29.7 27.0 - 31.0 pg    MCHC 31.3 (L) 33.0 - 36.0 mg/dL    RDW 17.0 11.5 - 17.0 %    Platelet 151 130 - 400 x10(3)/mcL    MPV 11.6 (H) 7.4 - 10.4 fL    IG# 0.15 (H) 0 - 0.04 x10(3)/mcL    IG% 1.5 %    NRBC% 0.3 %   Manual Differential    Collection Time: 08/27/22 12:00 AM   Result Value Ref Range    Neut Man 84 %    Lymph Man 4 %    Monocyte Man 13 %    Instr WBC 10 x10(3)/mcL    Abs Mono 1.3 0.1 - 1.3 x10(3)/mcL    Abs Lymp 0.4 (L) 0.6 - 4.6 x10(3)/mcL    Abs Neut 8.4 2.1 - 9.2 x10(3)/mcL    RBC Morph Abnormal (A) Normal    Macrocyte 1+ (A) (none)    Platelet Est Adequate Normal, Adequate   Comprehensive Metabolic Panel    Collection Time: 08/27/22  1:20 AM   Result Value Ref Range    Sodium Level 136 136 - 145 mmol/L    Potassium Level 4.3 3.5 - 5.1 mmol/L    Chloride 104 98 - 107 mmol/L    Carbon Dioxide 21 (L) 23 - 31 mmol/L    Glucose Level 72 (L) 82 - 115 mg/dL    Blood Urea Nitrogen 35.2 (H) 8.4 - 25.7 mg/dL    Creatinine 1.10 0.73 - 1.18 mg/dL    Calcium Level Total 8.7 (L) 8.8 - 10.0 mg/dL    Protein Total 5.3 (L) 5.8 - 7.6 gm/dL    Albumin Level 2.8 (L) 3.4 - 4.8 gm/dL    Globulin 2.5 2.4 - 3.5 gm/dL    Albumin/Globulin Ratio 1.1 1.1 - 2.0 ratio    Bilirubin  Total 1.6 (H) <=1.5 mg/dL    Alkaline Phosphatase 71 40 - 150 unit/L    Alanine Aminotransferase 52 0 - 55 unit/L    Aspartate Aminotransferase 56 (H) 5 - 34 unit/L    eGFR >60 mls/min/1.73/m2           X-Ray Chest 1 View    Result Date: 8/26/2022  EXAMINATION: XR CHEST 1 VIEW CLINICAL HISTORY: Removed chest tube; COMPARISON: Yesterday FINDINGS: Portable frontal view of the chest was obtained.  Right jugular sheath has been removed.  Cardiac silhouette unchanged.  Increased opacification left upper lung.  Similar patchy opacities right mid lung.  No pneumothorax is seen.     Increased left upper lung opacification. Electronically signed by: Kiran Restrepo Date:    08/26/2022 Time:    06:55    X-Ray Chest PA And Lateral    Result Date: 8/18/2022  EXAMINATION: XR CHEST PA AND LATERAL CLINICAL HISTORY: Atherosclerotic heart disease of native coronary artery without angina pectoris. COMPARISON: Chest x-ray 07/15/2022 FINDINGS: Frontal and lateral views of the chest was obtained.  The cardiac silhouette is within normal limits for size. Left-sided dual lead pacing device is again seen.  Left perihilar/suprahilar opacity appears to be grossly stable.  Calcified granuloma in the right lung base is seen.  Calcified granuloma versus calcified lymph node in the right perihilar/infrahilar region is again seen.  No visible pneumothorax or pleural effusion is appreciated.  No acute displaced fracture or dislocation is present.     1. There is redemonstration of airspace opacity in the left perihilar/upper lung.  This finding is nonspecific, but underlying mass with postobstructive pneumonitis cannot be excluded.  Infectious or inflammatory process cannot be excluded.  Further evaluation with CT chest with contrast is recommended. Electronically signed by: Ken Murillo MD Date:    08/18/2022 Time:    12:27    X-Ray Chest AP Portable    Result Date: 8/25/2022  EXAMINATION: XR CHEST AP PORTABLE CLINICAL HISTORY: Post-op; TECHNIQUE:  Single frontal view of the chest was performed. COMPARISON: 08/23/2022 FINDINGS: LINES AND TUBES: Right IJ CVC tip projects over the SVC.  Dual lead cardiac pacer device is in place via left subclavian approach with leads overlying the right atrium and right ventricle.  EKG/telemetry leads overlie the chest.  Anterior mediastinal drain is unchanged..  Interval extubation and removal of enteric tube. MEDIASTINUM AND LAXMI: The cardiac silhouette is normal. LUNGS: Unchanged left suprahilar opacity.  Mild increased interstitial opacities in the right perihilar lung zone. PLEURA: No pneumothorax. BONES: Postop sternotomy.     Interval extubation.  Mild increased interstitial opacities in the perihilar region possibly atelectasis.  Unchanged opacity in the left suprahilar lung zone. Electronically signed by: Joycelyn Mora Date:    08/25/2022 Time:    06:55    X-Ray Chest AP Portable    Result Date: 8/23/2022  EXAMINATION: XR CHEST AP PORTABLE CLINICAL HISTORY: Post-op; TECHNIQUE: Single view of the chest COMPARISON: 8222 FINDINGS: Patient remains intubated with postsurgical changes of median sternotomy.  Slight interval improvement of left upper lung zone opacification.     As above. Electronically signed by: Octaviano Tesfaye Date:    08/23/2022 Time:    06:49    X-Ray Chest AP Portable    Result Date: 8/22/2022  EXAMINATION: XR CHEST AP PORTABLE CLINICAL HISTORY: ; Post-op; TECHNIQUE: AP view(s) of the chest. COMPARISON: 18 August 2022 FINDINGS: Lines/tubes/devices: Endotracheal tube is now visualized, terminating at the level of the clavicles.  Right IJ sheath is also appreciated, as well as enteric tube that terminates at the left upper quadrant and side port distal to the level the GE junction, and mediastinal drain remaining in place.  The left chest wall ICD pacemaker is unchanged. Median sternotomy wires are now visualized.  There is prominence of the bilateral hilar and the central vascular structures.  Newly  appreciated irregular opacity projects over the left hilar region, with hazy opacification of the adjacent left upper lung zone.  No new or worsening focal abnormality of the right lung field is appreciated.  There is no enlarging pleural effusion or convincing pneumothorax. There is no acute osseous or extrathoracic abnormality.     1. Interval mediastinal postoperative changes with lines/tubes as above. 2. Newly appreciated hazy opacification and irregular infiltrate of the left upper lobe, may reflect changes of developing pneumonia, postoperative alteration, or sequela of aspiration. Electronically signed by: Theron White Date:    08/22/2022 Time:    16:14    Cardiac catheterization    Result Date: 8/2/2022  · The Mid LAD lesion was 60% stenosed.  The procedure log was documented by No documenter listed and verified by Reinaldo Gerardo MD. Date: 8/2/2022  Time: 9:28 AM Preprocedure diagnosis-abnormal stress test Postprocedure diagnosis-normal coronary arteries Estimated blood loss-5 cc Tissue removal-none Complications-none Procedures performed: 1. Ultrasound-guided right radial access 2. Coronary angiography 3. Left ventricular hemodynamics 4. RHC 5. IFR LAD 0.81 6. TR band access site hemostasis 7. Distal aortography Findings: 1. Left main-50% distal LM 2. Lad-prox 60%, IFR 0.81 3. LCX-non dominant, , Diagonal to OM collateral 4. RCA-dominant,  5. Bilateral common iliacs 30% calcified stenosis Procedure detail: IV exchanged out for 6 Thai sheath in right antecubital fossa.  Ultrasound-guided right radial access obtained.  Sheath inserted.  Vasodilators and heparin administered.  Tiger catheter used for RCA angiography.  Six Thai EBU 3.5 guiding catheter used for left main angiography additional heparin was administered.  IFR interrogation of the LAD was performed.  Artery was pre treated with nitroglycerin.  I was normalized in the aorta.  There 0.81 was obtained.  Pullback to the guide was 1. Final  angiography demonstrated CHUCK 3 flow in the LAD catheters were removed TR band was utilized for radial access site hemostasis.  Venous sheath was left in place. Plan: 1. Maximize medical management 2. Discharge home today 3.  Discuss options for revascularization with multivessel PCI versus CABG.             Assessment & Plan:     1. Recent CABG with mitral valve repair done on August 22, 2022   2. GERD/dysphagia/esophagitis-----we will discontinue the Pepcid and try PPI b.i.d..  Patient unfortunately cannot tolerate the Carafate.  Is very unfortunate that we only have the pills available and he cannot swallow them..  Since [facility] has taken over, the liquid Carafate is no longer available which is quite unfortunate.    We will plan on getting esophagram while he is here.  More than likely, he has some baseline reflux that just exacerbated post surgically.  We would like to hold off ideally on EGD given his recent CABG and valve repair.  Hopefully he will start to improve with b.i.d. PPI.  He does not improve, we certainly can evaluate next week.

## 2022-08-28 NOTE — PROGRESS NOTES
"[facility] - [address] [place] [address]  Adult Nutrition  Progress Note    SUMMARY       Recommendations    Recommendation/Intervention:   1. Advance diet as medically feasible. Goal diet: cardiac.   2.Consider PPN to assist in healing while ability to eat is compromised. Clinimix 4.25/5 @ 83.3 ml/hr w/ 250 ml 20% intralipids bi-weekly will provide 823 kcals/day (51% est needs), 84 g protein (78% est needs), and 1872 ml fluid (115% est needs).  3. Monitor weight, labs.    Goals: 1. Meet % est. nutritional needs by discharge. 2. Tolerate PO diet.  Nutrition Goal Status: new    Communication of RD Recs: reviewed with RN    Assessment and Plan    No new Assessment & Plan notes have been filed under this hospital service since the last note was generated.  Service: Nutrition       Malnutrition Assessment        <50% po intake for 5 days.    Unable to perform full malnutrition assessment @ this time.               Reason for Assessment    Reason For Assessment: identified at risk by screening criteria, length of stay (LOS, CABG, Ileus vs partial bowel obstruction)  Diagnosis: other (see comments) (CAD, CABG & MV Replacement 8/22, HF)  Relevant Medical History: CAD, HF, COPD, HEATHER, CVA, Arthritis  General Information Comments: 8/28/22: Pt sleeping, sent back to ICU 2/2 hypotension, NPO since yesterday d/t ileus, NG to suction, was eating <50% on po diet, no kcal containing meds or vent @ this time.    Nutrition Risk Screen    Nutrition Risk Screen: no indicators present    Nutrition/Diet History    Spiritual, Cultural Beliefs, Rastafarian Practices, Values that Affect Care: no    Anthropometrics    Temp: 98.1 °F (36.7 °C)  Height Method: Stated  Height: 5' 8.9" (175 cm)  Height (inches): 68.9 in  Weight Method: Standard Scale  Weight: 90 kg (198 lb 6.6 oz)  Weight (lb): 198.42 lb  Ideal Body Weight (IBW), Male: 159.4 lb  % Ideal Body Weight, Male (lb): 124.48 %  BMI (Calculated): 29.4   "     Lab/Procedures/Meds    Pertinent Labs Reviewed: reviewed  Pertinent Labs Comments: BUN 44, Cr 1.42, Ca 8.6, Alb 2.6, AST 47  Pertinent Medications Reviewed: reviewed  Pertinent Medications Comments: 1/2 NS, Norepinephrine, Atorvastatin, Docusate, Folic acid    Physical Findings/Assessment         Estimated/Assessed Needs    Weight Used For Calorie Calculations: 90 kg (198 lb 6.6 oz)  Energy Calorie Requirements (kcal): 1620 kcals (MSJ)  Energy Need Method: [place]  Protein Requirements: 108-180 g (1.2-2.0 g/kg CBW)  Weight Used For Protein Calculations: 90 kg (198 lb 6.6 oz)     Estimated Fluid Requirement Method: RDA Method  RDA Method (mL): 1620         Nutrition Prescription Ordered    Current Diet Order: NPO    Evaluation of Received Nutrient/Fluid Intake    Energy Calories Required: not meeting needs  Protein Required: not meeting needs  % Intake of Estimated Energy Needs: 0  % Meal Intake: n/a - NPO     Nutrition Risk    Level of Risk/Frequency of Follow-up: moderate     Monitor and Evaluation    Anthropometric Measurements: weight change  Biochemical Data, Medical Tests and Procedures: electrolyte and renal panel     Nutrition Follow-Up    RD Follow-up?: Yes

## 2022-08-28 NOTE — PLAN OF CARE
Problem: Adult Inpatient Plan of Care  Goal: Patient-Specific Goal (Individualized)  Outcome: Ongoing, Progressing     Problem: Skin Injury Risk Increased  Goal: Skin Health and Integrity  Outcome: Ongoing, Progressing     Problem: Impaired Wound Healing  Goal: Optimal Wound Healing  Outcome: Ongoing, Progressing

## 2022-08-28 NOTE — PROCEDURES
"Zackery Osullivan Jr. is a 77 y.o. male patient.    Temp: 98.1 °F (36.7 °C) (08/28/22 0800)  Pulse: 84 (08/28/22 0915)  Resp: 15 (08/28/22 0915)  BP: (!) 94/56 (08/28/22 0900)  SpO2: (!) 93 % (08/28/22 0915)  Weight: 90 kg (198 lb 6.6 oz) (08/28/22 0915)  Height: 5' 8.9" (175 cm) (08/28/22 0915)    PICC  Time out: Immediately prior to procedure a time out was called to verify the correct patient, procedure, equipment, support staff and site/side marked as required  Indications: med administration  Preparation: skin prepped with ChloraPrep  Skin prep agent dried: skin prep agent completely dried prior to procedure  Sterile barriers: all five maximum sterile barriers used - cap, mask, sterile gown, sterile gloves, and large sterile sheet  Hand hygiene: hand hygiene performed prior to central venous catheter insertion  Location details: right brachial  Catheter type: triple lumen  Catheter size: 5 Fr  Catheter Length: 37cm    Ultrasound guidance: yes  Needle advanced into vessel with real time Ultrasound guidance.  Guidewire confirmed in vessel.  Sterile sheath used.    Pressors and amio  Arm circumference 32 cm    Name Blaine Colmenares  8/28/2022    "

## 2022-08-28 NOTE — PROGRESS NOTES
[facility] - [facility]  Cardiology  Progress Note    Patient Name: Zackery Osullivan Jr.  MRN: [id number]  Admission Date: 8/22/2022  Hospital Length of Stay: 6 days  Code Status: Full Code   Attending Physician: Rowdy Davis IV, MD   Primary Care Physician: Isaiah Meeks MD  Expected Discharge Date:   Principal Problem:<principal problem not specified>    Subjective:     Brief HPI:   Mr. Osullivan is a 77 year old male, known to Dr. Gerardo and Dr. Flowers, who presented to the hospital and underwent CAD/CABG and MVR due to diagnosis of MV CAD and significant MR. Also underwent LISA Ligation. Patient tolerated the surgery well, and was transferred to intensive care unit for further close post operative monitoring. [facility] is consulted for post op surgical cardiac management.    Hospital Course:   8.25.22: NAD noted. VSS with low normal BP. SR on tele. Denies SOB/Palps, +incisional CP.  8.26.22: NAD noted. VSS with low normal BP. SR on tele. Sitting in chair at bedside. Denies SOB/Palps, endorses incisional CP.  8.27.22: NAD Noted. Reports throat discomfort. SR on Tele. BP Low Normal.  8.28.22: NAD Noted. BP Low Normal. SR on Tele. GI Following. NSVT overnight.  SR 83.     PMH: CAD (Multivessel), Ischemic Cardiomyopathy (ICD), VHD- MR, Hypertension, Hyperlipidemia, Chronic VI, COPD, Smoker, Obstructive Sleep Apnea, NSVT, Obesity  PSH: CABG/MVR, Skin Lesion, ICD Placement ([employer]), Tonsillectomy  Family History: Father- CAD, Brother- CAD, Sister- CAD  Social History: Tobacco- Former Smoker (Quit 3 Years Ago), Alcohol- Negative, Substance Abuse- Negative     Previous Cardiac Diagnostics:   CABG/MVR (8.22.22): MVR 29 mm Mosaic Porcine Valve; CABG LIMA to LAD & SVG to OM, LISA Ligation with Endo Stapler.     Left Heart Catheterization (8.2.22):  Left Main- 50% Distal Disease, LAD- 60% Proximal IFR 0.81, LCx- Nondominant (, Diagonal to OM Collateral), RCA- Dominant with , Bilateral Common  Iliacs 30% Calcified Stenosis.     Echocardiogram (4.18.22):  The study quality is average.   The left ventricle is normal in size. Global left ventricular systolic function is mildly decreased. The left ventricular ejection fraction is 40%. Left ventricular diastolic function is normal. Noted left ventricular hypertrophy. Asymmetric septal left ventricular hypertrophy is present. It is mild.   Mild to moderate (1-2+) mitral regurgitation. Somewhat eccentric jet noted, chordae appear hyperechoic and thickened.      Review of Systems   Cardiovascular:  Negative for chest pain.   Respiratory:  Negative for shortness of breath.    Gastrointestinal:  Positive for dysphagia.        Sore Throat     Objective:     Vital Signs (Most Recent):  Temp: 98 °F (36.7 °C) (08/28/22 0400)  Pulse: 81 (08/28/22 0500)  Resp: 16 (08/28/22 0500)  BP: 97/65 (08/28/22 0500)  SpO2: 95 % (08/28/22 0500) Vital Signs (24h Range):  Temp:  [97.7 °F (36.5 °C)-98.3 °F (36.8 °C)] 98 °F (36.7 °C)  Pulse:  [71-99] 81  Resp:  [13-26] 16  SpO2:  [72 %-100 %] 95 %  BP: ()/(50-71) 97/65     Weight: 90.4 kg (199 lb 4.7 oz)  Body mass index is 29.43 kg/m².    SpO2: 95 %  O2 Device (Oxygen Therapy): nasal cannula      Intake/Output Summary (Last 24 hours) at 8/28/2022 0753  Last data filed at 8/28/2022 0300  Gross per 24 hour   Intake 50 ml   Output 1650 ml   Net -1600 ml         Lines/Drains/Airways       Peripheral Intravenous Line  Duration                  Midline Catheter Insertion/Assessment  - Single Lumen 08/26/22 1130 Left brachial vein 1 day                    Significant Labs:   CMP   Recent Labs   Lab 08/27/22  0120 08/27/22  1623    139   K 4.3 4.7   CO2 21* 23   BUN 35.2* 37.9*   CREATININE 1.10 1.03   CALCIUM 8.7* 8.4*   ALBUMIN 2.8*  --    BILITOT 1.6*  --    ALKPHOS 71  --    AST 56*  --    ALT 52  --       and CBC   Recent Labs   Lab 08/27/22  0000   WBC 10.3   HGB 10.2*   HCT 32.6*            Telemetry:  Sinus Rhythm      Physical Exam:  Physical Exam  Vitals reviewed.   Constitutional:       Appearance: Normal appearance.   HENT:      Head: Normocephalic.      Mouth/Throat:      Mouth: Mucous membranes are moist.      Pharynx: Oropharynx is clear.   Cardiovascular:      Rate and Rhythm: Normal rate and regular rhythm.      Pulses: Normal pulses.      Heart sounds: Normal heart sounds.   Pulmonary:      Effort: Pulmonary effort is normal. No respiratory distress.      Breath sounds: Normal breath sounds.   Abdominal:      Palpations: Abdomen is soft.   Musculoskeletal:         General: Normal range of motion.      Cervical back: Neck supple.      Comments: Midsternal Incision intact   Skin:     General: Skin is warm and dry.      Capillary Refill: Capillary refill takes less than 2 seconds.   Neurological:      General: No focal deficit present.      Mental Status: He is alert and oriented to person, place, and time. Mental status is at baseline.   Psychiatric:         Mood and Affect: Mood normal.         Behavior: Behavior normal.       Current Inpatient Medications:    Current Facility-Administered Medications:     0.45% NaCl infusion, , Intravenous, Continuous, NINA Mancilla    0.9%  NaCl infusion (for blood administration), , Intravenous, Q24H PRN, Rowdy Davis IV, MD    acetaminophen oral solution 650 mg, 650 mg, Per OG tube, Q6H PRN, Rowdy Davis IV, MD    albuterol-ipratropium 2.5 mg-0.5 mg/3 mL nebulizer solution 3 mL, 3 mL, Nebulization, Q6H, Marlon Humphreys MD, 3 mL at 08/27/22 1954    amiodarone tablet 200 mg, 200 mg, Oral, BID, KIMBERLI Pratt, 200 mg at 08/27/22 2146    aspirin EC tablet 81 mg, 81 mg, Oral, Daily, Rowdy Davis IV, MD, 81 mg at 08/27/22 0800    atorvastatin tablet 80 mg, 80 mg, Oral, QHS, KIMBERLI Pratt, 80 mg at 08/27/22 2146    budesonide nebulizer solution 0.5 mg, 0.5 mg, Nebulization, Q12H, Marlon Humphreys MD, 0.5 mg at 08/27/22 1954    calcium gluconate 1 g in NS  IVPB (premixed), 1 g, Intravenous, PRN, Rowdy Davis IV, MD    calcium gluconate 1 g in NS IVPB (premixed), 3 g, Intravenous, PRN, Rowdy Davis IV, MD    dextrose 10% bolus 250 mL, 25 g, Intravenous, PRN, Rowdy Davis IV, MD    dextrose 50% injection 12.5 g, 12.5 g, Intravenous, PRN, Rowdy Davis IV, MD, 12.5 g at 08/27/22 2206    dextrose 50% injection, , , ,     docusate sodium capsule 100 mg, 100 mg, Oral, BID, Rowdy Davis IV, MD, 100 mg at 08/27/22 2146    enoxaparin injection 40 mg, 40 mg, Subcutaneous, Daily, NINA Mancilla, 40 mg at 08/27/22 1722    EPINEPHrine (ADRENALIN) 5 mg in dextrose 5 % 250 mL infusion, 0-2 mcg/kg/min, Intravenous, Continuous, Rowdy Davis IV, MD, Last Rate: 0 mL/hr at 08/25/22 0500, 0 mcg/kg/min at 08/25/22 0500    folic acid tablet 1 mg, 1 mg, Oral, Daily, Rowdy Davis IV, MD, 1 mg at 08/27/22 0800    HYDROcodone-acetaminophen 5-325 mg per tablet 1 tablet, 1 tablet, Oral, Q4H PRN, Rowdy Davis IV, MD, 1 tablet at 08/24/22 1811    insulin regular in 0.9 % NaCl 100 unit/100 mL (1 unit/mL) infusion, 4 Units/hr, Intravenous, Continuous, Rowdy Davis IV, MD, Stopped at 08/23/22 1400    loperamide capsule 4 mg, 4 mg, Oral, Once, Rowdy Davis IV, MD    magnesium sulfate 2g in water 50mL IVPB (premix), 2 g, Intravenous, PRN, Rowdy Davis IV, MD    magnesium sulfate 2g in water 50mL IVPB (premix), 4 g, Intravenous, PRN, Rowdy Davis IV, MD    metoclopramide HCl injection 5 mg, 5 mg, Intravenous, Q6H PRN, Rowdy Davis IV, MD, 5 mg at 08/28/22 0502    morphine injection 2 mg, 2 mg, Intravenous, PRN, Rowdy Davis IV, MD, 2 mg at 08/23/22 0817    mupirocin 2 % ointment, , Nasal, BID, NINA Mancilla, Given at 08/27/22 2146    NORepinephrine 8 mg in dextrose 5% 250 mL infusion, 0-3 mcg/kg/min, Intravenous, Continuous, Rowdy Davis IV, MD    ondansetron injection 4 mg, 4 mg, Intravenous, Q12H PRN, Rowdy Davis IV, MD, 4  mg at 08/28/22 0307    oxyCODONE immediate release tablet 5 mg, 5 mg, Oral, Q4H PRN, Rowdy Davis IV, MD, 5 mg at 08/24/22 2016    pantoprazole injection 40 mg, 40 mg, Intravenous, BID, Isaiah Levine MD, 40 mg at 08/27/22 2143    polyethylene glycol packet 17 g, 17 g, Oral, Daily, MARTHA Limon    potassium chloride 20 mEq in 100 mL IVPB (FOR CENTRAL LINE ADMINISTRATION ONLY), 20 mEq, Intravenous, PRN, Rowdy Davis IV, MD    potassium chloride 20 mEq in 100 mL IVPB (FOR CENTRAL LINE ADMINISTRATION ONLY), 40 mEq, Intravenous, PRN, Rowdy Davis IV, MD    potassium chloride 20 mEq in 100 mL IVPB (FOR CENTRAL LINE ADMINISTRATION ONLY), 20 mEq, Intravenous, PRN, Rowdy Davis IV, MD    Flushing PICC Protocol, , , Until Discontinued **AND** sodium chloride 0.9% flush 10 mL, 10 mL, Intravenous, Q6H, 10 mL at 08/28/22 0600 **AND** sodium chloride 0.9% flush 10 mL, 10 mL, Intravenous, PRN, Rowdy Davis IV, MD    sodium phosphate 15 mmol in dextrose 5 % 250 mL IVPB, 15 mmol, Intravenous, PRN, Rowdy Davis IV, MD    sodium phosphate 20.01 mmol in dextrose 5 % 250 mL IVPB, 20.01 mmol, Intravenous, PRN, Rowdy Davis IV, MD    sodium phosphate 30 mmol in dextrose 5 % 250 mL IVPB, 30 mmol, Intravenous, PRN, Rowdy Davis IV, MD    Facility-Administered Medications Ordered in Other Encounters:     diphenhydrAMINE capsule 50 mg, 50 mg, Oral, On Call Procedure, Reinaldo Gerardo MD, 50 mg at 08/02/22 0739    sodium chloride 0.9% flush 10 mL, 10 mL, Intravenous, PRN, Reinaldo Gerardo MD        Assessment:   IMPRESSION:  CAD (Multivessel)- Status Post CABG (LIMA to LAD, SVG to OM) (8.22.22)    - S/P LISA Ligation  Hypotension (Post Op), off pressors but remains borderline    -  H/O Hypertension   Valvular Heart Disease- MR Status Post Bio MVR (#29 mm Mosaic Porcine) (8.22.22)  Ischemic Cardiomyopathy EF 40% Status Post ICD ([employer])  NSVT    - History of VT    - On Amiodarone  Outpatient (Start Amiodarone gtt 8.28.22)  Hyperlipidemia  Chronic Venous Insufficiency  Elevated BMI/Obesity  COPD  Former Smoker    - Quit 3 Years Ago  Anemia  IRENE  Leukocytosis (Resolved)  Elevated LFT(S) (Mild)  Sore Throat/Dysphagia ? Esophagitis    Plan:   PLAN:  Start Amiodarone IV with Bolus (Per Protocol) Re: NSVT (Unable to Tolerate Oral Medications)  Hold Oral Amiodarone (Home Dose)  Mobilize as Able & Continue Regular IS Usage  Follow up Echo Results  Continue Supportive Care    KIMBERLI Pratt  Cardiology  [facility] - [facility] ICU  08/28/2022

## 2022-08-28 NOTE — H&P
Critical Care Medicine History and Physical Note   [facility] - [facility]      Patient Name: Zackery Osullivan Jr.  MRN: [id number]  Admission Date: 8/22/2022  Hospital Length of Stay: 6 days  Code Status: Full Code  Attending Provider: Rowdy Davis IV, MD  Primary Care Provider: Isaiah Meeks MD   Principal Problem: <principal problem not specified>      HPI:  77-year-old  male with past medical history of CAD, HTN, HLD, and HFrEF who presented to [facility] for a CABG x2 and mitral valve replacement. Received 2 units pRBCs intraop. Initially admitted to ICU for post-op monitoring.  Patient transferred out of ICU once extubated and hemodynamically stable off vasopressors on 8/25/22. On 8/27/22, patient reporting throat pain, nausea and constipation. Unable to keep down food without non bloody vomitus. Unable to tolerate PO meds. GI consulted. During this time, patient also having low/normal blood pressures. Had 17 beat run of V-tach evening of 8/27/22 in which amiodarone IV started since patient unable to tolerate PO medications. Morning of 8/28/22, MAPs <65 despite fluid resuscitation. Patient to be upgraded to ICU 8/28/22 due to need for vasopressor support and close monitoring.       Past Medical History:   Diagnosis Date    Arthritis     spine    CAD (coronary artery disease)     COPD (chronic obstructive pulmonary disease)     HLD (hyperlipidemia)     HTN (hypertension)     Ischemic cardiomyopathy     Mitral regurgitation     HEATHER on CPAP     Stroke     Venous insufficiency     Ventricular tachycardia          Past Surgical History:   Procedure Laterality Date    bilateral inguinal stents      CARDIAC DEFIBRILLATOR PLACEMENT Left     CATARACT EXTRACTION Bilateral     CATHETERIZATION OF BOTH LEFT AND RIGHT HEART  08/02/2022    Diagnostic Only; Dr. Gerardo    COLONOSCOPY      CORONARY ARTERY BYPASS GRAFT (CABG) N/A 8/22/2022    Procedure: CORONARY ARTERY BYPASS GRAFT (CABG);   Surgeon: Rowdy Davis IV, MD;  Location: [facility] OR;  Service: Cardiovascular;  Laterality: N/A;  possible MVR & LLAA  //  ECHO NOTIFIED    LEFT HEART CATHETERIZATION Left 2022    Procedure: CATHETERIZATION, HEART, LEFT;  Surgeon: Reinaldo Gerardo MD;  Location: [facility] CATH LAB;  Service: Cardiology;  Laterality: Left;  LHC +/- PCI    MITRAL VALVE REPLACEMENT N/A 2022    Procedure: REPLACEMENT, MITRAL VALVE;  Surgeon: Rowdy Davis IV, MD;  Location: [facility] OR;  Service: Cardiovascular;  Laterality: N/A;  possible MVR and LLAA    REMOVAL OF IMPLANTED CARDIOVERTER-DEFIBRILLATOR (ICD)      TONSILLECTOMY           Social History     Socioeconomic History    Marital status:    Tobacco Use    Smoking status: Former     Years: 55.00     Types: Cigarettes     Start date:      Quit date:      Years since quittin.6    Smokeless tobacco: Never   Substance and Sexual Activity    Alcohol use: Yes     Alcohol/week: 3.0 standard drinks     Types: 3 Glasses of wine per week    Drug use: Never         History reviewed. No pertinent family history.      Drug Allergies:   Review of patient's allergies indicates:  No Known Allergies      Current Infusions:   sodium chloride 0.45% 75 mL/hr at 22 0802    0.45% NaCl with KCl 20 mEq infusion      amiodarone in dextrose 5% 1 mg/min (22 1039)    amiodarone in dextrose 5%      EPINEPHrine 0 mcg/kg/min (22 0500)    insulin regular 1 units/mL infusion orderable (CTS POST-OP) Stopped (22 1400)    NORepinephrine bitartrate-D5W           Scheduled Medications:     albuterol-ipratropium  3 mL Nebulization Q6H    aspirin  81 mg Oral Daily    atorvastatin  80 mg Oral QHS    budesonide  0.5 mg Nebulization Q12H    docusate sodium  100 mg Oral BID    enoxaparin  40 mg Subcutaneous Daily    folic acid  1 mg Oral Daily    loperamide  4 mg Oral Once    mupirocin   Nasal BID    pantoprazole  40 mg Intravenous BID    polyethylene glycol  17 g Oral  Daily    sodium chloride 0.9%  10 mL Intravenous Q6H         PRN Medications:   sodium chloride, acetaminophen, calcium gluconate IVPB, calcium gluconate IVPB, dextrose 10%, dextrose 50%, HYDROcodone-acetaminophen, magnesium sulfate IVPB, magnesium sulfate IVPB, metoclopramide HCl, morphine, ondansetron, oxyCODONE, potassium chloride in water, potassium chloride in water, potassium chloride in water, Flushing PICC Protocol **AND** sodium chloride 0.9% **AND** sodium chloride 0.9%, sodium phosphate IVPB, sodium phosphate IVPB, sodium phosphate IVPB      Review of Systems   Constitutional:  Negative for fever.   Respiratory:  Positive for wheezing. Negative for shortness of breath.    Cardiovascular:  Negative for chest pain and palpitations.   Gastrointestinal:  Positive for abdominal pain, constipation, nausea and vomiting.   Neurological:  Negative for weakness.       Vital Signs:    Vitals:    08/28/22 0915   BP:    Pulse: 84   Resp: 15   Temp:          Fluid Balance:     Intake/Output Summary (Last 24 hours) at 8/28/2022 1124  Last data filed at 8/28/2022 0300  Gross per 24 hour   Intake --   Output 1650 ml   Net -1650 ml         Physical Exam  Constitutional:       General: He is not in acute distress.     Appearance: He is not ill-appearing.   Neck:      Vascular: No JVD.   Cardiovascular:      Rate and Rhythm: Normal rate and regular rhythm.      Pulses:           Radial pulses are 2+ on the right side and 2+ on the left side.      Heart sounds: Normal heart sounds.   Pulmonary:      Effort: Pulmonary effort is normal.      Breath sounds: Wheezing present.      Comments: On 1L nasal canula  Chest:      Comments: Midline sternotomy incision clean, dry and intact   Abdominal:      General: Abdomen is protuberant. Bowel sounds are normal. There is distension.      Palpations: Abdomen is soft.      Tenderness: There is generalized abdominal tenderness.   Musculoskeletal:      Right lower leg: No edema.      Left  lower leg: No edema.   Skin:     General: Skin is cool.      Capillary Refill: Capillary refill takes less than 2 seconds.   Neurological:      Mental Status: He is alert.       Laboratory Studies:     No results for input(s): PH, PCO2, PO2, HCO3, POCSATURATED, BE in the last 24 hours.    No results for input(s): WBC, RBC, HGB, HCT, PLT, MCV, MCH, MCHC in the last 24 hours.    Recent Labs   Lab 08/27/22  1623 08/28/22  0850   GLUCOSE 75* 93    139   K 4.7 4.6   CO2 23 21*   BUN 37.9* 44.0*   CREATININE 1.03 1.42*   MG 2.50  --          Microbiology Data:   Microbiology Results (last 7 days)       ** No results found for the last 168 hours. **            Imaging reviewed:  Echo  · The left ventricle is mildly enlarged with concentric hypertrophy and   mildly reduced LV systolic function.  · The estimated ejection fraction is 40%.  · Indeterminate left ventricular diastolic function due to MVR.  · Aneurysmal basal inferior wall/ RCA territory  · Well seated bioprosthetic MV without significant stenosis or   perivalvular leak. MG 4 mmHg.  · Mild to moderate tricuspid regurgitation.  · Mild left atrial enlargement.     X-Ray Chest 1 View  Narrative: EXAMINATION:  XR CHEST 1 VIEW    CPT 43609    CLINICAL HISTORY:  post-op;    COMPARISON:  August 26, 2022    FINDINGS:  Cardiomediastinal silhouette and pleuroparenchymal changes are essentially unchanged as compared with the previous exam  Impression: No significant change    Electronically signed by: Rodney Brice  Date:    08/28/2022  Time:    10:16  X-Ray Abdomen AP 1 View  Narrative: EXAMINATION:  XR ABDOMEN AP 1 VIEW    CLINICAL HISTORY:  Nausea/vomiting;    TECHNIQUE:  AP X-RAY OF THE ABDOMEN:    CPT 10603    FINDINGS:  There is evidence of dilated loops of small bowel distributed in a stepladder fashion indicative of a small bowel obstruction clinical correlation and or other imaging modalities might prove helpful for further evaluation  Impression: Changes  suggestive of small-bowel obstruction    Electronically signed by: Rodney Brice  Date:    08/28/2022  Time:    10:07        Assessment and Plan:    Assessment:  Hypovolemic shock 2/2 persistent vomiting and minimal PO intake  Post op ileus   NSVT  CAD s/p CABG x2 to LAD and obtuse marginal  MVR s/p porcine replacement   COPD   HTN   HLD    Plan:  - upgrade to ICU for vasopressor support  - on Levophed, titrate for MAP goal >65  - continue post-op care per CV Surgery/ Cardiology  - Amiodarone gtt; patient not tolerating PO meds at this time   - GI on board; plan for CT Abdomen today, PPI and docusate  - NPO  - DVT ppx with lovenox  - GI ppx with Protonix  - Keep HOB elevated > 30*      Evelyn Dobson MD  8/28/2022  [employer] FM Resident, [facility]  Pulmonology/Critical Care

## 2022-08-28 NOTE — CONSULTS
Trauma/Acute Care Surgery  Inpatient Consult     Date of Admission: 8/22/22  Date of Consult: 8/28/22  Consulting Service: CTS  Reason for Consult: Ileus vs SBO     Subjective:     HPI: Zackery Osullivan is a 76 yo M who underwent 2v CABG + MVR on 8/22/22. Post-operatively, he developed epigastric discomfort, hiccups, and belching. This progressed to vomiting. The patient also has not had a BM since surgery. GI was consulted feels that these symptoms are due to an exacerbation of baseline GERD. They started the patient on a PPI. Surgery has been consulted for evaluation due to imaging findings c/w SBO.     PMH: HTN, HLD, CAD, ischemic CM, MR, CVA, COPD, HEATHER, spinal arthritis   PSH: CABG, MVR, defibrillator implantation and removal, tonsillectomy, cataract extraction  Home Meds: Toprol, Entresto, Aldactone, Lasix, Lipitor, ezetimide, ASA, Plavix, amiodarone, inhalers  Allergies: NKDA   Social Hx: Former smoker (55 pack year hx); 3 EtOH drinks/week (wine)  Relevant Family Hx: None      Objective:     Vitals:  BP: 94-56   Pulse: 84   Resp: 15   Temp: 98.1 °F (36.7 °C)      Physical Exam:  Gen: NAD  Neuro: awake, alert, answering questions appropriately  CV: RRR  Resp: non-labored breathing, stable on 1L O2 via NC  Abd: soft, ND, NT, +BS  : deferred  Ext: moves all 4 spontaneously and purposefully  Skin: warm, well perfused     Labs:  WBC  Hgb  Plt   Cr 1.42  K 4.6  TBili 1.7  AST/ALT 47/53  Lipase 19  Glucose 93  Lactate 1.2     Imaging:  KUB: Dilated loops of small bowel in stepladder fashion, indicative of small bowel obstruction.   RUQ US: No gallstones. No pericholecystic fluid. 3.2 mm GB wall. 2.2 mm CBD. Liver and pancreas unremarkable.   CT A/P: No radiology read, but stomach and proximal small bowel appear dilated w/ transition point to decompressed small bowel in LLQ.     Assessment/Plan:  Zackery Osullivan is a 76 yo M s/p recent CABG + MVR. Developed post-op epigastric pain, hiccups, and belching that  progressed to vomiting. XR and CT showing SBO.     - Etiology of SBO unclear. No h/o abdominal surgeries.   - Keep NPO. Needs NGT.  - IVF resuscitation.   - Replace e-lytes w/ goal K>4, Ph >3, Mg >2.  - TBili and LFTs mildly elevated, but RUQ US WNL. Continue to trend CMPs.      Alexis Scheuermann, MD   [employer] General Surgery, PGY4  08/28/2022 1:00 PM

## 2022-08-28 NOTE — PROGRESS NOTES
"Gastroenterology Progress Note    Subjective/Interval History:  HPI:     77-year-old man with recent CABG and mitral valve repair who started having some epigastric discomfort along with lower chest discomfort along with hiccups and belching.  He then proceeded to have vomiting.  Now his throat hurts.  He did have some mild dysphagia prior to all of this.  He used to take Tums p.r.n..  He was not on a PPI.  He denies any prior EGDs.  He thinks he had a colonoscopy many years ago.    8-28-22  Pt sitting up in chair  No bm x 4 days  Still some mild nausea  No pain    ROS:  Cardiovascular:  Negative for chest pain.   Respiratory:  Negative for shortness of breath.    Gastrointestinal:         Sore Throat< Constipation, nauseaaaaaaaaa    Vital Signs:  BP 97/65   Pulse 81   Temp 98 °F (36.7 °C) (Oral)   Resp 16   Ht 5' 9" (1.753 m)   Wt 90.4 kg (199 lb 4.7 oz)   SpO2 95%   BMI 29.43 kg/m²   Body mass index is 29.43 kg/m².    Physical Exam:  Constitutional:  Negative for fever, malaise/fatigue and weight loss.   Respiratory:  Negative for cough and shortness of breath.    Cardiovascular:  Negative for chest pain (this is incisional pain), palpitations and leg swelling.   Gastrointestinal:  Negative for abdominal pain, blood in stool, constipation, diarrhea, heartburn, melena, nausea and vomiting.   Musculoskeletal:  Negative for back pain and myalgias.   Skin:  Negative for rash.   Neurological:  Negative for speech change and focal weakness.   All other systems reviewed and are negative.    Labs:  Recent Results (from the past 48 hour(s))   CBC with Differential    Collection Time: 08/27/22 12:00 AM   Result Value Ref Range    WBC 10.3 4.5 - 11.5 x10(3)/mcL    RBC 3.43 (L) 4.70 - 6.10 x10(6)/mcL    Hgb 10.2 (L) 14.0 - 18.0 gm/dL    Hct 32.6 (L) 42.0 - 52.0 %    MCV 95.0 (H) 80.0 - 94.0 fL    MCH 29.7 27.0 - 31.0 pg    MCHC 31.3 (L) 33.0 - 36.0 mg/dL    RDW 17.0 11.5 - 17.0 %    Platelet 151 130 - 400 x10(3)/mcL    " MPV 11.6 (H) 7.4 - 10.4 fL    IG# 0.15 (H) 0 - 0.04 x10(3)/mcL    IG% 1.5 %    NRBC% 0.3 %   Manual Differential    Collection Time: 08/27/22 12:00 AM   Result Value Ref Range    Neut Man 84 %    Lymph Man 4 %    Monocyte Man 13 %    Instr WBC 10 x10(3)/mcL    Abs Mono 1.3 0.1 - 1.3 x10(3)/mcL    Abs Lymp 0.4 (L) 0.6 - 4.6 x10(3)/mcL    Abs Neut 8.4 2.1 - 9.2 x10(3)/mcL    RBC Morph Abnormal (A) Normal    Macrocyte 1+ (A) (none)    Platelet Est Adequate Normal, Adequate   Comprehensive Metabolic Panel    Collection Time: 08/27/22  1:20 AM   Result Value Ref Range    Sodium Level 136 136 - 145 mmol/L    Potassium Level 4.3 3.5 - 5.1 mmol/L    Chloride 104 98 - 107 mmol/L    Carbon Dioxide 21 (L) 23 - 31 mmol/L    Glucose Level 72 (L) 82 - 115 mg/dL    Blood Urea Nitrogen 35.2 (H) 8.4 - 25.7 mg/dL    Creatinine 1.10 0.73 - 1.18 mg/dL    Calcium Level Total 8.7 (L) 8.8 - 10.0 mg/dL    Protein Total 5.3 (L) 5.8 - 7.6 gm/dL    Albumin Level 2.8 (L) 3.4 - 4.8 gm/dL    Globulin 2.5 2.4 - 3.5 gm/dL    Albumin/Globulin Ratio 1.1 1.1 - 2.0 ratio    Bilirubin Total 1.6 (H) <=1.5 mg/dL    Alkaline Phosphatase 71 40 - 150 unit/L    Alanine Aminotransferase 52 0 - 55 unit/L    Aspartate Aminotransferase 56 (H) 5 - 34 unit/L    eGFR >60 mls/min/1.73/m2   Basic Metabolic Panel    Collection Time: 08/27/22  4:23 PM   Result Value Ref Range    Sodium Level 139 136 - 145 mmol/L    Potassium Level 4.7 3.5 - 5.1 mmol/L    Chloride 104 98 - 107 mmol/L    Carbon Dioxide 23 23 - 31 mmol/L    Glucose Level 75 (L) 82 - 115 mg/dL    Blood Urea Nitrogen 37.9 (H) 8.4 - 25.7 mg/dL    Creatinine 1.03 0.73 - 1.18 mg/dL    BUN/Creatinine Ratio 37     Calcium Level Total 8.4 (L) 8.8 - 10.0 mg/dL    Anion Gap 12.0 mEq/L    eGFR >60 mls/min/1.73/m2   Magnesium    Collection Time: 08/27/22  4:23 PM   Result Value Ref Range    Magnesium Level 2.50 1.60 - 2.60 mg/dL   POCT Glucose, Hand-Held Device    Collection Time: 08/27/22 10:00 PM   Result Value Ref  Range    POC Glucose 73 70 - 110 MG/DL   POCT glucose    Collection Time: 08/28/22  4:12 AM   Result Value Ref Range    POCT Glucose 82 70 - 110 mg/dL         Assessment/Plan:     1. Recent CABG with mitral valve repair done on August 22, 2022   2. GERD/dysphagia/esophagitis-----we will discontinue the Pepcid and try PPI b.i.d..  Patient unfortunately cannot tolerate the Carafate.  Is very unfortunate that we only have the pills available and he cannot swallow them..  Since [facility] has taken over, the liquid Carafate is no longer available which is quite unfortunate.  3. Constipation - will start stool softner      We will plan on getting esophagram while he is here.  More than likely, he has some baseline reflux that just exacerbated post surgically.  We would like to hold off ideally on EGD given his recent CABG and valve repair.  Hopefully he will start to improve with b.i.d. PPI.  Will start stool softner for constipation.    If no improvement will re-evaluate need for endoscopy     Velia Shelton NP acting as scribe for Dr. Isaiah Levine

## 2022-08-28 NOTE — PROGRESS NOTES
Postoperative day 6    The patient had several episodes of vomiting since last night.  Hemodynamics have been stable.   The last laboratory data indicates prerenal azotemia.  Hematocrit is stable.  Chest x-ray satisfactory  KUB indicates gas in large and small bowel    Heart-regular rate and rhythm  Lungs-clear  Abdomen-slightly distended and quite tympanic.  Bowel sounds are diminished.  No peritoneal signs.  No rebound    Impression-postop ileus with mild dehydration  Plan-aggressive hydration.  NPO.  Nasogastric tube if any further vomiting.

## 2022-08-29 NOTE — PROGRESS NOTES
"Gastroenterology Progress Note    Subjective/Interval History:  NG placed yesterday with 2.2L output.  Another 1L output so far on current shift.  Denies n/v, abdominal pain.  No flatus or BMs.  Remains on low dose levophed.      ROS:  Review of Systems   Constitutional:  Negative for fever, malaise/fatigue and weight loss.   Respiratory:  Negative for cough and shortness of breath.    Cardiovascular:  Negative for chest pain, palpitations and leg swelling.   Gastrointestinal:  Positive for constipation. Negative for abdominal pain, blood in stool, diarrhea, heartburn, melena, nausea and vomiting.   Musculoskeletal:  Negative for back pain and myalgias.   Skin:  Negative for rash.   Neurological:  Negative for speech change and focal weakness.   All other systems reviewed and are negative.    Vital Signs:  BP (!) 82/49   Pulse 81   Temp 97.3 °F (36.3 °C)   Resp (!) 24   Ht 5' 8.9" (1.75 m)   Wt 90 kg (198 lb 6.6 oz)   SpO2 98%   BMI 29.39 kg/m²   Body mass index is 29.39 kg/m².    Physical Exam:  Physical Exam  Constitutional:       General: He is not in acute distress.     Appearance: He is not ill-appearing.      Comments: OOB in chair.    HENT:      Head: Normocephalic and atraumatic.      Nose:      Comments: NG in place to LIS  Eyes:      General: No scleral icterus.     Extraocular Movements: Extraocular movements intact.   Cardiovascular:      Rate and Rhythm: Normal rate and regular rhythm.      Comments: Midline incision   Pulmonary:      Effort: Pulmonary effort is normal. No respiratory distress.      Comments: O2 NC in place  Abdominal:      General: Bowel sounds are decreased. There is distension (mild).      Palpations: Abdomen is soft. There is no mass.      Tenderness: There is no abdominal tenderness. There is no guarding or rebound.   Musculoskeletal:      Right lower leg: No edema.      Left lower leg: No edema.   Skin:     General: Skin is warm and dry.      Coloration: Skin is not " jaundiced.   Neurological:      Mental Status: He is alert and oriented to person, place, and time.   Psychiatric:         Mood and Affect: Mood normal.         Behavior: Behavior normal.       Labs:  Recent Results (from the past 48 hour(s))   Basic Metabolic Panel    Collection Time: 08/27/22  4:23 PM   Result Value Ref Range    Sodium Level 139 136 - 145 mmol/L    Potassium Level 4.7 3.5 - 5.1 mmol/L    Chloride 104 98 - 107 mmol/L    Carbon Dioxide 23 23 - 31 mmol/L    Glucose Level 75 (L) 82 - 115 mg/dL    Blood Urea Nitrogen 37.9 (H) 8.4 - 25.7 mg/dL    Creatinine 1.03 0.73 - 1.18 mg/dL    BUN/Creatinine Ratio 37     Calcium Level Total 8.4 (L) 8.8 - 10.0 mg/dL    Anion Gap 12.0 mEq/L    eGFR >60 mls/min/1.73/m2   Magnesium    Collection Time: 08/27/22  4:23 PM   Result Value Ref Range    Magnesium Level 2.50 1.60 - 2.60 mg/dL   POCT glucose    Collection Time: 08/27/22  9:42 PM   Result Value Ref Range    POCT Glucose 73 70 - 110 mg/dL   POCT Glucose, Hand-Held Device    Collection Time: 08/27/22 10:00 PM   Result Value Ref Range    POC Glucose 73 70 - 110 MG/DL   POCT glucose    Collection Time: 08/28/22  4:12 AM   Result Value Ref Range    POCT Glucose 82 70 - 110 mg/dL   Lactic Acid, Plasma    Collection Time: 08/28/22  8:50 AM   Result Value Ref Range    Lactic Acid Level 1.2 0.5 - 2.2 mmol/L   Comprehensive Metabolic Panel    Collection Time: 08/28/22  8:50 AM   Result Value Ref Range    Sodium Level 139 136 - 145 mmol/L    Potassium Level 4.6 3.5 - 5.1 mmol/L    Chloride 107 98 - 107 mmol/L    Carbon Dioxide 21 (L) 23 - 31 mmol/L    Glucose Level 93 82 - 115 mg/dL    Blood Urea Nitrogen 44.0 (H) 8.4 - 25.7 mg/dL    Creatinine 1.42 (H) 0.73 - 1.18 mg/dL    Calcium Level Total 8.6 (L) 8.8 - 10.0 mg/dL    Protein Total 5.2 (L) 5.8 - 7.6 gm/dL    Albumin Level 2.6 (L) 3.4 - 4.8 gm/dL    Globulin 2.6 2.4 - 3.5 gm/dL    Albumin/Globulin Ratio 1.0 (L) 1.1 - 2.0 ratio    Bilirubin Total 1.7 (H) <=1.5 mg/dL     Alkaline Phosphatase 70 40 - 150 unit/L    Alanine Aminotransferase 53 0 - 55 unit/L    Aspartate Aminotransferase 47 (H) 5 - 34 unit/L    eGFR 52 mls/min/1.73/m2   Amylase    Collection Time: 08/28/22  8:50 AM   Result Value Ref Range    Amylase Level 22 20 - 160 unit/L   Lipase    Collection Time: 08/28/22  8:50 AM   Result Value Ref Range    Lipase Level 19 <=60 U/L   Echo    Collection Time: 08/28/22 11:03 AM   Result Value Ref Range    BSA 2.09 m2    TDI SEPTAL 0.06 m/s    LV LATERAL E/E' RATIO 21.50 m/s    LV SEPTAL E/E' RATIO 21.50 m/s    EF 40 %    Left Ventricular Outflow Tract Mean Velocity 0.78 cm/s    Left Ventricular Outflow Tract Mean Gradient 3.00 mmHg    TDI LATERAL 0.06 m/s    LVIDd 5.64 3.5 - 6.0 cm    IVS 1.20 (A) 0.6 - 1.1 cm    Posterior Wall 1.37 (A) 0.6 - 1.1 cm    LVIDs 4.68 (A) 2.1 - 4.0 cm    FS 17 28 - 44 %    LV mass 311.75 g    LA size 4.10 cm    RVDD 3.79 cm    TAPSE 1.55 cm    Left Ventricle Relative Wall Thickness 0.49 cm    AV mean gradient 3 mmHg    AV valve area 3.84 cm2    AV Velocity Ratio 1.06     AV index (prosthetic) 1.11     MV mean gradient 4 mmHg    MV valve area by continuity eq 1.51 cm2    E/A ratio 0.83     Mean e' 0.06 m/s    E wave deceleration time 330.00 msec    LVOT diameter 2.10 cm    LVOT area 3.5 cm2    LVOT peak bear 1.30 m/s    LVOT peak VTI 17.40 cm    Ao peak bear 1.23 m/s    Ao VTI 15.7 cm    LVOT stroke volume 60.24 cm3    AV peak gradient 6 mmHg    MV peak gradient 9 mmHg    E/E' ratio 21.50 m/s    MV Peak E Bear 1.29 m/s    TR Max Bear 2.87 m/s    MV VTI 39.9 cm    MV Peak A Bear 1.56 m/s    LV Systolic Volume 101.00 mL    LV Systolic Volume Index 49.0 mL/m2    LV Diastolic Volume 156.00 mL    LV Diastolic Volume Index 75.73 mL/m2    LV Mass Index 151 g/m2    Triscuspid Valve Regurgitation Peak Gradient 33 mmHg    LA Volume Index (Mod) 21.6 mL/m2    LA volume (mod) 44.40 cm3    Mitral Valve Heart Rate 82 bpm   CBC with Differential    Collection Time: 08/29/22   1:43 AM   Result Value Ref Range    WBC 8.0 4.5 - 11.5 x10(3)/mcL    RBC 1.50 (L) 4.70 - 6.10 x10(6)/mcL    Hgb 4.6 (LL) 14.0 - 18.0 gm/dL    Hct 16.7 (LL) 42.0 - 52.0 %    .3 (H) 80.0 - 94.0 fL    MCH 30.7 27.0 - 31.0 pg    MCHC 27.5 (L) 33.0 - 36.0 mg/dL    RDW 17.0 11.5 - 17.0 %    Platelet 64 (L) 130 - 400 x10(3)/mcL    MPV 11.0 (H) 7.4 - 10.4 fL    IG# 0.40 (H) 0 - 0.04 x10(3)/mcL    IG% 5.0 %    NRBC% 0.2 %   Manual Differential    Collection Time: 08/29/22  1:43 AM   Result Value Ref Range    Neut Man 91 %    Lymph Man 2 %    Monocyte Man 7 %    Instr WBC 8 x10(3)/mcL    Abs Mono 0.56 0.1 - 1.3 x10(3)/mcL    Abs Lymp 0.16 (L) 0.6 - 4.6 x10(3)/mcL    Abs Neut 7.28 2.1 - 9.2 x10(3)/mcL    RBC Morph Abnormal (A) Normal    Poik 2+ (A) (none)    Target Cell 1+ (A) (none)    Stomatocytes 1+ (A) (none)    Platelet Est Decreased (A) Normal, Adequate   Basic Metabolic Panel    Collection Time: 08/29/22  3:08 AM   Result Value Ref Range    Sodium Level 135 (L) 136 - 145 mmol/L    Potassium Level 4.6 3.5 - 5.1 mmol/L    Chloride 104 98 - 107 mmol/L    Carbon Dioxide 22 (L) 23 - 31 mmol/L    Glucose Level 101 82 - 115 mg/dL    Blood Urea Nitrogen 39.1 (H) 8.4 - 25.7 mg/dL    Creatinine 1.06 0.73 - 1.18 mg/dL    BUN/Creatinine Ratio 37     Calcium Level Total 8.1 (L) 8.8 - 10.0 mg/dL    Anion Gap 9.0 mEq/L    eGFR >60 mls/min/1.73/m2   Hemoglobin and Hematocrit    Collection Time: 08/29/22  3:08 AM   Result Value Ref Range    Hgb 10.1 (L) 14.0 - 18.0 gm/dL    Hct 32.4 (L) 42.0 - 52.0 %   POCT glucose    Collection Time: 08/29/22  9:14 AM   Result Value Ref Range    POCT Glucose 87 70 - 110 mg/dL         Assessment/Plan:  77-year-old male with a PMH of the assisting cecum ischemic cardiomyopathy, mitral regurgitation, HTN, HLD, chronic venous insufficiency, COPD, HEATHER, in SVT, obesity.  Admitted after CABG, MVR, and LISA ligation.  GI consulted for dysphagia, odynophagia, n/v, eigastric pain.     XR abd 8/28: dilated  loops of small bowel distributed in a stepladder fashion indicative of a small bowel obstruction   CT abd/pelv 8/28/22: prominent dilatation of numerous proximal through mid small bowel loops with gradual transition to nondilated distal caliber suggestive of moderate to severe ileus, with partial obstruction not entirely excluded.    Ileus vs pSBO  - Continue NG to LIS.  Monitor output  - Surgery on board. Rec conservative measures at this time.   - Will need gastrograffin SBS once NG output slows.   - Monitor for flatus/BMs.  - K nml.  Check Mag/phos.  Replete electrolytes as needed.        Laya Maravilla PA-C    Patient seen and examine. Agree with above.  Continue NG.  Appreciate surgery input.  Will continue to follow.     UGO Roa MD

## 2022-08-29 NOTE — PROGRESS NOTES
[facility] - [facility] ICU  Pulmonary Critical Care Note    Patient Name: Zackery Osullivan Jr.  MRN: [id number]  Admission Date: 8/22/2022  Hospital Length of Stay: 7 days  Code Status: Full Code  Attending Provider: Rowdy Davis IV, MD  Primary Care Provider: Isaiah Meeks MD     Subjective:     HPI: 77-year-old  male with past medical history of CAD, HTN, HLD, and HFrEF who presented to [facility] for a CABG x2 and mitral valve replacement. Received 2 units pRBCs intraop. Initially admitted to ICU for post-op monitoring.  Patient transferred out of ICU once extubated and hemodynamically stable off vasopressors on 8/25/22. On 8/27/22, patient reporting throat pain, nausea and constipation. Unable to keep down food without non bloody vomitus. Unable to tolerate PO meds. GI consulted. During this time, patient also having low/normal blood pressures. Had 17 beat run of V-tach evening of 8/27/22 in which amiodarone IV started since patient unable to tolerate PO medications. Morning of 8/28/22, MAPs <65 despite fluid resuscitation. Patient to be upgraded to ICU 8/28/22 due to need for vasopressor support and close monitoring.     24 Hour Interval History: Overnight was uneventful. He states having abdominal pain of 4 out of 10; it is intermittent, dull, achy, and localized; nothing makes it better or worse. H/H this AM remain stable; RBC 1.50 and PLT of 64k.     Past Medical History:   Diagnosis Date    Arthritis     spine    CAD (coronary artery disease)     COPD (chronic obstructive pulmonary disease)     HLD (hyperlipidemia)     HTN (hypertension)     Ischemic cardiomyopathy     Mitral regurgitation     HEATHER on CPAP     Stroke     Venous insufficiency     Ventricular tachycardia        Past Surgical History:   Procedure Laterality Date    bilateral inguinal stents      CARDIAC DEFIBRILLATOR PLACEMENT Left     CATARACT EXTRACTION Bilateral     CATHETERIZATION OF BOTH LEFT AND RIGHT HEART   2022    Diagnostic Only; Dr. Gerardo    COLONOSCOPY      CORONARY ARTERY BYPASS GRAFT (CABG) N/A 2022    Procedure: CORONARY ARTERY BYPASS GRAFT (CABG);  Surgeon: Rowdy Davis IV, MD;  Location: [facility] OR;  Service: Cardiovascular;  Laterality: N/A;  possible MVR & LLAA  //  ECHO NOTIFIED    LEFT HEART CATHETERIZATION Left 2022    Procedure: CATHETERIZATION, HEART, LEFT;  Surgeon: Reinaldo Gerardo MD;  Location: [facility] CATH LAB;  Service: Cardiology;  Laterality: Left;  LHC +/- PCI    MITRAL VALVE REPLACEMENT N/A 2022    Procedure: REPLACEMENT, MITRAL VALVE;  Surgeon: Rowdy Davis IV, MD;  Location: [facility] OR;  Service: Cardiovascular;  Laterality: N/A;  possible MVR and LLAA    REMOVAL OF IMPLANTED CARDIOVERTER-DEFIBRILLATOR (ICD)      TONSILLECTOMY         Social History     Socioeconomic History    Marital status:    Tobacco Use    Smoking status: Former     Years: 55.00     Types: Cigarettes     Start date:      Quit date:      Years since quittin.6    Smokeless tobacco: Never   Substance and Sexual Activity    Alcohol use: Yes     Alcohol/week: 3.0 standard drinks     Types: 3 Glasses of wine per week    Drug use: Never           Current Outpatient Medications   Medication Instructions    amiodarone (PACERONE) 200 mg, Oral, 2 times daily    aspirin (ECOTRIN) 81 mg, Oral, Nightly    atorvastatin (LIPITOR) 80 mg, Oral, Nightly    clopidogreL (PLAVIX) 75 mg, Oral, Nightly    ezetimibe (ZETIA) 10 mg, Oral, Nightly    fluticasone propionate (FLONASE) 50 mcg/actuation nasal spray 1 spray, Each Nostril, Daily PRN    furosemide (LASIX) 20 mg, Oral, Daily    metoprolol succinate (TOPROL-XL) 25 MG 24 hr tablet 1 tablet, Oral, Daily    mometasone (ASMANEX TWISTHALER) 220 mcg/ actuation (30) inhaler 2 puffs, Inhalation, Daily, Controller    sacubitriL-valsartan (ENTRESTO) 49-51 mg per tablet 0.5 tablets, Oral, 2 times daily    spironolactone (ALDACTONE) 12.5 mg, Oral, Daily     umeclidinium-vilanteroL (ANORO ELLIPTA) 62.5-25 mcg/actuation DsDv 1 puff, Inhalation, Daily, Controller       Current Inpatient Medications   albuterol-ipratropium  3 mL Nebulization Q6H    aspirin  81 mg Oral Daily    atorvastatin  80 mg Oral QHS    budesonide  0.5 mg Nebulization Q12H    docusate sodium  100 mg Oral BID    enoxaparin  40 mg Subcutaneous Daily    folic acid  1 mg Oral Daily    loperamide  4 mg Oral Once    metoclopramide HCl  5 mg Intravenous Q6H    mupirocin   Nasal BID    pantoprazole  40 mg Intravenous BID    polyethylene glycol  17 g Oral Daily    sodium chloride 0.9%  10 mL Intravenous Q6H    sodium chloride 0.9%  10 mL Intravenous Q6H       Current Intravenous Infusions   sodium chloride 0.45% 75 mL/hr at 08/28/22 0802    0.45% NaCl with KCl 20 mEq infusion 125 mL/hr (08/29/22 0009)    amiodarone in dextrose 5% 0.5 mg/min (08/29/22 0247)    EPINEPHrine 0 mcg/kg/min (08/25/22 0500)    insulin regular 1 units/mL infusion orderable (CTS POST-OP) Stopped (08/23/22 1400)    NORepinephrine bitartrate-D5W 0.08 mcg/kg/min (08/29/22 0300)         Review of Systems   Constitutional:  Negative for chills and diaphoresis.   Respiratory:  Positive for shortness of breath and wheezing. Negative for cough.    Cardiovascular:  Negative for chest pain and palpitations.   Gastrointestinal:  Positive for abdominal pain.        Objective:       Intake/Output Summary (Last 24 hours) at 8/29/2022 0301  Last data filed at 8/28/2022 2000  Gross per 24 hour   Intake 882.17 ml   Output 1600 ml   Net -717.83 ml     Vital Signs (Most Recent):  Temp: 99 °F (37.2 °C) (08/29/22 0000)  Pulse: 78 (08/29/22 0245)  Resp: 19 (08/29/22 0245)  BP: (!) 113/55 (08/29/22 0245)  SpO2: (!) 94 % (08/29/22 0245)    Body mass index is 29.39 kg/m².  Weight: 90 kg (198 lb 6.6 oz) Vital Signs (24h Range):  Temp:  [98 °F (36.7 °C)-99 °F (37.2 °C)] 99 °F (37.2 °C)  Pulse:  [] 78  Resp:  [15-24] 19  SpO2:  [84 %-100 %] 94 %  BP:  ()/() 113/55     Physical Exam  General: Well nourished w/ mild distress  HEENT: NC/AT; PERRL; nasal and oral mucosa moist and clear  Neck: Full ROM; no lymphadenopathy  Pulm: Audible wheezing, coarse crackles in left lower lobe, normal work of breathing on 5L/min via NC  CV: S1, S2 w/o murmurs or gallops; 1+ edema in lower extremities  GI: Abdomen distended with tenderness; bowel sound present  MSK: Full ROM of all extremities  Derm: Midline chest incision clean and intact w/o signs of infection  Neuro: AAOx4; motor/sensory function intact      Lines/Drains/Airways       Peripherally Inserted Central Catheter Line  Duration             PICC Triple Lumen 08/28/22 1516 right brachial <1 day              Drain  Duration                  NG/OG Tube 08/28/22 1501 16 Fr. Right nostril <1 day                    Significant Labs:    Lab Results   Component Value Date    WBC 8.0 08/29/2022    HGB 4.6 (LL) 08/29/2022    HCT 16.7 (LL) 08/29/2022    .3 (H) 08/29/2022    PLT 64 (L) 08/29/2022         BMP  Lab Results   Component Value Date     08/28/2022    K 4.6 08/28/2022    CO2 21 (L) 08/28/2022    BUN 44.0 (H) 08/28/2022    CREATININE 1.42 (H) 08/28/2022    CALCIUM 8.6 (L) 08/28/2022    EGFRNONAA >60 05/14/2020       ABG  Recent Labs   Lab 08/22/22  1337 08/22/22  1627 08/23/22  0745   PH 7.350   < > 7.38   PO2 351*   < > 102*   PCO2 57.3*   < > 39   HCO3 31.7*   < > 23.1   BE 6  --   --     < > = values in this interval not displayed.       Mechanical Ventilation Support:  Vent Mode: CPAP PSV (08/23/22 0550)  Ventilator Initiated: Yes (08/22/22 1545)  Set Rate: 12 BPM (08/23/22 0225)  Vt Set: 500 mL (08/23/22 0225)  Pressure Support: 10 cmH20 (08/23/22 0550)  PEEP/CPAP: 5 cmH20 (08/23/22 0550)  Oxygen Concentration (%): 30 (08/23/22 0550)  Peak Airway Pressure: 16 cmH2O (08/23/22 0550)  Total Ve: 13.6 mL (08/23/22 0550)  F/VT Ratio<105 (RSBI): (!) 18.89 (08/23/22 0550)    Significant  Imagin2022 - CXR shows persistent left upper lobe infiltrate, pulmonary venous congestion centrally, postsurgical changes are seen in mediastinum, and right-sided PICC line    2022 - CT abdomen shows moderate to severe ileus with partial obstruction, non-obstructing bilateral nephrolithiasis, bilateral pleural effusions with atelectasis     2022 - Echocardiogram shows EF 40%, Aneurysmal basal inferior wall/ RCA territory, mild to moderate TR, LV hypertrophy    Assessment/Plan:     Assessment  Small bowel obstruction vs severe ileus   Hypovolemic shock requiring vasopressor  Acute kidney injury   CAD s/p 2v CABG 2022  Mitral regurgitation s/p bioprosthetic MVR 2022  History of VT  HFrEF (EF40% on 2022)    Plan  -Continue ICU level of care for ongoing monitoring and vasopressor support  -Continue levophed with MAP goal >65  -Continue amiodarone drip per cardiology  -Continue post-op care per CV Surgery/ Cardiology  -Per GI, NG tube on low intermittent suction    Code status: Full  DVT Prophylaxis: Lovenox  GI Prophylaxis: Protonix  Diet: NPO     33 minutes of critical care was time spent personally by me on the following activities: development of treatment plan with patient or surrogate and bedside caregivers, discussions with consultants, evaluation of patient's response to treatment, examination of patient, ordering and performing treatments and interventions, ordering and review of laboratory studies, ordering and review of radiographic studies, pulse oximetry, re-evaluation of patient's condition.  This critical care time did not overlap with that of any other provider or involve time for any procedures.     Shahla Peace DO  Pulmonary Critical Care Medicine  [facility] - [facility] ICU

## 2022-08-29 NOTE — PROGRESS NOTES
Acute Care Surgery  Daily Progress Note    Subjective:  Afebrile, on levo at .08 mcg/kg/min. Breathing comfortably on 3L NC. NGT in place with 2.2L output in last 24 hours. Reports improvement in abdominal distension. Denies nausea and vomiting. Denies passing flatus.    Objective:    Vitals:  Vitals:    08/29/22 0445   BP: (!) 106/48   Pulse: 77   Resp: 19   Temp:         Intake/Output:  UOP: 550, 1x  NGT: 2.2L    Physical Exam:  Gen: NAD  Neuro: awake, alert, answering questions appropriately  CV: RR  Resp: non-labored breathing on 3L NC  Abd: soft, ND, NT  Ext: moves all 4 spontaneously and purposefully  Skin: warm, well perfused    Labs:  WBC 8 (10)  Hgb 10.1  K 4.6  Cr 1.06    Imaging:  N/a    Micro/Path/Other:  N/a    Assessment/Plan:  Zackery Osullivan is a 76 yo M s/p recent CABG + MVR. General surgery consulted for ileus vs SBO.      - Continue NGT  - NPO, IVF  - AROBF  - Replace e-lytes w/ goal K>4, Ph >3, Mg >2.  - Follow up hepatic function panel       Urszula Ortega MD   [employer] General Surgery, PGY2  08/29/2022 6:40 AM

## 2022-08-29 NOTE — PLAN OF CARE
Spoke to Yvrose with [facility] who states she does not see the referral in [facility]. I resubmitted this referral and asked that she please call me back if she does not receive it shortly. I also informed [facility] that pt will d/c to rehab and not home.

## 2022-08-29 NOTE — PROGRESS NOTES
[facility] - [facility] South ICU  Cardiology  Progress Note    Patient Name: Zackery Osullivan Jr.  MRN: [id number]  Admission Date: 8/22/2022  Hospital Length of Stay: 7 days  Code Status: Full Code   Attending Physician: Rowdy Davis IV, MD   Primary Care Physician: Isaiah Meeks MD  Expected Discharge Date:   Principal Problem:<principal problem not specified>    Subjective:     Brief HPI:   Mr. Osullivan is a 77 year old male, known to Dr. Gerardo and Dr. Flowers, who presented to the hospital and underwent CAD/CABG and MVR due to diagnosis of MV CAD and significant MRLouisa Also underwent LISA Ligation. Patient tolerated the surgery well, and was transferred to intensive care unit for further close post operative monitoring. [facility] is consulted for post op surgical cardiac management.    Hospital Course:   8.25.22: NAD noted. VSS with low normal BP. SR on tele. Denies SOB/Palps, +incisional CP.  8.26.22: NAD noted. VSS with low normal BP. SR on tele. Sitting in chair at bedside. Denies SOB/Palps, endorses incisional CP.  8.27.22: NAD Noted. Reports throat discomfort. SR on Tele. BP Low Normal.  8.28.22: NAD Noted. BP Low Normal. SR on Tele. GI Following. NSVT overnight.  SR 83.  8.29.22: NAD Noted. Patient with NG Tube. Surgical Team is following for evaluation of Ileus versus SBO. Hypotensive requiring low dose Levophed. SR on Tele. Amiodarone gtt is infusing. Started for NSVT, which he did not have last night according to the RN.     PMH: CAD (Multivessel), Ischemic Cardiomyopathy (ICD), VHD- MR, Hypertension, Hyperlipidemia, Chronic VI, COPD, Smoker, Obstructive Sleep Apnea, NSVT, Obesity  PSH: CABG/MVR, Skin Lesion, ICD Placement ([employer]), Tonsillectomy  Family History: Father- CAD, Brother- CAD, Sister- CAD  Social History: Tobacco- Former Smoker (Quit 3 Years Ago), Alcohol- Negative, Substance Abuse- Negative     Previous Cardiac Diagnostics:   Echocardiogram (8.28.22):  The left ventricle  is mildly enlarged with concentric hypertrophy and mildly reduced LV systolic function.  The estimated ejection fraction is 40%.  Indeterminate left ventricular diastolic function due to MVR.  Aneurysmal basal inferior wall/ RCA territory  Well seated bioprosthetic MV without significant stenosis or perivalvular leak. MG 4 mmHg.  Mild to moderate tricuspid regurgitation.  Mild left atrial enlargement.    CABG/MVR (8.22.22): MVR 29 mm Mosaic Porcine Valve; CABG LIMA to LAD & SVG to OM, LISA Ligation with Endo Stapler.     Left Heart Catheterization (8.2.22):  Left Main- 50% Distal Disease, LAD- 60% Proximal IFR 0.81, LCx- Nondominant (, Diagonal to OM Collateral), RCA- Dominant with , Bilateral Common Iliacs 30% Calcified Stenosis.     Echocardiogram (4.18.22):  The study quality is average.   The left ventricle is normal in size. Global left ventricular systolic function is mildly decreased. The left ventricular ejection fraction is 40%. Left ventricular diastolic function is normal. Noted left ventricular hypertrophy. Asymmetric septal left ventricular hypertrophy is present. It is mild.   Mild to moderate (1-2+) mitral regurgitation. Somewhat eccentric jet noted, chordae appear hyperechoic and thickened.      Review of Systems   Cardiovascular:  Negative for chest pain.   Respiratory:  Negative for shortness of breath.    Gastrointestinal:         Abdominal Tenderness/Bloating.     Objective:     Vital Signs (Most Recent):  Temp: 97.5 °F (36.4 °C) (08/29/22 0800)  Pulse: 72 (08/29/22 1000)  Resp: 19 (08/29/22 1000)  BP: (!) 90/48 (08/29/22 1000)  SpO2: 98 % (08/29/22 1000) Vital Signs (24h Range):  Temp:  [97.5 °F (36.4 °C)-99 °F (37.2 °C)] 97.5 °F (36.4 °C)  Pulse:  [72-87] 72  Resp:  [15-23] 19  SpO2:  [84 %-100 %] 98 %  BP: ()/() 90/48     Weight: 90 kg (198 lb 6.6 oz)  Body mass index is 29.39 kg/m².    SpO2: 98 %  O2 Device (Oxygen Therapy): nasal cannula      Intake/Output Summary (Last 24  hours) at 8/29/2022 1131  Last data filed at 8/29/2022 0500  Gross per 24 hour   Intake 3028.17 ml   Output 2750 ml   Net 278.17 ml         Lines/Drains/Airways       Peripherally Inserted Central Catheter Line  Duration             PICC Triple Lumen 08/28/22 1516 right brachial <1 day              Drain  Duration                  NG/OG Tube 08/28/22 1501 16 Fr. Right nostril <1 day                    Significant Labs:   CMP   Recent Labs   Lab 08/27/22  1623 08/28/22  0850 08/29/22  0308    139 135*   K 4.7 4.6 4.6   CO2 23 21* 22*   BUN 37.9* 44.0* 39.1*   CREATININE 1.03 1.42* 1.06   CALCIUM 8.4* 8.6* 8.1*   ALBUMIN  --  2.6*  --    BILITOT  --  1.7*  --    ALKPHOS  --  70  --    AST  --  47*  --    ALT  --  53  --       and CBC   Recent Labs   Lab 08/29/22  0143 08/29/22  0308   WBC 8.0  --    HGB 4.6* 10.1*   HCT 16.7* 32.4*   PLT 64*  --          Telemetry:  Sinus Rhythm     Physical Exam:  Physical Exam  Vitals reviewed.   Constitutional:       Appearance: Normal appearance.   HENT:      Head: Normocephalic.      Mouth/Throat:      Mouth: Mucous membranes are moist.   Cardiovascular:      Rate and Rhythm: Normal rate and regular rhythm.      Heart sounds: Normal heart sounds.   Pulmonary:      Effort: Pulmonary effort is normal. No respiratory distress.      Breath sounds: Normal breath sounds.   Abdominal:      General: There is distension.      Tenderness: There is abdominal tenderness.      Comments: NG Tube   Musculoskeletal:         General: Normal range of motion.      Cervical back: Neck supple.      Comments: Midsternal Incision intact   Skin:     General: Skin is warm and dry.      Capillary Refill: Capillary refill takes less than 2 seconds.   Neurological:      General: No focal deficit present.      Mental Status: He is alert and oriented to person, place, and time.   Psychiatric:         Behavior: Behavior normal.       Current Inpatient Medications:    Current Facility-Administered  Medications:     0.45% NaCl infusion, , Intravenous, Continuous, NINA Mancilla, Last Rate: 75 mL/hr at 08/28/22 0802, New Bag at 08/28/22 0802    0.45% NaCl with KCl 20 mEq infusion, 125 mL/hr, Intravenous, Continuous, Rowdy Davis IV, MD, Last Rate: 125 mL/hr at 08/29/22 0809, 125 mL/hr at 08/29/22 0809    0.9%  NaCl infusion (for blood administration), , Intravenous, Q24H PRN, Rowdy Davis IV, MD    acetaminophen oral solution 650 mg, 650 mg, Per OG tube, Q6H PRN, Rowdy Davis IV, MD    albuterol-ipratropium 2.5 mg-0.5 mg/3 mL nebulizer solution 3 mL, 3 mL, Nebulization, Q6H, Marlon Humphreys MD, 3 mL at 08/29/22 0849    amiodarone 360 mg/200 mL (1.8 mg/mL) infusion, 0.5 mg/min, Intravenous, Continuous, KIMBERLI Pratt, Last Rate: 16.7 mL/hr at 08/29/22 0247, 0.5 mg/min at 08/29/22 0247    aspirin EC tablet 81 mg, 81 mg, Oral, Daily, Rowdy Davis IV, MD, 81 mg at 08/27/22 0800    atorvastatin tablet 80 mg, 80 mg, Oral, QHS, KIMBERLI Pratt, 80 mg at 08/27/22 2146    budesonide nebulizer solution 0.5 mg, 0.5 mg, Nebulization, Q12H, Marlon Humphreys MD, 0.5 mg at 08/29/22 0850    calcium gluconate 1 g in NS IVPB (premixed), 1 g, Intravenous, PRN, Rowdy Davis IV, MD    calcium gluconate 1 g in NS IVPB (premixed), 3 g, Intravenous, PRN, Rowdy Davis IV, MD    dextrose 10% bolus 250 mL, 25 g, Intravenous, PRN, Rowdy Davis IV, MD    dextrose 50% injection 12.5 g, 12.5 g, Intravenous, PRN, Rowdy aDvis IV, MD, 12.5 g at 08/27/22 2206    docusate sodium capsule 100 mg, 100 mg, Oral, BID, Rowdy Davis IV, MD, 100 mg at 08/27/22 2146    enoxaparin injection 40 mg, 40 mg, Subcutaneous, Daily, NINA Mancilla, 40 mg at 08/28/22 1800    EPINEPHrine (ADRENALIN) 5 mg in dextrose 5 % 250 mL infusion, 0-2 mcg/kg/min, Intravenous, Continuous, Rowdy Davis IV, MD, Last Rate: 0 mL/hr at 08/25/22 0500, 0 mcg/kg/min at 08/25/22 0500    folic acid tablet 1 mg, 1 mg, Oral,  Daily, Rowdy Davis IV, MD, 1 mg at 08/27/22 0800    HYDROcodone-acetaminophen 5-325 mg per tablet 1 tablet, 1 tablet, Oral, Q4H PRN, Rowdy Davis IV, MD, 1 tablet at 08/24/22 1811    insulin regular in 0.9 % NaCl 100 unit/100 mL (1 unit/mL) infusion, 4 Units/hr, Intravenous, Continuous, Rowdy Davis IV, MD, Stopped at 08/23/22 1400    loperamide capsule 4 mg, 4 mg, Oral, Once, Rowdy Davis IV, MD    magnesium sulfate 2g in water 50mL IVPB (premix), 2 g, Intravenous, PRN, Rowdy Davis IV, MD    magnesium sulfate 2g in water 50mL IVPB (premix), 4 g, Intravenous, PRN, Rowdy Davis IV, MD    morphine injection 2 mg, 2 mg, Intravenous, PRN, Rowdy Davis IV, MD, 2 mg at 08/23/22 0817    mupirocin 2 % ointment, , Nasal, BID, NINA Mancilla, Given at 08/29/22 0909    NORepinephrine 8 mg in dextrose 5% 250 mL infusion, 0-3 mcg/kg/min, Intravenous, Continuous, Rowdy Davis IV, MD, Last Rate: 3.2 mL/hr at 08/29/22 0930, 0.02 mcg/kg/min at 08/29/22 0930    ondansetron injection 4 mg, 4 mg, Intravenous, Q12H PRN, Rowdy Davis IV, MD, 4 mg at 08/28/22 0307    oxyCODONE immediate release tablet 5 mg, 5 mg, Oral, Q4H PRN, Rowdy Davis IV, MD, 5 mg at 08/24/22 2016    pantoprazole injection 40 mg, 40 mg, Intravenous, BID, Isaiah Levine MD, 40 mg at 08/29/22 0809    polyethylene glycol packet 17 g, 17 g, Oral, Daily, Velia Shelton, MARTHA    potassium chloride 20 mEq in 100 mL IVPB (FOR CENTRAL LINE ADMINISTRATION ONLY), 20 mEq, Intravenous, PRN, Rowdy Davis IV, MD    potassium chloride 20 mEq in 100 mL IVPB (FOR CENTRAL LINE ADMINISTRATION ONLY), 40 mEq, Intravenous, PRN, Rowdy Davis IV, MD    potassium chloride 20 mEq in 100 mL IVPB (FOR CENTRAL LINE ADMINISTRATION ONLY), 20 mEq, Intravenous, PRN, Rowdy Davis IV, MD    Flushing PICC Protocol, , , Until Discontinued **AND** sodium chloride 0.9% flush 10 mL, 10 mL, Intravenous, Q6H, 10 mL at 08/28/22 1200  **AND** sodium chloride 0.9% flush 10 mL, 10 mL, Intravenous, PRN, Rowdy Davis IV, MD    Flushing PICC Protocol, , , Until Discontinued **AND** sodium chloride 0.9% flush 10 mL, 10 mL, Intravenous, Q6H, 10 mL at 08/29/22 0507 **AND** sodium chloride 0.9% flush 10 mL, 10 mL, Intravenous, PRN, Rowdy Davis IV, MD    sodium phosphate 15 mmol in dextrose 5 % 250 mL IVPB, 15 mmol, Intravenous, PRN, Rowdy Davis IV, MD    sodium phosphate 20.01 mmol in dextrose 5 % 250 mL IVPB, 20.01 mmol, Intravenous, PRN, Rowdy Davis IV, MD    sodium phosphate 30 mmol in dextrose 5 % 250 mL IVPB, 30 mmol, Intravenous, PRN, Rowdy Davis IV, MD    Facility-Administered Medications Ordered in Other Encounters:     diphenhydrAMINE capsule 50 mg, 50 mg, Oral, On Call Procedure, Reinaldo Gerardo MD, 50 mg at 08/02/22 0739    sodium chloride 0.9% flush 10 mL, 10 mL, Intravenous, PRN, Reinaldo Gerardo MD        Assessment:   IMPRESSION:  CAD (Multivessel)- Status Post CABG (LIMA to LAD, SVG to OM) (8.22.22)    - S/P LISA Ligation  Hypotension (Post Op), Now on Low Dose Levophed    -  H/O Hypertension   Valvular Heart Disease- MR Status Post Bio MVR (#29 mm Mosaic Porcine) (8.22.22)    - Well seated bioprosthetic MV without significant stenosis or perivalvular leak. MG 4 mmHg.  Ischemic Cardiomyopathy EF 40% Status Post ICD ([employer])    - Aneurysmal basal inferior wall/ RCA territory  NSVT    - History of VT    - On Amiodarone Outpatient (On Amiodarone gtt- Started on 8.28.22 Morning)  Ileus versus Small Bowel Obstruction  Hyperlipidemia  Chronic Venous Insufficiency  Elevated BMI/Obesity  COPD  Former Smoker    - Quit 3 Years Ago  Anemia  IRENE (Resolved)  Leukocytosis (Resolved)  Elevated LFT(S) (Resolved)  Thrombocytopenia  Sore Throat/Dysphagia ? Esophagitis    Plan:   PLAN:  Continue Amiodarone IV at Set Rate 0.5 Mg/Min Re: NSVT (Unable to Tolerate Oral Medications)  All Oral Medications are on hold given  possible Ileus/SBO (Surgical Team is Following)  Wean Vasopressor Support as able for Goal MAP 65 or Greater  Plan resumption/Optimization of HF Medications when/if BP Tolerates  Continue Supportive Care    KIMBERLI Pratt  Cardiology  [facility] - [facility] ICU  08/29/2022

## 2022-08-29 NOTE — PT/OT/SLP PROGRESS
Physical Therapy Treatment    Patient Name:  Zackery Osullivan Jr.   MRN:  [id number]    Recommendations:     Discharge Recommendations:  rehabilitation facility   Discharge Equipment Recommendations: walker, rolling   Barriers to discharge: medical needs    Assessment:     Zackery Osullivan Jr. is a 77 y.o. male admitted with a medical diagnosis of CABG, MVR, ileus vs SBO now with NG tube to suction.  Today, pt on 5L/minO2 and c/o pain and weakness. Pt requires Mod x2 for all mobility at this time. Performed stand pivot transfer with small steps Mod x2 assist. Pt demonstrated L lean in sitting and standing today. Pt also asymptomatically hypotensive after getting to chair, RN in room to assist. Unable to ambulate due to L lean and pt unable to be disconnected from NG tube suction. Ordered geomat for pt's chair to assist with offloading.     Rehab Prognosis: Good; patient would benefit from acute skilled PT services to address these deficits and reach maximum level of function.    Recent Surgery: Procedure(s) (LRB):  CORONARY ARTERY BYPASS GRAFT (CABG) (N/A)  REPLACEMENT, MITRAL VALVE (N/A) 7 Days Post-Op    Plan:     During this hospitalization, patient to be seen 6 x/week to address the identified rehab impairments via gait training, therapeutic activities, therapeutic exercises and progress toward the following goals:    Plan of Care Expires:  09/24/22    Subjective     Chief Complaint: fatigue  Patient/Family Comments/goals: to get stronger  Pain/Comfort:  Pain Rating 1: 6/10  Location 1: chest  Pain Addressed 1: Reposition, Pre-medicate for activity      Objective:     Communicated with nurse prior to session.  Patient found supine with NG tube, pulse ox (continuous), PureWick, telemetry, blood pressure cuff, oxygen upon PT entry to room.     General Precautions: Standard, sternal   Orthopedic Precautions:N/A   Braces:    Respiratory Status: Nasal cannula, flow 5 L/min   At Rest:  BP: 96/62  SpO2:  96%  HR:75    After mobility:  BP: 81/58  SpO2: 95%  HR: 82  RN in room to assist with BP with drip.   Functional Mobility:  Bed Mobility:     Scooting: moderate assistance and of 2 persons  Supine to Sit: moderate assistance and of 2 persons  Sit to Supine: moderate assistance and of 2 persons  Transfers:     Sit to Stand:  moderate assistance with rolling walker  Bed to Chair: moderate assistance and of 2 persons with  hand-held assist  using  Step Transfer  Balance: Pt with L lean in sitting and standing. Able to correct with VC and tactile cues.       AM-PAC 6 CLICK MOBILITY  Turning over in bed (including adjusting bedclothes, sheets and blankets)?: 2  Sitting down on and standing up from a chair with arms (e.g., wheelchair, bedside commode, etc.): 2  Moving from lying on back to sitting on the side of the bed?: 2  Moving to and from a bed to a chair (including a wheelchair)?: 2  Need to walk in hospital room?: 1  Climbing 3-5 steps with a railing?: 1  Basic Mobility Total Score: 10       Patient left up in chair with all lines intact, call button in reach, and nurse present..    GOALS:   Multidisciplinary Problems       Physical Therapy Goals          Problem: Physical Therapy    Goal Priority Disciplines Outcome Goal Variances Interventions   Physical Therapy Goal     PT, PT/OT Ongoing, Progressing     Description: Goals to be met by: 22     Patient will increase functional independence with mobility by performin. Supine to sit with MInimal Assistance  2. Sit to supine with MInimal Assistance  3. Sit to stand transfer with Minimal Assistance  4. Gait  x 150 feet with Minimal Assistance using Rolling Walker.                          Time Tracking:     PT Received On: 22  PT Start Time: 1047     PT Stop Time: 1120  PT Total Time (min): 33 min     Billable Minutes: Therapeutic Activity 33    Treatment Type: Treatment  PT/PTA: PT     PTA Visit Number: 2     2022

## 2022-08-29 NOTE — PROGRESS NOTES
Zackery Osullivan Jr. is a 77 y.o. male patient.   1. Solitary pulmonary nodule    2. CAD (coronary artery disease)    3. Coronary artery disease of native artery of native heart with stable angina pectoris    4. Coronary artery disease involving native heart, unspecified vessel or lesion type, unspecified whether angina present    5. Abnormal heart rhythm    6. Hypotension    7. Ileus, postoperative      Past Medical History:   Diagnosis Date    Arthritis     spine    CAD (coronary artery disease)     COPD (chronic obstructive pulmonary disease)     HLD (hyperlipidemia)     HTN (hypertension)     Ischemic cardiomyopathy     Mitral regurgitation     HEATHER on CPAP     Stroke     Venous insufficiency     Ventricular tachycardia      No past surgical history pertinent negatives on file.  Scheduled Meds:   albuterol-ipratropium  3 mL Nebulization Q6H    aspirin  81 mg Oral Daily    atorvastatin  80 mg Oral QHS    budesonide  0.5 mg Nebulization Q12H    docusate sodium  100 mg Oral BID    enoxaparin  40 mg Subcutaneous Daily    folic acid  1 mg Oral Daily    loperamide  4 mg Oral Once    mupirocin   Nasal BID    pantoprazole  40 mg Intravenous BID    polyethylene glycol  17 g Oral Daily    sodium chloride 0.9%  10 mL Intravenous Q6H    sodium chloride 0.9%  10 mL Intravenous Q6H     Continuous Infusions:   sodium chloride 0.45% 75 mL/hr at 08/28/22 0802    0.45% NaCl with KCl 20 mEq infusion 125 mL/hr (08/29/22 0809)    amiodarone in dextrose 5% 0.5 mg/min (08/29/22 0247)    EPINEPHrine 0 mcg/kg/min (08/25/22 0500)    insulin regular 1 units/mL infusion orderable (CTS POST-OP) Stopped (08/23/22 1400)    NORepinephrine bitartrate-D5W 0.02 mcg/kg/min (08/29/22 0930)     PRN Meds:sodium chloride, acetaminophen, calcium gluconate IVPB, calcium gluconate IVPB, dextrose 10%, dextrose 50%, HYDROcodone-acetaminophen, magnesium sulfate IVPB, magnesium sulfate IVPB, morphine, ondansetron, oxyCODONE, potassium chloride in water,  "potassium chloride in water, potassium chloride in water, Flushing PICC Protocol **AND** sodium chloride 0.9% **AND** sodium chloride 0.9%, Flushing PICC Protocol **AND** sodium chloride 0.9% **AND** sodium chloride 0.9%, sodium phosphate IVPB, sodium phosphate IVPB, sodium phosphate IVPB    Review of patient's allergies indicates:  No Known Allergies  There are no hospital problems to display for this patient.    Blood pressure (!) 90/48, pulse 72, temperature 97.5 °F (36.4 °C), resp. rate 19, height 5' 8.9" (1.75 m), weight 90 kg (198 lb 6.6 oz), SpO2 98 %.    Subjective:    POD #7  Resting comfortably  NG in place, >2L in 24hrs  On levo 0.02mcg/kg/min     Objective:   AFVSS. 98% on 3L NC  Heart: RRR  Lungs: respirations nonlabored, clear  Abd: soft; BS diminished      Assesment/Plan:    S/p:  CAB/MVR  Ileus vs SBO per general surgery  Continue care            NINA Jeffers  8/29/2022    "

## 2022-08-30 NOTE — PROGRESS NOTES
Acute Care Surgery  Daily Progress Note    Subjective:  Afebrile, on levo at .06 mcg/kg/min. Up in chair this morning. NGT in place with 1.3L bilious output in last 24 hours. Reports continued improvement in abdominal distension. Denies nausea and vomiting. Passed flatus, no bowel mvoement    Objective:    Vitals:  Vitals:    08/30/22 0831   BP:    Pulse: 74   Resp: 18   Temp:         Intake/Output:  UOP: 900cc  NGT: 1350cc    Physical Exam:  Gen: NAD, NGT in place  Neuro: awake, alert, answering questions appropriately  CV: RR  Resp: non-labored breathing  Abd: soft, ND, NT  Ext: moves all 4 spontaneously and purposefully  Skin: warm, well perfused    Labs:    K 5.6  Cr 1.04  Mg 2.3  Phos 3.3    Imaging:  N/a    Micro/Path/Other:  N/a    Assessment/Plan:  Zackery Osullivan is a 78 yo M s/p recent CABG + MVR. General surgery consulted for ileus vs SBO.      - Continue NGT  - SBFT today  - Suppository  - NPO, IVF  - Replace e-lytes w/ goal K>4, Ph >3, Mg >2.  - Rest of care per primary      Urszula Ortega MD   [employer] General Surgery, PGY2  08/30/2022 6:40 AM

## 2022-08-30 NOTE — PROGRESS NOTES
[facility] - [facility] ICU  Pulmonary Critical Care Note    Patient Name: Zackery Osullivan Jr.  MRN: [id number]  Admission Date: 8/22/2022  Hospital Length of Stay: 8 days  Code Status: Full Code  Attending Provider: Rowdy Davis IV, MD  Primary Care Provider: Isaiah Meeks MD     Subjective:     HPI: 77-year-old  male with past medical history of CAD, HTN, HLD, and HFrEF who presented to [facility] for a CABG x2 and mitral valve replacement. Received 2 units pRBCs intraop. Initially admitted to ICU for post-op monitoring.  Patient transferred out of ICU once extubated and hemodynamically stable off vasopressors on 8/25/22. On 8/27/22, patient reporting throat pain, nausea and constipation. Unable to keep down food without non bloody vomitus. Unable to tolerate PO meds. GI consulted. During this time, patient also having low/normal blood pressures. Had 17 beat run of V-tach evening of 8/27/22 in which amiodarone IV started since patient unable to tolerate PO medications. Morning of 8/28/22, MAPs <65 despite fluid resuscitation. Patient to be upgraded to ICU 8/28/22 due to need for vasopressor support and close monitoring.     24 Hour Interval History: Overnight was uneventful. Patient was able to get out of the bed and sit in the chair this AM. He continues to have abdominal discomfort; passed gas this morning; has no bowel movement yet. K+ 5.6, Calcium 7.7, I/O: net positive 2.5 L.     Past Medical History:   Diagnosis Date    Arthritis     spine    CAD (coronary artery disease)     COPD (chronic obstructive pulmonary disease)     HLD (hyperlipidemia)     HTN (hypertension)     Ischemic cardiomyopathy     Mitral regurgitation     HEATHER on CPAP     Stroke     Venous insufficiency     Ventricular tachycardia        Past Surgical History:   Procedure Laterality Date    bilateral inguinal stents      CARDIAC DEFIBRILLATOR PLACEMENT Left     CATARACT EXTRACTION Bilateral     CATHETERIZATION OF  BOTH LEFT AND RIGHT HEART  2022    Diagnostic Only; Dr. Gerardo    COLONOSCOPY      CORONARY ARTERY BYPASS GRAFT (CABG) N/A 2022    Procedure: CORONARY ARTERY BYPASS GRAFT (CABG);  Surgeon: Rowdy Davis IV, MD;  Location: [facility] OR;  Service: Cardiovascular;  Laterality: N/A;  possible MVR & LLAA  //  ECHO NOTIFIED    LEFT HEART CATHETERIZATION Left 2022    Procedure: CATHETERIZATION, HEART, LEFT;  Surgeon: Reinaldo Gerardo MD;  Location: [facility] CATH LAB;  Service: Cardiology;  Laterality: Left;  LHC +/- PCI    MITRAL VALVE REPLACEMENT N/A 2022    Procedure: REPLACEMENT, MITRAL VALVE;  Surgeon: Rowdy Davis IV, MD;  Location: [facility] OR;  Service: Cardiovascular;  Laterality: N/A;  possible MVR and LLAA    REMOVAL OF IMPLANTED CARDIOVERTER-DEFIBRILLATOR (ICD)      TONSILLECTOMY         Social History     Socioeconomic History    Marital status:    Tobacco Use    Smoking status: Former     Years: 55.00     Types: Cigarettes     Start date:      Quit date:      Years since quittin.6    Smokeless tobacco: Never   Substance and Sexual Activity    Alcohol use: Yes     Alcohol/week: 3.0 standard drinks     Types: 3 Glasses of wine per week    Drug use: Never           Current Outpatient Medications   Medication Instructions    amiodarone (PACERONE) 200 mg, Oral, 2 times daily    aspirin (ECOTRIN) 81 mg, Oral, Nightly    atorvastatin (LIPITOR) 80 mg, Oral, Nightly    clopidogreL (PLAVIX) 75 mg, Oral, Nightly    ezetimibe (ZETIA) 10 mg, Oral, Nightly    fluticasone propionate (FLONASE) 50 mcg/actuation nasal spray 1 spray, Each Nostril, Daily PRN    furosemide (LASIX) 20 mg, Oral, Daily    metoprolol succinate (TOPROL-XL) 25 MG 24 hr tablet 1 tablet, Oral, Daily    mometasone (ASMANEX TWISTHALER) 220 mcg/ actuation (30) inhaler 2 puffs, Inhalation, Daily, Controller    sacubitriL-valsartan (ENTRESTO) 49-51 mg per tablet 0.5 tablets, Oral, 2 times daily    spironolactone  (ALDACTONE) 12.5 mg, Oral, Daily    umeclidinium-vilanteroL (ANORO ELLIPTA) 62.5-25 mcg/actuation DsDv 1 puff, Inhalation, Daily, Controller       Current Inpatient Medications   albuterol-ipratropium  3 mL Nebulization Q6H    aspirin  81 mg Oral Daily    atorvastatin  80 mg Oral QHS    budesonide  0.5 mg Nebulization Q12H    docusate sodium  100 mg Oral BID    enoxaparin  40 mg Subcutaneous Daily    folic acid  1 mg Oral Daily    loperamide  4 mg Oral Once    mupirocin   Nasal BID    pantoprazole  40 mg Intravenous BID    polyethylene glycol  17 g Oral Daily    sodium chloride 0.9%  10 mL Intravenous Q6H    sodium chloride 0.9%  10 mL Intravenous Q6H       Current Intravenous Infusions   sodium chloride 0.45% 75 mL/hr at 08/28/22 0802    0.45% NaCl with KCl 20 mEq infusion 125 mL/hr (08/30/22 0002)    amiodarone in dextrose 5% 0.5 mg/min (08/29/22 2200)    EPINEPHrine 0 mcg/kg/min (08/25/22 0500)    insulin regular 1 units/mL infusion orderable (CTS POST-OP) Stopped (08/23/22 1400)    NORepinephrine bitartrate-D5W 0.06 mcg/kg/min (08/29/22 2000)         Review of Systems   Constitutional:  Negative for chills and diaphoresis.   Respiratory:  Positive for shortness of breath and wheezing. Negative for cough.    Cardiovascular:  Negative for chest pain and palpitations.   Gastrointestinal:  Positive for abdominal pain.        Objective:       Intake/Output Summary (Last 24 hours) at 8/30/2022 0124  Last data filed at 8/29/2022 2200  Gross per 24 hour   Intake 5833.7 ml   Output 2750 ml   Net 3083.7 ml       Vital Signs (Most Recent):  Temp: 97.6 °F (36.4 °C) (08/30/22 0000)  Pulse: 81 (08/30/22 0119)  Resp: 19 (08/30/22 0119)  BP: 122/63 (08/30/22 0030)  SpO2: 96 % (08/30/22 0119)    Body mass index is 29.39 kg/m².  Weight: 90 kg (198 lb 6.6 oz) Vital Signs (24h Range):  Temp:  [97.3 °F (36.3 °C)-99 °F (37.2 °C)] 97.6 °F (36.4 °C)  Pulse:  [] 81  Resp:  [15-24] 19  SpO2:  [88 %-100 %] 96 %  BP: ()/(44-63)  122/63     Physical Exam  General: Well nourished w/ mild distress  HEENT: NC/AT; PERRL; nasal and oral mucosa moist and clear  Neck: Full ROM; no lymphadenopathy  Pulm: Audible wheezing, coarse crackles in left lower lobe, normal work of breathing on 5L/min via NC  CV: S1, S2 w/o murmurs or gallops; 1+ edema in lower extremities  GI: Abdomen distended with tenderness; bowel sound present  MSK: Full ROM of all extremities  Derm: Midline chest incision clean and intact w/o signs of infection  Neuro: AAOx4; motor/sensory function intact      Lines/Drains/Airways       Peripherally Inserted Central Catheter Line  Duration             PICC Triple Lumen 22 1516 right brachial 1 day              Drain  Duration                  NG/OG Tube 22 1501 16 Fr. Right nostril 1 day                    Significant Labs:    Lab Results   Component Value Date    WBC 8.0 2022    HGB 10.1 (L) 2022    HCT 32.4 (L) 2022    .3 (H) 2022    PLT 64 (L) 2022         BMP  Lab Results   Component Value Date     (L) 2022    K 4.6 2022    CO2 22 (L) 2022    BUN 39.1 (H) 2022    CREATININE 1.06 2022    CALCIUM 8.1 (L) 2022    EGFRNONAA >60 2020       ABG  Recent Labs   Lab 22  0745   PH 7.38   PO2 102*   PCO2 39   HCO3 23.1         Mechanical Ventilation Support:  Vent Mode: CPAP PSV (22 0550)  Ventilator Initiated: Yes (22 1545)  Set Rate: 12 BPM (22)  Vt Set: 500 mL (22)  Pressure Support: 10 cmH20 (22 0550)  PEEP/CPAP: 5 cmH20 (22 0550)  Oxygen Concentration (%): 30 (22 0550)  Peak Airway Pressure: 16 cmH2O (22 0550)  Total Ve: 13.6 mL (22 0550)  F/VT Ratio<105 (RSBI): (!) 18.89 (22 0550)    Significant Imagin2022 - CXR shows persistent left upper lobe infiltrate, pulmonary venous congestion centrally, postsurgical changes are seen in mediastinum, and right-sided  PICC line    8/28/2022 - CT abdomen shows moderate to severe ileus with partial obstruction, non-obstructing bilateral nephrolithiasis, bilateral pleural effusions with atelectasis     8/28/2022 - Echocardiogram shows EF 40%, Aneurysmal basal inferior wall/ RCA territory, mild to moderate TR, LV hypertrophy    Assessment/Plan:     Assessment  Small bowel obstruction vs severe ileus   Hypovolemic shock requiring vasopressor  Acute kidney injury   CAD s/p 2v CABG 08/22/2022  Mitral regurgitation s/p bioprosthetic MVR 08/22/2022  History of VT  HFrEF (EF40% on 8/28/2022)    Plan  -Continue ICU level of care for ongoing monitoring and vasopressor support  -Per cardiology, wean vasopressor support if patient is able to maintain MAP >65; continue Amiodarone drip  -Per GI, NG tube on low intermittent suction; will need gastrograffin SBS once NG output slows; continue to monitor for flatus/ BMs  -Will hold all PO meds at this time d/t ileus/SBO    Code status: Full  DVT Prophylaxis: Lovenox  GI Prophylaxis: Protonix  Diet: NPO     33 minutes of critical care was time spent personally by me on the following activities: development of treatment plan with patient or surrogate and bedside caregivers, discussions with consultants, evaluation of patient's response to treatment, examination of patient, ordering and performing treatments and interventions, ordering and review of laboratory studies, ordering and review of radiographic studies, pulse oximetry, re-evaluation of patient's condition.  This critical care time did not overlap with that of any other provider or involve time for any procedures.     Shahla Peace DO  Pulmonary Critical Care Medicine  [facility] - [facility] ICU

## 2022-08-30 NOTE — NURSING
WOCN consult--76 y/o male in with CAD with a CABG done on 8-22-22 with multiple  issues post.  PMH of CAD COPD CVA MR ISCHEMIC CM spinal arthritis.     Slow to progress.     With nurse Araseli buttock crease assessment see photo.   Care put in place with instructions to nurse and recommendation for follow up by our clinic.    He is on an ICU bed and to be turned q2hrs when in bed.   Will follow up in a few days.

## 2022-08-30 NOTE — PT/OT/SLP PROGRESS
Physical Therapy Treatment    Patient Name:  Zackery Osullivan Jr.   MRN:  [id number]    Recommendations:     Discharge Recommendations:  rehabilitation facility   Discharge Equipment Recommendations: walker, rolling   Barriers to discharge:  weakness    Assessment:     Pt more alert and feeling better today compared to yesterday. BP more stable with mobility as well. Performed sit<>stand transfer with Mod x2, and able to tolerate 40ft of ambulation on 3L/minO2. Improving slowly. Recommending Rehab. Discussed importance of turning every 2hrs due to sacral skin breakdown. Used wedge for positioning.     Rehab Prognosis: Good; patient would benefit from acute skilled PT services to address these deficits and reach maximum level of function.    Recent Surgery: Procedure(s) (LRB):  CORONARY ARTERY BYPASS GRAFT (CABG) (N/A)  REPLACEMENT, MITRAL VALVE (N/A) 8 Days Post-Op    Plan:     During this hospitalization, patient to be seen 6 x/week to address the identified rehab impairments via gait training, therapeutic activities, therapeutic exercises and progress toward the following goals:    Plan of Care Expires:  09/24/22    Subjective     Chief Complaint: none  Patient/Family Comments/goals: to get stronger  Pain/Comfort:  Pain Rating 1: 0/10      Objective:     Communicated with nurse prior to session.  Patient found supine with PureWick, NG tube, pulse ox (continuous), blood pressure cuff, telemetry upon PT entry to room.     General Precautions: Standard, sternal, NPO   Orthopedic Precautions:N/A   Braces:    Respiratory Status: Nasal cannula, flow 2.5 L/min     Functional Mobility:  Bed Mobility:     Scooting: moderate assistance  Supine to Sit: moderate assistance  Transfers:     Sit to Stand:  moderate assistance and of 2 persons with rolling walker  Gait: 40 ft with RW and CGA, Nahomi for stability. Slow pace.   Pt able to sit EOB for 15 min w SBA.       AM-PAC 6 CLICK MOBILITY  Turning over in bed (including  adjusting bedclothes, sheets and blankets)?: 2  Sitting down on and standing up from a chair with arms (e.g., wheelchair, bedside commode, etc.): 2  Moving from lying on back to sitting on the side of the bed?: 2  Moving to and from a bed to a chair (including a wheelchair)?: 2  Need to walk in hospital room?: 3  Climbing 3-5 steps with a railing?: 1  Basic Mobility Total Score: 12       Patient left right sidelying with all lines intact, call button in reach, and nurse present.    GOALS:   Multidisciplinary Problems       Physical Therapy Goals          Problem: Physical Therapy    Goal Priority Disciplines Outcome Goal Variances Interventions   Physical Therapy Goal     PT, PT/OT Ongoing, Progressing     Description: Goals to be met by: 22     Patient will increase functional independence with mobility by performin. Supine to sit with MInimal Assistance  2. Sit to supine with MInimal Assistance  3. Sit to stand transfer with Minimal Assistance  4. Gait  x 150 feet with Minimal Assistance using Rolling Walker.                          Time Tracking:     PT Received On: 22  PT Start Time: 1039     PT Stop Time: 1112  PT Total Time (min): 33 min     Billable Minutes: Therapeutic Activity 33    Treatment Type: Treatment  PT/PTA: PT     PTA Visit Number: 3     2022

## 2022-08-30 NOTE — PROGRESS NOTES
[facility] - [facility]  Cardiology  Progress Note    Patient Name: Zackery Osullivan Jr.  MRN: [id number]  Admission Date: 8/22/2022  Hospital Length of Stay: 8 days  Code Status: Full Code   Attending Physician: Rowdy Davis IV, MD   Primary Care Physician: Isaiah Meeks MD  Expected Discharge Date:   Principal Problem:<principal problem not specified>    Subjective:     Brief HPI:   Mr. Osullivan is a 77 year old male, known to Dr. Gerardo and Dr. Flowers, who presented to the hospital and underwent CAD/CABG and MVR due to diagnosis of MV CAD and significant MRLouisa Also underwent LISA Ligation. Patient tolerated the surgery well, and was transferred to intensive care unit for further close post operative monitoring. [facility] is consulted for post op surgical cardiac management.    Hospital Course:   8.25.22: NAD noted. VSS with low normal BP. SR on tele. Denies SOB/Palps, +incisional CP.  8.26.22: NAD noted. VSS with low normal BP. SR on tele. Sitting in chair at bedside. Denies SOB/Palps, endorses incisional CP.  8.27.22: NAD Noted. Reports throat discomfort. SR on Tele. BP Low Normal.  8.28.22: NAD Noted. BP Low Normal. SR on Tele. GI Following. NSVT overnight.  SR 83.  8.29.22: NAD Noted. Patient with NG Tube. Surgical Team is following for evaluation of Ileus versus SBO. Hypotensive requiring low dose Levophed. SR on Tele. Amiodarone gtt is infusing. Started for NSVT, which he did not have last night according to the RN.  8.30.22: Remains NPO. Plans for small bowel follow through today     PMH: CAD (Multivessel), Ischemic Cardiomyopathy (ICD), VHD- MR, Hypertension, Hyperlipidemia, Chronic VI, COPD, Smoker, Obstructive Sleep Apnea, NSVT, Obesity  PSH: CABG/MVR, Skin Lesion, ICD Placement ([employer]), Tonsillectomy  Family History: Father- CAD, Brother- CAD, Sister- CAD  Social History: Tobacco- Former Smoker (Quit 3 Years Ago), Alcohol- Negative, Substance Abuse- Negative     Previous  Cardiac Diagnostics:   Echocardiogram (8.28.22):  The left ventricle is mildly enlarged with concentric hypertrophy and mildly reduced LV systolic function.  The estimated ejection fraction is 40%.  Indeterminate left ventricular diastolic function due to MVR.  Aneurysmal basal inferior wall/ RCA territory  Well seated bioprosthetic MV without significant stenosis or perivalvular leak. MG 4 mmHg.  Mild to moderate tricuspid regurgitation.  Mild left atrial enlargement.    CABG/MVR (8.22.22): MVR 29 mm Mosaic Porcine Valve; CABG LIMA to LAD & SVG to OM, LISA Ligation with Endo Stapler.     Left Heart Catheterization (8.2.22):  Left Main- 50% Distal Disease, LAD- 60% Proximal IFR 0.81, LCx- Nondominant (, Diagonal to OM Collateral), RCA- Dominant with , Bilateral Common Iliacs 30% Calcified Stenosis.     Echocardiogram (4.18.22):  The study quality is average.   The left ventricle is normal in size. Global left ventricular systolic function is mildly decreased. The left ventricular ejection fraction is 40%. Left ventricular diastolic function is normal. Noted left ventricular hypertrophy. Asymmetric septal left ventricular hypertrophy is present. It is mild.   Mild to moderate (1-2+) mitral regurgitation. Somewhat eccentric jet noted, chordae appear hyperechoic and thickened.      Review of Systems   Cardiovascular:  Negative for chest pain.   Respiratory:  Negative for shortness of breath.    Gastrointestinal:         Abdominal Tenderness/Bloating.     Objective:     Vital Signs (Most Recent):  Temp: 97.7 °F (36.5 °C) (08/30/22 0400)  Pulse: 72 (08/30/22 0845)  Resp: 18 (08/30/22 0845)  BP: 116/72 (08/30/22 0845)  SpO2: 100 % (08/30/22 0845) Vital Signs (24h Range):  Temp:  [97.3 °F (36.3 °C)-98.3 °F (36.8 °C)] 97.7 °F (36.5 °C)  Pulse:  [] 72  Resp:  [15-24] 18  SpO2:  [90 %-100 %] 100 %  BP: ()/(44-77) 116/72     Weight: 92.6 kg (204 lb 2.3 oz)  Body mass index is 30.24 kg/m².    SpO2: 100 %  O2  Device (Oxygen Therapy): nasal cannula      Intake/Output Summary (Last 24 hours) at 8/30/2022 0935  Last data filed at 8/30/2022 0822  Gross per 24 hour   Intake 5033.3 ml   Output 2250 ml   Net 2783.3 ml         Lines/Drains/Airways       Peripherally Inserted Central Catheter Line  Duration             PICC Triple Lumen 08/28/22 1516 right brachial 1 day              Drain  Duration                  NG/OG Tube 08/28/22 1501 16 Fr. Right nostril 1 day                    Significant Labs:   CMP   Recent Labs   Lab 08/29/22  0308 08/29/22  1221 08/30/22  0601   *  --  135*   K 4.6  --  5.6*   CO2 22*  --  23   BUN 39.1*  --  30.6*   CREATININE 1.06  --  1.04   CALCIUM 8.1*  --  7.7*   ALBUMIN  --  2.5*  --    BILITOT  --  1.2  --    ALKPHOS  --  72  --    AST  --  47*  --    ALT  --  52  --       and CBC   Recent Labs   Lab 08/29/22  0143 08/29/22  0308   WBC 8.0  --    HGB 4.6* 10.1*   HCT 16.7* 32.4*   PLT 64*  --          Telemetry:  Sinus Rhythm     Physical Exam:  Physical Exam  Vitals reviewed.   Constitutional:       Appearance: Normal appearance.   HENT:      Head: Normocephalic.      Mouth/Throat:      Mouth: Mucous membranes are moist.   Cardiovascular:      Rate and Rhythm: Normal rate and regular rhythm.      Heart sounds: Normal heart sounds.   Pulmonary:      Effort: Pulmonary effort is normal. No respiratory distress.      Breath sounds: Normal breath sounds.   Abdominal:      General: There is distension.      Tenderness: There is abdominal tenderness.      Comments: NG Tube   Musculoskeletal:         General: Normal range of motion.      Cervical back: Neck supple.      Comments: Midsternal Incision intact   Skin:     General: Skin is warm and dry.      Capillary Refill: Capillary refill takes less than 2 seconds.   Neurological:      General: No focal deficit present.      Mental Status: He is alert and oriented to person, place, and time.   Psychiatric:         Behavior: Behavior normal.        Current Inpatient Medications:    Current Facility-Administered Medications:     0.9%  NaCl infusion (for blood administration), , Intravenous, Q24H PRN, Rowdy Davis IV, MD    acetaminophen oral solution 650 mg, 650 mg, Per OG tube, Q6H PRN, Rowdy Davis IV, MD    albuterol-ipratropium 2.5 mg-0.5 mg/3 mL nebulizer solution 3 mL, 3 mL, Nebulization, Q6H, Marlon Humphreys MD, 3 mL at 08/30/22 0831    amiodarone 360 mg/200 mL (1.8 mg/mL) infusion, 0.5 mg/min, Intravenous, Continuous, KIMBERLI Pratt, Last Rate: 16.7 mL/hr at 08/30/22 0600, 0.5 mg/min at 08/30/22 0600    aspirin EC tablet 81 mg, 81 mg, Oral, Daily, Rowdy Davis IV, MD, 81 mg at 08/27/22 0800    atorvastatin tablet 80 mg, 80 mg, Oral, QHS, KIMBERLI Pratt, 80 mg at 08/27/22 2146    budesonide nebulizer solution 0.5 mg, 0.5 mg, Nebulization, Q12H, Marlon Humphreys MD, 0.5 mg at 08/30/22 0831    calcium gluconate 1 g in NS IVPB (premixed), 1 g, Intravenous, PRN, Rowdy Davis IV, MD    calcium gluconate 1 g in NS IVPB (premixed), 3 g, Intravenous, PRN, Rowdy Davis IV, MD    dextrose 10% bolus 250 mL, 25 g, Intravenous, PRN, Rowdy Davis IV, MD    dextrose 50% injection 12.5 g, 12.5 g, Intravenous, PRN, Rowdy Davis IV, MD, 12.5 g at 08/27/22 2206    docusate sodium capsule 100 mg, 100 mg, Oral, BID, Rowdy Davis IV, MD, 100 mg at 08/27/22 2146    enoxaparin injection 40 mg, 40 mg, Subcutaneous, Daily, NINA Mancilla, 40 mg at 08/29/22 1716    folic acid tablet 1 mg, 1 mg, Oral, Daily, Rowdy Davis IV, MD, 1 mg at 08/27/22 0800    HYDROcodone-acetaminophen 5-325 mg per tablet 1 tablet, 1 tablet, Oral, Q4H PRN, Rowdy Davis IV, MD, 1 tablet at 08/24/22 1811    loperamide capsule 4 mg, 4 mg, Oral, Once, Rowdy Davis IV, MD    magnesium sulfate 2g in water 50mL IVPB (premix), 2 g, Intravenous, PRN, Rowdy Davis IV, MD    magnesium sulfate 2g in water 50mL IVPB (premix), 4 g, Intravenous, PRN,  Rowdy Davis IV, MD    morphine injection 2 mg, 2 mg, Intravenous, PRN, Rowdy Davis IV, MD, 2 mg at 08/23/22 0817    NORepinephrine 8 mg in dextrose 5% 250 mL infusion, 0-3 mcg/kg/min, Intravenous, Continuous, Rowdy Davis IV, MD, Stopped at 08/30/22 0845    ondansetron injection 4 mg, 4 mg, Intravenous, Q12H PRN, Rowdy Davis IV, MD, 4 mg at 08/28/22 0307    oxyCODONE immediate release tablet 5 mg, 5 mg, Oral, Q4H PRN, Rowdy Davis IV, MD, 5 mg at 08/24/22 2016    pantoprazole injection 40 mg, 40 mg, Intravenous, BID, Isaiah Levine MD, 40 mg at 08/30/22 0824    polyethylene glycol packet 17 g, 17 g, Oral, Daily, MARTHA Limon    potassium chloride 20 mEq in 100 mL IVPB (FOR CENTRAL LINE ADMINISTRATION ONLY), 20 mEq, Intravenous, PRN, Rowdy Davis IV, MD    potassium chloride 20 mEq in 100 mL IVPB (FOR CENTRAL LINE ADMINISTRATION ONLY), 40 mEq, Intravenous, PRN, Rowdy Davis IV, MD    potassium chloride 20 mEq in 100 mL IVPB (FOR CENTRAL LINE ADMINISTRATION ONLY), 20 mEq, Intravenous, PRN, Rowdy Davis IV, MD    Flushing PICC Protocol, , , Until Discontinued **AND** sodium chloride 0.9% flush 10 mL, 10 mL, Intravenous, Q6H, 10 mL at 08/30/22 0600 **AND** sodium chloride 0.9% flush 10 mL, 10 mL, Intravenous, PRN, Rowdy Davis IV, MD    Flushing PICC Protocol, , , Until Discontinued **AND** sodium chloride 0.9% flush 10 mL, 10 mL, Intravenous, Q6H, 10 mL at 08/30/22 0620 **AND** sodium chloride 0.9% flush 10 mL, 10 mL, Intravenous, PRN, Rowdy Davis IV, MD    sodium phosphate 15 mmol in dextrose 5 % 250 mL IVPB, 15 mmol, Intravenous, PRN, Rowdy Davis IV, MD    sodium phosphate 20.01 mmol in dextrose 5 % 250 mL IVPB, 20.01 mmol, Intravenous, PRN, Rowdy Davis IV, MD    sodium phosphate 30 mmol in dextrose 5 % 250 mL IVPB, 30 mmol, Intravenous, PRN, Rowdy Davis IV, MD    Facility-Administered Medications Ordered in Other Encounters:      diphenhydrAMINE capsule 50 mg, 50 mg, Oral, On Call Procedure, Reinaldo Gerardo MD, 50 mg at 08/02/22 0739    sodium chloride 0.9% flush 10 mL, 10 mL, Intravenous, PRN, Reinaldo Gerardo MD        Assessment:   IMPRESSION:  CAD (Multivessel)- Status Post CABG (LIMA to LAD, SVG to OM) (8.22.22)    - S/P LISA Ligation  Hypotension (Post Op), Now on Low Dose Levophed    -  H/O Hypertension   Valvular Heart Disease- MR Status Post Bio MVR (#29 mm Mosaic Porcine) (8.22.22)    - Well seated bioprosthetic MV without significant stenosis or perivalvular leak. MG 4 mmHg.  Ischemic Cardiomyopathy EF 40% Status Post ICD ([employer])    - Aneurysmal basal inferior wall/ RCA territory  NSVT    - History of VT    - On Amiodarone Outpatient (On Amiodarone gtt- Started on 8.28.22 Morning)  Ileus versus Small Bowel Obstruction  --SBFT 8/30/22  Hyperlipidemia  Chronic Venous Insufficiency  Elevated BMI/Obesity  COPD  Former Smoker    - Quit 3 Years Ago  Anemia  IRENE (Resolved)  Leukocytosis (Resolved)  Elevated LFT(S) (Resolved)  Thrombocytopenia  Sore Throat/Dysphagia ? Esophagitis  Hyperkalemia  --K+ 5.6    Plan:   PLAN:  Continue Amiodarone IV at Set Rate 0.5 Mg/Min Re: NSVT (Unable to Tolerate Oral Medications)  All Oral Medications are on hold given possible Ileus/SBO (Surgical Team is Following)  Wean Vasopressor Support as able for Goal MAP 65 or Greater  Plan resumption/Optimization of HF Medications when/if BP Tolerates  Continue Supportive Care    Yluy Mccray, KIMBERLI  Cardiology  [facility] - [address] [place] [address] ICU  08/30/2022

## 2022-08-31 PROBLEM — L89.320 PRESSURE ULCER OF LEFT BUTTOCK, UNSTAGEABLE: Status: ACTIVE | Noted: 2022-01-01

## 2022-08-31 PROBLEM — L89.320 PRESSURE INJURY OF LEFT BUTTOCK, UNSTAGEABLE: Status: ACTIVE | Noted: 2022-01-01

## 2022-08-31 PROBLEM — R19.7 DIARRHEA: Status: ACTIVE | Noted: 2022-01-01

## 2022-08-31 PROBLEM — L89.300: Status: ACTIVE | Noted: 2022-01-01

## 2022-08-31 PROBLEM — M79.602 LEFT ARM PAIN: Status: ACTIVE | Noted: 2022-01-01

## 2022-08-31 PROBLEM — A41.9 SEPSIS: Status: ACTIVE | Noted: 2022-01-01

## 2022-08-31 NOTE — HPI
"[facility] - [facility] ICU  Wound Care  Consult Note    Patient Name: Zackery Osullivan Jr.  MRN: [id number]  Admission Date: 8/22/2022  Hospital Length of Stay: 9 days  Attending Physician: Rowdy Davis IV, MD  Primary Care Provider: Isaiah Meeks MD     Consults  Subjective:     History of Present Illness:  Sacral pressure ulcer   HPI: Zackery Osullivan is a 76 yo M who underwent 2v CABG + MVR on 8/22/22. Post-operatively,  he developed epigastric discomfort associated with hiccups and eventually vomiting. He was found to have an ileus  at that time.  The patient tells me that he has since had atleast 7 episodes of diarrhea and consequently had a rectal tube placed last night. He tells me that on day 3 of hospitalization it was noted that he had several midline and left inner gluteal ulcerations. He has also been persistently hypotensive and is still on a pressor agent.  The patient tells me his left hand began to "swell bc he was so full of fluid" but I am told by his RN that he has a DVT in that arm.  He does have numbness along all finger pads of tatianna hand (which has very pale and non-blanching nailbeds). He is alert and interactive on exam. Tells me he lives in [place] at his home. Has a slight cough with wheeze on my exam today.  He voices no other complaints.   PMH: HTN, HLD, CAD, ischemic CM, MR, CVA, COPD, HEATHER, spinal arthritis,Venous insufficiency, Ventricular tachycardia, GERD  PSH: CABG, MVR, defibrillator implantation and removal, tonsillectomy, cataract extraction  Home Meds: Toprol, Entresto, Aldactone, Lasix, Lipitor, ezetimide, ASA, Plavix, amiodarone, inhalers  Allergies: NKDA   Social Hx: Former smoker (55 pack year hx); 3 EtOH drinks/week (wine)  Relevant Family Hx: None     Physical Exam  Assessment/Plan:     Assessment & plan notes cannot be loaded without a specified hospital service.      Thank you for your consult. {SIGN OFF/FOLLOW-UP:43569}    Sharon Cabral, DO  Wound " Care [facility] - [address] [place] [address]

## 2022-08-31 NOTE — PLAN OF CARE
Acute Care Surgery Plan of Care    SBFT performed today with delayed small bowel transit time but no small bowel obstruction. Pt had multiple bowel movements. NGT removed and started on clear liquid diet. Ok to advance diet as tolerated. No surgical intervention indicated. General surgery will sign off, please page with questions or concerns.      Urszula Ortega MD  [employer] Surgery, PGY2

## 2022-08-31 NOTE — PLAN OF CARE
Problem: Adult Inpatient Plan of Care  Goal: Plan of Care Review  Outcome: Ongoing, Progressing  Goal: Patient-Specific Goal (Individualized)  Outcome: Ongoing, Progressing  Goal: Absence of Hospital-Acquired Illness or Injury  Outcome: Ongoing, Progressing  Goal: Optimal Comfort and Wellbeing  Outcome: Ongoing, Progressing  Goal: Readiness for Transition of Care  Outcome: Ongoing, Progressing     Problem: Infection  Goal: Absence of Infection Signs and Symptoms  Outcome: Ongoing, Progressing     Problem: Communication Impairment (Mechanical Ventilation, Invasive)  Goal: Effective Communication  Outcome: Met     Problem: Device-Related Complication Risk (Mechanical Ventilation, Invasive)  Goal: Optimal Device Function  Outcome: Met     Problem: Nutrition Impairment (Mechanical Ventilation, Invasive)  Goal: Optimal Nutrition Delivery  Outcome: Met     Problem: Skin and Tissue Injury (Mechanical Ventilation, Invasive)  Goal: Absence of Device-Related Skin and Tissue Injury  Outcome: Met     Problem: Ventilator-Induced Lung Injury (Mechanical Ventilation, Invasive)  Goal: Absence of Ventilator-Induced Lung Injury  Outcome: Met     Problem: Communication Impairment (Artificial Airway)  Goal: Effective Communication  Outcome: Met     Problem: Device-Related Complication Risk (Artificial Airway)  Goal: Optimal Device Function  Outcome: Met     Problem: Skin and Tissue Injury (Artificial Airway)  Goal: Absence of Device-Related Skin or Tissue Injury  Outcome: Met     Problem: Noninvasive Ventilation Acute  Goal: Effective Unassisted Ventilation and Oxygenation  Outcome: Ongoing, Progressing     Problem: Fall Injury Risk  Goal: Absence of Fall and Fall-Related Injury  Outcome: Ongoing, Progressing     Problem: Skin Injury Risk Increased  Goal: Skin Health and Integrity  Outcome: Ongoing, Progressing     Problem: Impaired Wound Healing  Goal: Optimal Wound Healing  Outcome: Ongoing, Progressing

## 2022-08-31 NOTE — PROGRESS NOTES
[facility]  Pulmonary Critical Care Note    Patient Name: Zackery Osullivan Jr.  MRN: [id number]  Admission Date: 8/22/2022  Hospital Length of Stay: 9 days  Code Status: Full Code  Attending Provider: Rowdy Davis IV, MD  Primary Care Provider: Isaiah Meeks MD     Subjective:     HPI: 77-year-old  male with past medical history of CAD, HTN, HLD, and HFrEF who presented to [facility] for a CABG x2 and mitral valve replacement. Received 2 units pRBCs intraop. Initially admitted to ICU for post-op monitoring.  Patient transferred out of ICU once extubated and hemodynamically stable off vasopressors on 8/25/22. On 8/27/22, patient reporting throat pain, nausea and constipation. Unable to keep down food without vomiting. Unable to tolerate PO meds. GI consulted. During this time, patient also having low/normal blood pressures. Had 17 beat run of V-tach evening of 8/27/22 in which amiodarone IV started since patient unable to tolerate PO medications. Morning of 8/28/22, MAPs <65 despite fluid resuscitation. Patient to be upgraded to ICU 8/28/22 due to need for vasopressor support and close monitoring. CT abdomen with ileus. Small bowel follow through on 8/30 with no obstruction but delayed transit.     24 Hour Interval History:   Still on 0.04mcg/kg/min of levophed. Left forearm swollen more than the right side. Had some diarrhea yesterday.     Past Medical History:   Diagnosis Date    Arthritis     spine    CAD (coronary artery disease)     COPD (chronic obstructive pulmonary disease)     HLD (hyperlipidemia)     HTN (hypertension)     Ischemic cardiomyopathy     Mitral regurgitation     HEATHER on CPAP     Stroke     Venous insufficiency     Ventricular tachycardia        Past Surgical History:   Procedure Laterality Date    bilateral inguinal stents      CARDIAC DEFIBRILLATOR PLACEMENT Left     CATARACT EXTRACTION Bilateral     CATHETERIZATION OF BOTH LEFT AND RIGHT HEART   2022    Diagnostic Only; Dr. Gerardo    COLONOSCOPY      CORONARY ARTERY BYPASS GRAFT (CABG) N/A 2022    Procedure: CORONARY ARTERY BYPASS GRAFT (CABG);  Surgeon: Rowdy Davis IV, MD;  Location: [facility] OR;  Service: Cardiovascular;  Laterality: N/A;  possible MVR & LLAA  //  ECHO NOTIFIED    LEFT HEART CATHETERIZATION Left 2022    Procedure: CATHETERIZATION, HEART, LEFT;  Surgeon: Reinaldo Gerardo MD;  Location: [facility] CATH LAB;  Service: Cardiology;  Laterality: Left;  LHC +/- PCI    MITRAL VALVE REPLACEMENT N/A 2022    Procedure: REPLACEMENT, MITRAL VALVE;  Surgeon: Rowdy Davis IV, MD;  Location: [facility] OR;  Service: Cardiovascular;  Laterality: N/A;  possible MVR and LLAA    REMOVAL OF IMPLANTED CARDIOVERTER-DEFIBRILLATOR (ICD)      TONSILLECTOMY         Social History     Socioeconomic History    Marital status:    Tobacco Use    Smoking status: Former     Years: 55.00     Types: Cigarettes     Start date:      Quit date:      Years since quittin.6    Smokeless tobacco: Never   Substance and Sexual Activity    Alcohol use: Yes     Alcohol/week: 3.0 standard drinks     Types: 3 Glasses of wine per week    Drug use: Never           Current Outpatient Medications   Medication Instructions    amiodarone (PACERONE) 200 mg, Oral, 2 times daily    aspirin (ECOTRIN) 81 mg, Oral, Nightly    atorvastatin (LIPITOR) 80 mg, Oral, Nightly    clopidogreL (PLAVIX) 75 mg, Oral, Nightly    ezetimibe (ZETIA) 10 mg, Oral, Nightly    fluticasone propionate (FLONASE) 50 mcg/actuation nasal spray 1 spray, Each Nostril, Daily PRN    furosemide (LASIX) 20 mg, Oral, Daily    metoprolol succinate (TOPROL-XL) 25 MG 24 hr tablet 1 tablet, Oral, Daily    mometasone (ASMANEX TWISTHALER) 220 mcg/ actuation (30) inhaler 2 puffs, Inhalation, Daily, Controller    sacubitriL-valsartan (ENTRESTO) 49-51 mg per tablet 0.5 tablets, Oral, 2 times daily    spironolactone (ALDACTONE) 12.5 mg, Oral, Daily     umeclidinium-vilanteroL (ANORO ELLIPTA) 62.5-25 mcg/actuation DsDv 1 puff, Inhalation, Daily, Controller       Current Inpatient Medications   albuterol-ipratropium  3 mL Nebulization Q6H    aspirin  81 mg Oral Daily    atorvastatin  80 mg Oral QHS    budesonide  0.5 mg Nebulization Q12H    collagenase   Topical (Top) BID    docusate sodium  100 mg Oral BID    enoxaparin  40 mg Subcutaneous Daily    folic acid  1 mg Oral Daily    loperamide  4 mg Oral Once    midodrine  5 mg Oral TID WM    pantoprazole  40 mg Intravenous BID    polyethylene glycol  17 g Oral Daily    sodium chloride 0.9%  10 mL Intravenous Q6H       Current Intravenous Infusions   amiodarone in dextrose 5% 0.5 mg/min (08/31/22 0019)    NORepinephrine bitartrate-D5W 0.04 mcg/kg/min (08/31/22 0745)         Review of Systems   Constitutional:  Negative for chills and diaphoresis.   Respiratory:  Positive for shortness of breath and wheezing. Negative for cough.    Cardiovascular:  Negative for chest pain and palpitations.   Gastrointestinal:  Positive for abdominal pain.        Objective:       Intake/Output Summary (Last 24 hours) at 8/31/2022 0927  Last data filed at 8/31/2022 0500  Gross per 24 hour   Intake 2067.07 ml   Output 1950 ml   Net 117.07 ml       Vital Signs (Most Recent):  Temp: 98.1 °F (36.7 °C) (08/31/22 0800)  Pulse: 79 (08/31/22 0845)  Resp: (!) 23 (08/31/22 0845)  BP: (!) 99/58 (08/31/22 0845)  SpO2: 98 % (08/31/22 0845)    Body mass index is 30.24 kg/m².  Weight: 92.6 kg (204 lb 2.3 oz) Vital Signs (24h Range):  Temp:  [97 °F (36.1 °C)-98.6 °F (37 °C)] 98.1 °F (36.7 °C)  Pulse:  [] 79  Resp:  [13-31] 23  SpO2:  [73 %-100 %] 98 %  BP: ()/(27-86) 99/58     Physical Exam  General:  male lying in bed  HEENT: NC/AT; PERRL; nasal and oral mucosa moist and clear  Neck: Full ROM; no lymphadenopathy  Pulm:diminished in bases  CV: S1, S2 w/o murmurs or gallops; 1+ edema in lower extremities  GI: Abdomen distended with  tenderness; bowel sound present  MSK: Full ROM of all extremities  Derm: Midline chest incision clean and intact w/o signs of infection  Neuro: AAOx4; motor/sensory function intact      Lines/Drains/Airways       Peripherally Inserted Central Catheter Line  Duration             PICC Triple Lumen 08/28/22 1516 right brachial 2 days              Drain  Duration                  Rectal Tube 08/31/22 0225 fecal management system <1 day                    Significant Labs:    Lab Results   Component Value Date    WBC 23.9 (H) 08/31/2022    HGB 10.1 (L) 08/31/2022    HCT 33.1 (L) 08/31/2022    MCV 97.9 (H) 08/31/2022     (L) 08/31/2022         BMP  Lab Results   Component Value Date     08/31/2022    K 4.6 08/31/2022    CO2 25 08/31/2022    BUN 31.2 (H) 08/31/2022    CREATININE 0.91 08/31/2022    CALCIUM 7.8 (L) 08/31/2022    EGFRNONAA >60 05/14/2020       ABG  No results for input(s): PH, PO2, PCO2, HCO3, BE in the last 168 hours.        Significant Imaging:  CXR this AM per my review with left upper lobe opacity, possible mucous plugging and few scattered right sided opacities    Assessment/Plan:     Assessment  Small bowel ileus  Hypovolemic shock requiring vasopressors  Acute kidney injury   CAD s/p 2v CABG 08/22/2022  Mitral regurgitation s/p bioprosthetic MVR 08/22/2022  History of VT  HFrEF (EF40% on 8/28/2022)  Left upper lobe pulmonary opacity, likely mucous plugging    Plan  -Continue ICU level of care for ongoing monitoring and vasopressor support  -Per cardiology, wean vasopressor support if patient is able to maintain MAP >65; continue Amiodarone drip  -US NIVA LUE for swelling  -Adding mucolytics, will obtain respiratory culture if possible  -Plans for CT chest once more stable to evaluate pulmonary nodules     Code status: Full  DVT Prophylaxis: Lovenox  GI Prophylaxis: Protonix       KIMBERLI Aguilar  Pulmonary Critical Care Medicine  [facility] - [facility] ICU

## 2022-08-31 NOTE — NURSING
WOCN follow-up. Consult sent to wound clinic with new orders in place.   Defer to clinic for changes.

## 2022-08-31 NOTE — CONSULTS
"[facility]  Wound Care  Consult Note    Patient Name: Zackery Osullivan Jr.  MRN: [id number]  Admission Date: 8/22/2022  Hospital Length of Stay: 18 days  Attending Physician: No att. providers found  Primary Care Provider: Isaiah Meeks MD       Subjective:     History of Present Illness:  [facility]  Wound Care  Consult Note    Patient Name: Zackery Osullivan Jr.  MRN: [id number]  Admission Date: 8/22/2022  Hospital Length of Stay: 9 days  Attending Physician: Rowdy Davis IV, MD  Primary Care Provider: Isaiah Meeks MD     Consults  Subjective:     History of Present Illness:  Sacral pressure ulcer   HPI: Zackery Osullivan is a 76 yo M who underwent 2v CABG + MVR on 8/22/22. Post-operatively,  he developed epigastric discomfort associated with hiccups and eventually vomiting. He was found to have an ileus  at that time.  The patient tells me that he has since had atleast 7 episodes of diarrhea and consequently had a rectal tube placed last night. He tells me that on day 3 of hospitalization it was noted that he had several midline and left inner gluteal ulcerations. He has also been persistently hypotensive and is still on a pressor agent.  The patient tells me his left hand began to "swell bc he was so full of fluid" but I am told by his RN that he has a DVT in that arm.  He does have numbness along all finger pads of tatianna hand (which has very pale and non-blanching nailbeds). He is alert and interactive on exam. Tells me he lives in [place] at his home. Has a slight cough with wheeze on my exam today.  He voices no other complaints.   PMH: HTN, HLD, CAD, ischemic CM, MR, CVA, COPD, HEATHER, spinal arthritis,Venous insufficiency, Ventricular tachycardia, GERD  PSH: CABG, MVR, defibrillator implantation and removal, tonsillectomy, cataract extraction  Home Meds: Toprol, Entresto, Aldactone, Lasix, Lipitor, ezetimide, ASA, Plavix, amiodarone, " inhalers  Allergies: NKDA   Social Hx: Former smoker (55 pack year hx); 3 EtOH drinks/week (wine)  Relevant Family Hx: None              I will sign off. Please contact us if you have any additional questions.      Scheduled Meds:   acetylcysteine 200 mg/ml (20%)  2 mL Nebulization TID    albuterol-ipratropium  3 mL Nebulization Q6H    [START ON 9/5/2022] amiodarone  200 mg Oral BID    [START ON 9/10/2022] amiodarone  200 mg Oral Daily    amiodarone  400 mg Oral BID    aspirin  81 mg Oral Daily    atorvastatin  80 mg Oral QHS    budesonide  0.5 mg Nebulization Q12H    ceFEPime (MAXIPIME) IVPB  2 g Intravenous Q8H    collagenase   Topical (Top) BID    docusate sodium  100 mg Oral BID    enoxaparin  1 mg/kg Subcutaneous Q12H    folic acid  1 mg Oral Daily    loperamide  4 mg Oral Once    midodrine  5 mg Oral TID WM    pantoprazole  40 mg Intravenous BID    polyethylene glycol  17 g Oral Daily    sodium chloride 0.9%  10 mL Intravenous Q6H     Continuous Infusions:   NORepinephrine bitartrate-D5W 0.02 mcg/kg/min (08/31/22 1030)     PRN Meds:sodium chloride, acetaminophen, calcium gluconate IVPB, calcium gluconate IVPB, dextrose 10%, dextrose 50%, HYDROcodone-acetaminophen, magnesium sulfate IVPB, magnesium sulfate IVPB, morphine, ondansetron, oxyCODONE, potassium chloride in water, potassium chloride in water, potassium chloride in water, sodium phosphate IVPB, sodium phosphate IVPB, sodium phosphate IVPB    Review of patient's allergies indicates:  No Known Allergies     Past Medical History:   Diagnosis Date    Arthritis     spine    CAD (coronary artery disease)     COPD (chronic obstructive pulmonary disease)     HLD (hyperlipidemia)     HTN (hypertension)     Ischemic cardiomyopathy     Mitral regurgitation     HEATHER on CPAP     Stroke     Venous insufficiency     Ventricular tachycardia      Past Surgical History:   Procedure Laterality Date    bilateral inguinal stents      CARDIAC DEFIBRILLATOR PLACEMENT Left      "CATARACT EXTRACTION Bilateral     CATHETERIZATION OF BOTH LEFT AND RIGHT HEART  2022    Diagnostic Only; Dr. Gerardo    COLONOSCOPY      CORONARY ARTERY BYPASS GRAFT (CABG) N/A 2022    Procedure: CORONARY ARTERY BYPASS GRAFT (CABG);  Surgeon: Rowdy Davis IV, MD;  Location: [facility] OR;  Service: Cardiovascular;  Laterality: N/A;  possible MVR & LLAA  //  ECHO NOTIFIED    LEFT HEART CATHETERIZATION Left 2022    Procedure: CATHETERIZATION, HEART, LEFT;  Surgeon: Reinaldo Gerardo MD;  Location: [facility] CATH LAB;  Service: Cardiology;  Laterality: Left;  LHC +/- PCI    MITRAL VALVE REPLACEMENT N/A 2022    Procedure: REPLACEMENT, MITRAL VALVE;  Surgeon: Rowdy Davis IV, MD;  Location: [facility] OR;  Service: Cardiovascular;  Laterality: N/A;  possible MVR and LLAA    REMOVAL OF IMPLANTED CARDIOVERTER-DEFIBRILLATOR (ICD)      TONSILLECTOMY         Family History    None       Tobacco Use    Smoking status: Former     Years: 55.00     Types: Cigarettes     Start date:      Quit date:      Years since quittin.6    Smokeless tobacco: Never   Substance and Sexual Activity    Alcohol use: Yes     Alcohol/week: 3.0 standard drinks     Types: 3 Glasses of wine per week    Drug use: Never    Sexual activity: Not on file     Review of Systems   Constitutional:  Positive for activity change and fatigue.   HENT:  Positive for trouble swallowing.    Eyes: Negative.    Respiratory:  Positive for wheezing.    Cardiovascular: Negative.    Gastrointestinal:  Positive for diarrhea.   Musculoskeletal:  Positive for myalgias.        Puffy and pale left hand distal wrist,full tendon /nerve function but "numb along fingertips. Very pale nailbeds with delayed cap refill left hand. Palpable radial/ulna wrist pulses.   Skin:  Wound: 3 elliptical wounds butttock, midline and left inner gluteal. black eschar/yellow exudate noted on 2. periwound pink skin changes. left volar side left forearm red as well " /abrasions left elbow.   Neurological:  Positive for weakness.   Hematological:  Bruises/bleeds easily.   Psychiatric/Behavioral: Negative.       Objective:     Vital Signs (Most Recent):  Temp: 97.5 °F (36.4 °C) (08/31/22 1200)  Pulse: 80 (08/31/22 1300)  Resp: 19 (08/31/22 1300)  BP: (!) 115/55 (08/31/22 1300)  SpO2: 99 % (08/31/22 1300)   Vital Signs (24h Range):  Temp:  [97 °F (36.1 °C)-98.6 °F (37 °C)] 97.5 °F (36.4 °C)  Pulse:  [] 80  Resp:  [13-31] 19  SpO2:  [73 %-100 %] 99 %  BP: ()/(27-77) 115/55     Weight: 92.6 kg (204 lb 2.3 oz)  Body mass index is 30.24 kg/m².  Physical Exam  Vitals and nursing note reviewed.   Constitutional:       Appearance: He is normal weight.   HENT:      Head: Normocephalic and atraumatic.      Nose: Nose normal.      Mouth/Throat:      Mouth: Mucous membranes are moist.   Eyes:      Extraocular Movements: Extraocular movements intact.      Pupils: Pupils are equal, round, and reactive to light.   Cardiovascular:      Rate and Rhythm: Normal rate and regular rhythm.   Pulmonary:      Breath sounds: Wheezing present.      Comments: No distress but exp wheezing herd upper lung fields  Abdominal:      Palpations: Abdomen is soft.   Musculoskeletal:         General: Swelling (left hand with puffines to top hand and fingers, pale skin color compared to right) present.      Cervical back: Normal range of motion and neck supple.   Skin:     Findings: Lesion (midline buttock 2 wounds unstagable with black eschar / midline with pink full thickness ulcer/left inner gluteal cheek with black eschar as well) present.   Neurological:      General: No focal deficit present.      Mental Status: He is alert and oriented to person, place, and time.   Psychiatric:         Mood and Affect: Mood normal.       Laboratory:  A1C: No results for input(s): HGBA1C in the last 4320 hours.  Blood Cultures: No results for input(s): LABBLOO in the last 48 hours.  CBC:   Recent Labs   Lab  "08/31/22  0824   WBC 23.9*   RBC 3.38*   HGB 10.1*   HCT 33.1*   *   MCV 97.9*   MCH 29.9   MCHC 30.5*     CMP:   Recent Labs   Lab 08/29/22  1221 08/30/22  0601 08/31/22  0553   CALCIUM  --    < > 7.8*   ALBUMIN 2.5*  --   --    NA  --    < > 140   K  --    < > 4.6   CO2  --    < > 25   BUN  --    < > 31.2*   CREATININE  --    < > 0.91   ALKPHOS 72  --   --    ALT 52  --   --    AST 47*  --   --    BILITOT 1.2  --   --     < > = values in this interval not displayed.     Coagulation: No results for input(s): PT, INR, APTT in the last 168 hours.  Prealbumin: No results for input(s): PREALBUMIN in the last 48 hours.  All pertinent labs reviewed within the last 24 hours.    Diagnostic Results:  I have reviewed all pertinent imaging results/findings within the past 24 hours.    Plan:   I am seeing Mr Osullivan for first time today . He has pressure ulcers to his buttock region that I can not establish whether they were here on admission or developed while inpatient. He did have a tami course post valve replacement surgery where he received blood and required pressor agents. I reviewed his labs, and most recent chest film. He is not a diabetic but does have a low albumin. I will add a PAB to his inpatient lab.  Multivitamin, vitamin D and C would help once he is on a regular diet.   His sacral wounds are certainly exacerbated by immobliity and diarrhea. He currently has a rectal tube.  I debrided his wounds and found most of them to be unstagable at this time as he has dark, hard eschar noted which may mask how deep the wound bed truly is. He has one full thickness ulcer midline as well.  I think we can try conservative treatment as they are "newly" diagnosed and see if with daily wound dressings, offloading, nutritional supplementation if labs warrant it, and regular wound care he heals his wounds.   I will look to see if C Diff has been ordered. He did have loose brown stool all over casey pad and buttock on my " exam today despite the rectal tube.   I instructed the nurse to continue to turn him q 2 hours as well as order a low airloss mattress if possible.   We removed wrap from around left hand so to better keep eye on swelling and paleness to left hand/fingers. Nurse to monitor q shift and notify admit doctor on daily basis. I applied Mepilex foam border to left elbow and hand skin abrasions.  We will give wound care orders to wound care team to carry out while inpatient and continue to follow in clinic once discharged should he desire.     Total time reviewing chart, interviewing and examining patient as well as making recommendations: 45 minutes.  Thank you for this consult.     Assessment/Plan:     Pressure injury of left buttock, unstageable  Off load, debride, make wound care recommendations, evaluate nutritional status    Pressure ulcer of buttock, unstageable  Offload, debride, wound dressings recommended, nutritional status evaluated      Thank you for your consult.     Sharon Cabral, DO  Wound Care  [facility]

## 2022-08-31 NOTE — PT/OT/SLP PROGRESS
Physical Therapy Treatment    Patient Name:  Zackery Osullivan Jr.   MRN:  [id number]    Recommendations:     Discharge Recommendations:  rehabilitation facility   Discharge Equipment Recommendations: walker, rolling   Barriers to discharge: None    Assessment:     Zackery Osullivan Jr. is a 77 y.o. male admitted with a medical diagnosis of CABG 8/22.  He presents with the following impairments/functional limitations:  weakness, gait instability, impaired endurance, impaired balance, impaired cardiopulmonary response to activity, impaired functional mobility, impaired self care skills .    Pt sitting up in bed reporting weakness and fatigue. Agreed to tx session, declined the chair today. Noted new LUE DVT, cleared to continue therapy. Decrease in BP and reported dizziness with standing activities.     Rehab Prognosis: Fair; patient would benefit from acute skilled PT services to address these deficits and reach maximum level of function.    Recent Surgery: Procedure(s) (LRB):  CORONARY ARTERY BYPASS GRAFT (CABG) (N/A)  REPLACEMENT, MITRAL VALVE (N/A) 9 Days Post-Op    Plan:     During this hospitalization, patient to be seen 6 x/week to address the identified rehab impairments via gait training, therapeutic activities, therapeutic exercises and progress toward the following goals:    Plan of Care Expires:  09/24/22    Subjective     Chief Complaint: generalized complaints  Patient/Family Comments/goals: to get stronger and walk  Pain/Comfort:  Pain Rating 1: other (see comments) (Generalized complaints like numb feet and weak LUE; no pain reported)      Objective:     Communicated with [facility], prior to session.  Patient found HOB elevated with blood pressure cuff, bowel management system, oxygen, SCD, telemetry, pulse ox (continuous) upon PT entry to room.     General Precautions: Standard, sternal   Orthopedic Precautions:N/A   Braces: N/A  Respiratory Status: Nasal cannula, flow 3 L/min  Vitals:       HR: 71  BP  after t/f   BP: 103/47 98/38   SPO2: 100%     Functional Mobility:  Bed Mobility:     Supine to Sit: maximal assistance  Sit to Supine: maximal assistance  Transfers:     Sit to Stand:  moderate assistance with rolling walker  Gait: Side steps at EOB, reported dizziness.  Activity limited by fatigue and dizziness.      Patient left HOB elevated with all lines intact and call button in reach..    GOALS:   Multidisciplinary Problems       Physical Therapy Goals          Problem: Physical Therapy    Goal Priority Disciplines Outcome Goal Variances Interventions   Physical Therapy Goal     PT, PT/OT Ongoing, Progressing     Description: Goals to be met by: 22     Patient will increase functional independence with mobility by performin. Supine to sit with MInimal Assistance  2. Sit to supine with MInimal Assistance  3. Sit to stand transfer with Minimal Assistance  4. Gait  x 150 feet with Minimal Assistance using Rolling Walker.                          Time Tracking:     PT Received On: 22  PT Start Time: 1427     PT Stop Time: 1454  PT Total Time (min): 27 min     Billable Minutes: Therapeutic Activity 2 units    Treatment Type: Treatment  PT/PTA: PTA     PTA Visit Number: 4     2022

## 2022-08-31 NOTE — PROGRESS NOTES
[facility] - [facility]  Cardiology  Progress Note    Patient Name: Zackery Osullivan Jr.  MRN: [id number]  Admission Date: 8/22/2022  Hospital Length of Stay: 9 days  Code Status: Full Code   Attending Physician: Rowdy Davis IV, MD   Primary Care Physician: Isaiah Meeks MD  Expected Discharge Date:   Principal Problem:<principal problem not specified>    Subjective:     Brief HPI:   Mr. Osullivan is a 77 year old male, known to Dr. Gerardo and Dr. Flowers, who presented to the hospital and underwent CAD/CABG and MVR due to diagnosis of MV CAD and significant MRLouisa Also underwent LISA Ligation. Patient tolerated the surgery well, and was transferred to intensive care unit for further close post operative monitoring. [facility] is consulted for post op surgical cardiac management.    Hospital Course:   8.25.22: NAD noted. VSS with low normal BP. SR on tele. Denies SOB/Palps, +incisional CP.  8.26.22: NAD noted. VSS with low normal BP. SR on tele. Sitting in chair at bedside. Denies SOB/Palps, endorses incisional CP.  8.27.22: NAD Noted. Reports throat discomfort. SR on Tele. BP Low Normal.  8.28.22: NAD Noted. BP Low Normal. SR on Tele. GI Following. NSVT overnight.  SR 83.  8.29.22: NAD Noted. Patient with NG Tube. Surgical Team is following for evaluation of Ileus versus SBO. Hypotensive requiring low dose Levophed. SR on Tele. Amiodarone gtt is infusing. Started for NSVT, which he did not have last night according to the RN.  8.30.22: Remains NPO. Plans for small bowel follow through today  8.31.22: Patient underwent SBFT without evidence of SBO. He was given suppository with multiple BMs overnight. NGT has been removed and he is tolerating oral diet. Amiodarone drip in progress. Continues to require pressor support. He has been started on midodrine. Urine is dark. Patient is having elevated WBCs and has been started on ABX. Patient c/o pain/swelling to LUE       PMH: CAD (Multivessel), Ischemic  Cardiomyopathy (ICD), VHD- MR, Hypertension, Hyperlipidemia, Chronic VI, COPD, Smoker, Obstructive Sleep Apnea, NSVT, Obesity  PSH: CABG/MVR, Skin Lesion, ICD Placement ([employer]), Tonsillectomy  Family History: Father- CAD, Brother- CAD, Sister- CAD  Social History: Tobacco- Former Smoker (Quit 3 Years Ago), Alcohol- Negative, Substance Abuse- Negative     Previous Cardiac Diagnostics:   Echocardiogram (8.28.22):  The left ventricle is mildly enlarged with concentric hypertrophy and mildly reduced LV systolic function.  The estimated ejection fraction is 40%.  Indeterminate left ventricular diastolic function due to MVR.  Aneurysmal basal inferior wall/ RCA territory  Well seated bioprosthetic MV without significant stenosis or perivalvular leak. MG 4 mmHg.  Mild to moderate tricuspid regurgitation.  Mild left atrial enlargement.    CABG/MVR (8.22.22): MVR 29 mm Mosaic Porcine Valve; CABG LIMA to LAD & SVG to OM, LISA Ligation with Endo Stapler.     Left Heart Catheterization (8.2.22):  Left Main- 50% Distal Disease, LAD- 60% Proximal IFR 0.81, LCx- Nondominant (, Diagonal to OM Collateral), RCA- Dominant with , Bilateral Common Iliacs 30% Calcified Stenosis.     Echocardiogram (4.18.22):  The study quality is average.   The left ventricle is normal in size. Global left ventricular systolic function is mildly decreased. The left ventricular ejection fraction is 40%. Left ventricular diastolic function is normal. Noted left ventricular hypertrophy. Asymmetric septal left ventricular hypertrophy is present. It is mild.   Mild to moderate (1-2+) mitral regurgitation. Somewhat eccentric jet noted, chordae appear hyperechoic and thickened.      Review of Systems   Constitutional: Negative.   Cardiovascular:  Negative for chest pain and dyspnea on exertion.   Respiratory:  Negative for cough and shortness of breath.    Skin:         Left forearm reddened/swollen   Gastrointestinal:  Positive for diarrhea.    Genitourinary: Negative.    Neurological: Negative.      Objective:     Vital Signs (Most Recent):  Temp: 98.1 °F (36.7 °C) (08/31/22 0800)  Pulse: 72 (08/31/22 1030)  Resp: 18 (08/31/22 1030)  BP: (!) 114/58 (08/31/22 1030)  SpO2: 98 % (08/31/22 1030) Vital Signs (24h Range):  Temp:  [97 °F (36.1 °C)-98.6 °F (37 °C)] 98.1 °F (36.7 °C)  Pulse:  [] 72  Resp:  [13-31] 18  SpO2:  [73 %-100 %] 98 %  BP: ()/(27-77) 114/58     Weight: 92.6 kg (204 lb 2.3 oz)  Body mass index is 30.24 kg/m².    SpO2: 98 %  O2 Device (Oxygen Therapy): nasal cannula      Intake/Output Summary (Last 24 hours) at 8/31/2022 1106  Last data filed at 8/31/2022 0500  Gross per 24 hour   Intake 2067.07 ml   Output 1950 ml   Net 117.07 ml         Lines/Drains/Airways       Peripherally Inserted Central Catheter Line  Duration             PICC Triple Lumen 08/28/22 1516 right brachial 2 days              Drain  Duration                  Rectal Tube 08/31/22 0225 fecal management system <1 day                    Significant Labs:   CMP   Recent Labs   Lab 08/29/22  1221 08/30/22  0601 08/31/22  0553   NA  --  135* 140   K  --  5.6* 4.6   CO2  --  23 25   BUN  --  30.6* 31.2*   CREATININE  --  1.04 0.91   CALCIUM  --  7.7* 7.8*   ALBUMIN 2.5*  --   --    BILITOT 1.2  --   --    ALKPHOS 72  --   --    AST 47*  --   --    ALT 52  --   --       and CBC   Recent Labs   Lab 08/31/22  0553 08/31/22  0824   WBC 21.1* 23.9*   HGB 10.1* 10.1*   HCT 33.2* 33.1*    129*         Telemetry:  Sinus Rhythm     Physical Exam:  Physical Exam  Vitals reviewed.   Constitutional:       Appearance: Normal appearance.   HENT:      Head: Normocephalic.      Mouth/Throat:      Mouth: Mucous membranes are moist.   Cardiovascular:      Rate and Rhythm: Normal rate and regular rhythm.      Heart sounds: Normal heart sounds.   Pulmonary:      Effort: Pulmonary effort is normal. No respiratory distress.      Breath sounds: Normal breath sounds.   Abdominal:       General: There is no distension.      Tenderness: There is no abdominal tenderness.      Comments: NG Tube removed   Genitourinary:     Comments: Urine dark  Musculoskeletal:         General: Normal range of motion.      Cervical back: Neck supple.      Comments: Midsternal Incision intact   Skin:     General: Skin is warm and dry.      Capillary Refill: Capillary refill takes less than 2 seconds.   Neurological:      General: No focal deficit present.      Mental Status: He is alert and oriented to person, place, and time.   Psychiatric:         Behavior: Behavior normal.       Current Inpatient Medications:    Current Facility-Administered Medications:     0.9%  NaCl infusion (for blood administration), , Intravenous, Q24H PRN, Rowdy Davis IV, MD    acetaminophen oral solution 650 mg, 650 mg, Per OG tube, Q6H PRN, Rowdy Davis IV, MD    acetylcysteine 200 mg/ml (20%) solution 2 mL, 2 mL, Nebulization, TID, KIMBERLI Aguilar    albuterol-ipratropium 2.5 mg-0.5 mg/3 mL nebulizer solution 3 mL, 3 mL, Nebulization, Q6H, Marlon Humphreys MD, 3 mL at 08/31/22 0818    amiodarone 360 mg/200 mL (1.8 mg/mL) infusion, 0.5 mg/min, Intravenous, Continuous, KIMBERLI Pratt, Last Rate: 16.7 mL/hr at 08/31/22 0019, 0.5 mg/min at 08/31/22 0019    aspirin EC tablet 81 mg, 81 mg, Oral, Daily, Rowdy Davis IV, MD, 81 mg at 08/31/22 0843    atorvastatin tablet 80 mg, 80 mg, Oral, QHS, KIMBERLI Pratt, 80 mg at 08/30/22 2025    budesonide nebulizer solution 0.5 mg, 0.5 mg, Nebulization, Q12H, Marlon Humphreys MD, 0.5 mg at 08/31/22 0818    calcium gluconate 1 g in NS IVPB (premixed), 1 g, Intravenous, PRN, Rowdy Davis IV, MD    calcium gluconate 1 g in NS IVPB (premixed), 3 g, Intravenous, PRN, Rowdy Davis IV, MD    ceFEPIme (MAXIPIME) 2 g in dextrose 5 % in water (D5W) 5 % 50 mL IVPB (MB+), 2 g, Intravenous, Q8H, Fidel Mckeon MD    collagenase ointment, , Topical (Top), BID, Rowdy Davis  MD RAFY, Given at 08/31/22 0843    dextrose 10% bolus 250 mL, 25 g, Intravenous, PRN, Rowdy Davis IV, MD    dextrose 50% injection 12.5 g, 12.5 g, Intravenous, PRN, Rowdy Davis IV, MD, 12.5 g at 08/27/22 2206    docusate sodium capsule 100 mg, 100 mg, Oral, BID, Rowdy Davis IV, MD, 100 mg at 08/27/22 2146    enoxaparin injection 90 mg, 1 mg/kg, Subcutaneous, Q12H, Fidel Mckeon MD    folic acid tablet 1 mg, 1 mg, Oral, Daily, Rowdy Davis IV, MD, 1 mg at 08/31/22 0843    HYDROcodone-acetaminophen 5-325 mg per tablet 1 tablet, 1 tablet, Oral, Q4H PRN, Rowdy Davis IV, MD, 1 tablet at 08/24/22 1811    loperamide capsule 4 mg, 4 mg, Oral, Once, Rowdy Davis IV, MD    magnesium sulfate 2g in water 50mL IVPB (premix), 2 g, Intravenous, PRN, Rowdy Davis IV, MD    magnesium sulfate 2g in water 50mL IVPB (premix), 4 g, Intravenous, PRN, Rowdy Davis IV, MD    midodrine tablet 5 mg, 5 mg, Oral, TID WM, Fidel Mckeon MD, 5 mg at 08/31/22 0843    morphine injection 2 mg, 2 mg, Intravenous, PRN, Rowdy Davis IV, MD, 2 mg at 08/23/22 0817    NORepinephrine 8 mg in dextrose 5% 250 mL infusion, 0-3 mcg/kg/min, Intravenous, Continuous, Rowdy Davis IV, MD, Last Rate: 3.2 mL/hr at 08/31/22 1030, 0.02 mcg/kg/min at 08/31/22 1030    ondansetron injection 4 mg, 4 mg, Intravenous, Q12H PRN, Rowdy Davis IV, MD, 4 mg at 08/28/22 0307    oxyCODONE immediate release tablet 5 mg, 5 mg, Oral, Q4H PRN, Rowdy Davis IV, MD, 5 mg at 08/24/22 2016    pantoprazole injection 40 mg, 40 mg, Intravenous, BID, Isaiah Levine MD, 40 mg at 08/31/22 0843    polyethylene glycol packet 17 g, 17 g, Oral, Daily, Velia Shelton, MARTHA    potassium chloride 20 mEq in 100 mL IVPB (FOR CENTRAL LINE ADMINISTRATION ONLY), 20 mEq, Intravenous, PRN, Rowdy Davis IV, MD    potassium chloride 20 mEq in 100 mL IVPB (FOR CENTRAL LINE ADMINISTRATION ONLY), 40 mEq, Intravenous, PRN, Rowdy HENRY  Ryan HILLMAN MD    potassium chloride 20 mEq in 100 mL IVPB (FOR CENTRAL LINE ADMINISTRATION ONLY), 20 mEq, Intravenous, PRN, Rowdy Davis IV, MD    Flushing PICC Protocol, , , Until Discontinued **AND** sodium chloride 0.9% flush 10 mL, 10 mL, Intravenous, Q6H, 10 mL at 08/31/22 0600 **AND** [DISCONTINUED] sodium chloride 0.9% flush 10 mL, 10 mL, Intravenous, PRN, Rowdy Davis IV, MD    sodium phosphate 15 mmol in dextrose 5 % 250 mL IVPB, 15 mmol, Intravenous, PRN, Rowdy Davis IV, MD    sodium phosphate 20.01 mmol in dextrose 5 % 250 mL IVPB, 20.01 mmol, Intravenous, PRN, Rowdy Davis IV, MD    sodium phosphate 30 mmol in dextrose 5 % 250 mL IVPB, 30 mmol, Intravenous, PRN, Rowdy Davis IV, MD    Facility-Administered Medications Ordered in Other Encounters:     diphenhydrAMINE capsule 50 mg, 50 mg, Oral, On Call Procedure, Reinaldo Gerardo MD, 50 mg at 08/02/22 0739    sodium chloride 0.9% flush 10 mL, 10 mL, Intravenous, PRN, Reinaldo Gerardo MD        Assessment:   IMPRESSION:  CAD (Multivessel)- Status Post CABG (LIMA to LAD, SVG to OM) (8.22.22)    - S/P LISA Ligation  Hypotension (Post Op), On Low Dose Levophed/midodrine    -  H/O Hypertension   Valvular Heart Disease- MR Status Post Bio MVR (#29 mm Mosaic Porcine) (8.22.22)    - Well seated bioprosthetic MV without significant stenosis or perivalvular leak. MG 4 mmHg.  Ischemic Cardiomyopathy EF 40% Status Post ICD ([employer])    - Aneurysmal basal inferior wall/ RCA territory  NSVT    - History of VT    - On Amiodarone Outpatient (On Amiodarone gtt- Started on 8.28.22 Morning)  Ileus   --SBFT 8/30/22 negative for SBO  Hyperlipidemia  Chronic Venous Insufficiency  Elevated BMI/Obesity  COPD  Former Smoker    - Quit 3 Years Ago  Anemia  --H/H stable  Leukocytosis   --WBC 23.9  Elevated LFT(S) (Resolved)  Thrombocytopenia  --improving Plt now 129  Sore Throat/Dysphagia ? Esophagitis      Plan:   PLAN:  Patient tolerating oral  medications- DC amiodarone drip. Start amiodarone 400 mg bid x 5 days, then amiodarone 200 mg bid x 5 days, then decrease to amiodarone 200 mg daily  Wean Vasopressor Support as able for Goal MAP 65 or Greater. Continue midodrine  Plan resumption/Optimization of HF Medications when/if BP Tolerates  Continue Supportive Care  Obtain venous US LUE due to concerns for DVT.     KIMBERLI Waddell  Cardiology  [facility] - [address] [place] [address]  08/31/2022

## 2022-08-31 NOTE — PROGRESS NOTES
CT SURGERY PROGRESS NOTE  Zackery Osullivan .  77 y.o.  1945    Patients Procedure: Procedure(s) (LRB):  CORONARY ARTERY BYPASS GRAFT (CABG) (N/A)  REPLACEMENT, MITRAL VALVE (N/A)    Subjective  Interval History: Patient in bed during eval. Continuing to improve. Currently feeling better. Had bowel movement yesterday after suppository since has had 7 bowel movements. Patient WBC count 21-23k this morning. Sternal and leg incisions sites c/d/i. CXR some increased opacities in R lung with no other changes.    Review of Systems   Constitutional: Negative.    Respiratory:  Negative for cough, sputum production and shortness of breath.    Cardiovascular:  Negative for chest pain, palpitations, claudication and leg swelling.   Gastrointestinal:  Negative for abdominal pain, nausea and vomiting.   Genitourinary: Negative.    Skin: Negative.         Incision C/D/I     Medication List  Infusions   amiodarone in dextrose 5% 0.5 mg/min (08/31/22 0019)    NORepinephrine bitartrate-D5W 0.02 mcg/kg/min (08/31/22 1030)     Scheduled   acetylcysteine 200 mg/ml (20%)  2 mL Nebulization TID    albuterol-ipratropium  3 mL Nebulization Q6H    aspirin  81 mg Oral Daily    atorvastatin  80 mg Oral QHS    budesonide  0.5 mg Nebulization Q12H    ceFEPime (MAXIPIME) IVPB  2 g Intravenous Q8H    collagenase   Topical (Top) BID    docusate sodium  100 mg Oral BID    enoxaparin  1 mg/kg Subcutaneous Q12H    folic acid  1 mg Oral Daily    loperamide  4 mg Oral Once    midodrine  5 mg Oral TID WM    pantoprazole  40 mg Intravenous BID    polyethylene glycol  17 g Oral Daily    sodium chloride 0.9%  10 mL Intravenous Q6H       Objective:  Recent Vitals:  Temp:  [97 °F (36.1 °C)-98.6 °F (37 °C)] 98.1 °F (36.7 °C)  Pulse:  [] 72  Resp:  [13-31] 18  SpO2:  [73 %-100 %] 98 %  BP: ()/(27-86) 114/58    Physical Exam  Vitals and nursing note reviewed.   Constitutional:       General: He is awake. He is not in acute distress.      Appearance: Normal appearance. He is not toxic-appearing or diaphoretic.   HENT:      Head: Normocephalic and atraumatic.   Eyes:      Extraocular Movements: Extraocular movements intact.      Pupils: Pupils are equal, round, and reactive to light.   Cardiovascular:      Rate and Rhythm: Normal rate and regular rhythm.      Pulses: Normal pulses.           Radial pulses are 2+ on the right side and 2+ on the left side.        Dorsalis pedis pulses are 2+ on the right side and 2+ on the left side.      Heart sounds: Normal heart sounds.   Pulmonary:      Effort: Pulmonary effort is normal.      Breath sounds: Normal breath sounds.   Musculoskeletal:      Right lower leg: No edema.      Left lower leg: No edema.   Skin:     General: Skin is warm and dry.      Comments: INCISION C/D/I   Neurological:      General: No focal deficit present.      Mental Status: He is alert and oriented to person, place, and time.   Psychiatric:         Mood and Affect: Mood and affect normal.        I/O last 24 hrs:  Intake/Output - Last 3 Shifts         08/29 0700 08/30 0659 08/30 0700 08/31 0659 08/31 0700 09/01 0659    I.V. (mL/kg) 3766.3 (40.7) 834.1 (9)     IV Piggyback 1000 1500     Total Intake(mL/kg) 4766.3 (51.5) 2334.1 (25.2)     Urine (mL/kg/hr) 900 (0.4) 1200 (0.5)     Drains 1350 750     Stool  0     Total Output 2250 1950     Net +2516.3 +384.1            Urine Occurrence  1 x     Stool Occurrence  7 x             Labs  BMP:   Recent Labs   Lab 08/30/22  0601 08/31/22  0553   * 140   K 5.6* 4.6   CO2 23 25   BUN 30.6* 31.2*   CREATININE 1.04 0.91   CALCIUM 7.7* 7.8*   MG 2.30  --      CMP:   Recent Labs   Lab 08/29/22  1221 08/30/22  0601 08/31/22  0553   CALCIUM  --    < > 7.8*   ALBUMIN 2.5*  --   --    NA  --    < > 140   K  --    < > 4.6   CO2  --    < > 25   BUN  --    < > 31.2*   CREATININE  --    < > 0.91   ALKPHOS 72  --   --    ALT 52  --   --    AST 47*  --   --    BILITOT 1.2  --   --     < > = values in  this interval not displayed.     All pertinent labs from the last 24 hours have been reviewed.      Imaging:   CXR: X-Ray Chest 1 View    Result Date: 8/31/2022  Confluent airspace opacities in the right perihilar region and right infrahilar region which might be related to an early infiltrate. Interval removal of nasogastric tube. No other significant change Electronically signed by: Rodney Brice Date:    08/31/2022 Time:    08:51   I have reviewed all pertinent imaging results/findings within the past 24 hours.        ASSESSMENT/PLAN:  - cefepime started for elevated wbc  - c dif testing  - IS/OOB/Ambulation    Case and plan of care discussed with MD Ovidio Pedersen PA-C

## 2022-08-31 NOTE — SUBJECTIVE & OBJECTIVE
Scheduled Meds:   acetylcysteine 200 mg/ml (20%)  2 mL Nebulization TID    albuterol-ipratropium  3 mL Nebulization Q6H    [START ON 9/5/2022] amiodarone  200 mg Oral BID    [START ON 9/10/2022] amiodarone  200 mg Oral Daily    amiodarone  400 mg Oral BID    aspirin  81 mg Oral Daily    atorvastatin  80 mg Oral QHS    budesonide  0.5 mg Nebulization Q12H    ceFEPime (MAXIPIME) IVPB  2 g Intravenous Q8H    collagenase   Topical (Top) BID    docusate sodium  100 mg Oral BID    enoxaparin  1 mg/kg Subcutaneous Q12H    folic acid  1 mg Oral Daily    loperamide  4 mg Oral Once    midodrine  5 mg Oral TID WM    pantoprazole  40 mg Intravenous BID    polyethylene glycol  17 g Oral Daily    sodium chloride 0.9%  10 mL Intravenous Q6H     Continuous Infusions:   NORepinephrine bitartrate-D5W 0.02 mcg/kg/min (08/31/22 1030)     PRN Meds:sodium chloride, acetaminophen, calcium gluconate IVPB, calcium gluconate IVPB, dextrose 10%, dextrose 50%, HYDROcodone-acetaminophen, magnesium sulfate IVPB, magnesium sulfate IVPB, morphine, ondansetron, oxyCODONE, potassium chloride in water, potassium chloride in water, potassium chloride in water, sodium phosphate IVPB, sodium phosphate IVPB, sodium phosphate IVPB    Review of patient's allergies indicates:  No Known Allergies     Past Medical History:   Diagnosis Date    Arthritis     spine    CAD (coronary artery disease)     COPD (chronic obstructive pulmonary disease)     HLD (hyperlipidemia)     HTN (hypertension)     Ischemic cardiomyopathy     Mitral regurgitation     HEATHER on CPAP     Stroke     Venous insufficiency     Ventricular tachycardia      Past Surgical History:   Procedure Laterality Date    bilateral inguinal stents      CARDIAC DEFIBRILLATOR PLACEMENT Left     CATARACT EXTRACTION Bilateral     CATHETERIZATION OF BOTH LEFT AND RIGHT HEART  08/02/2022    Diagnostic Only; Dr. Gerardo    COLONOSCOPY      CORONARY ARTERY BYPASS GRAFT (CABG) N/A 8/22/2022    Procedure:  "CORONARY ARTERY BYPASS GRAFT (CABG);  Surgeon: Rowdy Davis IV, MD;  Location: [facility] OR;  Service: Cardiovascular;  Laterality: N/A;  possible MVR & LLAA  //  ECHO NOTIFIED    LEFT HEART CATHETERIZATION Left 2022    Procedure: CATHETERIZATION, HEART, LEFT;  Surgeon: Reinaldo Gerardo MD;  Location: [facility] CATH LAB;  Service: Cardiology;  Laterality: Left;  LHC +/- PCI    MITRAL VALVE REPLACEMENT N/A 2022    Procedure: REPLACEMENT, MITRAL VALVE;  Surgeon: Rowdy Davis IV, MD;  Location: [facility] OR;  Service: Cardiovascular;  Laterality: N/A;  possible MVR and LLAA    REMOVAL OF IMPLANTED CARDIOVERTER-DEFIBRILLATOR (ICD)      TONSILLECTOMY         Family History    None       Tobacco Use    Smoking status: Former     Years: 55.00     Types: Cigarettes     Start date:      Quit date:      Years since quittin.6    Smokeless tobacco: Never   Substance and Sexual Activity    Alcohol use: Yes     Alcohol/week: 3.0 standard drinks     Types: 3 Glasses of wine per week    Drug use: Never    Sexual activity: Not on file     Review of Systems   Constitutional:  Positive for activity change and fatigue.   HENT:  Positive for trouble swallowing.    Eyes: Negative.    Respiratory:  Positive for wheezing.    Cardiovascular: Negative.    Gastrointestinal:  Positive for diarrhea.   Musculoskeletal:  Positive for myalgias.        Puffy and pale left hand distal wrist,full tendon /nerve function but "numb along fingertips. Very pale nailbeds with delayed cap refill left hand. Palpable radial/ulna wrist pulses.   Skin:  Wound: 3 elliptical wounds butttock, midline and left inner gluteal. black eschar/yellow exudate noted on 2. periwound pink skin changes. left volar side left forearm red as well /abrasions left elbow.   Neurological:  Positive for weakness.   Hematological:  Bruises/bleeds easily.   Psychiatric/Behavioral: Negative.       Objective:     Vital Signs (Most Recent):  Temp: 97.5 °F (36.4 °C) " (08/31/22 1200)  Pulse: 80 (08/31/22 1300)  Resp: 19 (08/31/22 1300)  BP: (!) 115/55 (08/31/22 1300)  SpO2: 99 % (08/31/22 1300)   Vital Signs (24h Range):  Temp:  [97 °F (36.1 °C)-98.6 °F (37 °C)] 97.5 °F (36.4 °C)  Pulse:  [] 80  Resp:  [13-31] 19  SpO2:  [73 %-100 %] 99 %  BP: ()/(27-77) 115/55     Weight: 92.6 kg (204 lb 2.3 oz)  Body mass index is 30.24 kg/m².  Physical Exam  Vitals and nursing note reviewed.   Constitutional:       Appearance: He is normal weight.   HENT:      Head: Normocephalic and atraumatic.      Nose: Nose normal.      Mouth/Throat:      Mouth: Mucous membranes are moist.   Eyes:      Extraocular Movements: Extraocular movements intact.      Pupils: Pupils are equal, round, and reactive to light.   Cardiovascular:      Rate and Rhythm: Normal rate and regular rhythm.   Pulmonary:      Breath sounds: Wheezing present.      Comments: No distress but exp wheezing herd upper lung fields  Abdominal:      Palpations: Abdomen is soft.   Musculoskeletal:         General: Swelling (left hand with puffines to top hand and fingers, pale skin color compared to right) present.      Cervical back: Normal range of motion and neck supple.   Skin:     Findings: Lesion (midline buttock 2 wounds unstagable with black eschar / midline with pink full thickness ulcer/left inner gluteal cheek with black eschar as well) present.   Neurological:      General: No focal deficit present.      Mental Status: He is alert and oriented to person, place, and time.   Psychiatric:         Mood and Affect: Mood normal.       Laboratory:  A1C: No results for input(s): HGBA1C in the last 4320 hours.  Blood Cultures: No results for input(s): LABBLOO in the last 48 hours.  CBC:   Recent Labs   Lab 08/31/22  0824   WBC 23.9*   RBC 3.38*   HGB 10.1*   HCT 33.1*   *   MCV 97.9*   MCH 29.9   MCHC 30.5*     CMP:   Recent Labs   Lab 08/29/22  1221 08/30/22  0601 08/31/22  0553   CALCIUM  --    < > 7.8*   ALBUMIN  "2.5*  --   --    NA  --    < > 140   K  --    < > 4.6   CO2  --    < > 25   BUN  --    < > 31.2*   CREATININE  --    < > 0.91   ALKPHOS 72  --   --    ALT 52  --   --    AST 47*  --   --    BILITOT 1.2  --   --     < > = values in this interval not displayed.     Coagulation: No results for input(s): PT, INR, APTT in the last 168 hours.  Prealbumin: No results for input(s): PREALBUMIN in the last 48 hours.  All pertinent labs reviewed within the last 24 hours.    Diagnostic Results:  I have reviewed all pertinent imaging results/findings within the past 24 hours.    Plan:   I am seeing Mr Osullivan for first time today . He has pressure ulcers to his buttock region that I can not establish whether they were here on admission or developed while inpatient. He did have a tami course post valve replacement surgery where he received blood and required pressor agents. I reviewed his labs, and most recent chest film. He is not a diabetic but does have a low albumin. I will add a PAB to his inpatient lab.  Multivitamin, vitamin D and C would help once he is on a regular diet.   His sacral wounds are certainly exacerbated by immobliity and diarrhea. He currently has a rectal tube.  I debrided his wounds and found most of them to be unstagable at this time as he has dark, hard eschar noted which may mask how deep the wound bed truly is. He has one full thickness ulcer midline as well.  I think we can try conservative treatment as they are "newly" diagnosed and see if with daily wound dressings, offloading, nutritional supplementation if labs warrant it, and regular wound care he heals his wounds.   I will look to see if C Diff has been ordered. He did have loose brown stool all over casey pad and buttock on my exam today despite the rectal tube.   I instructed the nurse to continue to turn him q 2 hours as well as order a low airloss mattress if possible.   We removed wrap from around left hand so to better keep eye on " swelling and paleness to left hand/fingers. Nurse to monitor q shift and notify admit doctor on daily basis. I applied Mepilex foam border to left elbow and hand skin abrasions.  We will give wound care orders to wound care team to carry out while inpatient and continue to follow in clinic once discharged should he desire.     Total time reviewing chart, interviewing and examining patient as well as making recommendations: 45 minutes.  Thank you for this consult.

## 2022-09-01 NOTE — PROGRESS NOTES
[facility] - [facility]  Pulmonary Critical Care Note    Patient Name: Zackery Osullivan Jr.  MRN: [id number]  Admission Date: 8/22/2022  Hospital Length of Stay: 10 days  Code Status: Full Code  Attending Provider: Rowdy Davis IV, MD  Primary Care Provider: Isaiah Meeks MD     Subjective:     HPI: 77-year-old  male with past medical history of CAD, HTN, HLD, and HFrEF who presented to [facility] for a CABG x2 and mitral valve replacement. Received 2 units pRBCs intraop. Initially admitted to ICU for post-op monitoring.  Patient transferred out of ICU once extubated and hemodynamically stable off vasopressors on 8/25/22. On 8/27/22, patient reporting throat pain, nausea and constipation. Unable to keep down food without vomiting. Unable to tolerate PO meds. GI consulted. During this time, patient also having low/normal blood pressures. Had 17 beat run of V-tach evening of 8/27/22 in which amiodarone IV started since patient unable to tolerate PO medications. Morning of 8/28/22, MAPs <65 despite fluid resuscitation. Patient to be upgraded to ICU 8/28/22 due to need for vasopressor support and close monitoring. CT abdomen with ileus. Small bowel follow through on 8/30 with no obstruction but delayed transit.     24 Hour Interval History: Overnight was uneventful. He continues to have mild abdominal discomfort and needs Levophed at 0.08 mcg/kg/min. , ALT 88 this AM. I/O: net positive 1.7 L.     Past Medical History:   Diagnosis Date    Arthritis     spine    CAD (coronary artery disease)     COPD (chronic obstructive pulmonary disease)     HLD (hyperlipidemia)     HTN (hypertension)     Ischemic cardiomyopathy     Mitral regurgitation     HEATHER on CPAP     Stroke     Venous insufficiency     Ventricular tachycardia        Past Surgical History:   Procedure Laterality Date    bilateral inguinal stents      CARDIAC DEFIBRILLATOR PLACEMENT Left     CATARACT EXTRACTION Bilateral      CATHETERIZATION OF BOTH LEFT AND RIGHT HEART  2022    Diagnostic Only; Dr. Gerardo    COLONOSCOPY      CORONARY ARTERY BYPASS GRAFT (CABG) N/A 2022    Procedure: CORONARY ARTERY BYPASS GRAFT (CABG);  Surgeon: Rowdy Davis IV, MD;  Location: [facility] OR;  Service: Cardiovascular;  Laterality: N/A;  possible MVR & LLAA  //  ECHO NOTIFIED    LEFT HEART CATHETERIZATION Left 2022    Procedure: CATHETERIZATION, HEART, LEFT;  Surgeon: Reinaldo Gerardo MD;  Location: [facility] CATH LAB;  Service: Cardiology;  Laterality: Left;  LHC +/- PCI    MITRAL VALVE REPLACEMENT N/A 2022    Procedure: REPLACEMENT, MITRAL VALVE;  Surgeon: Rowdy Davis IV, MD;  Location: [facility] OR;  Service: Cardiovascular;  Laterality: N/A;  possible MVR and LLAA    REMOVAL OF IMPLANTED CARDIOVERTER-DEFIBRILLATOR (ICD)      TONSILLECTOMY         Social History     Socioeconomic History    Marital status:    Tobacco Use    Smoking status: Former     Years: 55.00     Types: Cigarettes     Start date:      Quit date:      Years since quittin.6    Smokeless tobacco: Never   Substance and Sexual Activity    Alcohol use: Yes     Alcohol/week: 3.0 standard drinks     Types: 3 Glasses of wine per week    Drug use: Never           Current Outpatient Medications   Medication Instructions    amiodarone (PACERONE) 200 mg, Oral, 2 times daily    aspirin (ECOTRIN) 81 mg, Oral, Nightly    atorvastatin (LIPITOR) 80 mg, Oral, Nightly    clopidogreL (PLAVIX) 75 mg, Oral, Nightly    ezetimibe (ZETIA) 10 mg, Oral, Nightly    fluticasone propionate (FLONASE) 50 mcg/actuation nasal spray 1 spray, Each Nostril, Daily PRN    furosemide (LASIX) 20 mg, Oral, Daily    metoprolol succinate (TOPROL-XL) 25 MG 24 hr tablet 1 tablet, Oral, Daily    mometasone (ASMANEX TWISTHALER) 220 mcg/ actuation (30) inhaler 2 puffs, Inhalation, Daily, Controller    sacubitriL-valsartan (ENTRESTO) 49-51 mg per tablet 0.5 tablets, Oral, 2 times daily     spironolactone (ALDACTONE) 12.5 mg, Oral, Daily    umeclidinium-vilanteroL (ANORO ELLIPTA) 62.5-25 mcg/actuation DsDv 1 puff, Inhalation, Daily, Controller       Current Inpatient Medications   acetylcysteine 200 mg/ml (20%)  2 mL Nebulization TID    albuterol-ipratropium  3 mL Nebulization Q6H    [START ON 9/5/2022] amiodarone  200 mg Oral BID    [START ON 9/10/2022] amiodarone  200 mg Oral Daily    amiodarone  400 mg Oral BID    aspirin  81 mg Oral Daily    atorvastatin  80 mg Oral QHS    budesonide  0.5 mg Nebulization Q12H    ceFEPime (MAXIPIME) IVPB  2 g Intravenous Q8H    collagenase   Topical (Top) BID    docusate sodium  100 mg Oral BID    enoxaparin  1 mg/kg Subcutaneous Q12H    folic acid  1 mg Oral Daily    loperamide  4 mg Oral Once    midodrine  5 mg Oral TID WM    pantoprazole  40 mg Intravenous BID    polyethylene glycol  17 g Oral Daily    sodium chloride 0.9%  10 mL Intravenous Q6H       Current Intravenous Infusions   NORepinephrine bitartrate-D5W 0.08 mcg/kg/min (09/01/22 0351)         Review of Systems   Constitutional:  Negative for chills, diaphoresis and fever.   Respiratory:  Negative for cough and shortness of breath.    Cardiovascular:  Negative for chest pain and palpitations.   Gastrointestinal:  Positive for abdominal pain and diarrhea. Negative for nausea and vomiting.        Objective:       Intake/Output Summary (Last 24 hours) at 9/1/2022 0501  Last data filed at 9/1/2022 0400  Gross per 24 hour   Intake 2228 ml   Output 520 ml   Net 1708 ml         Vital Signs (Most Recent):  Temp: 97.4 °F (36.3 °C) (09/01/22 0400)  Pulse: 90 (09/01/22 0430)  Resp: 18 (09/01/22 0430)  BP: (!) 99/54 (09/01/22 0430)  SpO2: 97 % (09/01/22 0400)    Body mass index is 30.24 kg/m².  Weight: 92.6 kg (204 lb 2.3 oz) Vital Signs (24h Range):  Temp:  [97.4 °F (36.3 °C)-98.1 °F (36.7 °C)] 97.4 °F (36.3 °C)  Pulse:  [64-90] 90  Resp:  [13-31] 18  SpO2:  [86 %-100 %] 97 %  BP: ()/(27-66) 99/54     Physical  Exam  General: Well nourished w/ mild distress  HEENT: NC/AT; PERRL; nasal and oral mucosa moist and clear  Neck: Full ROM; no lymphadenopathy  Pulm: Audible wheezing, coarse crackles in left lower lobe, normal work of breathing on 5L/min via NC  CV: S1, S2 w/o murmurs or gallops; 1+ edema in lower extremities  GI: Abdomen distended with mild tenderness; bowel sound present; rectal tube in place  MSK: Full ROM of all extremities; non-pitting edema in left upper extremity   Derm: Midline chest incision clean and intact w/o signs of infection  Neuro: AAOx4; motor/sensory function intact    Lines/Drains/Airways       Peripherally Inserted Central Catheter Line  Duration             PICC Triple Lumen 08/28/22 1516 right brachial 3 days              Drain  Duration                  Rectal Tube 08/31/22 0225 fecal management system 1 day                    Significant Labs:    Lab Results   Component Value Date    WBC 20.1 (H) 09/01/2022    HGB 10.2 (L) 09/01/2022    HCT 35.2 (L) 09/01/2022    .1 (H) 09/01/2022     (L) 09/01/2022         BMP  Lab Results   Component Value Date     (L) 09/01/2022    K 4.3 09/01/2022    CO2 21 (L) 09/01/2022    BUN 30.5 (H) 09/01/2022    CREATININE 1.14 09/01/2022    CALCIUM 7.9 (L) 09/01/2022    EGFRNONAA >60 05/14/2020       ABG  No results for input(s): PH, PO2, PCO2, HCO3, BE in the last 168 hours.    Mechanical Ventilation Support:  Vent Mode: CPAP PSV (08/23/22 0550)  Ventilator Initiated: Yes (08/22/22 1545)  Set Rate: 12 BPM (08/23/22 0225)  Vt Set: 500 mL (08/23/22 0225)  Pressure Support: 10 cmH20 (08/23/22 0550)  PEEP/CPAP: 5 cmH20 (08/23/22 0550)  Oxygen Concentration (%): 30 (08/23/22 0550)  Peak Airway Pressure: 16 cmH2O (08/23/22 0550)  Total Ve: 13.6 mL (08/23/22 0550)  F/VT Ratio<105 (RSBI): (!) 18.89 (08/23/22 0550)    Significant Imaging:  I have reviewed the pertinent imaging within the past 24 hours.    8/31/2022 - CXR shows slightly more confluent  airspace opacities in the right perihilar and infrahilar region     Assessment/Plan:     Assessment  Hypovolemic shock   Small bowel obstruction vs severe ileus   LUE DVT  IRENE   CAD s/p 2v CABG 08/22/2022  Mitral regurgitation s/p bioprosthetic MVR 08/22/2022  History of VT  HFrEF (EF 40% on 8/28/2022)    Plan  -Continue ICU level of care for ongoing monitoring and vasopressor support  -Continue Cefepime while awaiting for cultures (stool, blood, and sputum)  -Per cardiology, D/C Amiodarone drip and transition to Amiodarone PO (400 mg bid x 5 days --> amiodarone 200 mg bid x 5 days --> amiodarone 200 mg daily); wean vasopressor as tolerated to maintain MAP >65  -Will get KUB today    Code status: Full code   DVT Prophylaxis: Lovenox  GI Prophylaxis: Protonix  Diet: Clear liquid     33 minutes of critical care was time spent personally by me on the following activities: development of treatment plan with patient or surrogate and bedside caregivers, discussions with consultants, evaluation of patient's response to treatment, examination of patient, ordering and performing treatments and interventions, ordering and review of laboratory studies, ordering and review of radiographic studies, pulse oximetry, re-evaluation of patient's condition.  This critical care time did not overlap with that of any other provider or involve time for any procedures.     Shahla Peace DO  Pulmonary Critical Care Medicine  [facility] - [facility] ICU

## 2022-09-01 NOTE — PROGRESS NOTES
[facility] - [facility] ICU  Cardiology  Progress Note    Patient Name: Zackery Osullivan Jr.  MRN: [id number]  Admission Date: 8/22/2022  Hospital Length of Stay: 10 days  Code Status: Full Code   Attending Physician: Rowdy Davis IV, MD   Primary Care Physician: Isaiah Meeks MD  Expected Discharge Date:   Principal Problem:<principal problem not specified>    Subjective:     Brief HPI:   Mr. Osullivan is a 77 year old male, known to Dr. Gerardo and Dr. Flowers, who presented to the hospital and underwent CAD/CABG and MVR due to diagnosis of MV CAD and significant MRLouisa Also underwent LISA Ligation. Patient tolerated the surgery well, and was transferred to intensive care unit for further close post operative monitoring. [facility] is consulted for post op surgical cardiac management.    Hospital Course:   8.25.22: NAD noted. VSS with low normal BP. SR on tele. Denies SOB/Palps, +incisional CP.  8.26.22: NAD noted. VSS with low normal BP. SR on tele. Sitting in chair at bedside. Denies SOB/Palps, endorses incisional CP.  8.27.22: NAD Noted. Reports throat discomfort. SR on Tele. BP Low Normal.  8.28.22: NAD Noted. BP Low Normal. SR on Tele. GI Following. NSVT overnight.  SR 83.  8.29.22: NAD Noted. Patient with NG Tube. Surgical Team is following for evaluation of Ileus versus SBO. Hypotensive requiring low dose Levophed. SR on Tele. Amiodarone gtt is infusing. Started for NSVT, which he did not have last night according to the RN.  8.30.22: Remains NPO. Plans for small bowel follow through today  8.31.22: Patient underwent SBFT without evidence of SBO. He was given suppository with multiple BMs overnight. NGT has been removed and he is tolerating oral diet. Amiodarone drip in progress. Continues to require pressor support. He has been started on midodrine. Urine is dark. Patient is having elevated WBCs and has been started on ABX. Patient c/o pain/swelling to LUE  9.01.22: Still requiring pressor support.  LUE + DVT. Lovenox has been restarted. Stool studies pending. Urine dark dania       PMH: CAD (Multivessel), Ischemic Cardiomyopathy (ICD), VHD- MR, Hypertension, Hyperlipidemia, Chronic VI, COPD, Smoker, Obstructive Sleep Apnea, NSVT, Obesity  PSH: CABG/MVR, Skin Lesion, ICD Placement ([employer]), Tonsillectomy  Family History: Father- CAD, Brother- CAD, Sister- CAD  Social History: Tobacco- Former Smoker (Quit 3 Years Ago), Alcohol- Negative, Substance Abuse- Negative     Previous Cardiac Diagnostics:   TTE 09/01/22:  Difficult to accurately assess LVEF as patient was in Afib, RVR during acquisition of images. Limited study to assess LV function. Definity used. Estimated EF 30%. LV apex appears aneurysmal without LV thrombus.     Venous US LUE 08/31/22:  DVT to left axillary through proximal brachial veins.  A superficial vein thrombosis was identified in the left cephalic vein.   All other vessels compressed.     Echocardiogram (8.28.22):  The left ventricle is mildly enlarged with concentric hypertrophy and mildly reduced LV systolic function.  The estimated ejection fraction is 40%.  Indeterminate left ventricular diastolic function due to MVR.  Aneurysmal basal inferior wall/ RCA territory  Well seated bioprosthetic MV without significant stenosis or perivalvular leak. MG 4 mmHg.  Mild to moderate tricuspid regurgitation.  Mild left atrial enlargement.    CABG/MVR (8.22.22): MVR 29 mm Mosaic Porcine Valve; CABG LIMA to LAD & SVG to OM, LISA Ligation with Endo Stapler.     Left Heart Catheterization (8.2.22):  Left Main- 50% Distal Disease, LAD- 60% Proximal IFR 0.81, LCx- Nondominant (, Diagonal to OM Collateral), RCA- Dominant with , Bilateral Common Iliacs 30% Calcified Stenosis.     Echocardiogram (4.18.22):  The study quality is average.   The left ventricle is normal in size. Global left ventricular systolic function is mildly decreased. The left ventricular ejection fraction is 40%. Left  ventricular diastolic function is normal. Noted left ventricular hypertrophy. Asymmetric septal left ventricular hypertrophy is present. It is mild.   Mild to moderate (1-2+) mitral regurgitation. Somewhat eccentric jet noted, chordae appear hyperechoic and thickened.      Review of Systems   Constitutional: Negative.   Cardiovascular:  Negative for chest pain and dyspnea on exertion.   Respiratory:  Negative for cough and shortness of breath.    Skin:         Left forearm reddened/swollen   Gastrointestinal:  Positive for diarrhea.   Genitourinary: Negative.    Neurological: Negative.      Objective:     Vital Signs (Most Recent):  Temp: 97.4 °F (36.3 °C) (09/01/22 0400)  Pulse: 86 (09/01/22 0600)  Resp: 18 (09/01/22 0600)  BP: (!) 85/63 (09/01/22 0600)  SpO2: 100 % (09/01/22 0600) Vital Signs (24h Range):  Temp:  [97.4 °F (36.3 °C)-98.1 °F (36.7 °C)] 97.4 °F (36.3 °C)  Pulse:  [64-90] 86  Resp:  [13-25] 18  SpO2:  [86 %-100 %] 100 %  BP: ()/(27-66) 85/63     Weight: 92.6 kg (204 lb 2.3 oz)  Body mass index is 30.24 kg/m².    SpO2: 100 %  O2 Device (Oxygen Therapy): CPAP      Intake/Output Summary (Last 24 hours) at 9/1/2022 0622  Last data filed at 9/1/2022 0400  Gross per 24 hour   Intake 2228 ml   Output 520 ml   Net 1708 ml         Lines/Drains/Airways       Peripherally Inserted Central Catheter Line  Duration             PICC Triple Lumen 08/28/22 1516 right brachial 3 days              Drain  Duration                  Rectal Tube 08/31/22 0225 fecal management system 1 day                    Significant Labs:   CMP   Recent Labs   Lab 08/31/22  0553 09/01/22  0217    135*   K 4.6 4.3   CO2 25 21*   BUN 31.2* 30.5*   CREATININE 0.91 1.14   CALCIUM 7.8* 7.9*   ALBUMIN  --  2.0*   BILITOT  --  1.7*   ALKPHOS  --  85   AST  --  100*   ALT  --  88*      and CBC   Recent Labs   Lab 08/31/22  0553 08/31/22  0824 09/01/22  0217   WBC 21.1* 23.9* 20.1*   HGB 10.1* 10.1* 10.2*   HCT 33.2* 33.1* 35.2*   PLT  130 129* 106*       Telemetry:  Sinus Rhythm     Physical Exam:  Physical Exam  Vitals reviewed.   Constitutional:       Appearance: Normal appearance.   HENT:      Head: Normocephalic.      Mouth/Throat:      Mouth: Mucous membranes are moist.   Cardiovascular:      Rate and Rhythm: Normal rate and regular rhythm.      Heart sounds: Normal heart sounds.   Pulmonary:      Effort: Pulmonary effort is normal. No respiratory distress.      Breath sounds: Normal breath sounds.   Abdominal:      General: There is no distension.      Tenderness: There is no abdominal tenderness.   Genitourinary:     Comments: Urine dark  Musculoskeletal:         General: Normal range of motion.      Cervical back: Neck supple.      Comments: Midsternal Incision intact   Skin:     General: Skin is warm and dry.      Capillary Refill: Capillary refill takes less than 2 seconds.   Neurological:      General: No focal deficit present.      Mental Status: He is alert and oriented to person, place, and time.   Psychiatric:         Behavior: Behavior normal.       Current Inpatient Medications:    Current Facility-Administered Medications:     0.9%  NaCl infusion (for blood administration), , Intravenous, Q24H PRN, Rowdy Davis IV, MD    acetaminophen oral solution 650 mg, 650 mg, Per OG tube, Q6H PRN, Rowdy Davis IV, MD    acetylcysteine 200 mg/ml (20%) solution 2 mL, 2 mL, Nebulization, TID, KIMBERLI Aguilar    albuterol-ipratropium 2.5 mg-0.5 mg/3 mL nebulizer solution 3 mL, 3 mL, Nebulization, Q6H, Marlon Humphreys MD, 3 mL at 09/01/22 0216    [START ON 9/5/2022] amiodarone tablet 200 mg, 200 mg, Oral, BID, KIMBERLI Waddell    [START ON 9/10/2022] amiodarone tablet 200 mg, 200 mg, Oral, Daily, KIMBERLI Waddell    amiodarone tablet 400 mg, 400 mg, Oral, BID, KIMBERLI Waddell, 400 mg at 08/31/22 2010    aspirin EC tablet 81 mg, 81 mg, Oral, Daily, Rowdy Davis IV, MD, 81 mg at 08/31/22 0843     atorvastatin tablet 80 mg, 80 mg, Oral, QHS, Jeniffer Gale, FNP, 80 mg at 08/31/22 2010    budesonide nebulizer solution 0.5 mg, 0.5 mg, Nebulization, Q12H, Marlon Humphreys MD, 0.5 mg at 08/31/22 2015    calcium gluconate 1 g in NS IVPB (premixed), 1 g, Intravenous, PRN, Rowdy Davis IV, MD    calcium gluconate 1 g in NS IVPB (premixed), 3 g, Intravenous, PRN, Rowdy Davis IV, MD    ceFEPIme (MAXIPIME) 2 g in dextrose 5 % in water (D5W) 5 % 50 mL IVPB (MB+), 2 g, Intravenous, Q8H, Fidel Mckeon MD, Stopped at 09/01/22 0415    collagenase ointment, , Topical (Top), BID, Rowdy Davis IV, MD, Given at 08/31/22 2100    dextrose 10% bolus 250 mL, 25 g, Intravenous, PRN, Rowdy Davis IV, MD    dextrose 50% injection 12.5 g, 12.5 g, Intravenous, PRN, Rowdy Davis IV, MD, 12.5 g at 08/27/22 2206    docusate sodium capsule 100 mg, 100 mg, Oral, BID, Rowdy Davis IV, MD, 100 mg at 08/27/22 2146    enoxaparin injection 90 mg, 1 mg/kg, Subcutaneous, Q12H, Fidel Mckeon MD, 90 mg at 09/01/22 0415    folic acid tablet 1 mg, 1 mg, Oral, Daily, Rowdy Davis IV, MD, 1 mg at 08/31/22 0843    HYDROcodone-acetaminophen 5-325 mg per tablet 1 tablet, 1 tablet, Oral, Q4H PRN, Rowdy Davis IV, MD, 1 tablet at 08/24/22 1811    loperamide capsule 4 mg, 4 mg, Oral, Once, Rowdy Davis IV, MD    magnesium sulfate 2g in water 50mL IVPB (premix), 2 g, Intravenous, PRN, Rowdy Davis IV, MD    magnesium sulfate 2g in water 50mL IVPB (premix), 4 g, Intravenous, PRN, Rowdy Davis IV, MD    midodrine tablet 5 mg, 5 mg, Oral, TID WM, Fidel Mckeon MD, 5 mg at 08/31/22 1618    morphine injection 2 mg, 2 mg, Intravenous, PRN, Rowdy Davis IV, MD, 2 mg at 08/23/22 0817    NORepinephrine 8 mg in dextrose 5% 250 mL infusion, 0-3 mcg/kg/min, Intravenous, Continuous, Rowdy Davis IV, MD, Last Rate: 12.8 mL/hr at 09/01/22 0351, 0.08 mcg/kg/min at 09/01/22 0351    ondansetron injection 4 mg,  4 mg, Intravenous, Q12H PRN, Rowdy Davis IV, MD, 4 mg at 08/28/22 0307    oxyCODONE immediate release tablet 5 mg, 5 mg, Oral, Q4H PRN, Rowdy Davis IV, MD, 5 mg at 08/24/22 2016    pantoprazole injection 40 mg, 40 mg, Intravenous, BID, Isaiah Levine MD, 40 mg at 08/31/22 2010    polyethylene glycol packet 17 g, 17 g, Oral, Daily, MARTHA Limon    potassium chloride 20 mEq in 100 mL IVPB (FOR CENTRAL LINE ADMINISTRATION ONLY), 20 mEq, Intravenous, PRN, Rowdy Davis IV, MD    potassium chloride 20 mEq in 100 mL IVPB (FOR CENTRAL LINE ADMINISTRATION ONLY), 40 mEq, Intravenous, PRN, Rowdy Davis IV, MD    potassium chloride 20 mEq in 100 mL IVPB (FOR CENTRAL LINE ADMINISTRATION ONLY), 20 mEq, Intravenous, PRN, Rowdy Davis IV, MD    Flushing PICC Protocol, , , Until Discontinued **AND** sodium chloride 0.9% flush 10 mL, 10 mL, Intravenous, Q6H, 10 mL at 09/01/22 0600 **AND** [DISCONTINUED] sodium chloride 0.9% flush 10 mL, 10 mL, Intravenous, PRN, Rowdy Davis IV, MD    sodium phosphate 15 mmol in dextrose 5 % 250 mL IVPB, 15 mmol, Intravenous, PRN, Rowdy Davis IV, MD    sodium phosphate 20.01 mmol in dextrose 5 % 250 mL IVPB, 20.01 mmol, Intravenous, PRN, Rowdy Davis IV, MD    sodium phosphate 30 mmol in dextrose 5 % 250 mL IVPB, 30 mmol, Intravenous, PRN, Rowdy Davis IV, MD    Facility-Administered Medications Ordered in Other Encounters:     diphenhydrAMINE capsule 50 mg, 50 mg, Oral, On Call Procedure, Reinaldo Gerardo MD, 50 mg at 08/02/22 0739    sodium chloride 0.9% flush 10 mL, 10 mL, Intravenous, PRN, Reinaldo Gerardo MD        Assessment:   IMPRESSION:  CAD (Multivessel)- Status Post CABG (LIMA to LAD, SVG to OM) (8.22.22)    - S/P LISA Ligation  Hypotension (Post Op), On Low Dose Levophed/midodrine    -  H/O Hypertension   Valvular Heart Disease- MR Status Post Bio MVR (#29 mm Mosaic Porcine) (8.22.22)    - Well seated bioprosthetic MV without  significant stenosis or perivalvular leak. MG 4 mmHg.  Ischemic Cardiomyopathy EF 40% Status Post ICD ([employer])    - Aneurysmal basal inferior wall/ RCA territory  NSVT    - History of VT    - On Amiodarone Outpatient  Ileus   --SBFT 8/30/22 negative for SBO  C-diff + (9/1/22)  DVT LUE  -- left axillary through proximal brachial veins. Superficial vein thrombus left cephalic vein.   Hyperlipidemia  Chronic Venous Insufficiency  Elevated BMI/Obesity  COPD  Former Smoker    - Quit 3 Years Ago  Anemia  --H/H stable  Leukocytosis   --WBC 23.9  Elevated LFT  Thrombocytopenia  Sore Throat/Dysphagia ? Esophagitis  Unstageable sacral decubitus      Plan:   Continue oral amiodarone 400 mg bid x 5 days, then amiodarone 200 mg bid x 5 days, then decrease to amiodarone 200 mg daily  Will interrogate AICD as patient appears to be pacing at 80+ BPM.  Wean Vasopressor Support as able for Goal MAP 65 or Greater. Continue midodrine. Patient to receive IVF bolus  Placed on aBX for C-diff per intensivist  Plan resumption/Optimization of HF Medications when/if BP Tolerates  Continue Supportive Care      Yuly Mccray, KIMBERLI  Cardiology  [facility] - [address] [place] [address] ICU  09/01/2022

## 2022-09-01 NOTE — PROGRESS NOTES
Pt with slightly more abd. Distention today  Otherwise feels ok  Afebrile, some hyptension  Lungs clear  Abd soft but distended, nontender  Wbc's a little lower cr 1.1  Volume  Cont. Abx's  Ileus on KUB  Await cx's

## 2022-09-01 NOTE — PT/OT/SLP PROGRESS
Reconsult orders received, chart reviewed, POC discussed with nursing and MD.  Recent KUB again revealing possible ileus, MD recommending to hold eval at this time.  SLP to follow up tomorrow.

## 2022-09-01 NOTE — PT/OT/SLP PROGRESS
Physical Therapy      Patient Name:  Zackery Osullivan Jr.   MRN:  [id number]    Patient pleasantly declined tx session reporting no sleep last night. Will follow up in p.m. if schedule permits.

## 2022-09-01 NOTE — PLAN OF CARE
Problem: Adult Inpatient Plan of Care  Goal: Plan of Care Review  Outcome: Ongoing, Progressing  Goal: Patient-Specific Goal (Individualized)  Outcome: Ongoing, Progressing  Goal: Absence of Hospital-Acquired Illness or Injury  Outcome: Ongoing, Progressing  Goal: Optimal Comfort and Wellbeing  Outcome: Ongoing, Progressing  Goal: Readiness for Transition of Care  Outcome: Ongoing, Progressing     Problem: Infection  Goal: Absence of Infection Signs and Symptoms  Outcome: Ongoing, Progressing     Problem: Noninvasive Ventilation Acute  Goal: Effective Unassisted Ventilation and Oxygenation  Outcome: Ongoing, Progressing     Problem: Fall Injury Risk  Goal: Absence of Fall and Fall-Related Injury  Outcome: Ongoing, Progressing     Problem: Skin Injury Risk Increased  Goal: Skin Health and Integrity  Outcome: Ongoing, Progressing     Problem: Impaired Wound Healing  Goal: Optimal Wound Healing  Outcome: Ongoing, Progressing     Problem: Adjustment to Illness (Sepsis/Septic Shock)  Goal: Optimal Coping  Outcome: Ongoing, Progressing     Problem: Bleeding (Sepsis/Septic Shock)  Goal: Absence of Bleeding  Outcome: Ongoing, Progressing     Problem: Glycemic Control Impaired (Sepsis/Septic Shock)  Goal: Blood Glucose Level Within Desired Range  Outcome: Ongoing, Progressing     Problem: Infection Progression (Sepsis/Septic Shock)  Goal: Absence of Infection Signs and Symptoms  Outcome: Ongoing, Progressing     Problem: Nutrition Impaired (Sepsis/Septic Shock)  Goal: Optimal Nutrition Intake  Outcome: Ongoing, Progressing

## 2022-09-02 NOTE — PROGRESS NOTES
[facility] - [facility]  Pulmonary Critical Care Note    Patient Name: Zackery Osullivan Jr.  MRN: [id number]  Admission Date: 8/22/2022  Hospital Length of Stay: 11 days  Code Status: Full Code  Attending Provider: Rowdy Davis IV, MD  Primary Care Provider: Isaiah Meeks MD     Subjective:     HPI: 77-year-old  male with past medical history of CAD, HTN, HLD, and HFrEF who presented to [facility] for a CABG x2 and mitral valve replacement. Received 2 units pRBCs intraop. Initially admitted to ICU for post-op monitoring.  Patient transferred out of ICU once extubated and hemodynamically stable off vasopressors on 8/25/22. On 8/27/22, patient reporting throat pain, nausea and constipation. Unable to keep down food without vomiting. Unable to tolerate PO meds. GI consulted. During this time, patient also having low/normal blood pressures. Had 17 beat run of V-tach evening of 8/27/22 in which amiodarone IV started since patient unable to tolerate PO medications. Morning of 8/28/22, MAPs <65 despite fluid resuscitation. Patient to be upgraded to ICU 8/28/22 due to the need for vasopressor support and close monitoring. CT abdomen with ileus. Small bowel follow through on 8/30 with no obstruction but delayed transit.     24 Hour Interval History: Overnight was uneventful. He states mild improvement in abdominal discomfort; still passing minimal flatus. I/O: overall net positive of 5.5 L.     Past Medical History:   Diagnosis Date    Arthritis     spine    CAD (coronary artery disease)     COPD (chronic obstructive pulmonary disease)     HLD (hyperlipidemia)     HTN (hypertension)     Ischemic cardiomyopathy     Mitral regurgitation     HEATHER on CPAP     Stroke     Venous insufficiency     Ventricular tachycardia        Past Surgical History:   Procedure Laterality Date    bilateral inguinal stents      CARDIAC DEFIBRILLATOR PLACEMENT Left     CATARACT EXTRACTION Bilateral     CATHETERIZATION OF  BOTH LEFT AND RIGHT HEART  2022    Diagnostic Only; Dr. Gerardo    COLONOSCOPY      CORONARY ARTERY BYPASS GRAFT (CABG) N/A 2022    Procedure: CORONARY ARTERY BYPASS GRAFT (CABG);  Surgeon: Rowyd Davis IV, MD;  Location: [facility] OR;  Service: Cardiovascular;  Laterality: N/A;  possible MVR & LLAA  //  ECHO NOTIFIED    LEFT HEART CATHETERIZATION Left 2022    Procedure: CATHETERIZATION, HEART, LEFT;  Surgeon: Reinaldo Gerardo MD;  Location: [facility] CATH LAB;  Service: Cardiology;  Laterality: Left;  LHC +/- PCI    MITRAL VALVE REPLACEMENT N/A 2022    Procedure: REPLACEMENT, MITRAL VALVE;  Surgeon: Rowdy Davis IV, MD;  Location: [facility] OR;  Service: Cardiovascular;  Laterality: N/A;  possible MVR and LLAA    REMOVAL OF IMPLANTED CARDIOVERTER-DEFIBRILLATOR (ICD)      TONSILLECTOMY         Social History     Socioeconomic History    Marital status:    Tobacco Use    Smoking status: Former     Years: 55.00     Types: Cigarettes     Start date:      Quit date:      Years since quittin.6    Smokeless tobacco: Never   Substance and Sexual Activity    Alcohol use: Yes     Alcohol/week: 3.0 standard drinks     Types: 3 Glasses of wine per week    Drug use: Never           Current Outpatient Medications   Medication Instructions    amiodarone (PACERONE) 200 mg, Oral, 2 times daily    aspirin (ECOTRIN) 81 mg, Oral, Nightly    atorvastatin (LIPITOR) 80 mg, Oral, Nightly    clopidogreL (PLAVIX) 75 mg, Oral, Nightly    ezetimibe (ZETIA) 10 mg, Oral, Nightly    fluticasone propionate (FLONASE) 50 mcg/actuation nasal spray 1 spray, Each Nostril, Daily PRN    furosemide (LASIX) 20 mg, Oral, Daily    metoprolol succinate (TOPROL-XL) 25 MG 24 hr tablet 1 tablet, Oral, Daily    mometasone (ASMANEX TWISTHALER) 220 mcg/ actuation (30) inhaler 2 puffs, Inhalation, Daily, Controller    sacubitriL-valsartan (ENTRESTO) 49-51 mg per tablet 0.5 tablets, Oral, 2 times daily    spironolactone  (ALDACTONE) 12.5 mg, Oral, Daily    umeclidinium-vilanteroL (ANORO ELLIPTA) 62.5-25 mcg/actuation DsDv 1 puff, Inhalation, Daily, Controller       Current Inpatient Medications   acetylcysteine 200 mg/ml (20%)  2 mL Nebulization TID    albuterol-ipratropium  3 mL Nebulization Q6H    [START ON 9/5/2022] amiodarone  200 mg Oral BID    [START ON 9/10/2022] amiodarone  200 mg Oral Daily    amiodarone  400 mg Oral BID    aspirin  81 mg Oral Daily    atorvastatin  80 mg Oral QHS    budesonide  0.5 mg Nebulization Q12H    ceFEPime (MAXIPIME) IVPB  2 g Intravenous Q8H    collagenase   Topical (Top) BID    docusate sodium  100 mg Oral BID    enoxaparin  1 mg/kg Subcutaneous Q12H    folic acid  1 mg Oral Daily    loperamide  4 mg Oral Once    midodrine  10 mg Oral Q8H    pantoprazole  40 mg Intravenous BID    polyethylene glycol  17 g Oral Daily    sodium chloride 0.9%  10 mL Intravenous Q6H       Current Intravenous Infusions   NORepinephrine bitartrate-D5W Stopped (09/01/22 1745)         Review of Systems   Constitutional:  Negative for chills, diaphoresis and fever.   Respiratory:  Negative for cough and shortness of breath.    Cardiovascular:  Negative for chest pain and palpitations.   Gastrointestinal:  Positive for abdominal pain and diarrhea. Negative for nausea and vomiting.        Objective:       Intake/Output Summary (Last 24 hours) at 9/2/2022 0432  Last data filed at 9/2/2022 0400  Gross per 24 hour   Intake 2950.01 ml   Output 1305 ml   Net 1645.01 ml           Vital Signs (Most Recent):  Temp: 97 °F (36.1 °C) (09/02/22 0400)  Pulse: 62 (09/02/22 0400)  Resp: 18 (09/02/22 0400)  BP: (!) 111/40 (09/02/22 0400)  SpO2: 100 % (09/02/22 0400)    Body mass index is 30.24 kg/m².  Weight: 92.6 kg (204 lb 2.3 oz) Vital Signs (24h Range):  Temp:  [96.9 °F (36.1 °C)-97.5 °F (36.4 °C)] 97 °F (36.1 °C)  Pulse:  [59-89] 62  Resp:  [14-26] 18  SpO2:  [94 %-100 %] 100 %  BP: ()/(37-94) 111/40     Physical Exam  General:  Well nourished w/ mild distress  HEENT: NC/AT; PERRL; nasal and oral mucosa moist and clear  Neck: Full ROM; no lymphadenopathy  Pulm: Audible wheezing, coarse crackles in left lower lobe, normal work of breathing on 5L/min via NC  CV: S1, S2 w/o murmurs or gallops; 1+ edema in lower extremities  GI: Abdomen distended with mild tenderness; bowel sound present; rectal tube in place  MSK: Full ROM of all extremities; non-pitting edema in left upper extremity   Derm: Midline chest incision clean and intact w/o signs of infection  Neuro: AAOx4; motor/sensory function intact    Lines/Drains/Airways       Peripherally Inserted Central Catheter Line  Duration             PICC Triple Lumen 08/28/22 1516 right brachial 4 days              Drain  Duration                  Rectal Tube 08/31/22 0225 fecal management system 2 days         NG/OG Tube 09/01/22 1323 16 Fr. Left nostril <1 day         Urethral Catheter 09/01/22 1200 16 Fr. <1 day                    Significant Labs:    Lab Results   Component Value Date    WBC 20.1 (H) 09/01/2022    HGB 10.2 (L) 09/01/2022    HCT 35.2 (L) 09/01/2022    .1 (H) 09/01/2022     (L) 09/01/2022         BMP  Lab Results   Component Value Date     (L) 09/01/2022    K 4.3 09/01/2022    CO2 21 (L) 09/01/2022    BUN 30.5 (H) 09/01/2022    CREATININE 1.14 09/01/2022    CALCIUM 7.9 (L) 09/01/2022    EGFRNONAA >60 05/14/2020       ABG  No results for input(s): PH, PO2, PCO2, HCO3, BE in the last 168 hours.    Mechanical Ventilation Support:  Vent Mode: CPAP PSV (08/23/22 0550)  Ventilator Initiated: Yes (08/22/22 1545)  Set Rate: 12 BPM (08/23/22 0225)  Vt Set: 500 mL (08/23/22 0225)  Pressure Support: 10 cmH20 (08/23/22 0550)  PEEP/CPAP: 5 cmH20 (08/23/22 0550)  Oxygen Concentration (%): 30 (09/01/22 0700)  Peak Airway Pressure: 16 cmH2O (08/23/22 0550)  Total Ve: 13.6 mL (08/23/22 0550)  F/VT Ratio<105 (RSBI): (!) 18.89 (08/23/22 0550)    Significant Imaging:  I have  reviewed the pertinent imaging within the past 24 hours.    9/1/2022 - Abdominal Xray shows persisting gaseous distension of the abdomen with gas in loops of small and large bowel gas pattern      9/1/2022 - Echocardiogram shows EF 30%; LV apex appears aneurysmal without LV thrombus    Assessment/Plan:     Assessment  Hypovolemic shock   Small bowel obstruction vs severe ileus   LUE DVT  IRENE   CAD s/p 2v CABG 08/22/2022  Mitral regurgitation s/p bioprosthetic MVR 08/22/2022  History of VT  HFrEF (EF 40% on 8/28/2022)    Plan  -Continue ICU level of care for ongoing monitoring and vasopressor support  -Continue Cefepime while awaiting for cultures (stool, blood, and sputum)  -Per cardiology, continue Amiodarone PO (400 mg bid x 5 days --> amiodarone 200 mg bid x 5 days --> amiodarone 200 mg daily); wean vasopressor as tolerated to maintain MAP >65; continue Midodrine,   -Continue Vanc PO and Flagyl IV. C-diff Ag (+) but toxin (-), will get C-diff PCR  -Continue Cefepime, blood and sputum cultures sent and pending  -Remainder of postoperative care per cardiac surgery recommendations     Code status: Full code   DVT Prophylaxis: Lovenox  GI Prophylaxis: Protonix  Diet: Clear liquid     33 minutes of critical care was time spent personally by me on the following activities: development of treatment plan with patient or surrogate and bedside caregivers, discussions with consultants, evaluation of patient's response to treatment, examination of patient, ordering and performing treatments and interventions, ordering and review of laboratory studies, ordering and review of radiographic studies, pulse oximetry, re-evaluation of patient's condition.  This critical care time did not overlap with that of any other provider or involve time for any procedures.     Shahla Peace,   Pulmonary Critical Care Medicine  [facility] - [facility] ICU

## 2022-09-02 NOTE — PT/OT/SLP EVAL
Speech Language Pathology Department  Clinical Swallow Evaluation    Patient Name:  Zackery Osullivan Jr.   MRN:  [id number]  Admitting Diagnosis: CABGx2, postop ileus    Recommendations:     General recommendations:  Modified Barium Swallow Study when appropriate  Diet recommendations:  NPO, Liquid Diet Level: NPO   Swallow strategies/precautions: medications crushed in puree  Precautions: Standard, aspiration    History:     Past Medical History:   Diagnosis Date    Arthritis     spine    CAD (coronary artery disease)     COPD (chronic obstructive pulmonary disease)     HLD (hyperlipidemia)     HTN (hypertension)     Ischemic cardiomyopathy     Mitral regurgitation     HEATHER on CPAP     Stroke     Venous insufficiency     Ventricular tachycardia        Past Surgical History:   Procedure Laterality Date    bilateral inguinal stents      CARDIAC DEFIBRILLATOR PLACEMENT Left     CATARACT EXTRACTION Bilateral     CATHETERIZATION OF BOTH LEFT AND RIGHT HEART  08/02/2022    Diagnostic Only; Dr. Gerardo    COLONOSCOPY      CORONARY ARTERY BYPASS GRAFT (CABG) N/A 8/22/2022    Procedure: CORONARY ARTERY BYPASS GRAFT (CABG);  Surgeon: Rowdy Davis IV, MD;  Location: [facility] OR;  Service: Cardiovascular;  Laterality: N/A;  possible MVR & LLAA  //  ECHO NOTIFIED    LEFT HEART CATHETERIZATION Left 08/02/2022    Procedure: CATHETERIZATION, HEART, LEFT;  Surgeon: Reinaldo Gerardo MD;  Location: [facility] CATH LAB;  Service: Cardiology;  Laterality: Left;  LHC +/- PCI    MITRAL VALVE REPLACEMENT N/A 8/22/2022    Procedure: REPLACEMENT, MITRAL VALVE;  Surgeon: Rowdy Davis IV, MD;  Location: [facility] OR;  Service: Cardiovascular;  Laterality: N/A;  possible MVR and LLAA    REMOVAL OF IMPLANTED CARDIOVERTER-DEFIBRILLATOR (ICD)      TONSILLECTOMY         Chest X-Rays: Confluent airspace opacities in the right perihilar region and right infrahilar region which might be related to an early infiltrate.    Home Diet: Regular and thin  "liquids  Current Method of Nutrition: NPO    Patient complaint: Pt reports coughing episode with medications this hospital stay.    Pt evaluated by this department 8/25/22 with diet recommendations of regular solids and thin liquids.  Pt with subsequent development of ileus, associated increased weakness.    Subjective     Patient awake, alert, calm, and cooperative.    Patient goals: "I want to eat."     Pain/Comfort: Pain Rating 1: 0/10    Respiratory Status: room air    Objective:     Oral Musculature Evaluation  Oral Musculature: WFL    Consistency Fed By Oral Symptoms Pharyngeal Symptoms   Ice chips SLP none Multiple swallows   puree SLP none Multiple swallows                                     Assessment:     Pt presents with signs/sx of aspiration, complaints of dysphagia, as well as increased R sided infiltrates seen on lung imaging.  Pt would benefit from MBS for comprehensive assessment of swallow function however barium administration contraindicated at this time due to resolving ileus.  Discussed with nursing who recommends holding MBS at this time as assessment of resolution of ileus continues.  SLP rec: NPO, ok for meds crushed in pudding.  Will complete MBS as appropriate.    Goals:   Multidisciplinary Problems       SLP Goals          Problem: SLP    Goal Priority Disciplines Outcome   SLP Goal     SLP Ongoing, Progressing   Description: LTG: Pt will tolerate least restrictive PO diet with no clinical signs/sx aspiration    STGs:  Pt will tolerate ice chips with no clinical signs/sx aspiration  Pt will tolerate puree solids with improved bolus formation/transport  Pt will participate in MBS as clinically appropriate.                       Plan:     Patient to be seen:  daily   Plan of Care expires:  09/30/22  Plan of Care reviewed with:  patient   SLP Follow-Up:  Yes      Time Tracking:     SLP Treatment Date:   09/02/22  Speech Start Time:  1320  Speech Stop Time:  1340     Speech Total Time (min): "  20 min    Billable minutes:  Swallow and Oral Function Evaluation, 20 minutes      09/02/2022

## 2022-09-02 NOTE — PLAN OF CARE
Spoke to CM about pt possibly becoming more LTAC/skilled appropriate rather than rehab. Will follow up. Also spoke with [facility] who is interested in accepting pt if ok with MD.

## 2022-09-02 NOTE — PT/OT/SLP PROGRESS
Physical Therapy Treatment    Patient Name:  Zackery Osullivan Jr.   MRN:  [id number]    Recommendations:     Discharge Recommendations:  rehabilitation facility vs SNF  Discharge Equipment Recommendations: walker, rolling   Barriers to discharge: None    Assessment:     Zackery Osullivan Jr. is a 77 y.o. male admitted with a medical diagnosis of CABG 8/22.  He presents with the following impairments/functional limitations:  weakness, gait instability, impaired endurance, impaired functional mobility, impaired self care skills, impaired balance, impaired cardiopulmonary response to activity .    Pt semi-supine in bed, awake and agreeable to tx session. Improved tolerance to activity with decreased reports of dizziness. Unable to obtain accurate BP during mobility. Pt mobilized to recliner. Spouse present.     Rehab Prognosis: Fair; patient would benefit from acute skilled PT services to address these deficits and reach maximum level of function.    Recent Surgery: Procedure(s) (LRB):  CORONARY ARTERY BYPASS GRAFT (CABG) (N/A)  REPLACEMENT, MITRAL VALVE (N/A) 11 Days Post-Op    Plan:     During this hospitalization, patient to be seen 6 x/week to address the identified rehab impairments via gait training, therapeutic activities, therapeutic exercises and progress toward the following goals:    Plan of Care Expires:  09/24/22    Subjective     Chief Complaint: generalized complaints  Patient/Family Comments/goals: to get stronger and walk  Pain/Comfort:  Pain Rating 1: other (see comments) (Reported discomfort around rectal tube but did not rate pain)      Objective:     Communicated with RNBarbra, prior to session.  Patient found HOB elevated with blood pressure cuff, bowel management system, oxygen, SCD, telemetry, pulse ox (continuous) and NGT upon PT entry to room.     General Precautions: Standard, sternal, NPO   Orthopedic Precautions:N/A   Braces: N/A  Respiratory Status: Nasal cannula, flow 2 L/min  Vitals:        HR: 60  BP: 102/49    SPO2: 96%     Functional Mobility:  Bed mobility:  Supine to sit EOB with mod x 2 assistance  Scooting with max assist  Balance:   Slight posterior lean, min to SBA to correct  Transfer:  Sit<->stand; mod lift assist with RW (LLE slid forward during transition)      Patient left up in chair with all lines intact and call button in reach..    GOALS:   Multidisciplinary Problems       Physical Therapy Goals          Problem: Physical Therapy    Goal Priority Disciplines Outcome Goal Variances Interventions   Physical Therapy Goal     PT, PT/OT Ongoing, Progressing     Description: Goals to be met by: 22     Patient will increase functional independence with mobility by performin. Supine to sit with MInimal Assistance  2. Sit to supine with MInimal Assistance  3. Sit to stand transfer with Minimal Assistance  4. Gait  x 150 feet with Minimal Assistance using Rolling Walker.                          Time Tracking:     PT Received On: 22  PT Start Time: 902     PT Stop Time: 937  PT Total Time (min): 35 min     Billable Minutes: Therapeutic Activity 2 units    Treatment Type: Treatment  PT/PTA: PTA     PTA Visit Number: 5     2022

## 2022-09-02 NOTE — PLAN OF CARE
Problem: SLP  Goal: SLP Goal  Description: LTG: Pt will tolerate least restrictive PO diet with no clinical signs/sx aspiration    STGs:  Pt will tolerate ice chips with no clinical signs/sx aspiration  Pt will tolerate puree solids with improved bolus formation/transport  Pt will participate in MBS as clinically appropriate.  Outcome: Ongoing, Progressing     POC initiated and goals created.  Latrice

## 2022-09-02 NOTE — PROGRESS NOTES
[facility] - [facility]  Cardiology  Progress Note    Patient Name: Zackery Osullivan Jr.  MRN: [id number]  Admission Date: 8/22/2022  Hospital Length of Stay: 11 days  Code Status: Full Code   Attending Physician: Rowdy Davis IV, MD   Primary Care Physician: Isaiah Meeks MD  Expected Discharge Date:   Principal Problem:<principal problem not specified>    Subjective:     Brief HPI:   Mr. Osullivan is a 77 year old male, known to Dr. Gerardo and Dr. Flowers, who presented to the hospital and underwent CAD/CABG and MVR due to diagnosis of MV CAD and significant MRLouisa Also underwent LISA Ligation. Patient tolerated the surgery well, and was transferred to intensive care unit for further close post operative monitoring. CIS is consulted for post op surgical cardiac management.    Hospital Course:   8.25.22: NAD noted. VSS with low normal BP. SR on tele. Denies SOB/Palps, +incisional CP.  8.26.22: NAD noted. VSS with low normal BP. SR on tele. Sitting in chair at bedside. Denies SOB/Palps, endorses incisional CP.  8.27.22: NAD Noted. Reports throat discomfort. SR on Tele. BP Low Normal.  8.28.22: NAD Noted. BP Low Normal. SR on Tele. GI Following. NSVT overnight.  SR 83.  8.29.22: NAD Noted. Patient with NG Tube. Surgical Team is following for evaluation of Ileus versus SBO. Hypotensive requiring low dose Levophed. SR on Tele. Amiodarone gtt is infusing. Started for NSVT, which he did not have last night according to the RN.  8.30.22: Remains NPO. Plans for small bowel follow through today  8.31.22: Patient underwent SBFT without evidence of SBO. He was given suppository with multiple BMs overnight. NGT has been removed and he is tolerating oral diet. Amiodarone drip in progress. Continues to require pressor support. He has been started on midodrine. Urine is dark. Patient is having elevated WBCs and has been started on ABX. Patient c/o pain/swelling to LUE  9.01.22: Still requiring pressor support.  LUE + DVT. Lovenox has been restarted. Stool studies pending. Urine dark dania  09/2/22: Patient sitting up in bedside chair. He had NGT placed again due to   Abdominal pain/distension. KUB revealing persistent gaseous distension of abdomen with gas in loops of small and large intestine. Pressors have been weaned to off. Midodrine @ 10mg tid. AICD interrogated and settings adjustments made per [employer] rep       PMH: CAD (Multivessel), Ischemic Cardiomyopathy (ICD), VHD- MR, Hypertension, Hyperlipidemia, Chronic VI, COPD, Smoker, Obstructive Sleep Apnea, NSVT, Obesity  PSH: CABG/MVR, Skin Lesion, ICD Placement ([employer]), Tonsillectomy  Family History: Father- CAD, Brother- CAD, Sister- CAD  Social History: Tobacco- Former Smoker (Quit 3 Years Ago), Alcohol- Negative, Substance Abuse- Negative     Previous Cardiac Diagnostics:   TTE 09/01/22:  Difficult to accurately assess LVEF as patient was in Afib, RVR during acquisition of images. Limited study to assess LV function. Definity used. Estimated EF 30%. LV apex appears aneurysmal without LV thrombus.     Venous US LUE 08/31/22:  DVT to left axillary through proximal brachial veins.  A superficial vein thrombosis was identified in the left cephalic vein.   All other vessels compressed.     Echocardiogram (8.28.22):  The left ventricle is mildly enlarged with concentric hypertrophy and mildly reduced LV systolic function.  The estimated ejection fraction is 40%.  Indeterminate left ventricular diastolic function due to MVR.  Aneurysmal basal inferior wall/ RCA territory  Well seated bioprosthetic MV without significant stenosis or perivalvular leak. MG 4 mmHg.  Mild to moderate tricuspid regurgitation.  Mild left atrial enlargement.    CABG/MVR (8.22.22): MVR 29 mm Mosaic Porcine Valve; CABG LIMA to LAD & SVG to OM, LISA Ligation with Endo Stapler.     Left Heart Catheterization (8.2.22):  Left Main- 50% Distal Disease, LAD- 60% Proximal IFR 0.81,  LCx- Nondominant (, Diagonal to OM Collateral), RCA- Dominant with , Bilateral Common Iliacs 30% Calcified Stenosis.     Echocardiogram (4.18.22):  The study quality is average.   The left ventricle is normal in size. Global left ventricular systolic function is mildly decreased. The left ventricular ejection fraction is 40%. Left ventricular diastolic function is normal. Noted left ventricular hypertrophy. Asymmetric septal left ventricular hypertrophy is present. It is mild.   Mild to moderate (1-2+) mitral regurgitation. Somewhat eccentric jet noted, chordae appear hyperechoic and thickened.      Review of Systems   Constitutional: Negative.   Cardiovascular:  Negative for chest pain and dyspnea on exertion.   Respiratory:  Negative for cough and shortness of breath.    Skin:         Left forearm reddened/swollen   Gastrointestinal:  Positive for diarrhea.   Genitourinary: Negative.    Neurological: Negative.      Objective:     Vital Signs (Most Recent):  Temp: 97 °F (36.1 °C) (09/02/22 0400)  Pulse: 62 (09/02/22 0826)  Resp: 18 (09/02/22 0826)  BP: (!) 101/55 (09/02/22 0745)  SpO2: 99 % (09/02/22 0826) Vital Signs (24h Range):  Temp:  [96.9 °F (36.1 °C)-97.5 °F (36.4 °C)] 97 °F (36.1 °C)  Pulse:  [59-88] 62  Resp:  [13-25] 18  SpO2:  [94 %-100 %] 99 %  BP: ()/(37-94) 101/55     Weight: 92.6 kg (204 lb 2.3 oz)  Body mass index is 30.24 kg/m².    SpO2: 99 %  O2 Device (Oxygen Therapy): CPAP (home unit)      Intake/Output Summary (Last 24 hours) at 9/2/2022 1013  Last data filed at 9/2/2022 1013  Gross per 24 hour   Intake 4429.01 ml   Output 2030 ml   Net 2399.01 ml       Lines/Drains/Airways       Peripherally Inserted Central Catheter Line  Duration             PICC Triple Lumen 08/28/22 1516 right brachial 4 days              Drain  Duration                  Rectal Tube 08/31/22 0225 fecal management system 2 days         NG/OG Tube 09/01/22 1323 16 Fr. Left nostril <1 day         Urethral Catheter  09/01/22 1200 16 Fr. <1 day                    Significant Labs:   CMP   Recent Labs   Lab 09/01/22 0217   *   K 4.3   CO2 21*   BUN 30.5*   CREATININE 1.14   CALCIUM 7.9*   ALBUMIN 2.0*   BILITOT 1.7*   ALKPHOS 85   *   ALT 88*    and CBC   Recent Labs   Lab 09/01/22 0217   WBC 20.1*   HGB 10.2*   HCT 35.2*   *     Telemetry:  Sinus Rhythm     Physical Exam:  Physical Exam  Vitals reviewed.   Constitutional:       Appearance: Normal appearance.   HENT:      Head: Normocephalic.      Nose:      Comments: NGT     Mouth/Throat:      Mouth: Mucous membranes are moist.   Cardiovascular:      Rate and Rhythm: Normal rate and regular rhythm.      Heart sounds: Normal heart sounds.   Pulmonary:      Effort: Pulmonary effort is normal. No respiratory distress.      Breath sounds: Normal breath sounds.   Abdominal:      General: There is no distension.      Tenderness: There is no abdominal tenderness.   Genitourinary:     Comments: Urine dark  Musculoskeletal:         General: Normal range of motion.      Cervical back: Neck supple.      Comments: Midsternal Incision intact   Skin:     General: Skin is warm and dry.      Capillary Refill: Capillary refill takes less than 2 seconds.   Neurological:      General: No focal deficit present.      Mental Status: He is alert and oriented to person, place, and time.   Psychiatric:         Behavior: Behavior normal.       Current Inpatient Medications:    Current Facility-Administered Medications:     0.9%  NaCl infusion (for blood administration), , Intravenous, Q24H PRN, Rowdy Davis IV, MD    acetaminophen oral solution 650 mg, 650 mg, Per OG tube, Q6H PRN, Rowdy Davis IV, MD    acetylcysteine 200 mg/ml (20%) solution 2 mL, 2 mL, Nebulization, TID, KIMBERLI Aguilar, 2 mL at 09/02/22 0826    albuterol-ipratropium 2.5 mg-0.5 mg/3 mL nebulizer solution 3 mL, 3 mL, Nebulization, Q6H, Marlon Humphreys MD, 3 mL at 09/02/22 0826    [START ON 9/5/2022]  amiodarone tablet 200 mg, 200 mg, Oral, BID, KIMBERLI Waddell    [START ON 9/10/2022] amiodarone tablet 200 mg, 200 mg, Oral, Daily, KIMBERLI Waddell    amiodarone tablet 400 mg, 400 mg, Oral, BID, KIMBERLI Waddell, 400 mg at 09/01/22 2026    aspirin EC tablet 81 mg, 81 mg, Oral, Daily, Rowdy Davis IV, MD, 81 mg at 09/01/22 0818    atorvastatin tablet 80 mg, 80 mg, Oral, QHS, KIMBERLI Pratt, 80 mg at 09/01/22 2026    budesonide nebulizer solution 0.5 mg, 0.5 mg, Nebulization, Q12H, Marlon Humphreys MD, 0.5 mg at 09/02/22 0826    calcium gluconate 1 g in NS IVPB (premixed), 1 g, Intravenous, PRN, Rowdy Davis IV, MD    calcium gluconate 1 g in NS IVPB (premixed), 3 g, Intravenous, PRN, Rowdy Davis IV, MD    ceFEPIme (MAXIPIME) 2 g in dextrose 5 % in water (D5W) 5 % 50 mL IVPB (MB+), 2 g, Intravenous, Q8H, Fidel Mckeon MD, Stopped at 09/02/22 0427    collagenase ointment, , Topical (Top), BID, Rowdy Davis IV, MD, Given at 09/01/22 2100    dextrose 10% bolus 250 mL, 25 g, Intravenous, PRN, Rowdy Davis IV, MD    dextrose 50% injection 12.5 g, 12.5 g, Intravenous, PRN, Rowdy Davis IV, MD, 12.5 g at 08/27/22 2206    docusate sodium capsule 100 mg, 100 mg, Oral, BID, Rowdy Davis IV, MD, 100 mg at 09/01/22 2026    enoxaparin injection 90 mg, 1 mg/kg, Subcutaneous, Q12H, Fidel Mckeon MD, 90 mg at 09/02/22 0457    folic acid tablet 1 mg, 1 mg, Oral, Daily, Rowdy Davis IV, MD, 1 mg at 09/01/22 0818    HYDROcodone-acetaminophen 5-325 mg per tablet 1 tablet, 1 tablet, Oral, Q4H PRN, Rowdy Davis IV, MD, 1 tablet at 08/24/22 1811    loperamide capsule 4 mg, 4 mg, Oral, Once, Rowdy Davis IV, MD    magnesium sulfate 2g in water 50mL IVPB (premix), 2 g, Intravenous, PRN, Rowdy Davis IV, MD    magnesium sulfate 2g in water 50mL IVPB (premix), 4 g, Intravenous, PRN, Rowdy Davis IV, MD    midodrine tablet 10 mg, 10 mg, Oral, Q8H, Fidel  LAYLA Mckeon MD, 10 mg at 09/02/22 0500    morphine injection 2 mg, 2 mg, Intravenous, PRN, Rowdy Davis IV, MD, 2 mg at 08/23/22 0817    NORepinephrine 8 mg in dextrose 5% 250 mL infusion, 0-3 mcg/kg/min, Intravenous, Continuous, Rowdy Davis IV, MD, Stopped at 09/01/22 1745    ondansetron injection 4 mg, 4 mg, Intravenous, Q12H PRN, Rowdy Davis IV, MD, 4 mg at 08/28/22 0307    oxyCODONE immediate release tablet 5 mg, 5 mg, Oral, Q4H PRN, Rowdy Davis IV, MD, 5 mg at 08/24/22 2016    pantoprazole injection 40 mg, 40 mg, Intravenous, BID, Isaiah Levine MD, 40 mg at 09/01/22 2022    polyethylene glycol packet 17 g, 17 g, Oral, Daily, MARTHA Limon, 17 g at 09/01/22 0818    potassium chloride 20 mEq in 100 mL IVPB (FOR CENTRAL LINE ADMINISTRATION ONLY), 20 mEq, Intravenous, PRN, Rowdy Davis IV, MD    potassium chloride 20 mEq in 100 mL IVPB (FOR CENTRAL LINE ADMINISTRATION ONLY), 40 mEq, Intravenous, PRN, Rowdy Davis IV, MD    potassium chloride 20 mEq in 100 mL IVPB (FOR CENTRAL LINE ADMINISTRATION ONLY), 20 mEq, Intravenous, PRN, Rowdy Davis IV, MD    Flushing PICC Protocol, , , Until Discontinued **AND** sodium chloride 0.9% flush 10 mL, 10 mL, Intravenous, Q6H, 10 mL at 09/02/22 0600 **AND** [DISCONTINUED] sodium chloride 0.9% flush 10 mL, 10 mL, Intravenous, PRN, Rowdy Davis IV, MD    sodium phosphate 15 mmol in dextrose 5 % 250 mL IVPB, 15 mmol, Intravenous, PRN, Rowdy Davis IV, MD    sodium phosphate 20.01 mmol in dextrose 5 % 250 mL IVPB, 20.01 mmol, Intravenous, YESENIA, Rowdy Davis IV, MD    sodium phosphate 30 mmol in dextrose 5 % 250 mL IVPB, 30 mmol, Intravenous, PRN, Rowdy Davis IV, MD    Facility-Administered Medications Ordered in Other Encounters:     diphenhydrAMINE capsule 50 mg, 50 mg, Oral, On Call Procedure, Reinaldo Gerardo MD, 50 mg at 08/02/22 0739    sodium chloride 0.9% flush 10 mL, 10 mL, Intravenous, PRN, Reinaldo Gerardo,  MD        Assessment:   IMPRESSION:  CAD (Multivessel)- Status Post CABG (LIMA to LAD, SVG to OM) (8.22.22)    - S/P LISA Ligation  Hypotension (Post Op),    -  H/O Hypertension     --improved. Off pressors. On Midodrine 10mg tid  Valvular Heart Disease- MR Status Post Bio MVR (#29 mm Mosaic Porcine) (8.22.22)    - Well seated bioprosthetic MV without significant stenosis or perivalvular leak. MG 4 mmHg.  Ischemic Cardiomyopathy EF 40% Status Post ICD ([employer])    - Aneurysmal basal inferior wall/ RCA territory--needs OAC  NSVT    - History of VT    - On Amiodarone Outpatient  Ileus   --SBFT 8/30/22 negative for SBO  C-diff + (9/1/22)  --NGT placed again on 9/1/22  DVT LUE  -- left axillary through proximal brachial veins. Superficial vein thrombus left cephalic vein.   Hyperlipidemia  Chronic Venous Insufficiency  Elevated BMI/Obesity  COPD  Former Smoker    - Quit 3 Years Ago  Anemia  --H/H stable  Leukocytosis   Elevated LFT  Thrombocytopenia  Sore Throat/Dysphagia ? Esophagitis  Unstageable sacral decubitus      Plan:   Continue oral amiodarone 400 mg bid x 5 days, then amiodarone 200 mg bid x 5 days, then decrease to amiodarone 200 mg daily  Continue midodrine for BP support. Placed on ABX for C-diff per intensivist  Plan resumption/Optimization of HF Medications when/if BP Tolerates  Continue Supportive Care      Yuly Mccray, KIMBERLI  Cardiology  [facility] - [facility]  09/02/2022

## 2022-09-02 NOTE — PROGRESS NOTES
Inpatient Nutrition Assessment    Admit Date: 8/22/2022   Total duration of encounter: 11 days     Nutrition Recommendation/Prescription     Continue current diet as tolerated. Continue with ONS.    Communication of Recommendations: reviewed with nurse    Nutrition Assessment     Malnutrition Assessment/Nutrition-Focused Physical Exam    Malnutrition in the context of acute illness or injury  Degree of Malnutrition: does not meet criteria  Energy Intake: </= 50% of estimated energy requirement for >/= 5 days  Interpretation of Weight Loss: does not meet criteria  Body Fat:does not meet criteria  Area of Body Fat Loss: does not meet criteria  Muscle Mass Loss: does not meet criteria  Area of Muscle Mass Loss: does not meet criteria  Fluid Accumulation: mild  Edema: generalized   Reduced  Strength: unable to obtain  A minimum of two characteristics is recommended for diagnosis of either severe or non-severe malnutrition.    Chart Review    Reason Seen: follow-up    Diagnosis:  #Small bowel obstruction vs severe ileus   #Acute kidney injury   #CAD s/p 2v CABG 08/22/2022  #Mitral regurgitation s/p bioprosthetic MVR 08/22/2022  #Heart failure with reduced ejection fraction    Relevant Medical History: [place], HLD, CAD, ischemic CM, MR, CVA, COPD, HEATHER, spinal arthritis     Nutrition-Related Medications: budesonide, docusate, folic acid, miralax  Calorie Containing IV Medications: no significant kcals from medications at this time    Nutrition-Related Labs:  9/2/22 Cl 109, BUN 31.1    Diet/PN Order: Diet clear liquid Cardiac  Oral Supplement Order: Boost  Tube Feeding Order: none at this time  Appetite/Oral Intake: not applicable/not applicable  Factors Affecting Nutritional Intake: clear liquid diet and NPO  Food/Rastafari/Cultural Preferences: none reported    Skin Integrity: wound, skin tear, incision  Wound(s):      Altered Skin Integrity 08/26/22 0621 Buttocks-Tissue loss description: Partial thickness noted wound on  "buttocks    Comments    9/2/22: Noted diet recently advanced to liquids. NPO since 8/28/22. Will monitor for tolerance. If not able to tolerate and advance diet, will need to consider PN.    Anthropometrics    Height: 5' 8.9" (175 cm) Height Method: Stated  Last Weight: 92.6 kg (204 lb 2.3 oz) (08/30/22 0600) Weight Method: Standard Scale  BMI (Calculated): 30.2  BMI Classification: obese grade I (BMI 30-34.9)        Ideal Body Weight (IBW), Male: 159.4 lb  % Ideal Body Weight, Male (lb): 124.48 %                 Usual Weight Provided By: unable to obtain usual weight at this time    Wt Readings from Last 5 Encounters:   08/30/22 92.6 kg (204 lb 2.3 oz)   08/09/22 87.1 kg (192 lb)   08/02/22 87.9 kg (193 lb 12.6 oz)   11/01/21 98 kg (215 lb 15.8 oz)     Weight Change(s) Since Admission:  Admit Weight: 88 kg (194 lb) (08/17/22 1427)  9/2/22: 92.6kg    Estimated Needs    Weight Used For Calorie Calculations: 90 kg (198 lb 6.6 oz)  Energy Calorie Requirements (kcal): 1620 kcals (MSJ)  Energy Need Method: [place]-St Jeor  Weight Used For Protein Calculations: 90 kg (198 lb 6.6 oz)  Protein Requirements: 108-180 g (1.2-2.0 g/kg CBW)     Temp: 97 °F (36.1 °C)       Enteral Nutrition    Patient not receiving enteral nutrition at this time.    Parenteral Nutrition    Patient not receiving parenteral nutrition support at this time.    Evaluation of Received Nutrient Intake    Calories: not meeting estimated needs  Protein: not meeting estimated needs    Patient Education    Not applicable.    Nutrition Diagnosis     PES: Inadequate oral intake related to current condition as evidenced by NPO/clear liquid since 8/28/22. (new)    Interventions/Goals     Intervention(s): general/healthful diet, commercial beverage, and collaboration with other providers  Goal: Meet greater than 75% of nutritional needs by follow-up. (new)    Monitoring & Evaluation     Dietitian will monitor energy intake.  Nutrition Risk/Follow-Up: moderate " (follow-up in 3-5 days)

## 2022-09-02 NOTE — PROGRESS NOTES
Patient is little bit better today  He is up in the chair and feels good   He is having frequent stools  He he is now off of pressors and his blood pressure is stable  Lungs are clear  Abdomen soft slightly distended nontender  Extremities okay   White blood cell count down to 16 dialysis   Creatinine 1.0   His stool was positive for C diff  Will begin oral vancomycin  Start oral liquids

## 2022-09-02 NOTE — PT/OT/SLP PROGRESS
SLP attempting to see pt for possible bedside swallow evaluation however pt remains NPO with ng to suction.  Discussed with nursing who will notify SLP if improvement in status for assessment.  SLP to continue to follow.

## 2022-09-03 PROBLEM — Z95.2 S/P MVR (MITRAL VALVE REPLACEMENT): Status: ACTIVE | Noted: 2022-01-01

## 2022-09-03 NOTE — PT/OT/SLP PROGRESS
Speech Language Pathology      Zackery Osullivan Jr.  MRN: [id number]     SLP consulted nursing this AM in regards to MBS appropriateness. Discussed with nursing who recommends holding MBS at this time as resolution of ileus continues.  SLP rec: NPO, ok for meds crushed in pudding.  Will complete MBS as appropriate.

## 2022-09-03 NOTE — ASSESSMENT & PLAN NOTE
Continue per Critical Care Service for postoperative ileus/SBO.  Point a continued increase activity and re-evaluated by speech path.  Consider enteral nutrition once the bowel activity returns noted will defer to Critical Care Service with nutritional requirements.  Increase activity as tolerated.

## 2022-09-03 NOTE — CONSULTS
Zackery Osullivan Jr.   MRN: [id number]   ADMISSION DATE: 2022  : 1945  AGE: 77 y.o.    DATE :  2022       PROVIDER: KARYN INGRAM    REASON FOR REFERRAL:  Recurrent ileus    HPI: 77-year-old  male with past medical history of CAD, HTN, HLD, and HFrEF who presented to [facility] for a CABG x2 and mitral valve replacement. Received 2 units pRBCs intraop. Initially admitted to ICU for post-op monitoring.  Patient transferred out of ICU once extubated and hemodynamically stable off vasopressors on 22. On 22, patient reporting throat pain, nausea and constipation. Unable to keep down food without vomiting. Unable to tolerate PO meds. GI consulted. During this time, patient also having low/normal blood pressures. Had 17 beat run of V-tach evening of 22 in which amiodarone IV started since patient unable to tolerate PO medications. Morning of 22, MAPs <65 despite fluid resuscitation. Patient to be upgraded to ICU 22 due to the need for vasopressor support and close monitoring. CT abdomen with ileus. Small bowel follow through on  with no obstruction but delayed transit.      24 Hour Interval History: NAEO. Patient had C. Diff Ag+/Toxin- x2, C. Diff PCR 9/3/22 negative, will discontinue oral vanc and flagyl. Had some SOB/LEONARDO this morning, CXR showed no significant interval changes. States his diarrhea improving still being treated for ileus, on Clinimix, will d/c mIVF. Hemodynamically stable off pressors for > 24h. Stable for stepdown to floor today.     Patient is presently on home CPAP.  He has a nasogastric tube in place.  He was seen last week by Dr. Isaiah Levine.  He had some abdominal discomfort at that time.  Also had dilated bowel loops.  Imaging study as above was negative for any obstruction although there was delayed transit reported.  There was significant dilation of the small bowel loop reported on CT scan with distal tapering.  It seems like CT scan  was done without enteric contrast.  Small-bowel series was reported to show delayed transit.  I am not able to retrieve small bowel series report at this time.     SUBJECTIVE:    Review of Systems   No fever or chills reported.  Patient has some subjective complaints of shortness of breath.  No overt GI blood loss.  He is reported as some diarrhea.  He reports some flatus this morning.  [facility].     Review of patient's allergies indicates:  No Known Allergies      acetylcysteine 200 mg/ml (20%)  2 mL Nebulization TID    albuterol-ipratropium  3 mL Nebulization Q6H    [START ON 9/5/2022] amiodarone  200 mg Oral BID    [START ON 9/10/2022] amiodarone  200 mg Oral Daily    amiodarone  400 mg Oral BID    aspirin  81 mg Oral Daily    atorvastatin  80 mg Oral QHS    budesonide  0.5 mg Nebulization Q12H    ceFEPime (MAXIPIME) IVPB  2 g Intravenous Q8H    collagenase   Topical (Top) BID    docusate sodium  100 mg Oral BID    enoxaparin  1 mg/kg Subcutaneous Q12H    [START ON 9/5/2022] fat emulsion 20%  250 mL Intravenous Daily    folic acid  1 mg Oral Daily    loperamide  4 mg Oral Once    midodrine  10 mg Oral Q8H    pantoprazole  40 mg Intravenous BID    polyethylene glycol  17 g Oral Daily    sodium chloride 0.9%  10 mL Intravenous Q6H       Medications Discontinued During This Encounter   Medication Reason    calcium chloride 100 mg/mL (10 %) injection     cefazolin (ANCEF) 2 gram in dextrose 5% 50 mL IVPB (premix)     vancomycin in dextrose 5 % 1 gram/250 mL IVPB 1,000 mg     vancomycin injection Patient Discharge    thrombin (bovine) solution Patient Discharge    papaverine injection Patient Discharge    heparin (porcine) injection Patient Discharge    calcium chloride 100 mg/mL (10 %) injection Patient Discharge    dexmedetomidine (PRECEDEX) 400mcg/100mL 0.9% NaCL infusion     metoprolol tartrate (LOPRESSOR) split tablet 12.5 mg     metoprolol succinate (TOPROL-XL) 24 hr tablet  25 mg     mupirocin 2 % ointment     sucralfate tablet 1 g     metoprolol succinate (TOPROL-XL) 24 hr split tablet 12.5 mg     famotidine (PF) injection 20 mg     0.45% NaCl infusion     amiodarone tablet 200 mg     0.45% NaCl with KCl 20 mEq infusion     metoclopramide HCl injection 5 mg     insulin regular in 0.9 % NaCl 100 unit/100 mL (1 unit/mL) infusion     EPINEPHrine (ADRENALIN) 5 mg in dextrose 5 % 250 mL infusion     0.45% NaCl infusion     0.45% NaCl with KCl 20 mEq infusion     sodium chloride 0.9% flush 10 mL     sodium chloride 0.9% flush 10 mL     sodium chloride 0.9% flush 10 mL     enoxaparin injection 40 mg     amiodarone 360 mg/200 mL (1.8 mg/mL) infusion     lactated ringers infusion     midodrine tablet 5 mg     midodrine tablet 10 mg     vancomycin capsule 500 mg     metronidazole IVPB 500 mg     lactated ringers infusion     NORepinephrine 8 mg in dextrose 5% 250 mL infusion     vancomycin capsule 125 mg          amino acid 5 % in 15% dextrose 85 mL/hr at 22 1524        Past Medical History:   Diagnosis Date    Arthritis     spine    CAD (coronary artery disease)     COPD (chronic obstructive pulmonary disease)     HLD (hyperlipidemia)     HTN (hypertension)     Ischemic cardiomyopathy     Mitral regurgitation     HEATHER on CPAP     Stroke     Venous insufficiency     Ventricular tachycardia       Social History     Socioeconomic History    Marital status:    Tobacco Use    Smoking status: Former     Years: 55.00     Types: Cigarettes     Start date:      Quit date:      Years since quittin.6    Smokeless tobacco: Never   Substance and Sexual Activity    Alcohol use: Yes     Alcohol/week: 3.0 standard drinks     Types: 3 Glasses of wine per week    Drug use: Never      Past Surgical History:   Procedure Laterality Date    bilateral inguinal stents      CARDIAC DEFIBRILLATOR PLACEMENT Left     CATARACT EXTRACTION Bilateral     CATHETERIZATION OF BOTH LEFT AND RIGHT HEART   08/02/2022    Diagnostic Only; Dr. Gerardo    COLONOSCOPY      CORONARY ARTERY BYPASS GRAFT (CABG) N/A 8/22/2022    Procedure: CORONARY ARTERY BYPASS GRAFT (CABG);  Surgeon: Rowdy Davis IV, MD;  Location: [facility] OR;  Service: Cardiovascular;  Laterality: N/A;  possible MVR & LLAA  //  ECHO NOTIFIED    LEFT HEART CATHETERIZATION Left 08/02/2022    Procedure: CATHETERIZATION, HEART, LEFT;  Surgeon: Reinaldo Gerardo MD;  Location: [facility] CATH LAB;  Service: Cardiology;  Laterality: Left;  LHC +/- PCI    MITRAL VALVE REPLACEMENT N/A 8/22/2022    Procedure: REPLACEMENT, MITRAL VALVE;  Surgeon: Rowdy Davis IV, MD;  Location: [facility] OR;  Service: Cardiovascular;  Laterality: N/A;  possible MVR and LLAA    REMOVAL OF IMPLANTED CARDIOVERTER-DEFIBRILLATOR (ICD)      TONSILLECTOMY          History reviewed. No pertinent family history.       OBJECTIVE:     Vitals:    09/03/22 1300 09/03/22 1400 09/03/22 1410 09/03/22 1500   BP: 121/61 (!) 144/67  (!) 113/55   Pulse: 61 62 63 65   Resp: 12 16 18 20   Temp:       TempSrc:       SpO2: 99% 100% 100% 98%   Weight:       Height:         Physical Exam   HEENT examination:  Unremarkable   Neck:  Supple   Heart examination:  S1, S2 audible, status post cardiac surgery   Chest:  Decreased breath sounds bilaterally.    Abdomen:  Bowel sounds hypoactive.  Soft.  Mild abdominal distention.  Nontender.  No guarding or rebound.    Extremities: No significant edema.      LABS    Recent Labs   Lab 09/01/22 0217 09/02/22  0954 09/03/22  0140   WBC 20.1* 16.7* 14.4*   HGB 10.2* 8.8* 8.3*   HCT 35.2* 28.7* 27.3*   * 125* 122*      Recent Labs   Lab 09/01/22 0217 09/02/22  0954 09/03/22  0140   * 139 140   K 4.3 3.9 3.8   CO2 21* 23 23   BUN 30.5* 31.1* 27.4*   CREATININE 1.14 1.00 0.92   CALCIUM 7.9* 7.3* 7.2*   BILITOT 1.7* 1.7* 1.4   ALKPHOS 85 86 88   ALT 88* 84* 75*   * 90* 80*   GLUCOSE 109 90 72*    No results for input(s): INR in the last 168 hours.   Recent  Labs   Lab 08/28/22  0850   AMYLASE 22    No results for input(s): APTT, INR, PTT in the last 168 hours.        RESULTS: CT Abdomen Pelvis W Wo Contrast    Result Date: 8/28/2022  EXAMINATION: CT ABDOMEN PELVIS W WO CONTRAST CLINICAL HISTORY: ; Nausea/vomiting; TECHNIQUE: Helical-acquisition CT images of the abdomen and/or pelvis were obtained from the lung bases through ischial tuberosities, prior to and following the intravenous administration of iodinated contrast media (ISOVUE-370, 100 mL). Enteric contrast was not utilized.  Multiplanar reformats were accomplished by a CT technologist at a separate workstation and pushed to PACS for physician review. Automated tube current modulation, weight-based exposure dosing, and/or iterative reconstruction technique utilized to reach lowest reasonably achievable exposure rate.  DLP: 1110 mGy*cm COMPARISON: No prior cross-sectional imaging is available.  Abdominal radiographs dated 28 August 2022 were reviewed. FINDINGS: Images were reviewed in soft tissue, lung, and bone windows. Exam quality: adequate for evaluation Lines/tubes: none visualized Cardiopulmonary: There acute postoperative changes of the partially visualized mediastinal, with scattered pericardial air, median sternotomy wires, and mitral valve repair/replacement components.  The cardiac chambers are mildly prominent, more so on the left.  No significant pericardial fluid is appreciated.  Mild bilateral dependent atelectasis is noted, as well as emphysematous changes through the lung bases.  There are calcified right lower lobe granulomas, without suspicious focal lesion or evidence of acute airspace consolidation.  Small volume layering bilateral pleural effusions are also present. Hepatobiliary/Pancreas: No acute or focal liver abnormality.  The gallbladder is moderately distended, with layering contrast versus hyperdense sludge within the lumen.  No definite large calcified stone, acute inflammatory  changes, or evidence of biliary obstruction.  There is no evidence of expansile pancreatic lesion, ductal dilatation, or peripancreatic inflammatory changes. Spleen: No evidence of acute or suspicious focal abnormality.  Numerous focal calcifications are scattered through the splenic parenchyma, consistent with sequela of prior granulomatous process. Adrenal/: No suspicious adrenal or renal lesion. Multiple nonobstructing radiodense stones are present within the bilateral renal calices, readily evident on the post-contrast series.  There are no findings of distal obstructive uropathy.  Punctate focus of gas within the nondependent urinary bladder lumen is likely iatrogenic secondary to recent catheter placement/removal.  No focal wall irregularity, mural thickening, or otherwise suspicious abnormality of the bladder.  Prostate is unremarkable for CT assessment. Esophagus/GI tract: The included lower esophagus is unremarkable. The stomach is notably distended, with layering dependent ingested material within the proximal lumen.  The antrum/pylorus are unremarkable.  There is prominent dilatation of numerous proximal through mid small bowel loops, visualized through the bilateral upper and left lower abdomen and consistent with recent radiographic appearance.  The loops are fluid-filled with numerous scattered air-fluid levels and largest caliber measuring approximately 4 cm.  Transition to nondilated fluid-filled bowel is appreciated anteriorly at the mid abdomen (series 18, images 51-62; series 14, images 30-45; series 16, images 80-90).  The remainder of the distal small bowel course is nondistended.  There is no abnormal large bowel dilatation.  Small amount of fluid with associated air-fluid levels noted through the ascending colon.  No suspicious focal large bowel lesion.  Uncomplicated scattered sigmoid diverticulosis is incidentally mentioned. Peritoneal/Extraperitoneal Spaces: No free fluid or air, and no  drainable collections.  The aortoiliac vasculature is non-aneurysmal and without convincing focal abnormality or intraluminal filling defect.  Moderate to severe burden of lower abdominal aorta and bilateral iliac atherosclerotic plaquing and calcification is evident, with grossly maintained patency throughout the visualized vasculature.  There are bilateral common/external iliac vein stents.  No pathologic yue enlargement or necrotic adenopathy. Musculoskeletal: Extensive degenerative alterations are noted throughout the spinal column and bony pelvis.  No acute cortical displacement is identified.  There is no evidence of destructive osseous lesion.  The body wall subcutaneous tissues and regional musculature are without convincing focal abnormality.  Scattered areas of gas are appreciated through the superficial ventral left lower body wall, without associated focal abnormality.  There is also a small gas locule superficial to the left inguinal vasculature, which may be residual to recent vessel access; no surrounding fluid, hematoma, or drainable collection identified.  Disorganized subcutaneous fluid is appreciated diffusely through the bilateral hip regions.  There are moderate sized uncomplicated fat containing bilateral inguinal hernias.     1. Overall appearance of small bowel with gradual transition to nondilated distal caliber suggestive of moderate to severe ileus, with partial obstruction not entirely excluded. 2. Scattered gas foci at the subcutaneous left ventral body wall and left inguinal region, as well as within the urinary bladder, favored iatrogenic but knee close correlation with pertinent clinical details. 3. Nonobstructing bilateral nephrolithiasis. 4. Thoracic findings include layering bilateral pleural effusions with adjacent atelectasis, and acute/subacute mediastinal postoperative changes. 5. Additional chronic secondary details discussed above. ========== ========== Please note the  non-contrast images provide no significant added diagnostic value/clinical utility relative to the increased radiation exposure and resource utilization.  Prior to patient arrival at the CT suite, attempts were made to discuss the provided clinical indication, ordered exam protocol, and potential lower radiation and/or better diagnostic value protocol(s) in accordance with ACR Appropriateness Criteria. Electronically signed by: Theron White Date:    08/28/2022 Time:    14:00    X-Ray Chest 1 View    Result Date: 9/3/2022  EXAMINATION: XR CHEST 1 VIEW CPT 03030 CLINICAL HISTORY: dyspnea; COMPARISON: August 31, 2022 FINDINGS: Cardiomediastinal silhouette and pleuroparenchymal changes are essentially unchanged as compared with the previous exam.     No significant change Electronically signed by: Rodney Brice Date:    09/03/2022 Time:    10:44    X-Ray Chest 1 View    Result Date: 8/31/2022  EXAMINATION: XR CHEST 1 VIEW CPT 55946 CLINICAL HISTORY: increased wbc; COMPARISON: August 28, 2022 FINDINGS: Cardiomediastinal silhouette and pleuroparenchymal changes are essentially unchanged as compared with the previous exam with the exception of slightly more confluent airspace opacities in the right perihilar and infrahilar region superimposed early infiltrate cannot be completely excluded. Interval removal of nasogastric tube No other change     Confluent airspace opacities in the right perihilar region and right infrahilar region which might be related to an early infiltrate. Interval removal of nasogastric tube. No other significant change Electronically signed by: Rodney Brice Date:    08/31/2022 Time:    08:51    X-Ray Chest 1 View    Result Date: 8/28/2022  EXAMINATION: XR CHEST 1 VIEW CLINICAL HISTORY: PICC placement; TECHNIQUE: Single frontal view of the chest was performed. COMPARISON: 08/28/2022 FINDINGS: There is a persistent left upper lobe infiltrate.  The heart is normal in appearance.  There is some  pulmonary venous congestion centrally.  Postsurgical changes are seen in mediastinum.  Bones and joints show no acute abnormality.  There is a right-sided PICC line in good position.     Interval placement of right-sided PICC line in good position otherwise unchanged Electronically signed by: Maria Esther Calix Date:    08/28/2022 Time:    15:56    X-Ray Chest 1 View    Result Date: 8/28/2022  EXAMINATION: XR CHEST 1 VIEW CPT 67833 CLINICAL HISTORY: post-op; COMPARISON: August 26, 2022 FINDINGS: Cardiomediastinal silhouette and pleuroparenchymal changes are essentially unchanged as compared with the previous exam     No significant change Electronically signed by: Rodney Brice Date:    08/28/2022 Time:    10:16    X-Ray Chest 1 View    Result Date: 8/26/2022  EXAMINATION: XR CHEST 1 VIEW CLINICAL HISTORY: Removed chest tube; COMPARISON: Yesterday FINDINGS: Portable frontal view of the chest was obtained.  Right jugular sheath has been removed.  Cardiac silhouette unchanged.  Increased opacification left upper lung.  Similar patchy opacities right mid lung.  No pneumothorax is seen.     Increased left upper lung opacification. Electronically signed by: Kiran Restrepo Date:    08/26/2022 Time:    06:55    X-Ray Chest PA And Lateral    Result Date: 8/18/2022  EXAMINATION: XR CHEST PA AND LATERAL CLINICAL HISTORY: Atherosclerotic heart disease of native coronary artery without angina pectoris. COMPARISON: Chest x-ray 07/15/2022 FINDINGS: Frontal and lateral views of the chest was obtained.  The cardiac silhouette is within normal limits for size. Left-sided dual lead pacing device is again seen.  Left perihilar/suprahilar opacity appears to be grossly stable.  Calcified granuloma in the right lung base is seen.  Calcified granuloma versus calcified lymph node in the right perihilar/infrahilar region is again seen.  No visible pneumothorax or pleural effusion is appreciated.  No acute displaced fracture or dislocation  is present.     1. There is redemonstration of airspace opacity in the left perihilar/upper lung.  This finding is nonspecific, but underlying mass with postobstructive pneumonitis cannot be excluded.  Infectious or inflammatory process cannot be excluded.  Further evaluation with CT chest with contrast is recommended. Electronically signed by: Ken Murillo MD Date:    08/18/2022 Time:    12:27    X-Ray Abdomen AP 1 View    Result Date: 9/1/2022  EXAMINATION: XR ABDOMEN AP 1 VIEW CLINICAL HISTORY: Solitary pulmonary nodule Illeus; TECHNIQUE: AP X-RAY OF THE ABDOMEN: CPT 64751 COMPARISON: August 30th 2022 FINDINGS: Examination reveals persisting gaseous distension of the abdomen with persistent distended loops of small bowel in a pattern similar to previous exams no abnormal masses or calcifications identified.  Contrast is seen in the bladder there appears to be a Whitehead catheter or balloon of other type projecting by the symphysis pubis     Persisting gaseous distension of the abdomen with gas in loops of small and large bowel gas pattern similar to previous exam. Residual contrast in the bladder. Either Whitehead catheter and or other balloon projecting by the symphysis pubis Electronically signed by: Rodney Brice Date:    09/01/2022 Time:    08:41    X-Ray Abdomen AP 1 View    Result Date: 8/28/2022  EXAMINATION: XR ABDOMEN AP 1 VIEW CLINICAL HISTORY: Nausea/vomiting; TECHNIQUE: AP X-RAY OF THE ABDOMEN: CPT 71670 FINDINGS: There is evidence of dilated loops of small bowel distributed in a stepladder fashion indicative of a small bowel obstruction clinical correlation and or other imaging modalities might prove helpful for further evaluation     Changes suggestive of small-bowel obstruction Electronically signed by: Rodney Brice Date:    08/28/2022 Time:    10:07    X-Ray Chest AP Portable    Result Date: 8/25/2022  EXAMINATION: XR CHEST AP PORTABLE CLINICAL HISTORY: Post-op; TECHNIQUE: Single frontal view of  the chest was performed. COMPARISON: 08/23/2022 FINDINGS: LINES AND TUBES: Right IJ CVC tip projects over the SVC.  Dual lead cardiac pacer device is in place via left subclavian approach with leads overlying the right atrium and right ventricle.  EKG/telemetry leads overlie the chest.  Anterior mediastinal drain is unchanged..  Interval extubation and removal of enteric tube. MEDIASTINUM AND LAXMI: The cardiac silhouette is normal. LUNGS: Unchanged left suprahilar opacity.  Mild increased interstitial opacities in the right perihilar lung zone. PLEURA: No pneumothorax. BONES: Postop sternotomy.     Interval extubation.  Mild increased interstitial opacities in the perihilar region possibly atelectasis.  Unchanged opacity in the left suprahilar lung zone. Electronically signed by: Joycelyn Mora Date:    08/25/2022 Time:    06:55    X-Ray Chest AP Portable    Result Date: 8/23/2022  EXAMINATION: XR CHEST AP PORTABLE CLINICAL HISTORY: Post-op; TECHNIQUE: Single view of the chest COMPARISON: 8222 FINDINGS: Patient remains intubated with postsurgical changes of median sternotomy.  Slight interval improvement of left upper lung zone opacification.     As above. Electronically signed by: Octaviano Tesfaye Date:    08/23/2022 Time:    06:49    X-Ray Chest AP Portable    Result Date: 8/22/2022  EXAMINATION: XR CHEST AP PORTABLE CLINICAL HISTORY: ; Post-op; TECHNIQUE: AP view(s) of the chest. COMPARISON: 18 August 2022 FINDINGS: Lines/tubes/devices: Endotracheal tube is now visualized, terminating at the level of the clavicles.  Right IJ sheath is also appreciated, as well as enteric tube that terminates at the left upper quadrant and side port distal to the level the GE junction, and mediastinal drain remaining in place.  The left chest wall ICD pacemaker is unchanged. Median sternotomy wires are now visualized.  There is prominence of the bilateral hilar and the central vascular structures.  Newly appreciated irregular  opacity projects over the left hilar region, with hazy opacification of the adjacent left upper lung zone.  No new or worsening focal abnormality of the right lung field is appreciated.  There is no enlarging pleural effusion or convincing pneumothorax. There is no acute osseous or extrathoracic abnormality.     1. Interval mediastinal postoperative changes with lines/tubes as above. 2. Newly appreciated hazy opacification and irregular infiltrate of the left upper lobe, may reflect changes of developing pneumonia, postoperative alteration, or sequela of aspiration. Electronically signed by: Theron White Date:    08/22/2022 Time:    16:14    Echo Saline Bubble? No    Result Date: 9/1/2022  · Difficult to accurately assess LVEF as the patient was in AFIB with RVR during the acquisition of the images. · Limited study to assess LV function; definity used. · The estimated ejection fraction is 30%. · LV apex appears aneurysmal without LV thrombus.      Echo    Result Date: 8/28/2022  · The left ventricle is mildly enlarged with concentric hypertrophy and mildly reduced LV systolic function. · The estimated ejection fraction is 40%. · Indeterminate left ventricular diastolic function due to MVR. · Aneurysmal basal inferior wall/ RCA territory · Well seated bioprosthetic MV without significant stenosis or perivalvular leak. MG 4 mmHg. · Mild to moderate tricuspid regurgitation. · Mild left atrial enlargement.      CV Ultrasound doppler venous arm left    Result Date: 9/2/2022  Positive deep vein thrombosis identified in the left axillary through proximal brachial veins. A superficial vein thrombosis was identified in the left cephalic vein. All other vessels compressed.    XR Small Bowel Follow Through    Result Date: 8/30/2022  EXAMINATION: XR SMALL BOWEL FOLLOW THROUGH CLINICAL HISTORY: SBO vs ileus; COMPARISON: CT 28 August 2022 FINDINGS:  image demonstrates enteric tube extending into the stomach.  There are  moderately dilated small bowel loops.  Enteric contrast was then administered.  Contrast reaches the colon at 4 hours.     Delayed small bowel transit time but no small bowel obstruction. Electronically signed by: Kiran Restrepo Date:    08/30/2022 Time:    14:14    US Abdomen Limited    Result Date: 8/28/2022  EXAMINATION: US ABDOMEN LIMITED CLINICAL HISTORY: Nausea, vomiting, elevated LFTs'; TECHNIQUE: Complete abdominal ultrasound (including pancreas, aorta, liver, gallbladder, common bile duct, IVC, kidneys, and spleen) was performed. COMPARISON: None FINDINGS: The pancreas unremarkable.  No pancreatic mass or lesion is seen. The visualized portion of the abdominal aorta appear grossly unremarkable. The liver is normal in size.  It measures 16.6 cm.  No liver mass or lesion is seen.  Portal and hepatic veins appear normal. The gallbladder appears normal.  No gallstones are seen.  No pericholecystic fluid is seen.  Gallbladder wall measures 3.2 mm.  Common bile duct home measures 2.2 mm. The right kidney measures 9.8 cm.  . No hydronephrosis is seen.  No hydroureter is seen.  No cortical mass or lesion is seen.  No nephrolithiasis is seen.  Flow to the kidney appears normal.     Normal right upper quadrant ultrasound Electronically signed by: Maria Esther Calix Date:    08/28/2022 Time:    11:41             ICD-10-CM ICD-9-CM   1. Solitary pulmonary nodule  R91.1 793.11   2. CAD (coronary artery disease)  I25.10 414.00   3. Coronary artery disease of native artery of native heart with stable angina pectoris  I25.118 414.01     413.9   4. Coronary artery disease involving native heart, unspecified vessel or lesion type, unspecified whether angina present  I25.10 414.01   5. Abnormal heart rhythm  I49.9 427.9   6. Hypotension  I95.9 458.9   7. Ileus, postoperative  K91.89 997.49    K56.7 560.1   8. Left arm pain  M79.602 729.5   9. Diarrhea, unspecified type  R19.7 787.91   10. Sepsis, due to unspecified organism,  unspecified whether acute organ dysfunction present  A41.9 038.9     995.91   11. CAD in native artery  I25.10 414.01   12. Pressure injury of buttock, unstageable, unspecified laterality [L89.300 (ICD-10-CM)]  L89.300 707.05     707.25   13. Pressure injury of left buttock, unstageable [L89.320 (ICD-10-CM)]  L89.320 707.05     707.25   14. Paced rhythm on cardiac monitor  Z78.9 V49.89   15. Pressure injury of left buttock, unstageable  L89.320 707.05     707.25   16. Pressure injury of buttock, unstageable, unspecified laterality  L89.300 707.05     707.25        ASSESSMENT & PLAN:     Hypovolemic shock   Small bowel obstruction vs severe ileus   LUE DVT  IRENE   CAD s/p 2v CABG 08/22/2022  Mitral regurgitation s/p bioprosthetic MVR 08/22/2022  History of VT  HFrEF (EF 40% on 8/28/2022)    77 years old male with multiple medical issues as above now with recurrent ileus.  Patient is bedbound postoperatively of which is probably contributing to the underlying ileus along with its recent episode of significant loose bowels and diarrhea.  C diff was reported negative per PCR.  He was empirically started on treatment for C diff based on preliminary result.  This has been discontinued.  PCR was reported negative.  Imaging study with no evidence of obstruction.  Surgery also saw the patient during hospitalization.  Patient is known to Dr. Isaiah Chu.  I am on-call this weekend.      Recommendations:  Serial abdominal x-rays.    Restart Reglan 5 mg IV q.8 hours if no medical contraindication.    Monitor electrolytes closely/especially potassium and magnesium   May need repeat imaging studies if necessary.  Patient is supine on the bed at this time.  Gradual ambulation if okay with primary service might help in increased gut motility.  Need to obtain the results of small bowel series    Thank you for consultation.  I will follow the patient over the weekend.  Dr. Isaiah Levine's service to follow the  patient starting Tuesday.

## 2022-09-03 NOTE — PROGRESS NOTES
[facility] - [facility]  Cardiothoracic Surgery  Progress Note    Patient Name: Zackery Osullivan Jr.  MRN: [id number]  Admission Date: 8/22/2022  Hospital Length of Stay: 12 days  Code Status: Full Code   Attending Physician: Rwody Davis IV, MD   Referring Provider: Rienaldo Gerardo MD  Principal Problem:S/P MVR (mitral valve replacement)            Subjective:     Post-Op Info:  Procedure(s) (LRB):  CORONARY ARTERY BYPASS GRAFT (CABG) (N/A)  REPLACEMENT, MITRAL VALVE (N/A)   12 Days Post-Op     Interval History:  Patient is now approximately postoperative day 12 from a bioprosthetic mitral valve replacement with coronary artery bypass grafting x2.  The postoperative course was complicated by an ileus and small-bowel obstruction which has caused him to be transferred back to the intensive care unit.  He has also had a run of V-tach for which she needs observation.  Recent speech pathology evaluation reveals the patient failed a swallowing study and thus remains NPO.  Stool cultures have also returned positive for C diff.  the patient now is without complaints and interactive with the exception of being thirst y.  He has been sitting in the chair daily however has not been up yet today.  Review of Systems   Constitutional: Negative. Negative for chills, diaphoresis, fever and night sweats.   HENT:  Negative for hoarse voice, sore throat and stridor.    Eyes: Negative.  Negative for blurred vision and double vision.   Cardiovascular:  Negative for chest pain, claudication, cyanosis, dyspnea on exertion, irregular heartbeat, leg swelling, orthopnea, palpitations, paroxysmal nocturnal dyspnea and syncope.   Respiratory:  Negative for cough, hemoptysis, shortness of breath, sputum production and wheezing.    Endocrine: Negative for polydipsia and polyuria.   Hematologic/Lymphatic: Negative for adenopathy and bleeding problem.   Gastrointestinal:  Negative for abdominal pain, diarrhea, heartburn,  hematemesis, hematochezia, nausea and vomiting.   Neurological:  Negative for brief paralysis, disturbances in coordination, dizziness, focal weakness, headaches, numbness and paresthesias.   Medications:  Continuous Infusions:   lactated ringers 150 mL/hr at 09/02/22 2314    NORepinephrine bitartrate-D5W Stopped (09/01/22 1745)     Scheduled Meds:   acetylcysteine 200 mg/ml (20%)  2 mL Nebulization TID    albuterol-ipratropium  3 mL Nebulization Q6H    [START ON 9/5/2022] amiodarone  200 mg Oral BID    [START ON 9/10/2022] amiodarone  200 mg Oral Daily    amiodarone  400 mg Oral BID    aspirin  81 mg Oral Daily    atorvastatin  80 mg Oral QHS    budesonide  0.5 mg Nebulization Q12H    ceFEPime (MAXIPIME) IVPB  2 g Intravenous Q8H    collagenase   Topical (Top) BID    docusate sodium  100 mg Oral BID    enoxaparin  1 mg/kg Subcutaneous Q12H    folic acid  1 mg Oral Daily    loperamide  4 mg Oral Once    midodrine  10 mg Oral Q8H    pantoprazole  40 mg Intravenous BID    polyethylene glycol  17 g Oral Daily    sodium chloride 0.9%  10 mL Intravenous Q6H    vancomycin  125 mg Oral Q6H     PRN Meds:sodium chloride, acetaminophen, calcium gluconate IVPB, calcium gluconate IVPB, dextrose 10%, dextrose 50%, HYDROcodone-acetaminophen, magnesium sulfate IVPB, magnesium sulfate IVPB, morphine, ondansetron, oxyCODONE, potassium chloride in water, potassium chloride in water, potassium chloride in water, sodium phosphate IVPB, sodium phosphate IVPB, sodium phosphate IVPB     Objective:     Vital Signs (Most Recent):  Temp: 98.9 °F (37.2 °C) (09/02/22 1601)  Pulse: 61 (09/03/22 0630)  Resp: 18 (09/03/22 0630)  BP: (!) 115/55 (09/03/22 0600)  SpO2: 99 % (09/03/22 0630)   Vital Signs (24h Range):  Temp:  [97.6 °F (36.4 °C)-98.9 °F (37.2 °C)] 98.9 °F (37.2 °C)  Pulse:  [60-65] 61  Resp:  [13-25] 18  SpO2:  [94 %-100 %] 99 %  BP: (102-142)/(49-65) 115/55     Weight: 92.6 kg (204 lb 2.3 oz)  Body mass index is  30.24 kg/m².    SpO2: 99 %  O2 Device (Oxygen Therapy): nasal cannula    Intake/Output - Last 3 Shifts         09/01 0700  09/02 0659 09/02 0700  09/03 0659 09/03 0700  09/04 0659    I.V. (mL/kg) 4279 (46.2) 755 (8.2)     IV Piggyback 150 100     Total Intake(mL/kg) 4429 (47.8) 855 (9.2)     Urine (mL/kg/hr) 930 (0.4) 635 (0.3)     Drains 150 50     Stool 800 200     Total Output 1880 885     Net +2549 -30                    Lines/Drains/Airways       Peripherally Inserted Central Catheter Line  Duration             PICC Triple Lumen 08/28/22 1516 right brachial 5 days              Drain  Duration                  Rectal Tube 08/31/22 0225 fecal management system 3 days         NG/OG Tube 09/01/22 1323 16 Fr. Left nostril 1 day         Urethral Catheter 09/01/22 1200 16 Fr. 1 day                    Physical Exam  Vitals and nursing note reviewed.   Constitutional:       General: He is not in acute distress.     Appearance: Normal appearance.   HENT:      Head: Normocephalic and atraumatic.      Nose: Nose normal. No congestion.      Mouth/Throat:      Mouth: Mucous membranes are moist.   Eyes:      General: No scleral icterus.     Extraocular Movements: Extraocular movements intact.      Conjunctiva/sclera: Conjunctivae normal.      Pupils: Pupils are equal, round, and reactive to light.   Neck:      Thyroid: No thyroid mass or thyromegaly.      Vascular: No carotid bruit or JVD.   Cardiovascular:      Rate and Rhythm: Normal rate and regular rhythm.      Pulses: Normal pulses.           Carotid pulses are 2+ on the right side and 2+ on the left side.       Radial pulses are 2+ on the right side and 2+ on the left side.        Femoral pulses are 2+ on the right side and 2+ on the left side.       Dorsalis pedis pulses are 2+ on the right side and 2+ on the left side.        Posterior tibial pulses are 2+ on the right side and 2+ on the left side.      Heart sounds: No murmur heard.    No friction rub. No gallop.    Pulmonary:      Effort: Pulmonary effort is normal. No respiratory distress.      Breath sounds: Normal breath sounds. No stridor. No wheezing, rhonchi or rales.   Chest:      Chest wall: No tenderness.   Abdominal:      General: Abdomen is flat. Bowel sounds are decreased. There is distension.      Palpations: Abdomen is soft. There is no hepatomegaly, splenomegaly, mass or pulsatile mass.      Tenderness: There is no abdominal tenderness. There is no rebound.   Musculoskeletal:         General: No swelling. Normal range of motion.      Cervical back: Normal range of motion. No rigidity or tenderness.      Right lower leg: No edema.      Left lower leg: No edema.   Lymphadenopathy:      Cervical: No cervical adenopathy.      Upper Body:      Right upper body: No supraclavicular or axillary adenopathy.      Left upper body: No supraclavicular or axillary adenopathy.   Skin:     General: Skin is warm and dry.   Neurological:      General: No focal deficit present.      Mental Status: He is alert and oriented to person, place, and time. Mental status is at baseline.      Cranial Nerves: No cranial nerve deficit.      Sensory: No sensory deficit.      Motor: No weakness.      Gait: Gait normal.   Psychiatric:         Mood and Affect: Mood normal.       Significant Labs:  All pertinent labs from the last 24 hours have been reviewed.    Significant Diagnostics:  I have reviewed and interpreted all pertinent imaging results/findings within the past 24 hours.    Assessment/Plan:     * S/P MVR (mitral valve replacement)  Continue per Critical Care Service for postoperative ileus/SBO.  Point a continued increase activity and re-evaluated by speech path.  Consider enteral nutrition once the bowel activity returns noted will defer to Critical Care Service with nutritional requirements.  Increase activity as tolerated.        Nba Licona MD  Cardiothoracic Surgery  [facility] - [facility]

## 2022-09-03 NOTE — PROGRESS NOTES
[facility] - [facility]  Cardiology  Progress Note    Patient Name: Zackery Osullivan Jr.  MRN: [id number]  Admission Date: 8/22/2022  Hospital Length of Stay: 12 days  Code Status: Full Code   Attending Physician: ALEXSANDER Holm MD   Primary Care Physician: Isaiah Meeks MD  Expected Discharge Date:   Principal Problem:S/P MVR (mitral valve replacement)    Subjective:     Brief HPI:   Mr. Osullivan is a 77 year old male, known to Dr. Gerardo and Dr. Flowers, who presented to the hospital and underwent CAD/CABG and MVR due to diagnosis of MV CAD and significant MR. Also underwent LISA Ligation. Patient tolerated the surgery well, and was transferred to intensive care unit for further close post operative monitoring. [facility] is consulted for post op surgical cardiac management.    Hospital Course:   8.25.22: NAD noted. VSS with low normal BP. SR on tele. Denies SOB/Palps, +incisional CP.  8.26.22: NAD noted. VSS with low normal BP. SR on tele. Sitting in chair at bedside. Denies SOB/Palps, endorses incisional CP.  8.27.22: NAD Noted. Reports throat discomfort. SR on Tele. BP Low Normal.  8.28.22: NAD Noted. BP Low Normal. SR on Tele. GI Following. NSVT overnight.  SR 83.  8.29.22: NAD Noted. Patient with NG Tube. Surgical Team is following for evaluation of Ileus versus SBO. Hypotensive requiring low dose Levophed. SR on Tele. Amiodarone gtt is infusing. Started for NSVT, which he did not have last night according to the RN.  8.30.22: Remains NPO. Plans for small bowel follow through today  8.31.22: Patient underwent SBFT without evidence of SBO. He was given suppository with multiple BMs overnight. NGT has been removed and he is tolerating oral diet. Amiodarone drip in progress. Continues to require pressor support. He has been started on midodrine. Urine is dark. Patient is having elevated WBCs and has been started on ABX. Patient c/o pain/swelling to LUE  9.01.22: Still requiring pressor support. LUE  + DVT. Lovenox has been restarted. Stool studies pending. Urine dark dania  09/2/22: Patient sitting up in bedside chair. He had NGT placed again due to   Abdominal pain/distension. KUB revealing persistent gaseous distension of abdomen with gas in loops of small and large intestine. Pressors have been weaned to off. Midodrine @ 10mg tid. AICD interrogated and settings adjustments made per [employer] rep  9.3.22: NAD Noted. Sitting in chair. BIPAP in Place. SR on Tele. BP Low Normal.    PMH: CAD (Multivessel), Ischemic Cardiomyopathy (ICD), VHD- MR, Hypertension, Hyperlipidemia, Chronic VI, COPD, Smoker, Obstructive Sleep Apnea, NSVT, Obesity  PSH: CABG/MVR, Skin Lesion, ICD Placement ([employer]), Tonsillectomy  Family History: Father- CAD, Brother- CAD, Sister- CAD  Social History: Tobacco- Former Smoker (Quit 3 Years Ago), Alcohol- Negative, Substance Abuse- Negative     Previous Cardiac Diagnostics:   TTE 09/01/22:  Difficult to accurately assess LVEF as patient was in Afib, RVR during acquisition of images. Limited study to assess LV function. Definity used. Estimated EF 30%. LV apex appears aneurysmal without LV thrombus.     Venous US LUE 08/31/22:  DVT to left axillary through proximal brachial veins.  A superficial vein thrombosis was identified in the left cephalic vein.   All other vessels compressed.     Echocardiogram (8.28.22):  The left ventricle is mildly enlarged with concentric hypertrophy and mildly reduced LV systolic function.  The estimated ejection fraction is 40%.  Indeterminate left ventricular diastolic function due to MVR.  Aneurysmal basal inferior wall/ RCA territory  Well seated bioprosthetic MV without significant stenosis or perivalvular leak. MG 4 mmHg.  Mild to moderate tricuspid regurgitation.  Mild left atrial enlargement.    CABG/MVR (8.22.22): MVR 29 mm Mosaic Porcine Valve; CABG LIMA to LAD & SVG to OM, LISA Ligation with Endo Stapler.     Left Heart  Catheterization (8.2.22):  Left Main- 50% Distal Disease, LAD- 60% Proximal IFR 0.81, LCx- Nondominant (, Diagonal to OM Collateral), RCA- Dominant with , Bilateral Common Iliacs 30% Calcified Stenosis.     Echocardiogram (4.18.22):  The study quality is average.   The left ventricle is normal in size. Global left ventricular systolic function is mildly decreased. The left ventricular ejection fraction is 40%. Left ventricular diastolic function is normal. Noted left ventricular hypertrophy. Asymmetric septal left ventricular hypertrophy is present. It is mild.   Mild to moderate (1-2+) mitral regurgitation. Somewhat eccentric jet noted, chordae appear hyperechoic and thickened.      Review of Systems   Cardiovascular:  Negative for chest pain.   Respiratory:          Intermittent SOB- Comfortable on BIPAP     Objective:     Vital Signs (Most Recent):  Temp: 98.9 °F (37.2 °C) (09/02/22 1601)  Pulse: 69 (09/03/22 1015)  Resp: (!) 22 (09/03/22 1015)  BP: (!) 132/57 (09/03/22 1000)  SpO2: (!) 93 % (09/03/22 1015) Vital Signs (24h Range):  Temp:  [97.6 °F (36.4 °C)-98.9 °F (37.2 °C)] 98.9 °F (37.2 °C)  Pulse:  [60-72] 69  Resp:  [13-25] 22  SpO2:  [93 %-100 %] 93 %  BP: (109-143)/(51-73) 132/57     Weight: 92.6 kg (204 lb 2.3 oz)  Body mass index is 30.24 kg/m².    SpO2: (!) 93 %  O2 Device (Oxygen Therapy): nasal cannula      Intake/Output Summary (Last 24 hours) at 9/3/2022 1119  Last data filed at 9/3/2022 1000  Gross per 24 hour   Intake 855 ml   Output 785 ml   Net 70 ml         Lines/Drains/Airways       Peripherally Inserted Central Catheter Line  Duration             PICC Triple Lumen 08/28/22 1516 right brachial 5 days              Drain  Duration                  Rectal Tube 08/31/22 0225 fecal management system 3 days         NG/OG Tube 09/01/22 1323 16 Fr. Left nostril 1 day         Urethral Catheter 09/01/22 1200 16 Fr. 1 day                    Significant Labs:   CMP   Recent Labs   Lab  09/02/22  0954 09/03/22  0140    140   K 3.9 3.8   CO2 23 23   BUN 31.1* 27.4*   CREATININE 1.00 0.92   CALCIUM 7.3* 7.2*   ALBUMIN 1.9* 1.7*   BILITOT 1.7* 1.4   ALKPHOS 86 88   AST 90* 80*   ALT 84* 75*      and CBC   Recent Labs   Lab 09/02/22  0954 09/03/22  0140   WBC 16.7* 14.4*   HGB 8.8* 8.3*   HCT 28.7* 27.3*   * 122*       Telemetry:  Sinus Rhythm     Physical Exam:  Physical Exam  Vitals reviewed.   Constitutional:       Appearance: Normal appearance.   HENT:      Head: Normocephalic.   Cardiovascular:      Rate and Rhythm: Normal rate and regular rhythm.   Pulmonary:      Effort: Pulmonary effort is normal.      Breath sounds: Normal breath sounds.      Comments: On BIPAP  Abdominal:      General: There is distension.   Musculoskeletal:         General: Normal range of motion.      Cervical back: Neck supple.   Skin:     General: Skin is warm and dry.      Capillary Refill: Capillary refill takes less than 2 seconds.   Neurological:      General: No focal deficit present.      Mental Status: He is alert and oriented to person, place, and time. Mental status is at baseline.   Psychiatric:         Behavior: Behavior normal.       Current Inpatient Medications:    Current Facility-Administered Medications:     0.9%  NaCl infusion (for blood administration), , Intravenous, Q24H PRN, Rowdy Davis IV, MD    acetaminophen oral solution 650 mg, 650 mg, Per OG tube, Q6H PRN, Rowdy Davis IV, MD    acetylcysteine 200 mg/ml (20%) solution 2 mL, 2 mL, Nebulization, TID, KIMBERLI Aguilar, 2 mL at 09/03/22 0916    albuterol-ipratropium 2.5 mg-0.5 mg/3 mL nebulizer solution 3 mL, 3 mL, Nebulization, Q6H, Marlon Humphreys MD, 3 mL at 09/03/22 0916    amino acid 5% in 15% dextrose (CLINIMIX-E) solution (1L provides 50gm AA, 150gm CHO (510 kcal/L dextrose), Na 35, K 30, Mg 5, Ca 4.5, Acetate 80, Cl 39, Phos 15) (710 total kcal/L), , Intravenous, Continuous, Nba Licona MD    [START ON 9/5/2022]  amiodarone tablet 200 mg, 200 mg, Oral, BID, KIMBERLI Waddell    [START ON 9/10/2022] amiodarone tablet 200 mg, 200 mg, Oral, Daily, LAYNE WaddellP    amiodarone tablet 400 mg, 400 mg, Oral, BID, KIMBERLI Waddell, 400 mg at 09/03/22 0851    aspirin EC tablet 81 mg, 81 mg, Oral, Daily, Rowdy Davis IV, MD, 81 mg at 09/03/22 0851    atorvastatin tablet 80 mg, 80 mg, Oral, QHS, KIMBERLI Pratt, 80 mg at 09/02/22 2032    budesonide nebulizer solution 0.5 mg, 0.5 mg, Nebulization, Q12H, Marlon Humphreys MD, 0.5 mg at 09/03/22 0916    calcium gluconate 1 g in NS IVPB (premixed), 1 g, Intravenous, PRN, Rowdy Davis IV, MD    calcium gluconate 1 g in NS IVPB (premixed), 3 g, Intravenous, PRN, Rowdy Davis IV, MD    ceFEPIme (MAXIPIME) 2 g in dextrose 5 % in water (D5W) 5 % 50 mL IVPB (MB+), 2 g, Intravenous, Q8H, Fidel Mckeon MD, Stopped at 09/03/22 0446    collagenase ointment, , Topical (Top), BID, Rowdy Davis IV, MD, Given at 09/03/22 0900    dextrose 10% bolus 250 mL, 25 g, Intravenous, PRN, Rowdy Davis IV, MD    dextrose 50% injection 12.5 g, 12.5 g, Intravenous, PRN, Rowdy Davis IV, MD, 12.5 g at 08/27/22 2206    docusate sodium capsule 100 mg, 100 mg, Oral, BID, Rowdy Davis IV, MD, 100 mg at 09/03/22 0851    enoxaparin injection 90 mg, 1 mg/kg, Subcutaneous, Q12H, Fidel Mckeon MD, 90 mg at 09/03/22 0417    [START ON 9/5/2022] fat emulsion 20% infusion 250 mL, 250 mL, Intravenous, Daily, Nba Licona MD    folic acid tablet 1 mg, 1 mg, Oral, Daily, Rowdy Davis IV, MD, 1 mg at 09/03/22 0851    HYDROcodone-acetaminophen 5-325 mg per tablet 1 tablet, 1 tablet, Oral, Q4H PRN, Rowdy Davis IV, MD, 1 tablet at 09/03/22 0851    loperamide capsule 4 mg, 4 mg, Oral, Once, Rowdy Davis IV, MD    magnesium sulfate 2g in water 50mL IVPB (premix), 2 g, Intravenous, PRN, Rowdy Davis IV, MD    magnesium sulfate 2g in water 50mL IVPB (premix), 4  g, Intravenous, PRN, Rowdy Davis IV, MD    midodrine tablet 10 mg, 10 mg, Oral, Q8H, Fidel Mckeon MD, 10 mg at 09/03/22 0600    morphine injection 2 mg, 2 mg, Intravenous, PRN, Rowdy Davis IV, MD, 2 mg at 08/23/22 0817    ondansetron injection 4 mg, 4 mg, Intravenous, Q12H PRN, Rowdy Davis IV, MD, 4 mg at 08/28/22 0307    oxyCODONE immediate release tablet 5 mg, 5 mg, Oral, Q4H PRN, Rowdy Davis IV, MD, 5 mg at 08/24/22 2016    pantoprazole injection 40 mg, 40 mg, Intravenous, BID, Isaiah Levine MD, 40 mg at 09/03/22 0851    polyethylene glycol packet 17 g, 17 g, Oral, Daily, MARTHA Limon, 17 g at 09/03/22 0851    potassium chloride 20 mEq in 100 mL IVPB (FOR CENTRAL LINE ADMINISTRATION ONLY), 20 mEq, Intravenous, PRN, Rowdy Davis IV, MD    potassium chloride 20 mEq in 100 mL IVPB (FOR CENTRAL LINE ADMINISTRATION ONLY), 40 mEq, Intravenous, PRN, Rowdy Davis IV, MD    potassium chloride 20 mEq in 100 mL IVPB (FOR CENTRAL LINE ADMINISTRATION ONLY), 20 mEq, Intravenous, PRN, Rowdy Davis IV, MD    Flushing PICC Protocol, , , Until Discontinued **AND** sodium chloride 0.9% flush 10 mL, 10 mL, Intravenous, Q6H, 10 mL at 09/03/22 0600 **AND** [DISCONTINUED] sodium chloride 0.9% flush 10 mL, 10 mL, Intravenous, PRN, Rowdy Davis IV, MD    sodium phosphate 15 mmol in dextrose 5 % 250 mL IVPB, 15 mmol, Intravenous, PRN, Rowdy Davis IV, MD    sodium phosphate 20.01 mmol in dextrose 5 % 250 mL IVPB, 20.01 mmol, Intravenous, PRN, Rowdy Davis IV, MD    sodium phosphate 30 mmol in dextrose 5 % 250 mL IVPB, 30 mmol, Intravenous, PRN, Rowdy Davis IV, MD    Facility-Administered Medications Ordered in Other Encounters:     diphenhydrAMINE capsule 50 mg, 50 mg, Oral, On Call Procedure, Reinaldo Gerardo MD, 50 mg at 08/02/22 0739    sodium chloride 0.9% flush 10 mL, 10 mL, Intravenous, PRN, Reinaldo Gerardo MD        Assessment:   IMPRESSION:  CAD  (Multivessel)- Status Post CABG (LIMA to LAD, SVG to OM) (8.22.22)    - S/P LISA Ligation  Hypotension (Improved)- Off Pressors    -  H/O Hypertension   Valvular Heart Disease- MR Status Post Bio MVR (#29 mm Mosaic Porcine) (8.22.22)    - Well seated bioprosthetic MV without significant stenosis or perivalvular leak. MG 4 mmHg.  Ischemic Cardiomyopathy EF 40% Status Post ICD ([employer])    - Aneurysmal basal inferior wall/ RCA territory--needs OAC  NSVT    - History of VT    - On Amiodarone Outpatient  Ileus   --SBFT 8/30/22 negative for SBO  C-diff + (9/1/22)  --NGT placed again on 9/1/22  DVT LUE  -- left axillary through proximal brachial veins. Superficial vein thrombus left cephalic vein.   Hyperlipidemia  Chronic Venous Insufficiency  Elevated BMI/Obesity  COPD  Former Smoker    - Quit 3 Years Ago  Anemia  Thrombocytopenia  Leukocytosis   Transaminitis  Unstageable sacral decubitus    Plan:   Continue Amiodarone Tapered Dosing  Continue Midodrine for BP Support  On FD Lovenox for DVT Treatment  Continue ASA/Statin  Supportive Care per ICU Team    KIMBERLI Pratt  Cardiology  [facility] - [address] [place] [address] ICU  09/03/2022

## 2022-09-03 NOTE — PT/OT/SLP RE-EVAL
Physical Therapy Re-evaluation    Patient Name:  Zackery Osullivan Jr.   MRN:  [id number]    Recommendations:     Discharge Recommendations:  nursing facility, skilled, rehabilitation facility   Discharge Equipment Recommendations: walker, rolling   Barriers to discharge:  medical diagnosis; decreased functional mobility     Assessment:     Zackery Osullivan Jr. is a 77 y.o. male admitted with a medical diagnosis of S/P MVR (mitral valve replacement).  He presents with the following impairments/functional limitations:  weakness, gait instability, impaired endurance, impaired balance, impaired cardiopulmonary response to activity, impaired functional mobility.    Patient tolerated PT re-eval fairly well. Patient is requiring mod-maxA for bed mobility and modA to stand and perform stand-step t/f with use of RW. Patient has made limited progress over the course of his stay; will continue to progress as able. Still recommending placement beyond this stay in order to maximize functional independence and reduce burden of care.    Rehab Prognosis:  good; patient would benefit from acute skilled PT services to address these deficits and reach maximum level of function.      Recent Surgery: Procedure(s) (LRB):  CORONARY ARTERY BYPASS GRAFT (CABG) (N/A)  REPLACEMENT, MITRAL VALVE (N/A) 12 Days Post-Op    Plan:     During this hospitalization, patient to be seen 6 x/week to address the above listed problems via gait training, therapeutic activities, therapeutic exercises  Plan of Care Expires:  09/24/22  Plan of Care Reviewed with: patient    Subjective     Communicated with NSG prior to session.  Patient found HOB elevated with blood pressure cuff, pulse ox (continuous), telemetry, bowel management system, maldonado catheter, NG tube, oxygen upon PT entry to room, agreeable to evaluation.      Chief Complaint: LH, SOB  Patient comments/goals: to get stronger  Pain/Comfort:  Pain Rating 1: 0/10    Patients cultural, spiritual,  Restorationism conflicts given the current situation: no      Objective:     Patient found with: blood pressure cuff, pulse ox (continuous), telemetry, bowel management system, maldonado catheter, NG tube, oxygen     General Precautions: Standard, fall, sternal   Orthopedic Precautions:N/A   Braces: N/A  Respiratory Status: Nasal cannula, flow 2 L/min  Vitals   BP: 143/61 (prior to ax semi-supine)   BP: 135/73 (after ax sitting in chair)   SpO2: mid to low 80s (with ax)-- pursed lip breathing encouraged, took about 30 sec to get to the low 90s    Exams:  Cognitive Exam:  Patient is oriented to Person, Place, Time, and Situation  RLE and LLE Strength: WFL    Functional Mobility:  Bed Mobility:     Supine to Sit: moderate assistance and maximal assistance  Transfers:     Sit to Stand:  moderate assistance with rolling walker  Bed to Chair: minimum assistance with  rolling walker  using  Step Transfer  Gait: deferred at this time 2/2 for pt's safety--decreased tolerance to ax, SOB with decrease in O2 sats    AM-PAC 6 CLICK MOBILITY  Total Score:11       Patient left up in chair with all lines intact, call button in reach, and RN notified.    GOALS:   Multidisciplinary Problems       Physical Therapy Goals          Problem: Physical Therapy    Goal Priority Disciplines Outcome Goal Variances Interventions   Physical Therapy Goal     PT, PT/OT Ongoing, Progressing     Description: Goals to be met by: 22     Patient will increase functional independence with mobility by performin. Supine to sit with MInimal Assistance  2. Sit to supine with MInimal Assistance  3. Sit to stand transfer with Minimal Assistance  4. Gait  x 150 feet with Minimal Assistance using Rolling Walker.                          History:     Past Medical History:   Diagnosis Date    Arthritis     spine    CAD (coronary artery disease)     COPD (chronic obstructive pulmonary disease)     HLD (hyperlipidemia)     HTN (hypertension)     Ischemic  cardiomyopathy     Mitral regurgitation     HEATHER on CPAP     Stroke     Venous insufficiency     Ventricular tachycardia        Past Surgical History:   Procedure Laterality Date    bilateral inguinal stents      CARDIAC DEFIBRILLATOR PLACEMENT Left     CATARACT EXTRACTION Bilateral     CATHETERIZATION OF BOTH LEFT AND RIGHT HEART  08/02/2022    Diagnostic Only; Dr. Gerardo    COLONOSCOPY      CORONARY ARTERY BYPASS GRAFT (CABG) N/A 8/22/2022    Procedure: CORONARY ARTERY BYPASS GRAFT (CABG);  Surgeon: Rowdy Davis IV, MD;  Location: [facility] OR;  Service: Cardiovascular;  Laterality: N/A;  possible MVR & LLAA  //  ECHO NOTIFIED    LEFT HEART CATHETERIZATION Left 08/02/2022    Procedure: CATHETERIZATION, HEART, LEFT;  Surgeon: Reinaldo Gerardo MD;  Location: [facility] CATH LAB;  Service: Cardiology;  Laterality: Left;  LHC +/- PCI    MITRAL VALVE REPLACEMENT N/A 8/22/2022    Procedure: REPLACEMENT, MITRAL VALVE;  Surgeon: Rowdy Davis IV, MD;  Location: [facility] OR;  Service: Cardiovascular;  Laterality: N/A;  possible MVR and LLAA    REMOVAL OF IMPLANTED CARDIOVERTER-DEFIBRILLATOR (ICD)      TONSILLECTOMY         Time Tracking:     PT Received On: 09/03/22  PT Start Time: 0855     PT Stop Time: 0910  PT Total Time (min): 15 min     Billable Minutes: Re-eval 1      09/03/2022

## 2022-09-03 NOTE — SUBJECTIVE & OBJECTIVE
Interval History:  Patient is now approximately postoperative day 12 from a bioprosthetic mitral valve replacement with coronary artery bypass grafting x2.  The postoperative course was complicated by an ileus and small-bowel obstruction which has caused him to be transferred back to the intensive care unit.  He has also had a run of V-tach for which she needs observation.  Recent speech pathology evaluation reveals the patient failed a swallowing study and thus remains NPO.  Stool cultures have also returned positive for C diff.  the patient now is without complaints and interactive with the exception of being thirst y.  He has been sitting in the chair daily however has not been up yet today.  Review of Systems   Constitutional: Negative. Negative for chills, diaphoresis, fever and night sweats.   HENT:  Negative for hoarse voice, sore throat and stridor.    Eyes: Negative.  Negative for blurred vision and double vision.   Cardiovascular:  Negative for chest pain, claudication, cyanosis, dyspnea on exertion, irregular heartbeat, leg swelling, orthopnea, palpitations, paroxysmal nocturnal dyspnea and syncope.   Respiratory:  Negative for cough, hemoptysis, shortness of breath, sputum production and wheezing.    Endocrine: Negative for polydipsia and polyuria.   Hematologic/Lymphatic: Negative for adenopathy and bleeding problem.   Gastrointestinal:  Negative for abdominal pain, diarrhea, heartburn, hematemesis, hematochezia, nausea and vomiting.   Neurological:  Negative for brief paralysis, disturbances in coordination, dizziness, focal weakness, headaches, numbness and paresthesias.   Medications:  Continuous Infusions:   lactated ringers 150 mL/hr at 09/02/22 8244    NORepinephrine bitartrate-D5W Stopped (09/01/22 7755)     Scheduled Meds:   acetylcysteine 200 mg/ml (20%)  2 mL Nebulization TID    albuterol-ipratropium  3 mL Nebulization Q6H    [START ON 9/5/2022] amiodarone  200 mg Oral BID    [START ON  9/10/2022] amiodarone  200 mg Oral Daily    amiodarone  400 mg Oral BID    aspirin  81 mg Oral Daily    atorvastatin  80 mg Oral QHS    budesonide  0.5 mg Nebulization Q12H    ceFEPime (MAXIPIME) IVPB  2 g Intravenous Q8H    collagenase   Topical (Top) BID    docusate sodium  100 mg Oral BID    enoxaparin  1 mg/kg Subcutaneous Q12H    folic acid  1 mg Oral Daily    loperamide  4 mg Oral Once    midodrine  10 mg Oral Q8H    pantoprazole  40 mg Intravenous BID    polyethylene glycol  17 g Oral Daily    sodium chloride 0.9%  10 mL Intravenous Q6H    vancomycin  125 mg Oral Q6H     PRN Meds:sodium chloride, acetaminophen, calcium gluconate IVPB, calcium gluconate IVPB, dextrose 10%, dextrose 50%, HYDROcodone-acetaminophen, magnesium sulfate IVPB, magnesium sulfate IVPB, morphine, ondansetron, oxyCODONE, potassium chloride in water, potassium chloride in water, potassium chloride in water, sodium phosphate IVPB, sodium phosphate IVPB, sodium phosphate IVPB     Objective:     Vital Signs (Most Recent):  Temp: 98.9 °F (37.2 °C) (09/02/22 1601)  Pulse: 61 (09/03/22 0630)  Resp: 18 (09/03/22 0630)  BP: (!) 115/55 (09/03/22 0600)  SpO2: 99 % (09/03/22 0630)   Vital Signs (24h Range):  Temp:  [97.6 °F (36.4 °C)-98.9 °F (37.2 °C)] 98.9 °F (37.2 °C)  Pulse:  [60-65] 61  Resp:  [13-25] 18  SpO2:  [94 %-100 %] 99 %  BP: (102-142)/(49-65) 115/55     Weight: 92.6 kg (204 lb 2.3 oz)  Body mass index is 30.24 kg/m².    SpO2: 99 %  O2 Device (Oxygen Therapy): nasal cannula    Intake/Output - Last 3 Shifts         09/01 0700 09/02 0659 09/02 0700 09/03 0659 09/03 0700 09/04 0659    I.V. (mL/kg) 4279 (46.2) 755 (8.2)     IV Piggyback 150 100     Total Intake(mL/kg) 4429 (47.8) 855 (9.2)     Urine (mL/kg/hr) 930 (0.4) 635 (0.3)     Drains 150 50     Stool 800 200     Total Output 1880 885     Net +2549 -30                    Lines/Drains/Airways       Peripherally Inserted Central Catheter Line  Duration             PICC Triple Lumen  08/28/22 1516 right brachial 5 days              Drain  Duration                  Rectal Tube 08/31/22 0225 fecal management system 3 days         NG/OG Tube 09/01/22 1323 16 Fr. Left nostril 1 day         Urethral Catheter 09/01/22 1200 16 Fr. 1 day                    Physical Exam  Vitals and nursing note reviewed.   Constitutional:       General: He is not in acute distress.     Appearance: Normal appearance.   HENT:      Head: Normocephalic and atraumatic.      Nose: Nose normal. No congestion.      Mouth/Throat:      Mouth: Mucous membranes are moist.   Eyes:      General: No scleral icterus.     Extraocular Movements: Extraocular movements intact.      Conjunctiva/sclera: Conjunctivae normal.      Pupils: Pupils are equal, round, and reactive to light.   Neck:      Thyroid: No thyroid mass or thyromegaly.      Vascular: No carotid bruit or JVD.   Cardiovascular:      Rate and Rhythm: Normal rate and regular rhythm.      Pulses: Normal pulses.           Carotid pulses are 2+ on the right side and 2+ on the left side.       Radial pulses are 2+ on the right side and 2+ on the left side.        Femoral pulses are 2+ on the right side and 2+ on the left side.       Dorsalis pedis pulses are 2+ on the right side and 2+ on the left side.        Posterior tibial pulses are 2+ on the right side and 2+ on the left side.      Heart sounds: No murmur heard.    No friction rub. No gallop.   Pulmonary:      Effort: Pulmonary effort is normal. No respiratory distress.      Breath sounds: Normal breath sounds. No stridor. No wheezing, rhonchi or rales.   Chest:      Chest wall: No tenderness.   Abdominal:      General: Abdomen is flat. Bowel sounds are decreased. There is distension.      Palpations: Abdomen is soft. There is no hepatomegaly, splenomegaly, mass or pulsatile mass.      Tenderness: There is no abdominal tenderness. There is no rebound.   Musculoskeletal:         General: No swelling. Normal range of motion.       Cervical back: Normal range of motion. No rigidity or tenderness.      Right lower leg: No edema.      Left lower leg: No edema.   Lymphadenopathy:      Cervical: No cervical adenopathy.      Upper Body:      Right upper body: No supraclavicular or axillary adenopathy.      Left upper body: No supraclavicular or axillary adenopathy.   Skin:     General: Skin is warm and dry.   Neurological:      General: No focal deficit present.      Mental Status: He is alert and oriented to person, place, and time. Mental status is at baseline.      Cranial Nerves: No cranial nerve deficit.      Sensory: No sensory deficit.      Motor: No weakness.      Gait: Gait normal.   Psychiatric:         Mood and Affect: Mood normal.       Significant Labs:  All pertinent labs from the last 24 hours have been reviewed.    Significant Diagnostics:  I have reviewed and interpreted all pertinent imaging results/findings within the past 24 hours.

## 2022-09-03 NOTE — PROGRESS NOTES
[facility] - [facility] ICU  Pulmonary Critical Care Note    Patient Name: Zackery Osullivan Jr.  MRN: [id number]  Admission Date: 8/22/2022  Hospital Length of Stay: 12 days  Code Status: Full Code  Attending Provider: ALEXSANDER Holm MD  Primary Care Provider: Isaiah Meeks MD     Subjective:     HPI: 77-year-old  male with past medical history of CAD, HTN, HLD, and HFrEF who presented to [facility] for a CABG x2 and mitral valve replacement. Received 2 units pRBCs intraop. Initially admitted to ICU for post-op monitoring.  Patient transferred out of ICU once extubated and hemodynamically stable off vasopressors on 8/25/22. On 8/27/22, patient reporting throat pain, nausea and constipation. Unable to keep down food without vomiting. Unable to tolerate PO meds. GI consulted. During this time, patient also having low/normal blood pressures. Had 17 beat run of V-tach evening of 8/27/22 in which amiodarone IV started since patient unable to tolerate PO medications. Morning of 8/28/22, MAPs <65 despite fluid resuscitation. Patient to be upgraded to ICU 8/28/22 due to the need for vasopressor support and close monitoring. CT abdomen with ileus. Small bowel follow through on 8/30 with no obstruction but delayed transit.     24 Hour Interval History: NAEO. Patient had C. Diff Ag+/Toxin- x2, C. Diff PCR 9/3/22 negative, will discontinue oral vanc and flagyl. Had some SOB/LEONARDO this morning, CXR showed no significant interval changes. States his diarrhea improving still being treated for ileus, on Clinimix, will d/c mIVF. Hemodynamically stable off pressors for > 24h. Stable for stepdown to floor today.     Past Medical History:   Diagnosis Date    Arthritis     spine    CAD (coronary artery disease)     COPD (chronic obstructive pulmonary disease)     HLD (hyperlipidemia)     HTN (hypertension)     Ischemic cardiomyopathy     Mitral regurgitation     HEATHER on CPAP     Stroke     Venous insufficiency      Ventricular tachycardia        Past Surgical History:   Procedure Laterality Date    bilateral inguinal stents      CARDIAC DEFIBRILLATOR PLACEMENT Left     CATARACT EXTRACTION Bilateral     CATHETERIZATION OF BOTH LEFT AND RIGHT HEART  2022    Diagnostic Only; Dr. Gerardo    COLONOSCOPY      CORONARY ARTERY BYPASS GRAFT (CABG) N/A 2022    Procedure: CORONARY ARTERY BYPASS GRAFT (CABG);  Surgeon: Rowdy Davis IV, MD;  Location: [facility] OR;  Service: Cardiovascular;  Laterality: N/A;  possible MVR & LLAA  //  ECHO NOTIFIED    LEFT HEART CATHETERIZATION Left 2022    Procedure: CATHETERIZATION, HEART, LEFT;  Surgeon: Reinaldo Gerardo MD;  Location: [facility] CATH LAB;  Service: Cardiology;  Laterality: Left;  LHC +/- PCI    MITRAL VALVE REPLACEMENT N/A 2022    Procedure: REPLACEMENT, MITRAL VALVE;  Surgeon: Rowdy Davis IV, MD;  Location: [facility] OR;  Service: Cardiovascular;  Laterality: N/A;  possible MVR and LLAA    REMOVAL OF IMPLANTED CARDIOVERTER-DEFIBRILLATOR (ICD)      TONSILLECTOMY         Social History     Socioeconomic History    Marital status:    Tobacco Use    Smoking status: Former     Years: 55.00     Types: Cigarettes     Start date:      Quit date:      Years since quittin.6    Smokeless tobacco: Never   Substance and Sexual Activity    Alcohol use: Yes     Alcohol/week: 3.0 standard drinks     Types: 3 Glasses of wine per week    Drug use: Never           Current Outpatient Medications   Medication Instructions    amiodarone (PACERONE) 200 mg, Oral, 2 times daily    aspirin (ECOTRIN) 81 mg, Oral, Nightly    atorvastatin (LIPITOR) 80 mg, Oral, Nightly    clopidogreL (PLAVIX) 75 mg, Oral, Nightly    ezetimibe (ZETIA) 10 mg, Oral, Nightly    fluticasone propionate (FLONASE) 50 mcg/actuation nasal spray 1 spray, Each Nostril, Daily PRN    furosemide (LASIX) 20 mg, Oral, Daily    metoprolol succinate (TOPROL-XL) 25 MG 24 hr tablet 1 tablet, Oral, Daily     mometasone (ASMANEX TWISTHALER) 220 mcg/ actuation (30) inhaler 2 puffs, Inhalation, Daily, Controller    sacubitriL-valsartan (ENTRESTO) 49-51 mg per tablet 0.5 tablets, Oral, 2 times daily    spironolactone (ALDACTONE) 12.5 mg, Oral, Daily    umeclidinium-vilanteroL (ANORO ELLIPTA) 62.5-25 mcg/actuation DsDv 1 puff, Inhalation, Daily, Controller       Current Inpatient Medications   acetylcysteine 200 mg/ml (20%)  2 mL Nebulization TID    albuterol-ipratropium  3 mL Nebulization Q6H    [START ON 9/5/2022] amiodarone  200 mg Oral BID    [START ON 9/10/2022] amiodarone  200 mg Oral Daily    amiodarone  400 mg Oral BID    aspirin  81 mg Oral Daily    atorvastatin  80 mg Oral QHS    budesonide  0.5 mg Nebulization Q12H    ceFEPime (MAXIPIME) IVPB  2 g Intravenous Q8H    collagenase   Topical (Top) BID    docusate sodium  100 mg Oral BID    enoxaparin  1 mg/kg Subcutaneous Q12H    [START ON 9/5/2022] fat emulsion 20%  250 mL Intravenous Daily    folic acid  1 mg Oral Daily    loperamide  4 mg Oral Once    midodrine  10 mg Oral Q8H    pantoprazole  40 mg Intravenous BID    polyethylene glycol  17 g Oral Daily    sodium chloride 0.9%  10 mL Intravenous Q6H    vancomycin  125 mg Oral Q6H       Current Intravenous Infusions   amino acid 5 % in 15% dextrose      lactated ringers 150 mL/hr at 09/02/22 2314    NORepinephrine bitartrate-D5W Stopped (09/01/22 1745)         Review of Systems   Constitutional:  Negative for chills, diaphoresis and fever.   Respiratory:  Positive for shortness of breath (SOB, LEONARDO). Negative for cough.    Cardiovascular:  Negative for chest pain and palpitations.   Gastrointestinal:  Positive for abdominal pain (mild TTP, improving) and diarrhea (improving). Negative for blood in stool, melena, nausea and vomiting.        Objective:       Intake/Output Summary (Last 24 hours) at 9/3/2022 1031  Last data filed at 9/3/2022 1000  Gross per 24 hour   Intake 855 ml   Output 785 ml   Net 70 ml            Vital Signs (Most Recent):  Temp: 98.9 °F (37.2 °C) (09/02/22 1601)  Pulse: 69 (09/03/22 1015)  Resp: (!) 22 (09/03/22 1015)  BP: (!) 132/57 (09/03/22 1000)  SpO2: (!) 93 % (09/03/22 1015)    Body mass index is 30.24 kg/m².  Weight: 92.6 kg (204 lb 2.3 oz) Vital Signs (24h Range):  Temp:  [97.6 °F (36.4 °C)-98.9 °F (37.2 °C)] 98.9 °F (37.2 °C)  Pulse:  [60-72] 69  Resp:  [13-25] 22  SpO2:  [93 %-100 %] 93 %  BP: (109-143)/(51-73) 132/57     Physical Exam  Constitutional:       General: He is not in acute distress.     Appearance: Normal appearance. He is obese. He is ill-appearing. He is not toxic-appearing or diaphoretic.   HENT:      Head: Normocephalic and atraumatic.      Mouth/Throat:      Mouth: Mucous membranes are moist.      Pharynx: Oropharynx is clear.   Eyes:      Extraocular Movements: Extraocular movements intact.      Conjunctiva/sclera: Conjunctivae normal.      Pupils: Pupils are equal, round, and reactive to light.   Cardiovascular:      Rate and Rhythm: Normal rate and regular rhythm.      Pulses: Normal pulses.      Heart sounds: Normal heart sounds. No murmur heard.    No gallop.   Pulmonary:      Effort: Pulmonary effort is normal.      Breath sounds: Wheezing present.   Abdominal:      General: Abdomen is flat. There is distension.      Palpations: Abdomen is soft.      Tenderness: There is abdominal tenderness (mild TTP). There is no guarding or rebound.      Comments: Hypoactive BS  Rectal tube in place   Genitourinary:     Comments: [place] in place  Musculoskeletal:         General: Normal range of motion.      Cervical back: Normal range of motion and neck supple.      Right lower leg: No edema.      Left lower leg: No edema.   Skin:     General: Skin is warm and dry.      Capillary Refill: Capillary refill takes less than 2 seconds.   Neurological:      General: No focal deficit present.      Mental Status: He is alert and oriented to person, place, and time. Mental status is at  baseline.         Lines/Drains/Airways       Peripherally Inserted Central Catheter Line  Duration             PICC Triple Lumen 08/28/22 1516 right brachial 5 days              Drain  Duration                  Rectal Tube 08/31/22 0225 fecal management system 3 days         NG/OG Tube 09/01/22 1323 16 Fr. Left nostril 1 day         Urethral Catheter 09/01/22 1200 16 Fr. 1 day                    Significant Labs:    Lab Results   Component Value Date    WBC 14.4 (H) 09/03/2022    HGB 8.3 (L) 09/03/2022    HCT 27.3 (L) 09/03/2022    MCV 99.6 (H) 09/03/2022     (L) 09/03/2022         BMP  Lab Results   Component Value Date     09/03/2022    K 3.8 09/03/2022    CO2 23 09/03/2022    BUN 27.4 (H) 09/03/2022    CREATININE 0.92 09/03/2022    CALCIUM 7.2 (L) 09/03/2022    EGFRNONAA >60 05/14/2020       ABG  No results for input(s): PH, PO2, PCO2, HCO3, BE in the last 168 hours.      Significant Imaging:  I have reviewed the pertinent imaging within the past 24 hours.    9/1/2022 - Abdominal Xray shows persisting gaseous distension of the abdomen with gas in loops of small and large bowel gas pattern      9/1/2022 - Echocardiogram shows EF 30%; LV apex appears aneurysmal without LV thrombus    Assessment/Plan:     Assessment  Hypovolemic shock   Small bowel obstruction vs severe ileus   LUE DVT  IRENE   CAD s/p 2v CABG 08/22/2022  Mitral regurgitation s/p bioprosthetic MVR 08/22/2022  History of VT  HFrEF (EF 40% on 8/28/2022)    Plan  -Per cardiology, continue Amiodarone PO (400 mg bid x 5 days --> amiodarone 200 mg bid x 5 days --> amiodarone 200 mg daily)  -Off vasopressors > 24h, continue Midodrine 10 mg TID- though may be able to wean dose as BP stable in 130/50s this AM  -C. Diff PCR negative 9/3/22, will stop PO Vanc and Flagyl  -Continue Cefepime, blood and sputum cultures negative x48h  -Stable for downgrade from ICU to telemetry as no longer requiring vasopressors and clinically stable  -Remainder of  postoperative care per cardiac surgery recommendations     Code status: Full code   DVT Prophylaxis: Lovenox  GI Prophylaxis: Protonix  Diet: Clear liquid     33 minutes of critical care was time spent personally by me on the following activities: development of treatment plan with patient or surrogate and bedside caregivers, discussions with consultants, evaluation of patient's response to treatment, examination of patient, ordering and performing treatments and interventions, ordering and review of laboratory studies, ordering and review of radiographic studies, pulse oximetry, re-evaluation of patient's condition.  This critical care time did not overlap with that of any other provider or involve time for any procedures.     Stewart Mcleod MD  [employer] IM PGY II

## 2022-09-04 NOTE — PROGRESS NOTES
"Gastroenterology Progress Note    Subjective/Interval History:  HPI:     77-year-old man with recent CABG and mitral valve repair who started having some epigastric discomfort along with lower chest discomfort along with hiccups and belching.  He then proceeded to have vomiting.  Now his throat hurts.  He did have some mild dysphagia prior to all of this.  He used to take Tums p.r.n..  He was not on a PPI.  He denies any prior EGDs.  He thinks he had a colonoscopy many years ago.      9-4-22  Passing some flatus  Less abd pain  No BM    ROS:  Cardiovascular:  Negative for chest pain.   Respiratory:  Negative for shortness of breath.    Gastrointestinal:  mild tenderness, constipation         Vital Signs:  BP (!) 111/58   Pulse 62   Temp 97.9 °F (36.6 °C) (Axillary)   Resp 14   Ht 5' 8.9" (1.75 m)   Wt 92.4 kg (203 lb 11.3 oz)   SpO2 (!) 94%   BMI 30.17 kg/m²   Body mass index is 30.17 kg/m².    Physical Exam:  Constitutional:  Negative for fever, malaise/fatigue and weight loss.   Respiratory:  Negative for cough and shortness of breath.    Cardiovascular:  Negative for chest pain (this is incisional pain), palpitations and leg swelling.   Gastrointestinal:  Negative for abdominal pain, blood in stool, constipation, diarrhea, heartburn, melena, nausea and vomiting.   Musculoskeletal:  Negative for back pain and myalgias.   Skin:  Negative for rash.   Neurological:  Negative for speech change and focal weakness.   All other systems reviewed and are negative.    Labs:  Recent Results (from the past 48 hour(s))   Comprehensive Metabolic Panel    Collection Time: 09/02/22  9:54 AM   Result Value Ref Range    Sodium Level 139 136 - 145 mmol/L    Potassium Level 3.9 3.5 - 5.1 mmol/L    Chloride 109 (H) 98 - 107 mmol/L    Carbon Dioxide 23 23 - 31 mmol/L    Glucose Level 90 82 - 115 mg/dL    Blood Urea Nitrogen 31.1 (H) 8.4 - 25.7 mg/dL    Creatinine 1.00 0.73 - 1.18 mg/dL    Calcium Level Total 7.3 (L) 8.8 - 10.0 " mg/dL    Protein Total 3.7 (L) 5.8 - 7.6 gm/dL    Albumin Level 1.9 (L) 3.4 - 4.8 gm/dL    Globulin 1.8 (L) 2.4 - 3.5 gm/dL    Albumin/Globulin Ratio 1.1 1.1 - 2.0 ratio    Bilirubin Total 1.7 (H) <=1.5 mg/dL    Alkaline Phosphatase 86 40 - 150 unit/L    Alanine Aminotransferase 84 (H) 0 - 55 unit/L    Aspartate Aminotransferase 90 (H) 5 - 34 unit/L    eGFR >60 mls/min/1.73/m2   CBC with Differential    Collection Time: 09/02/22  9:54 AM   Result Value Ref Range    WBC 16.7 (H) 4.5 - 11.5 x10(3)/mcL    RBC 2.95 (L) 4.70 - 6.10 x10(6)/mcL    Hgb 8.8 (L) 14.0 - 18.0 gm/dL    Hct 28.7 (L) 42.0 - 52.0 %    MCV 97.3 (H) 80.0 - 94.0 fL    MCH 29.8 27.0 - 31.0 pg    MCHC 30.7 (L) 33.0 - 36.0 mg/dL    RDW 17.7 (H) 11.5 - 17.0 %    Platelet 125 (L) 130 - 400 x10(3)/mcL    MPV 11.6 (H) 7.4 - 10.4 fL    Neut % 80.4 %    Lymph % 5.3 %    Mono % 9.2 %    Eos % 0.2 %    Basophil % 0.2 %    Lymph # 0.89 0.6 - 4.6 x10(3)/mcL    Neut # 13.4 (H) 2.1 - 9.2 x10(3)/mcL    Mono # 1.53 (H) 0.1 - 1.3 x10(3)/mcL    Eos # 0.03 0 - 0.9 x10(3)/mcL    Baso # 0.04 0 - 0.2 x10(3)/mcL    IG# 0.79 (H) 0 - 0.04 x10(3)/mcL    IG% 4.7 %    NRBC% 0.0 %   Clostridium Diff Toxin, A & B, EIA    Collection Time: 09/02/22 10:46 AM    Specimen: Stool   Result Value Ref Range    Clostridium Difficile GDH Antigen Positive (A) Negative    Clostridium Difficile Toxin A/B Negative Negative   C Diff Toxin by PCR    Collection Time: 09/02/22 10:46 AM    Specimen: Stool   Result Value Ref Range    Clostridium difficile toxin PCR Not Detected Not Detected   Comprehensive Metabolic Panel    Collection Time: 09/03/22  1:40 AM   Result Value Ref Range    Sodium Level 140 136 - 145 mmol/L    Potassium Level 3.8 3.5 - 5.1 mmol/L    Chloride 112 (H) 98 - 107 mmol/L    Carbon Dioxide 23 23 - 31 mmol/L    Glucose Level 72 (L) 82 - 115 mg/dL    Blood Urea Nitrogen 27.4 (H) 8.4 - 25.7 mg/dL    Creatinine 0.92 0.73 - 1.18 mg/dL    Calcium Level Total 7.2 (L) 8.8 - 10.0 mg/dL     Protein Total 3.3 (L) 5.8 - 7.6 gm/dL    Albumin Level 1.7 (L) 3.4 - 4.8 gm/dL    Globulin 1.6 (L) 2.4 - 3.5 gm/dL    Albumin/Globulin Ratio 1.1 1.1 - 2.0 ratio    Bilirubin Total 1.4 <=1.5 mg/dL    Alkaline Phosphatase 88 40 - 150 unit/L    Alanine Aminotransferase 75 (H) 0 - 55 unit/L    Aspartate Aminotransferase 80 (H) 5 - 34 unit/L    eGFR >60 mls/min/1.73/m2   CBC with Differential    Collection Time: 09/03/22  1:40 AM   Result Value Ref Range    WBC 14.4 (H) 4.5 - 11.5 x10(3)/mcL    RBC 2.74 (L) 4.70 - 6.10 x10(6)/mcL    Hgb 8.3 (L) 14.0 - 18.0 gm/dL    Hct 27.3 (L) 42.0 - 52.0 %    MCV 99.6 (H) 80.0 - 94.0 fL    MCH 30.3 27.0 - 31.0 pg    MCHC 30.4 (L) 33.0 - 36.0 mg/dL    RDW 17.8 (H) 11.5 - 17.0 %    Platelet 122 (L) 130 - 400 x10(3)/mcL    MPV 11.4 (H) 7.4 - 10.4 fL    Neut % 79.7 %    Lymph % 5.7 %    Mono % 9.4 %    Eos % 0.2 %    Basophil % 0.2 %    Lymph # 0.82 0.6 - 4.6 x10(3)/mcL    Neut # 11.5 (H) 2.1 - 9.2 x10(3)/mcL    Mono # 1.35 (H) 0.1 - 1.3 x10(3)/mcL    Eos # 0.03 0 - 0.9 x10(3)/mcL    Baso # 0.03 0 - 0.2 x10(3)/mcL    IG# 0.69 (H) 0 - 0.04 x10(3)/mcL    IG% 4.8 %    NRBC% 0.0 %   Comprehensive Metabolic Panel    Collection Time: 09/04/22  1:27 AM   Result Value Ref Range    Sodium Level 137 136 - 145 mmol/L    Potassium Level 4.0 3.5 - 5.1 mmol/L    Chloride 110 (H) 98 - 107 mmol/L    Carbon Dioxide 21 (L) 23 - 31 mmol/L    Glucose Level 147 (H) 82 - 115 mg/dL    Blood Urea Nitrogen 28.9 (H) 8.4 - 25.7 mg/dL    Creatinine 0.91 0.73 - 1.18 mg/dL    Calcium Level Total 7.6 (L) 8.8 - 10.0 mg/dL    Protein Total 3.8 (L) 5.8 - 7.6 gm/dL    Albumin Level 1.8 (L) 3.4 - 4.8 gm/dL    Globulin 2.0 (L) 2.4 - 3.5 gm/dL    Albumin/Globulin Ratio 0.9 (L) 1.1 - 2.0 ratio    Bilirubin Total 1.3 <=1.5 mg/dL    Alkaline Phosphatase 98 40 - 150 unit/L    Alanine Aminotransferase 80 (H) 0 - 55 unit/L    Aspartate Aminotransferase 82 (H) 5 - 34 unit/L    eGFR >60 mls/min/1.73/m2   CBC with Differential     Collection Time: 09/04/22  1:27 AM   Result Value Ref Range    WBC 13.7 (H) 4.5 - 11.5 x10(3)/mcL    RBC 3.02 (L) 4.70 - 6.10 x10(6)/mcL    Hgb 9.0 (L) 14.0 - 18.0 gm/dL    Hct 30.1 (L) 42.0 - 52.0 %    MCV 99.7 (H) 80.0 - 94.0 fL    MCH 29.8 27.0 - 31.0 pg    MCHC 29.9 (L) 33.0 - 36.0 mg/dL    RDW 18.2 (H) 11.5 - 17.0 %    Platelet 136 130 - 400 x10(3)/mcL    MPV 11.2 (H) 7.4 - 10.4 fL    Neut % 80.2 %    Lymph % 6.0 %    Mono % 9.5 %    Eos % 0.3 %    Basophil % 0.2 %    Lymph # 0.82 0.6 - 4.6 x10(3)/mcL    Neut # 11.0 (H) 2.1 - 9.2 x10(3)/mcL    Mono # 1.31 (H) 0.1 - 1.3 x10(3)/mcL    Eos # 0.04 0 - 0.9 x10(3)/mcL    Baso # 0.03 0 - 0.2 x10(3)/mcL    IG# 0.52 (H) 0 - 0.04 x10(3)/mcL    IG% 3.8 %    NRBC% 0.0 %         Assessment/Plan:     1. Recent CABG with mitral valve repair done on August 22, 2022   2. GERD/dysphagia/esophagitis-----we will discontinue the Pepcid and try PPI b.i.d..  Patient unfortunately cannot tolerate the Carafate.  Is very unfortunate that we only have the pills available and he cannot swallow them..  Since [facility] has taken over, the liquid Carafate is no longer available which is quite unfortunate.  3. Constipation - will start stool softner    Will give relistor and dulcolax supp  Miralax bid  OOB as tolerated  Minimize narcotics    Velia Shelton NP acting as scribe for Dr. Harshal Romero    I have seen the patient, reviewed Velia Shelton,. NP's progress note. I have personally interviewed the patient at bedside and agree with the findings.  Carafate tablets can be dissolved in water and taken p.o. once patient starts oral intake.  Small-bowel series with delayed transit few days ago.  No evidence of obstruction.  Repeat abdominal x-ray with nonspecific bowel gas pattern along with constipation.    Recommendations:  Serial abdominal x-rays.    Restart Reglan 5 mg IV q.8 hours if no medical contraindication.    Monitor electrolytes closely/especially potassium and magnesium   May need  repeat imaging studies if necessary.  Patient is supine on the bed at this time.  Gradual ambulation if okay with primary service might help in increased gut motility.

## 2022-09-04 NOTE — SUBJECTIVE & OBJECTIVE
Interval History:  Patient continues to require CPAP for adequate oxygenation.  Also GI recommendations noted.  Respiratory culture with Pseudomonas and Gram-positive cocci.  A will defer to Critical Care Service for medical treatment.  No significant changes from the cardiovascular perspective.  Leukocytosis continues to improve.  His most significant complaint is that he is thirsty.     Review of Systems   Constitutional: Negative. Negative for chills, diaphoresis, fever and night sweats.   HENT:  Negative for hoarse voice, sore throat and stridor.    Eyes: Negative.  Negative for blurred vision and double vision.   Cardiovascular:  Negative for chest pain, claudication, cyanosis, dyspnea on exertion, irregular heartbeat, leg swelling, orthopnea, palpitations, paroxysmal nocturnal dyspnea and syncope.   Respiratory:  Positive for shortness of breath. Negative for cough, hemoptysis, sputum production and wheezing.    Endocrine: Negative for polydipsia and polyuria.   Hematologic/Lymphatic: Negative for adenopathy and bleeding problem.   Gastrointestinal:  Negative for abdominal pain, diarrhea, heartburn, hematemesis, hematochezia, nausea and vomiting.   Neurological:  Negative for brief paralysis, disturbances in coordination, dizziness, focal weakness, headaches, numbness and paresthesias.   Medications:  Continuous Infusions:   amino acid 5 % in 15% dextrose 85 mL/hr at 09/03/22 1524     Scheduled Meds:   acetylcysteine 200 mg/ml (20%)  2 mL Nebulization TID    albuterol-ipratropium  3 mL Nebulization Q6H    [START ON 9/5/2022] amiodarone  200 mg Oral BID    [START ON 9/10/2022] amiodarone  200 mg Oral Daily    amiodarone  400 mg Oral BID    aspirin  81 mg Oral Daily    atorvastatin  80 mg Oral QHS    budesonide  0.5 mg Nebulization Q12H    ceFEPime (MAXIPIME) IVPB  2 g Intravenous Q8H    collagenase   Topical (Top) BID    docusate sodium  100 mg Oral BID    enoxaparin  1 mg/kg Subcutaneous Q12H    [START ON  9/5/2022] fat emulsion 20%  250 mL Intravenous Daily    folic acid  1 mg Oral Daily    loperamide  4 mg Oral Once    methylnaltrexone  12 mg Subcutaneous Every other day    metoclopramide HCl  5 mg Per NG tube Q8H    midodrine  10 mg Oral Q8H    pantoprazole  40 mg Intravenous BID    polyethylene glycol  17 g Oral BID    sodium chloride 0.9%  10 mL Intravenous Q6H     PRN Meds:sodium chloride, acetaminophen, calcium gluconate IVPB, calcium gluconate IVPB, dextrose 10%, dextrose 50%, HYDROcodone-acetaminophen, magnesium sulfate IVPB, magnesium sulfate IVPB, morphine, ondansetron, oxyCODONE, potassium chloride in water, potassium chloride in water, potassium chloride in water, sodium phosphate IVPB, sodium phosphate IVPB, sodium phosphate IVPB     Objective:     Vital Signs (Most Recent):  Temp: 97.9 °F (36.6 °C) (09/03/22 2145)  Pulse: 65 (09/04/22 0831)  Resp: 19 (09/04/22 0831)  BP: (!) 111/58 (09/04/22 0600)  SpO2: 98 % (09/04/22 0831)   Vital Signs (24h Range):  Temp:  [96.6 °F (35.9 °C)-97.9 °F (36.6 °C)] 97.9 °F (36.6 °C)  Pulse:  [56-72] 65  Resp:  [12-24] 19  SpO2:  [91 %-100 %] 98 %  BP: ()/(42-73) 111/58     Weight: 92.4 kg (203 lb 11.3 oz)  Body mass index is 30.17 kg/m².    SpO2: 98 %  O2 Device (Oxygen Therapy): Other (comment) (home unit)    Intake/Output - Last 3 Shifts         09/02 0700 09/03 0659 09/03 0700 09/04 0659 09/04 0700 09/05 0659    I.V. (mL/kg) 755 (8.2)      IV Piggyback 100 50     TPN  1156     Total Intake(mL/kg) 855 (9.2) 1206 (13.1)     Urine (mL/kg/hr) 635 (0.3) 750 (0.3)     Drains 50      Stool 200 0     Total Output 885 750     Net -30 +456                    Lines/Drains/Airways       Peripherally Inserted Central Catheter Line  Duration             PICC Triple Lumen 08/28/22 1516 right brachial 6 days              Drain  Duration                  NG/OG Tube 09/01/22 1323 16 Fr. Left nostril 2 days         Urethral Catheter 09/01/22 1200 16 Fr. 2 days                     Physical Exam  Vitals and nursing note reviewed.   Constitutional:       General: He is not in acute distress.     Appearance: Normal appearance. He is obese.   HENT:      Head: Normocephalic and atraumatic.      Nose: Nose normal. No congestion.      Mouth/Throat:      Mouth: Mucous membranes are dry.   Eyes:      General: No scleral icterus.     Extraocular Movements: Extraocular movements intact.      Conjunctiva/sclera: Conjunctivae normal.      Pupils: Pupils are equal, round, and reactive to light.   Neck:      Thyroid: No thyroid mass or thyromegaly.      Vascular: No carotid bruit or JVD.   Cardiovascular:      Rate and Rhythm: Normal rate and regular rhythm.      Pulses: Normal pulses.           Carotid pulses are 2+ on the right side and 2+ on the left side.       Radial pulses are 2+ on the right side and 2+ on the left side.        Femoral pulses are 2+ on the right side and 2+ on the left side.       Dorsalis pedis pulses are 2+ on the right side and 2+ on the left side.        Posterior tibial pulses are 2+ on the right side and 2+ on the left side.      Heart sounds: Murmur heard.     No friction rub. No gallop.   Pulmonary:      Effort: Pulmonary effort is normal. No respiratory distress.      Breath sounds: No stridor. Rhonchi present. No wheezing or rales.   Chest:      Chest wall: No tenderness.   Abdominal:      General: Abdomen is flat. Bowel sounds are normal. There is no distension.      Palpations: Abdomen is soft. There is no hepatomegaly, splenomegaly, mass or pulsatile mass.      Tenderness: There is no abdominal tenderness. There is no rebound.   Musculoskeletal:         General: No swelling. Normal range of motion.      Cervical back: Normal range of motion. No rigidity or tenderness.      Right lower leg: No edema.      Left lower leg: No edema.   Lymphadenopathy:      Cervical: No cervical adenopathy.      Upper Body:      Right upper body: No supraclavicular or axillary  adenopathy.      Left upper body: No supraclavicular or axillary adenopathy.   Skin:     General: Skin is warm and dry.   Neurological:      General: No focal deficit present.      Mental Status: He is alert and oriented to person, place, and time. Mental status is at baseline.      Cranial Nerves: No cranial nerve deficit.      Sensory: No sensory deficit.      Motor: No weakness.      Gait: Gait normal.   Psychiatric:         Mood and Affect: Mood normal.       Significant Labs:  All pertinent labs from the last 24 hours have been reviewed.    Significant Diagnostics:  I have reviewed and interpreted all pertinent imaging results/findings within the past 24 hours.

## 2022-09-04 NOTE — PROGRESS NOTES
[facility] - [facility]  Cardiothoracic Surgery  Progress Note    Patient Name: Zackery Osullivan Jr.  MRN: [id number]  Admission Date: 8/22/2022  Hospital Length of Stay: 13 days  Code Status: Full Code   Attending Physician: Rowdy Davis IV, MD   Referring Provider: Reinaldo Gerardo MD  Principal Problem:S/P MVR (mitral valve replacement)            Subjective:     Post-Op Info:  Procedure(s) (LRB):  CORONARY ARTERY BYPASS GRAFT (CABG) (N/A)  REPLACEMENT, MITRAL VALVE (N/A)   13 Days Post-Op     Interval History:  Patient continues to require CPAP for adequate oxygenation.  Also GI recommendations noted.  Respiratory culture with Pseudomonas and Gram-positive cocci.  A will defer to Critical Care Service for medical treatment.  No significant changes from the cardiovascular perspective.  Leukocytosis continues to improve.  His most significant complaint is that he is thirsty.     Review of Systems   Constitutional: Negative. Negative for chills, diaphoresis, fever and night sweats.   HENT:  Negative for hoarse voice, sore throat and stridor.    Eyes: Negative.  Negative for blurred vision and double vision.   Cardiovascular:  Negative for chest pain, claudication, cyanosis, dyspnea on exertion, irregular heartbeat, leg swelling, orthopnea, palpitations, paroxysmal nocturnal dyspnea and syncope.   Respiratory:  Positive for shortness of breath. Negative for cough, hemoptysis, sputum production and wheezing.    Endocrine: Negative for polydipsia and polyuria.   Hematologic/Lymphatic: Negative for adenopathy and bleeding problem.   Gastrointestinal:  Negative for abdominal pain, diarrhea, heartburn, hematemesis, hematochezia, nausea and vomiting.   Neurological:  Negative for brief paralysis, disturbances in coordination, dizziness, focal weakness, headaches, numbness and paresthesias.   Medications:  Continuous Infusions:   amino acid 5 % in 15% dextrose 85 mL/hr at 09/03/22 1524     Scheduled  Meds:   acetylcysteine 200 mg/ml (20%)  2 mL Nebulization TID    albuterol-ipratropium  3 mL Nebulization Q6H    [START ON 9/5/2022] amiodarone  200 mg Oral BID    [START ON 9/10/2022] amiodarone  200 mg Oral Daily    amiodarone  400 mg Oral BID    aspirin  81 mg Oral Daily    atorvastatin  80 mg Oral QHS    budesonide  0.5 mg Nebulization Q12H    ceFEPime (MAXIPIME) IVPB  2 g Intravenous Q8H    collagenase   Topical (Top) BID    docusate sodium  100 mg Oral BID    enoxaparin  1 mg/kg Subcutaneous Q12H    [START ON 9/5/2022] fat emulsion 20%  250 mL Intravenous Daily    folic acid  1 mg Oral Daily    loperamide  4 mg Oral Once    methylnaltrexone  12 mg Subcutaneous Every other day    metoclopramide HCl  5 mg Per NG tube Q8H    midodrine  10 mg Oral Q8H    pantoprazole  40 mg Intravenous BID    polyethylene glycol  17 g Oral BID    sodium chloride 0.9%  10 mL Intravenous Q6H     PRN Meds:sodium chloride, acetaminophen, calcium gluconate IVPB, calcium gluconate IVPB, dextrose 10%, dextrose 50%, HYDROcodone-acetaminophen, magnesium sulfate IVPB, magnesium sulfate IVPB, morphine, ondansetron, oxyCODONE, potassium chloride in water, potassium chloride in water, potassium chloride in water, sodium phosphate IVPB, sodium phosphate IVPB, sodium phosphate IVPB     Objective:     Vital Signs (Most Recent):  Temp: 97.9 °F (36.6 °C) (09/03/22 2145)  Pulse: 65 (09/04/22 0831)  Resp: 19 (09/04/22 0831)  BP: (!) 111/58 (09/04/22 0600)  SpO2: 98 % (09/04/22 0831)   Vital Signs (24h Range):  Temp:  [96.6 °F (35.9 °C)-97.9 °F (36.6 °C)] 97.9 °F (36.6 °C)  Pulse:  [56-72] 65  Resp:  [12-24] 19  SpO2:  [91 %-100 %] 98 %  BP: ()/(42-73) 111/58     Weight: 92.4 kg (203 lb 11.3 oz)  Body mass index is 30.17 kg/m².    SpO2: 98 %  O2 Device (Oxygen Therapy): Other (comment) (home unit)    Intake/Output - Last 3 Shifts         09/02 0700 09/03 0659 09/03 0700 09/04 0659 09/04 0700  09/05 0659    I.V. (mL/kg) 755  (8.2)      IV Piggyback 100 50     TPN  1156     Total Intake(mL/kg) 855 (9.2) 1206 (13.1)     Urine (mL/kg/hr) 635 (0.3) 750 (0.3)     Drains 50      Stool 200 0     Total Output 885 750     Net -30 +456                    Lines/Drains/Airways       Peripherally Inserted Central Catheter Line  Duration             PICC Triple Lumen 08/28/22 1516 right brachial 6 days              Drain  Duration                  NG/OG Tube 09/01/22 1323 16 Fr. Left nostril 2 days         Urethral Catheter 09/01/22 1200 16 Fr. 2 days                    Physical Exam  Vitals and nursing note reviewed.   Constitutional:       General: He is not in acute distress.     Appearance: Normal appearance. He is obese.   HENT:      Head: Normocephalic and atraumatic.      Nose: Nose normal. No congestion.      Mouth/Throat:      Mouth: Mucous membranes are dry.   Eyes:      General: No scleral icterus.     Extraocular Movements: Extraocular movements intact.      Conjunctiva/sclera: Conjunctivae normal.      Pupils: Pupils are equal, round, and reactive to light.   Neck:      Thyroid: No thyroid mass or thyromegaly.      Vascular: No carotid bruit or JVD.   Cardiovascular:      Rate and Rhythm: Normal rate and regular rhythm.      Pulses: Normal pulses.           Carotid pulses are 2+ on the right side and 2+ on the left side.       Radial pulses are 2+ on the right side and 2+ on the left side.        Femoral pulses are 2+ on the right side and 2+ on the left side.       Dorsalis pedis pulses are 2+ on the right side and 2+ on the left side.        Posterior tibial pulses are 2+ on the right side and 2+ on the left side.      Heart sounds: Murmur heard.     No friction rub. No gallop.   Pulmonary:      Effort: Pulmonary effort is normal. No respiratory distress.      Breath sounds: No stridor. Rhonchi present. No wheezing or rales.   Chest:      Chest wall: No tenderness.   Abdominal:      General: Abdomen is flat. Bowel sounds are normal.  There is no distension.      Palpations: Abdomen is soft. There is no hepatomegaly, splenomegaly, mass or pulsatile mass.      Tenderness: There is no abdominal tenderness. There is no rebound.   Musculoskeletal:         General: No swelling. Normal range of motion.      Cervical back: Normal range of motion. No rigidity or tenderness.      Right lower leg: No edema.      Left lower leg: No edema.   Lymphadenopathy:      Cervical: No cervical adenopathy.      Upper Body:      Right upper body: No supraclavicular or axillary adenopathy.      Left upper body: No supraclavicular or axillary adenopathy.   Skin:     General: Skin is warm and dry.   Neurological:      General: No focal deficit present.      Mental Status: He is alert and oriented to person, place, and time. Mental status is at baseline.      Cranial Nerves: No cranial nerve deficit.      Sensory: No sensory deficit.      Motor: No weakness.      Gait: Gait normal.   Psychiatric:         Mood and Affect: Mood normal.       Significant Labs:  All pertinent labs from the last 24 hours have been reviewed.    Significant Diagnostics:  I have reviewed and interpreted all pertinent imaging results/findings within the past 24 hours.    Assessment/Plan:     * S/P MVR (mitral valve replacement)  Continue per Critical Care Service for postoperative ileus/SBO and respiratory cultures as they see fit..  Increase activity and re-evaluated by speech path.  Consider enteral nutrition once the bowel activity returns noted will defer to Critical Care Service with nutritional requirements.  Increase activity as tolerated.        Nba Licona MD  Cardiothoracic Surgery  [facility] - [facility]

## 2022-09-04 NOTE — PROGRESS NOTES
Inpatient Nutrition Assessment    Admit Date: 8/22/2022   Total duration of encounter: 13 days     Nutrition Recommendation/Prescription     Continue with TPN until able to advance diet and pt tolerates.  TPN Recommendation:  Clinimix +E 5/15 @ 85ml/hr + 250ml 20% lipids IVPB qM,Th  Provides:  1590kcal (98% est needs)  102gm protein (94% est needs)  2040ml fluid (126% est needs)    Communication of Recommendations: reviewed with nurse    Nutrition Assessment     Malnutrition Assessment/Nutrition-Focused Physical Exam    Malnutrition in the context of acute illness or injury  Degree of Malnutrition: does not meet criteria  Energy Intake: does not meet criteria  Interpretation of Weight Loss: does not meet criteria  Body Fat:does not meet criteria  Area of Body Fat Loss: does not meet criteria  Muscle Mass Loss: does not meet criteria  Area of Muscle Mass Loss: does not meet criteria  Fluid Accumulation: mild  Edema: generalized   Reduced  Strength: unable to obtain  A minimum of two characteristics is recommended for diagnosis of either severe or non-severe malnutrition.    Chart Review    Reason Seen: follow-up    Diagnosis:  #Small bowel obstruction vs severe ileus   #Acute kidney injury   #CAD s/p 2v CABG 08/22/2022  #Mitral regurgitation s/p bioprosthetic MVR 08/22/2022  #Heart failure with reduced ejection fraction    Relevant Medical History: [place], HLD, CAD, ischemic CM, MR, CVA, COPD, HEATHER, spinal arthritis     Nutrition-Related Medications: budesonide, docusate, folic acid, miralax  Calorie Containing IV Medications: no significant kcals from medications at this time    Nutrition-Related Labs:  9/4 Cl 110, BUN 28.9, Glu 147    Diet/PN Order: amino acid 5% in 15% dextrose (CLINIMIX-E) solution (1L provides 50gm AA, 150gm CHO (510 kcal/L dextrose), Na 35, K 30, Mg 5, Ca 4.5, Acetate 80, Cl 39, Phos 15) (710 total kcal/L)  amino acid 5% in 15% dextrose (CLINIMIX-E) solution (1L provides 50gm AA, 150gm CHO (510  "kcal/L dextrose), Na 35, K 30, Mg 5, Ca 4.5, Acetate 80, Cl 39, Phos 15) (710 total kcal/L)  Oral Supplement Order: none at this time  Tube Feeding Order: none at this time  Appetite/Oral Intake: not applicable/not applicable  Factors Affecting Nutritional Intake: NPO  Food/Anabaptist/Cultural Preferences: none reported    Skin Integrity: wound, skin tear, incision  Wound(s):      Altered Skin Integrity 08/26/22 0621 Buttocks-Tissue loss description: Partial thickness noted wound on buttocks    Comments    9/2/22: Noted diet recently advanced to liquids. NPO since 8/28/22. Will monitor for tolerance. If not able to tolerate and advance diet, will need to consider PN.  9/4/22: Pt now on TPN. Discussed with RN and Pharmacy.    Anthropometrics    Height: 5' 8.9" (175 cm) Height Method: Stated  Last Weight: 92.4 kg (203 lb 11.3 oz) (09/04/22 0513) Weight Method: Bed Scale  BMI (Calculated): 30.2  BMI Classification: obese grade I (BMI 30-34.9)        Ideal Body Weight (IBW), Male: 159.4 lb  % Ideal Body Weight, Male (lb): 124.48 %                 Usual Weight Provided By: unable to obtain usual weight at this time    Wt Readings from Last 5 Encounters:   09/04/22 92.4 kg (203 lb 11.3 oz)   08/09/22 87.1 kg (192 lb)   08/02/22 87.9 kg (193 lb 12.6 oz)   11/01/21 98 kg (215 lb 15.8 oz)     Weight Change(s) Since Admission:  Admit Weight: 88 kg (194 lb) (08/17/22 1427)  9/2/22: 92.6kg    Estimated Needs    Weight Used For Calorie Calculations: 90 kg (198 lb 6.6 oz)  Energy Calorie Requirements (kcal): 1620 kcals (MSJ)  Energy Need Method: [place]- Jeor  Weight Used For Protein Calculations: 90 kg (198 lb 6.6 oz)  Protein Requirements: 108-180 g (1.2-2.0 g/kg CBW)  Fluid Requirements (mL): 1620ml (1ml/kcal)  Temp: 97.9 °F (36.6 °C)       Enteral Nutrition    Patient not receiving enteral nutrition at this time.    Parenteral Nutrition    Standard Formula: Clinimix E 5/15  Custom Formula: not applicable  Additives: " multivitamin with vitamin K and trace elements (Zn, Cu, Mn, Se)  Rate/Volume: 85ml/hr  Lipids: 250 ml 20% IV lipid emulsion twice weekly  Total Nutrition Provided by Parenteral Nutrition:  Calories Provided   1590 kcal/d, 98% needs   Protein Provided  102 g/d, 94% needs   Dextrose Provided  305   g/d, GIR 2.3 mg CHO/kg/min   Fluid Provided  2040 ml/d, 126% needs       Evaluation of Received Nutrient Intake    Calories: meeting estimated needs  Protein: meeting estimated needs    Patient Education    Not applicable.    Nutrition Diagnosis     PES: Inadequate oral intake related to current condition as evidenced by NPO/clear liquid since 8/22. (continues)    Interventions/Goals     Intervention(s): modified composition of parenteral nutrition, modified rate of parenteral nutrition, and collaboration with other providers  Goal: Meet greater than 75% of nutritional needs by follow-up. (goal progressing)    Monitoring & Evaluation     Dietitian will monitor energy intake.  Nutrition Risk/Follow-Up: moderate (follow-up in 3-5 days)

## 2022-09-04 NOTE — PROGRESS NOTES
[facility] - [facility]  Cardiology  Progress Note    Patient Name: Zackery Osullivan Jr.  MRN: [id number]  Admission Date: 8/22/2022  Hospital Length of Stay: 13 days  Code Status: Full Code   Attending Physician: Rowdy Davis IV, MD   Primary Care Physician: Isaiah Meeks MD  Expected Discharge Date:   Principal Problem:S/P MVR (mitral valve replacement)    Subjective:     Brief HPI:   Mr. Osullivan is a 77 year old male, known to Dr. Gerardo and Dr. Flowers, who presented to the hospital and underwent CAD/CABG and MVR due to diagnosis of MV CAD and significant MR. Also underwent LISA Ligation. Patient tolerated the surgery well, and was transferred to intensive care unit for further close post operative monitoring. CIS is consulted for post op surgical cardiac management.    Hospital Course:   8.25.22: NAD noted. VSS with low normal BP. SR on tele. Denies SOB/Palps, +incisional CP.  8.26.22: NAD noted. VSS with low normal BP. SR on tele. Sitting in chair at bedside. Denies SOB/Palps, endorses incisional CP.  8.27.22: NAD Noted. Reports throat discomfort. SR on Tele. BP Low Normal.  8.28.22: NAD Noted. BP Low Normal. SR on Tele. GI Following. NSVT overnight.  SR 83.  8.29.22: NAD Noted. Patient with NG Tube. Surgical Team is following for evaluation of Ileus versus SBO. Hypotensive requiring low dose Levophed. SR on Tele. Amiodarone gtt is infusing. Started for NSVT, which he did not have last night according to the RN.  8.30.22: Remains NPO. Plans for small bowel follow through today  8.31.22: Patient underwent SBFT without evidence of SBO. He was given suppository with multiple BMs overnight. NGT has been removed and he is tolerating oral diet. Amiodarone drip in progress. Continues to require pressor support. He has been started on midodrine. Urine is dark. Patient is having elevated WBCs and has been started on ABX. Patient c/o pain/swelling to LUE  9.01.22: Still requiring pressor support.  LUE + DVT. Lovenox has been restarted. Stool studies pending. Urine dark dania  09/2/22: Patient sitting up in bedside chair. He had NGT placed again due to   Abdominal pain/distension. KUB revealing persistent gaseous distension of abdomen with gas in loops of small and large intestine. Pressors have been weaned to off. Midodrine @ 10mg tid. AICD interrogated and settings adjustments made per [employer] rep  9.3.22: NAD Noted. Sitting in chair. BIPAP in Place. SR on Tele. BP Low Normal.  9.4.22 VSS. Requiring Bipap support. SR pet tele.     PMH: CAD (Multivessel), Ischemic Cardiomyopathy (ICD), VHD- MR, Hypertension, Hyperlipidemia, Chronic VI, COPD, Smoker, Obstructive Sleep Apnea, NSVT, Obesity  PSH: CABG/MVR, Skin Lesion, ICD Placement ([employer]), Tonsillectomy  Family History: Father- CAD, Brother- CAD, Sister- CAD  Social History: Tobacco- Former Smoker (Quit 3 Years Ago), Alcohol- Negative, Substance Abuse- Negative     Previous Cardiac Diagnostics:   TTE 09/01/22:  Difficult to accurately assess LVEF as patient was in Afib, RVR during acquisition of images. Limited study to assess LV function. Definity used. Estimated EF 30%. LV apex appears aneurysmal without LV thrombus.     Venous US LUE 08/31/22:  DVT to left axillary through proximal brachial veins.  A superficial vein thrombosis was identified in the left cephalic vein.   All other vessels compressed.     Echocardiogram (8.28.22):  The left ventricle is mildly enlarged with concentric hypertrophy and mildly reduced LV systolic function.  The estimated ejection fraction is 40%.  Indeterminate left ventricular diastolic function due to MVR.  Aneurysmal basal inferior wall/ RCA territory  Well seated bioprosthetic MV without significant stenosis or perivalvular leak. MG 4 mmHg.  Mild to moderate tricuspid regurgitation.  Mild left atrial enlargement.    CABG/MVR (8.22.22): MVR 29 mm Mosaic Porcine Valve; CABG LIMA to LAD & SVG to OM, LISA  Ligation with Endo Stapler.     Left Heart Catheterization (8.2.22):  Left Main- 50% Distal Disease, LAD- 60% Proximal IFR 0.81, LCx- Nondominant (, Diagonal to OM Collateral), RCA- Dominant with , Bilateral Common Iliacs 30% Calcified Stenosis.     Echocardiogram (4.18.22):  The study quality is average.   The left ventricle is normal in size. Global left ventricular systolic function is mildly decreased. The left ventricular ejection fraction is 40%. Left ventricular diastolic function is normal. Noted left ventricular hypertrophy. Asymmetric septal left ventricular hypertrophy is present. It is mild.   Mild to moderate (1-2+) mitral regurgitation. Somewhat eccentric jet noted, chordae appear hyperechoic and thickened.      Review of Systems   Cardiovascular:  Negative for chest pain.   Respiratory:          Intermittent SOB- Comfortable on BIPAP     Objective:     Vital Signs (Most Recent):  Temp: 97.6 °F (36.4 °C) (09/04/22 1200)  Pulse: 78 (09/04/22 1300)  Resp: (!) 24 (09/04/22 1300)  BP: 120/68 (09/04/22 1300)  SpO2: (!) 94 % (09/04/22 1300) Vital Signs (24h Range):  Temp:  [97.6 °F (36.4 °C)-97.9 °F (36.6 °C)] 97.6 °F (36.4 °C)  Pulse:  [56-78] 78  Resp:  [12-24] 24  SpO2:  [72 %-100 %] 94 %  BP: (105-144)/(49-84) 120/68     Weight: 92.4 kg (203 lb 11.3 oz)  Body mass index is 30.17 kg/m².    SpO2: (!) 94 %  O2 Device (Oxygen Therapy): Other (comment) (home unit)      Intake/Output Summary (Last 24 hours) at 9/4/2022 1351  Last data filed at 9/4/2022 1324  Gross per 24 hour   Intake 1206 ml   Output 750 ml   Net 456 ml         Lines/Drains/Airways       Peripherally Inserted Central Catheter Line  Duration             PICC Triple Lumen 08/28/22 1516 right brachial 6 days              Drain  Duration                  NG/OG Tube 09/01/22 1323 16 Fr. Left nostril 3 days         Urethral Catheter 09/01/22 1200 16 Fr. 3 days                    Significant Labs:   CMP   Recent Labs   Lab 09/03/22  0140  09/04/22  0127    137   K 3.8 4.0   CO2 23 21*   BUN 27.4* 28.9*   CREATININE 0.92 0.91   CALCIUM 7.2* 7.6*   ALBUMIN 1.7* 1.8*   BILITOT 1.4 1.3   ALKPHOS 88 98   AST 80* 82*   ALT 75* 80*      and CBC   Recent Labs   Lab 09/03/22  0140 09/04/22  0127   WBC 14.4* 13.7*   HGB 8.3* 9.0*   HCT 27.3* 30.1*   * 136       Telemetry:  Sinus Rhythm     Physical Exam:  Physical Exam  Vitals reviewed.   Constitutional:       Appearance: Normal appearance.   HENT:      Head: Normocephalic.   Cardiovascular:      Rate and Rhythm: Normal rate and regular rhythm.   Pulmonary:      Effort: Pulmonary effort is normal.      Breath sounds: Normal breath sounds.      Comments: On BIPAP  Abdominal:      General: There is distension.   Musculoskeletal:         General: Normal range of motion.      Cervical back: Neck supple.   Skin:     General: Skin is warm and dry.      Capillary Refill: Capillary refill takes less than 2 seconds.   Neurological:      General: No focal deficit present.      Mental Status: He is alert and oriented to person, place, and time. Mental status is at baseline.   Psychiatric:         Behavior: Behavior normal.       Current Inpatient Medications:    Current Facility-Administered Medications:     0.9%  NaCl infusion (for blood administration), , Intravenous, Q24H PRN, Rowdy Davis IV, MD    acetaminophen oral solution 650 mg, 650 mg, Per OG tube, Q6H PRN, Rowdy Davis IV, MD    acetylcysteine 200 mg/ml (20%) solution 2 mL, 2 mL, Nebulization, TID, KIMBERLI Aguilar, 2 mL at 09/04/22 0831    albuterol-ipratropium 2.5 mg-0.5 mg/3 mL nebulizer solution 3 mL, 3 mL, Nebulization, Q6H, Marlon Humphreys MD, 3 mL at 09/04/22 0831    amino acid 5% in 15% dextrose (CLINIMIX-E) solution (1L provides 50gm AA, 150gm CHO (510 kcal/L dextrose), Na 35, K 30, Mg 5, Ca 4.5, Acetate 80, Cl 39, Phos 15) (710 total kcal/L), , Intravenous, Continuous, Nba Licona MD, Last Rate: 85 mL/hr at 09/03/22 7664,  New Bag at 09/03/22 1524    amino acid 5% in 15% dextrose (CLINIMIX-E) solution (1L provides 50gm AA, 150gm CHO (510 kcal/L dextrose), Na 35, K 30, Mg 5, Ca 4.5, Acetate 80, Cl 39, Phos 15) (710 total kcal/L), , Intravenous, Continuous, Nba Licona MD    [START ON 9/5/2022] amiodarone tablet 200 mg, 200 mg, Oral, BID, KIMBERLI Waddell    [START ON 9/10/2022] amiodarone tablet 200 mg, 200 mg, Oral, Daily, Yuly Mccray, FNP    amiodarone tablet 400 mg, 400 mg, Oral, BID, LAYNE WaddellP, 400 mg at 09/04/22 0802    aspirin EC tablet 81 mg, 81 mg, Oral, Daily, Rowdy Davis IV, MD, 81 mg at 09/04/22 0803    atorvastatin tablet 80 mg, 80 mg, Oral, QHS, Jeniffer Gale, LAYNEP, 80 mg at 09/03/22 2028    budesonide nebulizer solution 0.5 mg, 0.5 mg, Nebulization, Q12H, Marlon Humphreys MD, 0.5 mg at 09/04/22 0831    calcium gluconate 1 g in NS IVPB (premixed), 1 g, Intravenous, PRN, Rowdy Davis IV, MD    calcium gluconate 1 g in NS IVPB (premixed), 3 g, Intravenous, PRN, Rowdy Davis IV, MD    ceFEPIme (MAXIPIME) 2 g in dextrose 5 % in water (D5W) 5 % 50 mL IVPB (MB+), 2 g, Intravenous, Q8H, Fidel Mckeon MD, Stopped at 09/04/22 1324    collagenase ointment, , Topical (Top), BID, Rowdy Davis IV, MD, Given at 09/04/22 0818    dextrose 10% bolus 250 mL, 25 g, Intravenous, PRN, Rowdy Davis IV, MD    dextrose 50% injection 12.5 g, 12.5 g, Intravenous, PRN, Rowdy Davis IV, MD, 12.5 g at 08/27/22 2206    docusate sodium capsule 100 mg, 100 mg, Oral, BID, Rowdy Davis IV, MD, 100 mg at 09/04/22 0802    enoxaparin injection 90 mg, 1 mg/kg, Subcutaneous, Q12H, Fidel Mckeon MD, 90 mg at 09/04/22 0508    [START ON 9/5/2022] fat emulsion 20% infusion 250 mL, 250 mL, Intravenous, Daily, Nba Licona MD    folic acid tablet 1 mg, 1 mg, Oral, Daily, Rowdy Davis IV, MD, 1 mg at 09/04/22 0802    HYDROcodone-acetaminophen 5-325 mg per tablet 1 tablet, 1 tablet, Oral, Q4H PRN,  Rowdy Davis IV, MD, 1 tablet at 09/04/22 1254    loperamide capsule 4 mg, 4 mg, Oral, Once, Rowdy Davis IV, MD    magnesium sulfate 2g in water 50mL IVPB (premix), 2 g, Intravenous, PRN, Rowdy Davis IV, MD    magnesium sulfate 2g in water 50mL IVPB (premix), 4 g, Intravenous, PRN, Rowdy Davis IV, MD    methylnaltrexone 12 mg/0.6 mL subcutaneous injection 12 mg, 12 mg, Subcutaneous, Every other day, MARTHA Limon, 12 mg at 09/04/22 0809    metoclopramide HCl 5 mg/5 mL solution 5 mg, 5 mg, Per NG tube, Q8H, Harshal Romero MD, 5 mg at 09/04/22 0508    midodrine tablet 10 mg, 10 mg, Oral, Q8H, Fidel Mckeon MD, 10 mg at 09/04/22 0507    morphine injection 2 mg, 2 mg, Intravenous, PRN, Rowdy Davis IV, MD, 2 mg at 08/23/22 0817    ondansetron injection 4 mg, 4 mg, Intravenous, Q12H PRN, Rowdy Davis IV, MD, 4 mg at 08/28/22 0307    oxyCODONE immediate release tablet 5 mg, 5 mg, Oral, Q4H PRN, Rowdy Davis IV, MD, 5 mg at 08/24/22 2016    pantoprazole injection 40 mg, 40 mg, Intravenous, BID, Isaiah Levine MD, 40 mg at 09/04/22 0803    polyethylene glycol packet 17 g, 17 g, Oral, BID, MARTHA Limon, 17 g at 09/04/22 0809    potassium chloride 20 mEq in 100 mL IVPB (FOR CENTRAL LINE ADMINISTRATION ONLY), 20 mEq, Intravenous, PRN, Rowdy Davis IV, MD    potassium chloride 20 mEq in 100 mL IVPB (FOR CENTRAL LINE ADMINISTRATION ONLY), 40 mEq, Intravenous, PRN, Rowdy Davis IV, MD    potassium chloride 20 mEq in 100 mL IVPB (FOR CENTRAL LINE ADMINISTRATION ONLY), 20 mEq, Intravenous, PRN, Rowdy Davis IV, MD    Flushing PICC Protocol, , , Until Discontinued **AND** sodium chloride 0.9% flush 10 mL, 10 mL, Intravenous, Q6H, 10 mL at 09/04/22 0600 **AND** [DISCONTINUED] sodium chloride 0.9% flush 10 mL, 10 mL, Intravenous, PRN, Rowdy Davis IV, MD    sodium phosphate 15 mmol in dextrose 5 % 250 mL IVPB, 15 mmol, Intravenous, PRN, Rowdy Davis IV,  MD    sodium phosphate 20.01 mmol in dextrose 5 % 250 mL IVPB, 20.01 mmol, Intravenous, PRN, Rowdy Davis IV, MD    sodium phosphate 30 mmol in dextrose 5 % 250 mL IVPB, 30 mmol, Intravenous, PRN, Rowdy Davis IV, MD    Facility-Administered Medications Ordered in Other Encounters:     diphenhydrAMINE capsule 50 mg, 50 mg, Oral, On Call Procedure, Reinaldo Gerardo MD, 50 mg at 08/02/22 0739    sodium chloride 0.9% flush 10 mL, 10 mL, Intravenous, PRN, Reinaldo Gerardo MD        Assessment:   IMPRESSION:  CAD (Multivessel)- Status Post CABG (LIMA to LAD, SVG to OM) (8.22.22)    - S/P LISA Ligation  Hypotension (Improved)- Off Pressors    -  H/O Hypertension   Valvular Heart Disease- MR Status Post Bio MVR (#29 mm Mosaic Porcine) (8.22.22)    - Well seated bioprosthetic MV without significant stenosis or perivalvular leak. MG 4 mmHg.  Ischemic Cardiomyopathy EF 40% Status Post ICD ([employer])    - Aneurysmal basal inferior wall/ RCA territory--needs OAC  NSVT    - History of VT    - On Amiodarone Outpatient  Ileus   --SBFT 8/30/22 negative for SBO  C-diff + (9/1/22)--PCR negative  --NGT placed again on 9/1/22  DVT LUE  -- left axillary through proximal brachial veins. Superficial vein thrombus left cephalic vein.   Hypoxic-requiring Bipap support  Positive sputum culture-pseudomonas  Hyperlipidemia  Chronic Venous Insufficiency  Elevated BMI/Obesity  COPD  Former Smoker    - Quit 3 Years Ago  Anemia  Thrombocytopenia  Leukocytosis   Transaminitis  Unstageable sacral decubitus    Plan:   CT of the chest pending  Antibiotics per ICU team  Continue Amiodarone Tapered Dosing  Continue Midodrine for BP Support as needed.  On FD Lovenox for DVT Treatment  Continue ASA/Statin  Supportive Care per ICU Team    KIMBERLI Kelley  Cardiology  [facility] - [address] [place] [address]  09/04/2022

## 2022-09-04 NOTE — CONSULTS
[facility] - [facility] ICU  Pulmonary Critical Care Note    Patient Name: Zackery Osullivan Jr.  MRN: [id number]  Admission Date: 8/22/2022  Hospital Length of Stay: 13 days  Code Status: Full Code  Attending Provider: Rowdy Davis IV, MD  Primary Care Provider: Isaiah Meeks MD     Subjective:     HPI:   This is a 77-year-old  male with past medical history of CAD, HTN, HLD, and HFrEF who presented to [facility] for a CABG x2 and mitral valve replacement. Received 2 units pRBCs intraop. Initially admitted to ICU for post-op monitoring.  Patient transferred out of ICU once extubated and hemodynamically stable off vasopressors on 8/25/22. On 8/27/22, patient reporting throat pain, nausea and constipation. Unable to keep down food without vomiting. Unable to tolerate PO meds. GI consulted. During this time, patient also having low/normal blood pressures. Had 17 beat run of V-tach evening of 8/27/22 in which amiodarone IV started since patient unable to tolerate PO medications. Morning of 8/28/22, MAPs <65 despite fluid resuscitation. Patient to be upgraded to ICU 8/28/22 due to the need for vasopressor support and close monitoring. CT abdomen with ileus. Small bowel follow through on 8/30 with no obstruction but delayed transit.      Hospital Course/Significant events:  C. Diff PCR was negative     24 Hour Interval History:   pressors were weaned to off and he was downgrade on 9/3/2022; however this Am pulmonary is being reconsulted for desaturation and requiring CPAP usage. Upon review of patient's, CXR he has had a persistent opacity to the left upper lobe since post op CABG. Sputum has also culture out Pseudmonas; on Cefepime. He reports occasional cough which is productive at times.       Past Medical History:   Diagnosis Date    Arthritis     spine    CAD (coronary artery disease)     COPD (chronic obstructive pulmonary disease)     HLD (hyperlipidemia)     HTN (hypertension)     Ischemic  cardiomyopathy     Mitral regurgitation     HEATHER on CPAP     Stroke     Venous insufficiency     Ventricular tachycardia        Past Surgical History:   Procedure Laterality Date    bilateral inguinal stents      CARDIAC DEFIBRILLATOR PLACEMENT Left     CATARACT EXTRACTION Bilateral     CATHETERIZATION OF BOTH LEFT AND RIGHT HEART  2022    Diagnostic Only; Dr. Gerardo    COLONOSCOPY      CORONARY ARTERY BYPASS GRAFT (CABG) N/A 2022    Procedure: CORONARY ARTERY BYPASS GRAFT (CABG);  Surgeon: Rowdy Davis IV, MD;  Location: [facility] OR;  Service: Cardiovascular;  Laterality: N/A;  possible MVR & LLAA  //  ECHO NOTIFIED    LEFT HEART CATHETERIZATION Left 2022    Procedure: CATHETERIZATION, HEART, LEFT;  Surgeon: Reinaldo Gerardo MD;  Location: [facility] CATH LAB;  Service: Cardiology;  Laterality: Left;  LHC +/- PCI    MITRAL VALVE REPLACEMENT N/A 2022    Procedure: REPLACEMENT, MITRAL VALVE;  Surgeon: Rowdy Davis IV, MD;  Location: [facility] OR;  Service: Cardiovascular;  Laterality: N/A;  possible MVR and LLAA    REMOVAL OF IMPLANTED CARDIOVERTER-DEFIBRILLATOR (ICD)      TONSILLECTOMY         Social History     Socioeconomic History    Marital status:    Tobacco Use    Smoking status: Former     Years: 55.00     Types: Cigarettes     Start date:      Quit date:      Years since quittin.6    Smokeless tobacco: Never   Substance and Sexual Activity    Alcohol use: Yes     Alcohol/week: 3.0 standard drinks     Types: 3 Glasses of wine per week    Drug use: Never           Current Outpatient Medications   Medication Instructions    amiodarone (PACERONE) 200 mg, Oral, 2 times daily    aspirin (ECOTRIN) 81 mg, Oral, Nightly    atorvastatin (LIPITOR) 80 mg, Oral, Nightly    clopidogreL (PLAVIX) 75 mg, Oral, Nightly    ezetimibe (ZETIA) 10 mg, Oral, Nightly    fluticasone propionate (FLONASE) 50 mcg/actuation nasal spray 1 spray, Each Nostril, Daily PRN    furosemide (LASIX) 20 mg,  Oral, Daily    metoprolol succinate (TOPROL-XL) 25 MG 24 hr tablet 1 tablet, Oral, Daily    mometasone (ASMANEX TWISTHALER) 220 mcg/ actuation (30) inhaler 2 puffs, Inhalation, Daily, Controller    sacubitriL-valsartan (ENTRESTO) 49-51 mg per tablet 0.5 tablets, Oral, 2 times daily    spironolactone (ALDACTONE) 12.5 mg, Oral, Daily    umeclidinium-vilanteroL (ANORO ELLIPTA) 62.5-25 mcg/actuation DsDv 1 puff, Inhalation, Daily, Controller       Current Inpatient Medications   acetylcysteine 200 mg/ml (20%)  2 mL Nebulization TID    albuterol-ipratropium  3 mL Nebulization Q6H    [START ON 9/5/2022] amiodarone  200 mg Oral BID    [START ON 9/10/2022] amiodarone  200 mg Oral Daily    amiodarone  400 mg Oral BID    aspirin  81 mg Oral Daily    atorvastatin  80 mg Oral QHS    budesonide  0.5 mg Nebulization Q12H    ceFEPime (MAXIPIME) IVPB  2 g Intravenous Q8H    collagenase   Topical (Top) BID    docusate sodium  100 mg Oral BID    enoxaparin  1 mg/kg Subcutaneous Q12H    [START ON 9/5/2022] fat emulsion 20%  250 mL Intravenous Daily    folic acid  1 mg Oral Daily    loperamide  4 mg Oral Once    methylnaltrexone  12 mg Subcutaneous Every other day    metoclopramide HCl  5 mg Per NG tube Q8H    midodrine  10 mg Oral Q8H    pantoprazole  40 mg Intravenous BID    polyethylene glycol  17 g Oral BID    sodium chloride 0.9%  10 mL Intravenous Q6H       Current Intravenous Infusions   amino acid 5 % in 15% dextrose 85 mL/hr at 09/03/22 1524         Review of Systems   Constitutional:  Positive for malaise/fatigue.   Respiratory:  Positive for cough, sputum production and shortness of breath.    Cardiovascular:  Positive for leg swelling.   Gastrointestinal: Negative.    Genitourinary: Negative.    Musculoskeletal: Negative.         Objective:       Intake/Output Summary (Last 24 hours) at 9/4/2022 0942  Last data filed at 9/4/2022 0500  Gross per 24 hour   Intake 1206 ml   Output 750 ml   Net 456 ml         Vital Signs (Most  Recent):  Temp: 97.9 °F (36.6 °C) (09/03/22 2145)  Pulse: 65 (09/04/22 0831)  Resp: 19 (09/04/22 0831)  BP: (!) 111/58 (09/04/22 0600)  SpO2: 98 % (09/04/22 0831)    Body mass index is 30.17 kg/m².  Weight: 92.4 kg (203 lb 11.3 oz) Vital Signs (24h Range):  Temp:  [96.6 °F (35.9 °C)-97.9 °F (36.6 °C)] 97.9 °F (36.6 °C)  Pulse:  [56-72] 65  Resp:  [12-24] 19  SpO2:  [91 %-100 %] 98 %  BP: ()/(42-69) 111/58     Physical Exam  Constitutional:       Appearance: He is ill-appearing.   HENT:      Head:      Comments: CPAP in place   Cardiovascular:      Rate and Rhythm: Normal rate and regular rhythm.   Pulmonary:      Comments: Diminished over left   Abdominal:      Palpations: Abdomen is soft.      Comments: Round, hypoactive BS   Musculoskeletal:      Right lower leg: Edema present.      Left lower leg: Edema present.   Neurological:      Mental Status: He is alert and oriented to person, place, and time.   Psychiatric:         Mood and Affect: Mood normal.         Behavior: Behavior normal.         Lines/Drains/Airways       Peripherally Inserted Central Catheter Line  Duration             PICC Triple Lumen 08/28/22 1516 right brachial 6 days              Drain  Duration                  NG/OG Tube 09/01/22 1323 16 Fr. Left nostril 2 days         Urethral Catheter 09/01/22 1200 16 Fr. 2 days                    Significant Labs:    Lab Results   Component Value Date    WBC 13.7 (H) 09/04/2022    HGB 9.0 (L) 09/04/2022    HCT 30.1 (L) 09/04/2022    MCV 99.7 (H) 09/04/2022     09/04/2022         BMP  Lab Results   Component Value Date     09/04/2022    K 4.0 09/04/2022    CO2 21 (L) 09/04/2022    BUN 28.9 (H) 09/04/2022    CREATININE 0.91 09/04/2022    CALCIUM 7.6 (L) 09/04/2022    EGFRNONAA >60 05/14/2020       ABG  No results for input(s): PH, PO2, PCO2, HCO3, BE in the last 168 hours.    Significant Imaging:X-Ray Abdomen AP 1 View  Narrative: EXAMINATION:  XR ABDOMEN AP 1 VIEW    CLINICAL  HISTORY:  ileus;    TECHNIQUE:  AP X-RAY OF THE ABDOMEN:    CPT 14116    FINDINGS:  Examination reveals some residual feces throughout the colon gas pattern is nonspecific with no clear evidence of ileus or obstruction no abnormal masses or calcifications identified  Impression: Nonspecific gas pattern    Electronically signed by: Rodney Brice  Date:    09/04/2022  Time:    09:15         Assessment/Plan:     Assessment  severe COPD per ATS criteria (previous FEV1 was 41%)  Persistent opacity to left upper lobe following CABG, Sputum also with pseudomonas  SBO vs severe ielus   LUE DVT    CAD/MR s/p Bioprosthetic MVR and CABG x 2 on 8/22/22  History of VT   HF with EF 40%       Plan  CT of chest   Continue recs per GI and CV sugery   Trial of Vapotherm. Continue CPAP with sleep   Continue Cefepime   Continue Lovenox BID   Needs mobilization and pulmonary tolieting   Cotninue nebs and Pulmicort     DVT Prophylaxis: lovenox   GI Prophylaxis: Protonix     Silvia Baxter, ANP  Pulmonary Critical Care Medicine  [facility] - [facility] ICU

## 2022-09-05 NOTE — PROGRESS NOTES
"Gastroenterology Progress Note    Subjective/Interval History:  HPI:     77-year-old man with recent CABG and mitral valve repair who started having some epigastric discomfort along with lower chest discomfort along with hiccups and belching.  He then proceeded to have vomiting.  Now his throat hurts.  He did have some mild dysphagia prior to all of this.  He used to take Tums p.r.n..  He was not on a PPI.  He denies any prior EGDs.  He thinks he had a colonoscopy many years ago.      9-4-22  Passing some flatus  Less abd pain  No BM    9-5-22  Pt with 2 very large bms  No abdominal distension  KUB - negative for ileus  Sat up in chair    ROS:  Cardiovascular:  Negative for chest pain.   Respiratory:  Negative for shortness of breath.    Gastrointestinal:  mild tenderness, constipation         Vital Signs:  /72   Pulse 74   Temp 97.9 °F (36.6 °C) (Axillary)   Resp 19   Ht 5' 8.9" (1.75 m)   Wt 100 kg (220 lb 7.4 oz)   SpO2 (!) 88%   BMI 32.65 kg/m²   Body mass index is 32.65 kg/m².    Physical Exam:  Constitutional:  Negative for fever, malaise/fatigue and weight loss.   Respiratory:  Negative for cough and shortness of breath.    Cardiovascular:  Negative for chest pain (this is incisional pain), palpitations and leg swelling.   Gastrointestinal:  Negative for abdominal pain, blood in stool, constipation, diarrhea, heartburn, melena, nausea and vomiting.   Musculoskeletal:  Negative for back pain and myalgias.   Skin:  Negative for rash.   Neurological:  Negative for speech change and focal weakness.   All other systems reviewed and are negative.    Labs:  Recent Results (from the past 48 hour(s))   Comprehensive Metabolic Panel    Collection Time: 09/04/22  1:27 AM   Result Value Ref Range    Sodium Level 137 136 - 145 mmol/L    Potassium Level 4.0 3.5 - 5.1 mmol/L    Chloride 110 (H) 98 - 107 mmol/L    Carbon Dioxide 21 (L) 23 - 31 mmol/L    Glucose Level 147 (H) 82 - 115 mg/dL    Blood Urea Nitrogen " 28.9 (H) 8.4 - 25.7 mg/dL    Creatinine 0.91 0.73 - 1.18 mg/dL    Calcium Level Total 7.6 (L) 8.8 - 10.0 mg/dL    Protein Total 3.8 (L) 5.8 - 7.6 gm/dL    Albumin Level 1.8 (L) 3.4 - 4.8 gm/dL    Globulin 2.0 (L) 2.4 - 3.5 gm/dL    Albumin/Globulin Ratio 0.9 (L) 1.1 - 2.0 ratio    Bilirubin Total 1.3 <=1.5 mg/dL    Alkaline Phosphatase 98 40 - 150 unit/L    Alanine Aminotransferase 80 (H) 0 - 55 unit/L    Aspartate Aminotransferase 82 (H) 5 - 34 unit/L    eGFR >60 mls/min/1.73/m2   CBC with Differential    Collection Time: 09/04/22  1:27 AM   Result Value Ref Range    WBC 13.7 (H) 4.5 - 11.5 x10(3)/mcL    RBC 3.02 (L) 4.70 - 6.10 x10(6)/mcL    Hgb 9.0 (L) 14.0 - 18.0 gm/dL    Hct 30.1 (L) 42.0 - 52.0 %    MCV 99.7 (H) 80.0 - 94.0 fL    MCH 29.8 27.0 - 31.0 pg    MCHC 29.9 (L) 33.0 - 36.0 mg/dL    RDW 18.2 (H) 11.5 - 17.0 %    Platelet 136 130 - 400 x10(3)/mcL    MPV 11.2 (H) 7.4 - 10.4 fL    Neut % 80.2 %    Lymph % 6.0 %    Mono % 9.5 %    Eos % 0.3 %    Basophil % 0.2 %    Lymph # 0.82 0.6 - 4.6 x10(3)/mcL    Neut # 11.0 (H) 2.1 - 9.2 x10(3)/mcL    Mono # 1.31 (H) 0.1 - 1.3 x10(3)/mcL    Eos # 0.04 0 - 0.9 x10(3)/mcL    Baso # 0.03 0 - 0.2 x10(3)/mcL    IG# 0.52 (H) 0 - 0.04 x10(3)/mcL    IG% 3.8 %    NRBC% 0.0 %   Magnesium    Collection Time: 09/04/22  1:27 AM   Result Value Ref Range    Magnesium Level 2.00 1.60 - 2.60 mg/dL   Phosphorus    Collection Time: 09/04/22  1:27 AM   Result Value Ref Range    Phosphorus Level 2.4 2.3 - 4.7 mg/dL   Basic Metabolic Panel    Collection Time: 09/04/22  4:18 PM   Result Value Ref Range    Sodium Level 133 (L) 136 - 145 mmol/L    Potassium Level 5.2 (H) 3.5 - 5.1 mmol/L    Chloride 105 98 - 107 mmol/L    Carbon Dioxide 23 23 - 31 mmol/L    Glucose Level 774 (HH) 82 - 115 mg/dL    Blood Urea Nitrogen 26.8 (H) 8.4 - 25.7 mg/dL    Creatinine 1.15 0.73 - 1.18 mg/dL    BUN/Creatinine Ratio 23     Calcium Level Total 8.0 (L) 8.8 - 10.0 mg/dL    Anion Gap 5.0 mEq/L    eGFR >60  mls/min/1.73/m2   Magnesium    Collection Time: 09/04/22  4:18 PM   Result Value Ref Range    Magnesium Level 2.20 1.60 - 2.60 mg/dL   Phosphorus    Collection Time: 09/04/22  4:18 PM   Result Value Ref Range    Phosphorus Level 4.6 2.3 - 4.7 mg/dL   POCT glucose    Collection Time: 09/04/22  5:43 PM   Result Value Ref Range    POCT Glucose 165 (H) 70 - 110 mg/dL   Comprehensive Metabolic Panel    Collection Time: 09/05/22  1:29 AM   Result Value Ref Range    Sodium Level 139 136 - 145 mmol/L    Potassium Level 3.9 3.5 - 5.1 mmol/L    Chloride 110 (H) 98 - 107 mmol/L    Carbon Dioxide 23 23 - 31 mmol/L    Glucose Level 153 (H) 82 - 115 mg/dL    Blood Urea Nitrogen 28.9 (H) 8.4 - 25.7 mg/dL    Creatinine 0.78 0.73 - 1.18 mg/dL    Calcium Level Total 7.9 (L) 8.8 - 10.0 mg/dL    Protein Total 4.2 (L) 5.8 - 7.6 gm/dL    Albumin Level 1.8 (L) 3.4 - 4.8 gm/dL    Globulin 2.4 2.4 - 3.5 gm/dL    Albumin/Globulin Ratio 0.8 (L) 1.1 - 2.0 ratio    Bilirubin Total 0.9 <=1.5 mg/dL    Alkaline Phosphatase 99 40 - 150 unit/L    Alanine Aminotransferase 66 (H) 0 - 55 unit/L    Aspartate Aminotransferase 54 (H) 5 - 34 unit/L    eGFR >60 mls/min/1.73/m2   CBC with Differential    Collection Time: 09/05/22  1:29 AM   Result Value Ref Range    WBC 16.9 (H) 4.5 - 11.5 x10(3)/mcL    RBC 3.00 (L) 4.70 - 6.10 x10(6)/mcL    Hgb 9.0 (L) 14.0 - 18.0 gm/dL    Hct 29.6 (L) 42.0 - 52.0 %    MCV 98.7 (H) 80.0 - 94.0 fL    MCH 30.0 27.0 - 31.0 pg    MCHC 30.4 (L) 33.0 - 36.0 mg/dL    RDW 18.1 (H) 11.5 - 17.0 %    Platelet 148 130 - 400 x10(3)/mcL    MPV 11.6 (H) 7.4 - 10.4 fL    Neut % 81.8 %    Lymph % 4.7 %    Mono % 8.4 %    Eos % 0.2 %    Basophil % 0.2 %    Lymph # 0.80 0.6 - 4.6 x10(3)/mcL    Neut # 13.8 (H) 2.1 - 9.2 x10(3)/mcL    Mono # 1.41 (H) 0.1 - 1.3 x10(3)/mcL    Eos # 0.04 0 - 0.9 x10(3)/mcL    Baso # 0.03 0 - 0.2 x10(3)/mcL    IG# 0.79 (H) 0 - 0.04 x10(3)/mcL    IG% 4.7 %    NRBC% 0.1 %         Assessment/Plan:     1. Recent CABG  with mitral valve repair done on August 22, 2022   2. GERD/dysphagia/esophagitis-----we will discontinue the Pepcid and try PPI b.i.d..  Patient unfortunately cannot tolerate the Carafate.  Is very unfortunate that we only have the pills available and he cannot swallow them..  Since [facility] has taken over, the liquid Carafate is no longer available which is quite unfortunate.  3. Constipation - will start stool softner    Will give relistor and dulcolax supp  Miralax bid  OOB as tolerated  Minimize narcotics    Pt with 2 large Bms  KUB negative for ileus  Abdomen soft  Tolerated up in chair    No further Gi recs - please call if needed    Velia Shelton NP acting as scribe for Dr. Harshal Romero    I have seen the patient, reviewed Louisa Edwards NP's progress note. I have personally interviewed and examined the patient at bedside and agree with the findings.  Patient was seen earlier today.  He was sitting up on the bedside chair.  Reported bowel movements.  Decreased abdominal distention.  He has a nasogastric tube in place.  Okay to proceed with speech/dysphagia evaluation from GI standpoint tomorrow.  Dr. Kiran Park's service to return tomorrow.  He also underwent bronchoscopy today.  Findings noted.    I

## 2022-09-05 NOTE — PROCEDURES
Bronchoscopy note    Patient was in ICU bed 16 for the bronchoscopy.  He was given nebulized xylocaine for anesthesia.  He was also given 2 mg of Versed and 50 mcg of fentanyl IV push.  He was on pulse oximetry and EKG monitoring.  The bronchoscope was advanced with some difficulty through the right nares into the posterior pharynx.  Local anesthesia was obtained with 4% xylocaine.  The bronchoscope was then advanced through the vocal cords without difficulty into the trachea which is noted to contain a moderate thick  secretions.  The jazzmine was sharp.  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  No biopsies were obtained as the patient is on full-dose Lovenox.  He otherwise tolerated procedure well.  Will need a formal bronchoscopy after stopping the Lovenox for diagnosis of a suspected lung malignancy.

## 2022-09-05 NOTE — PROGRESS NOTES
[facility] - [facility] ICU  Cardiology  Progress Note    Patient Name: Zackery Osullivan Jr.  MRN: [id number]  Admission Date: 8/22/2022  Hospital Length of Stay: 14 days  Code Status: Full Code   Attending Physician: Rowdy Davis IV, MD   Primary Care Physician: Isaiah Meeks MD  Expected Discharge Date:   Principal Problem:S/P MVR (mitral valve replacement)    Subjective:     Brief HPI:   Mr. Osullivan is a 77 year old male, known to Dr. Gerardo and Dr. Flowers, who presented to the hospital and underwent CAD/CABG and MVR due to diagnosis of MV CAD and significant MR. Also underwent LISA Ligation. Patient tolerated the surgery well, and was transferred to intensive care unit for further close post operative monitoring. [facility] is consulted for post op surgical cardiac management.    Hospital Course:   8.25.22: NAD noted. VSS with low normal BP. SR on tele. Denies SOB/Palps, +incisional CP.  8.26.22: NAD noted. VSS with low normal BP. SR on tele. Sitting in chair at bedside. Denies SOB/Palps, endorses incisional CP.  8.27.22: NAD Noted. Reports throat discomfort. SR on Tele. BP Low Normal.  8.28.22: NAD Noted. BP Low Normal. SR on Tele. GI Following. NSVT overnight.  SR 83.  8.29.22: NAD Noted. Patient with NG Tube. Surgical Team is following for evaluation of Ileus versus SBO. Hypotensive requiring low dose Levophed. SR on Tele. Amiodarone gtt is infusing. Started for NSVT, which he did not have last night according to the RN.  8.30.22: Remains NPO. Plans for small bowel follow through today  8.31.22: Patient underwent SBFT without evidence of SBO. He was given suppository with multiple BMs overnight. NGT has been removed and he is tolerating oral diet. Amiodarone drip in progress. Continues to require pressor support. He has been started on midodrine. Urine is dark. Patient is having elevated WBCs and has been started on ABX. Patient c/o pain/swelling to LUE  9.1.22: Still requiring pressor support.  "LUE + DVT. Lovenox has been restarted. Stool studies pending. Urine dark dania  9.2.22: Patient sitting up in bedside chair. He had NGT placed again due to   Abdominal pain/distension. KUB revealing persistent gaseous distension of abdomen with gas in loops of small and large intestine. Pressors have been weaned to off. Midodrine @ 10mg tid. AICD interrogated and settings adjustments made per [employer] rep  9.3.22: NAD Noted. Sitting in chair. BIPAP in Place. SR on Tele. BP Low Normal.  9.4.22 VSS. Requiring Bipap support. SR pet tele.   9.5.22: NAD. VSS.  2 Episodes of NSVT yesterday. "I am ok." Denies CP, SOB and Palps.     PMH: CAD (Multivessel), Ischemic Cardiomyopathy (ICD), VHD- MR, Hypertension, Hyperlipidemia, Chronic VI, COPD, Smoker, Obstructive Sleep Apnea, NSVT, Obesity  PSH: CABG/MVR, Skin Lesion, ICD Placement ([employer]), Tonsillectomy  Family History: Father- CAD, Brother- CAD, Sister- CAD  Social History: Tobacco- Former Smoker (Quit 3 Years Ago), Alcohol- Negative, Substance Abuse- Negative     Previous Cardiac Diagnostics:   TTE 09/01/22:  Difficult to accurately assess LVEF as patient was in Afib, RVR during acquisition of images. Limited study to assess LV function. Definity used. Estimated EF 30%. LV apex appears aneurysmal without LV thrombus.     Venous US LUE 08/31/22:  DVT to left axillary through proximal brachial veins.  A superficial vein thrombosis was identified in the left cephalic vein.   All other vessels compressed.     ECHO (8.28.22):  The left ventricle is mildly enlarged with concentric hypertrophy and mildly reduced LV systolic function.  The estimated ejection fraction is 40%.  Indeterminate left ventricular diastolic function due to MVR.  Aneurysmal basal inferior wall/ RCA territory  Well seated bioprosthetic MV without significant stenosis or perivalvular leak. MG 4 mmHg.  Mild to moderate tricuspid regurgitation.  Mild left atrial enlargement.    CABG/MVR " (8.22.22): MVR 29 mm Mosaic Porcine Valve; CABG LIMA to LAD & SVG to OM, LISA Ligation with Endo Stapler.     Left Heart Catheterization (8.2.22):  Left Main- 50% Distal Disease, LAD- 60% Proximal IFR 0.81, LCx- Nondominant (, Diagonal to OM Collateral), RCA- Dominant with , Bilateral Common Iliacs 30% Calcified Stenosis.     ECHO (4.18.22):  The study quality is average.   The left ventricle is normal in size. Global left ventricular systolic function is mildly decreased. The left ventricular ejection fraction is 40%. Left ventricular diastolic function is normal. Noted left ventricular hypertrophy. Asymmetric septal left ventricular hypertrophy is present. It is mild.   Mild to moderate (1-2+) mitral regurgitation. Somewhat eccentric jet noted, chordae appear hyperechoic and thickened.    Review of Systems   Cardiovascular:  Negative for chest pain and dyspnea on exertion.   Respiratory:  Negative for shortness of breath.    All other systems reviewed and are negative.    Objective:     Vital Signs (Most Recent):  Temp: 97.9 °F (36.6 °C) (09/04/22 2100)  Pulse: 76 (09/05/22 0917)  Resp: (!) 27 (09/05/22 1101)  BP: 125/72 (09/05/22 0400)  SpO2: (!) 92 % (09/05/22 0917) Vital Signs (24h Range):  Temp:  [97.5 °F (36.4 °C)-97.9 °F (36.6 °C)] 97.9 °F (36.6 °C)  Pulse:  [60-78] 76  Resp:  [12-27] 27  SpO2:  [88 %-97 %] 92 %  BP: (120-165)/(56-88) 125/72     Weight: 100 kg (220 lb 7.4 oz)  Body mass index is 32.65 kg/m².    SpO2: (!) 92 %  O2 Device (Oxygen Therapy): nasal cannula      Intake/Output Summary (Last 24 hours) at 9/5/2022 1229  Last data filed at 9/5/2022 1132  Gross per 24 hour   Intake 2042.33 ml   Output 1375 ml   Net 667.33 ml       Lines/Drains/Airways       Peripherally Inserted Central Catheter Line  Duration             PICC Triple Lumen 08/28/22 1516 right brachial 7 days              Drain  Duration                  Urethral Catheter 09/01/22 1200 16 Fr. 4 days         NG/OG Tube 09/01/22 1323  16 Fr. Left nostril 3 days                  Significant Labs:   CMP   Recent Labs   Lab 09/04/22  0127 09/04/22  1618 09/05/22  0129    133* 139   K 4.0 5.2* 3.9   CO2 21* 23 23   BUN 28.9* 26.8* 28.9*   CREATININE 0.91 1.15 0.78   CALCIUM 7.6* 8.0* 7.9*   ALBUMIN 1.8*  --  1.8*   BILITOT 1.3  --  0.9   ALKPHOS 98  --  99   AST 82*  --  54*   ALT 80*  --  66*      and CBC   Recent Labs   Lab 09/04/22  0127 09/05/22  0129   WBC 13.7* 16.9*   HGB 9.0* 9.0*   HCT 30.1* 29.6*    148       Telemetry:  Sinus Rhythm     Physical Exam  Vitals reviewed.   Constitutional:       Appearance: Normal appearance.   HENT:      Head: Normocephalic.   Cardiovascular:      Rate and Rhythm: Regular rhythm. Tachycardia present.   Pulmonary:      Effort: Pulmonary effort is normal.      Breath sounds: Normal breath sounds.      Comments: NC O2  Abdominal:      General: There is distension.   Musculoskeletal:         General: Normal range of motion.      Cervical back: Neck supple.   Skin:     General: Skin is warm and dry.      Capillary Refill: Capillary refill takes less than 2 seconds.   Neurological:      General: No focal deficit present.      Mental Status: He is alert and oriented to person, place, and time. Mental status is at baseline.   Psychiatric:         Behavior: Behavior normal.     Current Inpatient Medications:    Current Facility-Administered Medications:     0.9%  NaCl infusion (for blood administration), , Intravenous, Q24H PRN, Rowdy Davis IV, MD    acetaminophen oral solution 650 mg, 650 mg, Per OG tube, Q6H PRN, Rowdy Davis IV, MD    acetylcysteine 200 mg/ml (20%) solution 2 mL, 2 mL, Nebulization, TID, KIMBERLI Aguilar, 2 mL at 09/05/22 0917    albuterol-ipratropium 2.5 mg-0.5 mg/3 mL nebulizer solution 3 mL, 3 mL, Nebulization, Q6H, Marlon Humphreys MD, 3 mL at 09/05/22 0917    amino acid 5% in 15% dextrose (CLINIMIX-E) solution (1L provides 50gm AA, 150gm CHO (510 kcal/L dextrose), Na  35, K 30, Mg 5, Ca 4.5, Acetate 80, Cl 39, Phos 15) (710 total kcal/L), , Intravenous, Continuous, Nba Licona MD, Last Rate: 85 mL/hr at 09/04/22 1517, New Bag at 09/04/22 1517    amino acid 5% in 15% dextrose (CLINIMIX-E) solution (1L provides 50gm AA, 150gm CHO (510 kcal/L dextrose), Na 35, K 30, Mg 5, Ca 4.5, Acetate 80, Cl 39, Phos 15) (710 total kcal/L), , Intravenous, Continuous, Rowdy Davis IV, MD    amiodarone tablet 200 mg, 200 mg, Oral, BID, LAYNE WaddellP, 200 mg at 09/05/22 1131    [START ON 9/10/2022] amiodarone tablet 200 mg, 200 mg, Oral, Daily, LAYNE WaddellP    aspirin EC tablet 81 mg, 81 mg, Oral, Daily, Rowdy Davis IV, MD, 81 mg at 09/05/22 1131    atorvastatin tablet 80 mg, 80 mg, Oral, QHS, LAYNE PrattP, 80 mg at 09/04/22 2041    budesonide nebulizer solution 0.5 mg, 0.5 mg, Nebulization, Q12H, Marlon Humphreys MD, 0.5 mg at 09/05/22 0917    calcium gluconate 1 g in NS IVPB (premixed), 1 g, Intravenous, PRN, Rowdy Davis IV, MD    calcium gluconate 1 g in NS IVPB (premixed), 3 g, Intravenous, PRN, Rowdy Davis IV, MD    ceFEPIme (MAXIPIME) 2 g in dextrose 5 % in water (D5W) 5 % 50 mL IVPB (MB+), 2 g, Intravenous, Q8H, Fidel Mckeon MD, Last Rate: 100 mL/hr at 09/05/22 1132, 2 g at 09/05/22 1132    collagenase ointment, , Topical (Top), BID, Rowdy Davis IV, MD, Given at 09/05/22 1132    dextrose 10% bolus 250 mL, 25 g, Intravenous, PRN, Rowdy Davis IV, MD    dextrose 50% injection 12.5 g, 12.5 g, Intravenous, PRN, Rowdy Davis IV, MD, 12.5 g at 08/27/22 2206    docusate sodium capsule 100 mg, 100 mg, Oral, BID, Rowdy Davis IV, MD, 100 mg at 09/05/22 1132    enoxaparin injection 90 mg, 1 mg/kg, Subcutaneous, Q12H, Fidel Mckeon MD, 90 mg at 09/05/22 0527    fat emulsion 20% infusion 250 mL, 250 mL, Intravenous, Daily, Nba Licona MD    folic acid tablet 1 mg, 1 mg, Oral, Daily, Rowdy Davis IV, MD, 1 mg at 09/05/22  1132    HYDROcodone-acetaminophen 5-325 mg per tablet 1 tablet, 1 tablet, Oral, Q4H PRN, Rowdy Davis IV, MD, 1 tablet at 09/04/22 2041    LIDOcaine (PF) 40 mg/mL (4 %) injection 4 mL, 4 mL, Nebulization, Once, ALEXSANDER Holm MD    LIDOcaine HCl 2% oral solution 15 mL, 15 mL, Mucous Membrane, Once, ALEXSANDER Holm MD    LIDOcaine HCL 4% external solution 4 mL, 4 mL, Topical (Top), PRN, ALEXSANDER Holm MD, 4 mL at 09/05/22 1114    loperamide capsule 4 mg, 4 mg, Oral, Once, Rowdy Davis IV, MD    magnesium sulfate 2g in water 50mL IVPB (premix), 2 g, Intravenous, PRN, Rowdy Davis IV, MD    magnesium sulfate 2g in water 50mL IVPB (premix), 4 g, Intravenous, PRN, Rowdy Davis IV, MD    methylnaltrexone 12 mg/0.6 mL subcutaneous injection 12 mg, 12 mg, Subcutaneous, Every other day, MARTHA Limon, 12 mg at 09/04/22 0809    metoclopramide HCl 5 mg/5 mL solution 5 mg, 5 mg, Per NG tube, Q8H, Harshal Romero MD, 5 mg at 09/05/22 0600    midazolam (VERSED) 5 mg/mL injection 5 mg, 5 mg, Intravenous, Once PRN, ALEXSANDER Holm MD    midodrine tablet 10 mg, 10 mg, Oral, Q8H, Fidel Mckeon MD, 10 mg at 09/05/22 0527    morphine injection 2 mg, 2 mg, Intravenous, PRN, Rowdy Davis IV, MD, 2 mg at 09/04/22 1413    naloxone 0.4 mg/mL injection 0.4 mg, 0.4 mg, Intravenous, PRN, ALEXSANDER Holm MD, 0.4 mg at 09/05/22 1124    ondansetron injection 4 mg, 4 mg, Intravenous, Q12H PRN, Rowdy Davis IV, MD, 4 mg at 08/28/22 0307    oxyCODONE immediate release tablet 5 mg, 5 mg, Oral, Q4H PRN, Rowdy Davis IV, MD, 5 mg at 08/24/22 2016    pantoprazole injection 40 mg, 40 mg, Intravenous, BID, Isaiah Levine MD, 40 mg at 09/05/22 1132    polyethylene glycol packet 17 g, 17 g, Oral, BID, MARTHA Limon, 17 g at 09/05/22 1131    potassium chloride 20 mEq in 100 mL IVPB (FOR CENTRAL LINE ADMINISTRATION ONLY), 20 mEq, Intravenous, PRN, Rowdy Davis IV, MD    potassium chloride 20  mEq in 100 mL IVPB (FOR CENTRAL LINE ADMINISTRATION ONLY), 40 mEq, Intravenous, PRN, Rowdy Davis IV, MD    potassium chloride 20 mEq in 100 mL IVPB (FOR CENTRAL LINE ADMINISTRATION ONLY), 20 mEq, Intravenous, PRN, Rowdy Davis IV, MD    Flushing PICC Protocol, , , Until Discontinued **AND** sodium chloride 0.9% flush 10 mL, 10 mL, Intravenous, Q6H, 10 mL at 09/05/22 0600 **AND** [DISCONTINUED] sodium chloride 0.9% flush 10 mL, 10 mL, Intravenous, PRN, Rowdy Davis IV, MD    sodium phosphate 15 mmol in dextrose 5 % 250 mL IVPB, 15 mmol, Intravenous, PRN, Rowdy Davis IV, MD    sodium phosphate 20.01 mmol in dextrose 5 % 250 mL IVPB, 20.01 mmol, Intravenous, PRN, Rowdy Davis IV, MD    sodium phosphate 30 mmol in dextrose 5 % 250 mL IVPB, 30 mmol, Intravenous, PRN, Rowdy Davis IV, MD    Facility-Administered Medications Ordered in Other Encounters:     diphenhydrAMINE capsule 50 mg, 50 mg, Oral, On Call Procedure, Reinaldo Gerardo MD, 50 mg at 08/02/22 0739    sodium chloride 0.9% flush 10 mL, 10 mL, Intravenous, PRN, Reinaldo Gerardo MD        Assessment:   CAD (Multivessel) - Status Post CABG (LIMA to LAD, SVG to OM) (8.22.22)    - S/P LISA Ligation  Hypotension (Improved) - Off Pressors    -  H/O Hypertension   Valvular Heart Disease-  MR Status Post Bio MVR (#29 mm Mosaic Porcine) (8.22.22)    - Well seated bioprosthetic MV without significant stenosis or perivalvular leak. MG 4 mmHg.  Ischemic Cardiomyopathy EF 40% Status Post ICD ([employer])    - Aneurysmal basal inferior wall/ RCA territory--needs OAC  NSVT    - History of VT    - On Amiodarone Outpatient  Ileus     - SBFT 8/30/22 negative for SBO  C-Diff + (9/1/22) - PCR Negative    - NGT placed again on 9/1/22  DVT LUE    - Left axillary through proximal brachial veins. Superficial vein thrombus left cephalic vein.   Hypoxic - Improving - Less O2 Requirements  Positive Sputum Culture - Pseudomonas  Hyperlipidemia  Chronic  Venous Insufficiency  Elevated BMI/Obesity  COPD  Former Smoker    - Quit 3 Years Ago  Anemia  Thrombocytopenia  Leukocytosis   Transaminitis  Unstageable sacral decubitus    Plan:   CT of Chest Abnormal - Plans for Broch per Pulmonary Today  Antibiotics per ICU team  Continue Amiodarone Tapered Dosing  On FD Lovenox for DVT Treatment  Continue ASA/Statin  Supportive Care per ICU Team  Start Metoprolol Tartrate 25mg PO BID (NSVT) - Can Not Use XL secondary to Failure Swallow Study  Labs in AM: CBC, CMP and Mg    Prieto Shelton, ANP  Cardiology  [facility] - [address] [place] [address]  09/05/2022

## 2022-09-05 NOTE — PROGRESS NOTES
CT SURGERY PROGRESS NOTE  Zackery Osullivan .  77 y.o.  1945    Patients Procedure: Procedure(s) (LRB):  CORONARY ARTERY BYPASS GRAFT (CABG) (N/A)  REPLACEMENT, MITRAL VALVE (N/A)    Subjective  Interval History: Patient in bed using vapotherm. No new complaints. Currently NPO for bronch today. Yesterdays had chest CT which showed suspicion of multifocal pneumona, currently on cefepime. GI consulted, KUB negative for ileus, gave relistor and dulcolax.    Review of Systems   Constitutional: Negative.    Respiratory:  Positive for shortness of breath. Negative for cough and sputum production.    Cardiovascular:  Negative for chest pain, palpitations, claudication and leg swelling.   Gastrointestinal:  Negative for abdominal pain, nausea and vomiting.   Genitourinary: Negative.    Skin: Negative.         Incision C/D/I     Medication List  Infusions   amino acid 5 % in 15% dextrose 85 mL/hr at 09/04/22 1517     Scheduled   acetylcysteine 200 mg/ml (20%)  2 mL Nebulization TID    albuterol-ipratropium  3 mL Nebulization Q6H    amiodarone  200 mg Oral BID    [START ON 9/10/2022] amiodarone  200 mg Oral Daily    aspirin  81 mg Oral Daily    atorvastatin  80 mg Oral QHS    budesonide  0.5 mg Nebulization Q12H    ceFEPime (MAXIPIME) IVPB  2 g Intravenous Q8H    collagenase   Topical (Top) BID    docusate sodium  100 mg Oral BID    enoxaparin  1 mg/kg Subcutaneous Q12H    fat emulsion 20%  250 mL Intravenous Daily    folic acid  1 mg Oral Daily    LIDOcaine (PF)  4 mL Nebulization Once    LIDOcaine HCl 2%  15 mL Mucous Membrane Once    LIDOcaine HCl 2%  5 mL Mucous Membrane Once    loperamide  4 mg Oral Once    methylnaltrexone  12 mg Subcutaneous Every other day    metoclopramide HCl  5 mg Per NG tube Q8H    midodrine  10 mg Oral Q8H    pantoprazole  40 mg Intravenous BID    polyethylene glycol  17 g Oral BID    sodium chloride 0.9%  10 mL Intravenous Q6H       Objective:  Recent Vitals:  Temp:  [97.5 °F (36.4  °C)-97.9 °F (36.6 °C)] 97.9 °F (36.6 °C)  Pulse:  [60-78] 76  Resp:  [12-24] 18  SpO2:  [72 %-100 %] 92 %  BP: (118-165)/(56-88) 125/72    Physical Exam  Vitals and nursing note reviewed.   Constitutional:       General: He is awake. He is not in acute distress.     Appearance: Normal appearance. He is not toxic-appearing or diaphoretic.   HENT:      Head: Normocephalic and atraumatic.   Eyes:      Extraocular Movements: Extraocular movements intact.      Pupils: Pupils are equal, round, and reactive to light.   Cardiovascular:      Rate and Rhythm: Normal rate and regular rhythm.      Pulses: Normal pulses.           Radial pulses are 2+ on the right side and 2+ on the left side.        Dorsalis pedis pulses are 2+ on the right side and 2+ on the left side.      Heart sounds: Normal heart sounds.   Pulmonary:      Effort: Pulmonary effort is normal.      Breath sounds: Normal breath sounds.   Musculoskeletal:      Right lower leg: No edema.      Left lower leg: No edema.   Skin:     General: Skin is warm and dry.      Comments: INCISION C/D/I   Neurological:      General: No focal deficit present.      Mental Status: He is alert and oriented to person, place, and time.   Psychiatric:         Mood and Affect: Mood and affect normal.        I/O last 24 hrs:  Intake/Output - Last 3 Shifts         09/03 0700  09/04 0659 09/04 0700 09/05 0659 09/05 0700  09/06 0659    I.V. (mL/kg)       IV Piggyback 50 165.3     TPN 1156 1877     Total Intake(mL/kg) 1206 (13.1) 2042.3 (20.4)     Urine (mL/kg/hr) 750 (0.3) 1000 (0.4)     Drains       Stool 0      Total Output 750 1000     Net +456 +1042.3                    Labs  BMP:   Recent Labs   Lab 09/04/22  1618 09/05/22  0129   * 139   K 5.2* 3.9   CO2 23 23   BUN 26.8* 28.9*   CREATININE 1.15 0.78   CALCIUM 8.0* 7.9*   MG 2.20  --      CBC:   Recent Labs   Lab 09/05/22  0129   WBC 16.9*   RBC 3.00*   HGB 9.0*   HCT 29.6*      MCV 98.7*   MCH 30.0   MCHC 30.4*      All pertinent labs from the last 24 hours have been reviewed.      Imaging:   CXR: No results found in the last 24 hours.  I have reviewed all pertinent imaging results/findings within the past 24 hours.        ASSESSMENT/PLAN:  - cont ICU care  - Bronch today per pulm  - GI on on board  - IS/OOB/Ambulate as able    Case and plan of care discussed with MD Ovidio Pedersen PA-C

## 2022-09-05 NOTE — PT/OT/SLP PROGRESS
Physical Therapy      Patient Name:  Zackery Osullivan Jr.   MRN:  [id number]       Pt not seen d/t bronch taking place. Will follow up tomorrow.

## 2022-09-05 NOTE — PT/OT/SLP PROGRESS
Chief Complaint   Patient presents with    Skin Problem     pt asking to be checked to see if he is having an outbreak of herpes. \"REVIEWED RECORD IN PREPARATION FOR VISIT AND HAVE OBTAINED THE NECESSARY DOCUMENTATION\"  1. Have you been to the ER, urgent care clinic since your last visit? Hospitalized since your last visit? No    2. Have you seen or consulted any other health care providers outside of the [address] [place] [address] since your last visit? Include any pap smears or colon screening.  No POC discussed with nursing.  Pt with x2 BM however MD continuing to monitor to ensure ileus resolution.  In addition, pt NPO for bronch today.  SLP to follow up tomorrow.

## 2022-09-06 NOTE — PT/OT/SLP PROGRESS
Physical Therapy Treatment    Patient Name:  Zackery Osullivan Jr.   MRN:  [id number]    Recommendations:     Discharge Recommendations:  nursing facility, skilled, rehabilitation facility vs SNF  Discharge Equipment Recommendations: walker, rolling   Barriers to discharge: None    Assessment:     Zackery Osullivan Jr. is a 77 y.o. male admitted with a medical diagnosis of CABG 8/22.  He presents with the following impairments/functional limitations:  weakness, gait instability, impaired endurance, impaired functional mobility, impaired self care skills, impaired balance, impaired cardiopulmonary response to activity .    Pt semi-supine in bed, now on CPAP and desaturating when attempted to remove per RN. Cleared to perform light therapy. Mobilized to EOB with stable vitals however desaturated when returned to bed.  RN aware.      Rehab Prognosis: Fair; patient would benefit from acute skilled PT services to address these deficits and reach maximum level of function.    Recent Surgery: Procedure(s) (LRB):  CORONARY ARTERY BYPASS GRAFT (CABG) (N/A)  REPLACEMENT, MITRAL VALVE (N/A) 15 Days Post-Op    Plan:     During this hospitalization, patient to be seen 6 x/week to address the identified rehab impairments via therapeutic activities, therapeutic exercises and progress toward the following goals:    Plan of Care Expires:  09/24/22    Subjective     Chief Complaint: generalized complaints  Patient/Family Comments/goals: to get stronger and walk  Pain/Comfort:  Pain Rating 1: 0/10  Pain Addressed 1: Reposition      Objective:     Communicated with Donna LEWIS prior to session.  Patient found HOB elevated with blood pressure cuff, pulse ox (continuous), telemetry, bowel management system, CPAP, oxygen and NGT upon PT entry to room.     General Precautions: Standard, sternal, fall   Orthopedic Precautions:N/A   Braces: N/A  Respiratory Status: CPAP  Vitals:       HR: 60  BP: 139/64    SPO2: 91-93% at rest; desaturated to  mid 80's     Functional Mobility:  Bed mobility:  Supine<->sit with total assist  Balance with min/mod assist tolerating EOB ~ 3-4 minutes    Patient left up in chair with all lines intact and call button in reach..    GOALS:   Multidisciplinary Problems       Physical Therapy Goals          Problem: Physical Therapy    Goal Priority Disciplines Outcome Goal Variances Interventions   Physical Therapy Goal     PT, PT/OT Ongoing, Progressing     Description: Goals to be met by: 22     Patient will increase functional independence with mobility by performin. Supine to sit with MInimal Assistance  2. Sit to supine with MInimal Assistance  3. Sit to stand transfer with Minimal Assistance  4. Gait  x 150 feet with Minimal Assistance using Rolling Walker.                          Time Tracking:     PT Received On: 22  PT Start Time: 1432     PT Stop Time: 1448  PT Total Time (min): 16 min     Billable Minutes: Therapeutic Activity 1 unit    Treatment Type: Treatment  PT/PTA: PTA     PTA Visit Number: 1     2022

## 2022-09-06 NOTE — PT/OT/SLP PROGRESS
SLP attempting to see pt for possible evaluation however pt with continued respiratory decline requiring BiPAP for respiratory support.  Pt scheduled for bronch tomorrow.  SLP to sign off at this time, please reconsult as respiratory status improves.

## 2022-09-06 NOTE — PROGRESS NOTES
[facility] - [facility]  Pulmonary Critical Care Note    Patient Name: Zackery Osullivan Jr.  MRN: [id number]  Admission Date: 8/22/2022  Hospital Length of Stay: 15 days  Code Status: Full Code  Attending Provider: Rowdy Davis IV, MD  Primary Care Provider: Isaiah Meeks MD     Subjective:     HPI:   This is a 77-year-old  male with past medical history of CAD, HTN, HLD, and HFrEF who presented to [facility] for a CABG x2 and mitral valve replacement. Received 2 units pRBCs intraop. Initially admitted to ICU for post-op monitoring.  Patient transferred out of ICU once extubated and hemodynamically stable off vasopressors on 8/25/22. On 8/27/22, patient reporting throat pain, nausea and constipation. GI consulted. During this time, patient also having low/normal blood pressures. Had 17 beat run of V-tach evening of 8/27/22 in which amiodarone IV started since patient unable to tolerate PO medications. Morning of 8/28/22, MAPs <65 despite fluid resuscitation. Patient to be upgraded to ICU 8/28/22 due to the need for vasopressor support and close monitoring. CT abdomen with ileus. Small bowel follow through on 8/30 with no obstruction but delayed transit.      Hospital Course/Significant events:  C. Diff PCR was negative.  Pressors were weaned to off and he was downgrade on 9/3/2022; however this Am pulmonary was reconsulted for desaturation and requiring CPAP usage. Imaging with concerns of persistent left upper lobe opacity, on admit Dr. Norman was concerned for potential mass. Sputum has also culture out Pseudmonas; on Cefepime. Imaging with Multifocal ground-glass opacities throughout the lungs bilaterally suspicious for multifocal pneumonia, Moderately sized bilateral pleural effusions with lower lobe atelectasis and Collapse of the superior aspect of the left upper lobe with parenchymal hyperdensity with abrupt occlusion of the left upper lobe bronchus. Underwent bedside bronch and  was found a necrotic tumor occluding the left upper lobe orifice. Lovenox was placed on hold.     24 Hour Interval History:  Still on CPAP. Scheduled for formal bronchoscopy in the AM for diagnosis of suspected lung malignancy.     Past Medical History:   Diagnosis Date    Arthritis     spine    CAD (coronary artery disease)     COPD (chronic obstructive pulmonary disease)     HLD (hyperlipidemia)     HTN (hypertension)     Ischemic cardiomyopathy     Mitral regurgitation     HEATHER on CPAP     Stroke     Venous insufficiency     Ventricular tachycardia        Past Surgical History:   Procedure Laterality Date    bilateral inguinal stents      CARDIAC DEFIBRILLATOR PLACEMENT Left     CATARACT EXTRACTION Bilateral     CATHETERIZATION OF BOTH LEFT AND RIGHT HEART  2022    Diagnostic Only; Dr. Gerardo    COLONOSCOPY      CORONARY ARTERY BYPASS GRAFT (CABG) N/A 2022    Procedure: CORONARY ARTERY BYPASS GRAFT (CABG);  Surgeon: Rowdy Davis IV, MD;  Location: [facility] OR;  Service: Cardiovascular;  Laterality: N/A;  possible MVR & LLAA  //  ECHO NOTIFIED    LEFT HEART CATHETERIZATION Left 2022    Procedure: CATHETERIZATION, HEART, LEFT;  Surgeon: Reinaldo Gerardo MD;  Location: [facility] CATH LAB;  Service: Cardiology;  Laterality: Left;  LHC +/- PCI    MITRAL VALVE REPLACEMENT N/A 2022    Procedure: REPLACEMENT, MITRAL VALVE;  Surgeon: Rowdy Davis IV, MD;  Location: [facility] OR;  Service: Cardiovascular;  Laterality: N/A;  possible MVR and LLAA    REMOVAL OF IMPLANTED CARDIOVERTER-DEFIBRILLATOR (ICD)      TONSILLECTOMY         Social History     Socioeconomic History    Marital status:    Tobacco Use    Smoking status: Former     Years: 55.00     Types: Cigarettes     Start date:      Quit date:      Years since quittin.6    Smokeless tobacco: Never   Substance and Sexual Activity    Alcohol use: Yes     Alcohol/week: 3.0 standard drinks     Types: 3 Glasses of wine per week    Drug  use: Never           Current Outpatient Medications   Medication Instructions    amiodarone (PACERONE) 200 mg, Oral, 2 times daily    aspirin (ECOTRIN) 81 mg, Oral, Nightly    atorvastatin (LIPITOR) 80 mg, Oral, Nightly    clopidogreL (PLAVIX) 75 mg, Oral, Nightly    ezetimibe (ZETIA) 10 mg, Oral, Nightly    fluticasone propionate (FLONASE) 50 mcg/actuation nasal spray 1 spray, Each Nostril, Daily PRN    furosemide (LASIX) 20 mg, Oral, Daily    metoprolol succinate (TOPROL-XL) 25 MG 24 hr tablet 1 tablet, Oral, Daily    mometasone (ASMANEX TWISTHALER) 220 mcg/ actuation (30) inhaler 2 puffs, Inhalation, Daily, Controller    sacubitriL-valsartan (ENTRESTO) 49-51 mg per tablet 0.5 tablets, Oral, 2 times daily    spironolactone (ALDACTONE) 12.5 mg, Oral, Daily    umeclidinium-vilanteroL (ANORO ELLIPTA) 62.5-25 mcg/actuation DsDv 1 puff, Inhalation, Daily, Controller       Current Inpatient Medications   albuterol-ipratropium  3 mL Nebulization Q6H    amiodarone  200 mg Oral BID    [START ON 9/10/2022] amiodarone  200 mg Oral Daily    aspirin  81 mg Oral Daily    atorvastatin  80 mg Oral QHS    budesonide  0.5 mg Nebulization Q12H    ceFEPime (MAXIPIME) IVPB  2 g Intravenous Q8H    collagenase   Topical (Top) BID    docusate sodium  100 mg Oral BID    enoxaparin  1 mg/kg Subcutaneous Q12H    folic acid  1 mg Oral Daily    loperamide  4 mg Oral Once    methylnaltrexone  12 mg Subcutaneous Every other day    metoclopramide HCl  5 mg Per NG tube Q8H    metoprolol tartrate  25 mg Per NG tube BID    midodrine  10 mg Oral Q8H    pantoprazole  40 mg Intravenous BID    polyethylene glycol  17 g Oral BID    sodium chloride 0.9%  10 mL Intravenous Q6H       Current Intravenous Infusions   amino acid 5 % in 15% dextrose 85 mL/hr at 09/05/22 2000    amino acid 5 % in 15% dextrose      amino acid 5 % in 15% dextrose           Review of Systems   Constitutional:  Positive for malaise/fatigue.   Respiratory:  Positive for cough,  sputum production and shortness of breath.    Cardiovascular:  Positive for leg swelling.   Gastrointestinal: Negative.    Genitourinary: Negative.    Musculoskeletal: Negative.         Objective:       Intake/Output Summary (Last 24 hours) at 9/6/2022 1207  Last data filed at 9/6/2022 0511  Gross per 24 hour   Intake 2121.91 ml   Output 950 ml   Net 1171.91 ml           Vital Signs (Most Recent):  Temp: 98.2 °F (36.8 °C) (09/05/22 2300)  Pulse: 63 (09/06/22 0815)  Resp: 18 (09/06/22 1110)  BP: (!) 144/60 (09/06/22 0400)  SpO2: (!) 93 % (09/06/22 0815)    Body mass index is 32.65 kg/m².  Weight: 100 kg (220 lb 7.4 oz) Vital Signs (24h Range):  Temp:  [98 °F (36.7 °C)-98.2 °F (36.8 °C)] 98.2 °F (36.8 °C)  Pulse:  [62-99] 63  Resp:  [14-27] 18  SpO2:  [84 %-97 %] 93 %  BP: (101-201)/() 144/60     Physical Exam  Constitutional:       Appearance: He is ill-appearing.   HENT:      Head:      Comments: CPAP in place   Cardiovascular:      Rate and Rhythm: Normal rate and regular rhythm.   Pulmonary:      Comments: Diminished over left   Abdominal:      Palpations: Abdomen is soft.      Comments: Round, hypoactive BS   Musculoskeletal:      Right lower leg: Edema present.      Left lower leg: Edema present.   Neurological:      Mental Status: He is alert and oriented to person, place, and time.   Psychiatric:         Mood and Affect: Mood normal.         Behavior: Behavior normal.         Lines/Drains/Airways       Peripherally Inserted Central Catheter Line  Duration             PICC Triple Lumen 08/28/22 1516 right brachial 8 days              Drain  Duration                  Urethral Catheter 09/01/22 1200 16 Fr. 5 days         NG/OG Tube 09/01/22 1323 16 Fr. Left nostril 4 days                    Significant Labs:    Lab Results   Component Value Date    WBC 18.4 (H) 09/06/2022    HGB 8.7 (L) 09/06/2022    HCT 29.4 (L) 09/06/2022    .4 (H) 09/06/2022     09/06/2022         BMP  Lab Results    Component Value Date     (L) 09/06/2022    K 5.2 (H) 09/06/2022    CO2 24 09/06/2022    BUN 27.0 (H) 09/06/2022    CREATININE 0.98 09/06/2022    CALCIUM 8.0 (L) 09/06/2022    EGFRNONAA >60 05/14/2020       ABG  No results for input(s): PH, PO2, PCO2, HCO3, BE in the last 168 hours.    Significant Imaging:CT Chest Without Contrast  Narrative: EXAMINATION:  CT CHEST WITHOUT CONTRAST    CLINICAL HISTORY:  Abnormal xray - lung opacity/opacities;  Presence of prosthetic heart valve    TECHNIQUE:  Axial images of the chest were obtained without IV contrast administration.  Coronal and sagittal reconstructions were provided.  Three dimensional and MIP images were obtained and evaluated.  Total DLP was 257 mGy-cm. Dose lowering technique and automated exposure control were utilized for this exam.    COMPARISON:  Chest radiograph 09/03/2022.    FINDINGS:  The airway is widely patent.  There is no mediastinal or hilar lymphadenopathy.  There is a left upper chest pacing device.  There is a right upper extremity PICC.  There is a nasogastric tube in the distal esophagus.  There are postsurgical changes following median sternotomy.    There are moderately sized bilateral pleural effusions with lower lobe atelectasis.  There is complete collapse of the left upper lobe with hyperdensity throughout the left upper lobe parenchyma.  There are multifocal ground-glass opacities throughout the lungs bilaterally with severe centrilobular emphysematous change.  There is an abrupt cut off of the left upper lobe bronchus within the left suprahilar region.  There is an area of soft tissue thickening best visualized on series 8 images 162 through 166.    The upper abdomen demonstrates stones in the bilateral kidneys without acute abnormality.  There is no lytic or blastic osseous lesion.  Impression: 1. Multifocal ground-glass opacities throughout the lungs bilaterally suspicious for multifocal pneumonia.  2. Moderately sized  bilateral pleural effusions with lower lobe atelectasis.  3. Collapse of the superior aspect of the left upper lobe with parenchymal hyperdensity.  There is an abrupt occlusion of the left upper lobe bronchus as detailed above.  Bronchoscopy may be beneficial as an underlying obstructing malignancy cannot be entirely excluded.    Electronically signed by: Rich Narayanan MD  Date:    09/04/2022  Time:    12:21  X-Ray Abdomen AP 1 View  Narrative: EXAMINATION:  XR ABDOMEN AP 1 VIEW    CLINICAL HISTORY:  ileus;    TECHNIQUE:  AP X-RAY OF THE ABDOMEN:    CPT 17612    FINDINGS:  Examination reveals some residual feces throughout the colon gas pattern is nonspecific with no clear evidence of ileus or obstruction no abnormal masses or calcifications identified  Impression: Nonspecific gas pattern    Electronically signed by: Rodney Brice  Date:    09/04/2022  Time:    09:15         Assessment/Plan:     Assessment  severe COPD per ATS criteria (previous FEV1 was 41%)  Abnormal CT of chest s/p bedside bronch with findings of necrotic tumor.   SBO vs severe ielus   LUE DVT    CAD/MR s/p Bioprosthetic MVR and CABG x 2 on 8/22/22  History of VT   HF with EF 40%   Pseudomonas pneumonia    Plan  Bronch in the AM   Continue recs per GI and CV sugery   Continue Cefepime   Lovenox on hold for bronch in AM   Continue nebs and Pulmicort   Will repeat CMP later today; note sure if labs are accurate.     Silvia Baxter, ANP  Pulmonary Critical Care Medicine  [facility] - [facility]

## 2022-09-06 NOTE — PROGRESS NOTES
[facility] - [facility]  Cardiology  Progress Note    Patient Name: Zackery Osullivan Jr.  MRN: [id number]  Admission Date: 8/22/2022  Hospital Length of Stay: 15 days  Code Status: Full Code   Attending Physician: Rowdy Davis IV, MD   Primary Care Physician: Isaiha Meeks MD  Expected Discharge Date:   Principal Problem:S/P MVR (mitral valve replacement)    Subjective:     Brief HPI:   Mr. Osullivan is a 77 year old male, known to Dr. Gerardo and Dr. Flowers, who presented to the hospital and underwent CAD/CABG and MVR due to diagnosis of MV CAD and significant MR. Also underwent LISA Ligation. Patient tolerated the surgery well, and was transferred to intensive care unit for further close post operative monitoring. [facility] is consulted for post op surgical cardiac management.    Hospital Course:   8.25.22: NAD noted. VSS with low normal BP. SR on tele. Denies SOB/Palps, +incisional CP.  8.26.22: NAD noted. VSS with low normal BP. SR on tele. Sitting in chair at bedside. Denies SOB/Palps, endorses incisional CP.  8.27.22: NAD Noted. Reports throat discomfort. SR on Tele. BP Low Normal.  8.28.22: NAD Noted. BP Low Normal. SR on Tele. GI Following. NSVT overnight.  SR 83.  8.29.22: NAD Noted. Patient with NG Tube. Surgical Team is following for evaluation of Ileus versus SBO. Hypotensive requiring low dose Levophed. SR on Tele. Amiodarone gtt is infusing. Started for NSVT, which he did not have last night according to the RN.  8.30.22: Remains NPO. Plans for small bowel follow through today  8.31.22: Patient underwent SBFT without evidence of SBO. He was given suppository with multiple BMs overnight. NGT has been removed and he is tolerating oral diet. Amiodarone drip in progress. Continues to require pressor support. He has been started on midodrine. Urine is dark. Patient is having elevated WBCs and has been started on ABX. Patient c/o pain/swelling to LUE  9.1.22: Still requiring pressor support.  "LUE + DVT. Lovenox has been restarted. Stool studies pending. Urine dark dania  9.2.22: Patient sitting up in bedside chair. He had NGT placed again due to   Abdominal pain/distension. KUB revealing persistent gaseous distension of abdomen with gas in loops of small and large intestine. Pressors have been weaned to off. Midodrine @ 10mg tid. AICD interrogated and settings adjustments made per [employer] rep  9.3.22: NAD Noted. Sitting in chair. BIPAP in Place. SR on Tele. BP Low Normal.  9.4.22 VSS. Requiring Bipap support. SR pet tele.   9.5.22: NAD. VSS.  2 Episodes of NSVT yesterday. "I am ok." Denies CP, SOB and Palps.   9.6.22: NAD. VSS. No Further Episodes of NSVT. "I am feeling well." + SOB/BiPAP. S/p Bronchoscopy with Lung Mass Identification    PMH: CAD (Multivessel), Ischemic Cardiomyopathy (ICD), VHD- MR, Hypertension, Hyperlipidemia, Chronic VI, COPD, Smoker, Obstructive Sleep Apnea, NSVT, Obesity  PSH: CABG/MVR, Skin Lesion, ICD Placement ([employer]), Tonsillectomy  Family History: Father- CAD, Brother- CAD, Sister- CAD  Social History: Tobacco- Former Smoker (Quit 3 Years Ago), Alcohol- Negative, Substance Abuse- Negative     Previous Cardiac Diagnostics:   TTE 09/01/22:  Difficult to accurately assess LVEF as patient was in Afib, RVR during acquisition of images. Limited study to assess LV function. Definity used. Estimated EF 30%. LV apex appears aneurysmal without LV thrombus.     Venous US LUE 08/31/22:  DVT to left axillary through proximal brachial veins.  A superficial vein thrombosis was identified in the left cephalic vein.   All other vessels compressed.     ECHO (8.28.22):  The left ventricle is mildly enlarged with concentric hypertrophy and mildly reduced LV systolic function.  The estimated ejection fraction is 40%.  Indeterminate left ventricular diastolic function due to MVR.  Aneurysmal basal inferior wall/ RCA territory  Well seated bioprosthetic MV without significant " stenosis or perivalvular leak. MG 4 mmHg.  Mild to moderate tricuspid regurgitation.  Mild left atrial enlargement.    CABG/MVR (8.22.22): MVR 29 mm Mosaic Porcine Valve; CABG LIMA to LAD & SVG to OM, LISA Ligation with Endo Stapler.     Left Heart Catheterization (8.2.22):  Left Main- 50% Distal Disease, LAD- 60% Proximal IFR 0.81, LCx- Nondominant (, Diagonal to OM Collateral), RCA- Dominant with , Bilateral Common Iliacs 30% Calcified Stenosis.     ECHO (4.18.22):  The study quality is average.   The left ventricle is normal in size. Global left ventricular systolic function is mildly decreased. The left ventricular ejection fraction is 40%. Left ventricular diastolic function is normal. Noted left ventricular hypertrophy. Asymmetric septal left ventricular hypertrophy is present. It is mild.   Mild to moderate (1-2+) mitral regurgitation. Somewhat eccentric jet noted, chordae appear hyperechoic and thickened.    Review of Systems   Cardiovascular:  Negative for chest pain, claudication and dyspnea on exertion.   Respiratory:  Negative for shortness of breath.    All other systems reviewed and are negative.    Objective:     Vital Signs (Most Recent):  Temp: 98.2 °F (36.8 °C) (09/05/22 2300)  Pulse: 63 (09/06/22 0815)  Resp: 18 (09/06/22 1110)  BP: (!) 144/60 (09/06/22 0400)  SpO2: (!) 93 % (09/06/22 0815) Vital Signs (24h Range):  Temp:  [97.9 °F (36.6 °C)-98.2 °F (36.8 °C)] 98.2 °F (36.8 °C)  Pulse:  [62-99] 63  Resp:  [14-27] 18  SpO2:  [84 %-97 %] 93 %  BP: (101-201)/() 144/60     Weight: 100 kg (220 lb 7.4 oz)  Body mass index is 32.65 kg/m².    SpO2: (!) 93 %  O2 Device (Oxygen Therapy): CPAP      Intake/Output Summary (Last 24 hours) at 9/6/2022 1152  Last data filed at 9/6/2022 0511  Gross per 24 hour   Intake 2121.91 ml   Output 950 ml   Net 1171.91 ml       Lines/Drains/Airways       Peripherally Inserted Central Catheter Line  Duration             PICC Triple Lumen 08/28/22 1516 right  brachial 8 days              Drain  Duration                  NG/OG Tube 09/01/22 1323 16 Fr. Left nostril 4 days         Urethral Catheter 09/01/22 1200 16 Fr. 4 days                  Significant Labs:   CMP   Recent Labs   Lab 09/04/22  1618 09/05/22  0129 09/06/22  0126   * 139 134*   K 5.2* 3.9 5.2*   CO2 23 23 24   BUN 26.8* 28.9* 27.0*   CREATININE 1.15 0.78 0.98   CALCIUM 8.0* 7.9* 8.0*   ALBUMIN  --  1.8* 1.6*   BILITOT  --  0.9 0.8   ALKPHOS  --  99 87   AST  --  54* 38*   ALT  --  66* 50      and CBC   Recent Labs   Lab 09/05/22  0129 09/06/22  0126   WBC 16.9* 18.4*   HGB 9.0* 8.7*   HCT 29.6* 29.4*    137       Telemetry:  Sinus Rhythm     Physical Exam  Vitals reviewed.   Constitutional:       Appearance: Normal appearance.   HENT:      Head: Normocephalic.   Cardiovascular:      Rate and Rhythm: Normal rate and regular rhythm.   Pulmonary:      Effort: Pulmonary effort is normal.      Breath sounds: Normal breath sounds.      Comments: NC O2  Abdominal:      General: There is distension.   Musculoskeletal:         General: Normal range of motion.      Cervical back: Neck supple.   Skin:     General: Skin is warm and dry.      Capillary Refill: Capillary refill takes less than 2 seconds.   Neurological:      General: No focal deficit present.      Mental Status: He is alert and oriented to person, place, and time. Mental status is at baseline.   Psychiatric:         Behavior: Behavior normal.     Current Inpatient Medications:    Current Facility-Administered Medications:     0.9%  NaCl infusion (for blood administration), , Intravenous, Q24H PRN, Rowdy Davis IV, MD    acetaminophen oral solution 650 mg, 650 mg, Per OG tube, Q6H PRN, Rowdy Davis IV, MD    albuterol-ipratropium 2.5 mg-0.5 mg/3 mL nebulizer solution 3 mL, 3 mL, Nebulization, Q6H, Marlon Humphreys MD, 3 mL at 09/06/22 0815    amino acid 5% in 15% dextrose (CLINIMIX-E) solution (1L provides 50gm AA, 150gm CHO (510  kcal/L dextrose), Na 35, K 30, Mg 5, Ca 4.5, Acetate 80, Cl 39, Phos 15) (710 total kcal/L), , Intravenous, Continuous, Rowdy Davis IV, MD, Last Rate: 85 mL/hr at 09/05/22 2000, New Bag at 09/05/22 2000    amino acid 5% in 15% dextrose (CLINIMIX-E) solution (1L provides 50gm AA, 150gm CHO (510 kcal/L dextrose), Na 35, K 30, Mg 5, Ca 4.5, Acetate 80, Cl 39, Phos 15) (710 total kcal/L), , Intravenous, Continuous, Rowdy Davis IV, MD    amiodarone tablet 200 mg, 200 mg, Oral, BID, LAYNE WaddellP, 200 mg at 09/06/22 0941    [START ON 9/10/2022] amiodarone tablet 200 mg, 200 mg, Oral, Daily, LAYNE WaddellP    aspirin EC tablet 81 mg, 81 mg, Oral, Daily, Rowdy Davis IV, MD, 81 mg at 09/06/22 0941    atorvastatin tablet 80 mg, 80 mg, Oral, QHS, Jeniffer Gale FNP, 80 mg at 09/05/22 2052    budesonide nebulizer solution 0.5 mg, 0.5 mg, Nebulization, Q12H, Marlon Humphreys MD, 0.5 mg at 09/06/22 0815    calcium gluconate 1 g in NS IVPB (premixed), 1 g, Intravenous, PRN, Rowdy Davis IV, MD    calcium gluconate 1 g in NS IVPB (premixed), 3 g, Intravenous, PRN, Rowdy Davis IV, MD    ceFEPIme (MAXIPIME) 2 g in dextrose 5 % in water (D5W) 5 % 50 mL IVPB (MB+), 2 g, Intravenous, Q8H, Fidel Mckeon MD, Last Rate: 100 mL/hr at 09/06/22 1110, 2 g at 09/06/22 1110    collagenase ointment, , Topical (Top), BID, Rowdy Davis IV, MD, Given at 09/06/22 0943    dextrose 10% bolus 250 mL, 25 g, Intravenous, PRN, Rowdy Davis IV, MD    dextrose 50% injection 12.5 g, 12.5 g, Intravenous, PRN, Rowdy Davis IV, MD, 12.5 g at 08/27/22 2206    docusate sodium capsule 100 mg, 100 mg, Oral, BID, Rowdy Davis IV, MD, 100 mg at 09/06/22 0941    enoxaparin injection 90 mg, 1 mg/kg, Subcutaneous, Q12H, Fidel Mckeon MD, 90 mg at 09/05/22 1723    folic acid tablet 1 mg, 1 mg, Oral, Daily, Rowdy Davis IV, MD, 1 mg at 09/06/22 0940    hydrALAZINE injection 10 mg, 10 mg,  Intravenous, Q6H PRN, NINA Alves, 10 mg at 09/05/22 1559    HYDROcodone-acetaminophen 5-325 mg per tablet 1 tablet, 1 tablet, Oral, Q4H PRN, Rowdy Davis IV, MD, 1 tablet at 09/06/22 1110    LIDOcaine HCL 4% external solution 4 mL, 4 mL, Topical (Top), PRN, ALEXSANDER Holm MD, 4 mL at 09/05/22 1114    loperamide capsule 4 mg, 4 mg, Oral, Once, Rowdy Davis IV, MD    magnesium sulfate 2g in water 50mL IVPB (premix), 2 g, Intravenous, PRN, Rowdy Davis IV, MD    magnesium sulfate 2g in water 50mL IVPB (premix), 4 g, Intravenous, PRN, Rowdy Davis IV, MD    methylnaltrexone 12 mg/0.6 mL subcutaneous injection 12 mg, 12 mg, Subcutaneous, Every other day, MARTHA Limon, 12 mg at 09/06/22 0940    metoclopramide HCl 5 mg/5 mL solution 5 mg, 5 mg, Per NG tube, Q8H, Harshal Romero MD, 5 mg at 09/06/22 0600    metoprolol tartrate (LOPRESSOR) tablet 25 mg, 25 mg, Per NG tube, BID, MARTHA Hernández, 25 mg at 09/06/22 0941    midodrine tablet 10 mg, 10 mg, Oral, Q8H, Fidel Mckeon MD, 10 mg at 09/05/22 0527    morphine injection 2 mg, 2 mg, Intravenous, PRN, Rowdy Davis IV, MD, 2 mg at 09/05/22 1545    naloxone 0.4 mg/mL injection 0.4 mg, 0.4 mg, Intravenous, PRN, ALEXSANDER Holm MD, 0.4 mg at 09/05/22 1124    ondansetron injection 4 mg, 4 mg, Intravenous, Q12H PRN, Rowdy Davis IV, MD, 4 mg at 08/28/22 0307    oxyCODONE immediate release tablet 5 mg, 5 mg, Oral, Q4H PRN, Rowdy Davis IV, MD, 5 mg at 08/24/22 2016    pantoprazole injection 40 mg, 40 mg, Intravenous, BID, Isaiah Levine MD, 40 mg at 09/06/22 0940    polyethylene glycol packet 17 g, 17 g, Oral, BID, MARTHA Limon, 17 g at 09/06/22 0941    potassium chloride 20 mEq in 100 mL IVPB (FOR CENTRAL LINE ADMINISTRATION ONLY), 20 mEq, Intravenous, PRN, Rowdy Davis IV, MD    potassium chloride 20 mEq in 100 mL IVPB (FOR CENTRAL LINE ADMINISTRATION ONLY), 40 mEq, Intravenous, PRN, Rowdy Davsi IV, MD     potassium chloride 20 mEq in 100 mL IVPB (FOR CENTRAL LINE ADMINISTRATION ONLY), 20 mEq, Intravenous, PRN, Rowdy Davis IV, MD    Flushing PICC Protocol, , , Until Discontinued **AND** sodium chloride 0.9% flush 10 mL, 10 mL, Intravenous, Q6H, 10 mL at 09/06/22 0600 **AND** [DISCONTINUED] sodium chloride 0.9% flush 10 mL, 10 mL, Intravenous, PRN, Rowdy Davis IV, MD    sodium phosphate 15 mmol in dextrose 5 % 250 mL IVPB, 15 mmol, Intravenous, PRN, Rowdy Davis IV, MD    sodium phosphate 20.01 mmol in dextrose 5 % 250 mL IVPB, 20.01 mmol, Intravenous, PRN, Rowdy Davis IV, MD    sodium phosphate 30 mmol in dextrose 5 % 250 mL IVPB, 30 mmol, Intravenous, PRN, Rowdy Davis IV, MD    Facility-Administered Medications Ordered in Other Encounters:     diphenhydrAMINE capsule 50 mg, 50 mg, Oral, On Call Procedure, Reinaldo Gerardo MD, 50 mg at 08/02/22 0739    sodium chloride 0.9% flush 10 mL, 10 mL, Intravenous, PRN, Reinaldo Gerardo MD        Assessment:   CAD (Multivessel) - Status Post CABG (LIMA to LAD, SVG to OM) (8.22.22)    - S/P LISA Ligation  Hypotension (Improved) - Off Pressors    -  H/O Hypertension   Valvular Heart Disease-  MR Status Post Bio MVR (#29 mm Mosaic Porcine) (8.22.22)    - Well seated bioprosthetic MV without significant stenosis or perivalvular leak. MG 4 mmHg.  Ischemic Cardiomyopathy EF 40% Status Post ICD ([employer])    - Aneurysmal basal inferior wall/ RCA territory--needs OAC  Newly Diagnosed Lung Mass (Bronch on 9.5.22)  NSVT    - History of VT    - On Amiodarone Outpatient  Ileus     - SBFT 8/30/22 negative for SBO  C-Diff + (9/1/22) - PCR Negative    - NGT placed again on 9/1/22  DVT LUE    - Left axillary through proximal brachial veins. Superficial vein thrombus left cephalic vein.   Hypoxic - Improving - Less O2 Requirements  Positive Sputum Culture - Pseudomonas  Hyperlipidemia  Chronic Venous Insufficiency  Elevated BMI/Obesity  COPD  Former Smoker     - Quit 3 Years Ago  Anemia  Thrombocytopenia  Leukocytosis   Transaminitis  Unstageable sacral decubitus    Plan:   Antibiotics per ICU Team  Continue Amiodarone Tapered Dosing  Continue ASA/Statin  Supportive Care per ICU Team  Continue Metoprolol Tartrate 25mg PO BID (NSVT) - Can Not Use XL secondary to Failure Swallow Study  Plans for Bronch on 9.7.22 for Biopsy (Now Off Lovenox for Biopsy)  Labs in AM: CBC, CMP and Mg    Prieto Shelton, ANP  Cardiology  [facility] - [address] [place] [address]  09/06/2022

## 2022-09-07 NOTE — CARE UPDATE
[id number] Spoke with pt's wife re ltac. I explained to her what ltac is. She reported her son is having a procedure at this time. She asked I contact her back tomorrow.

## 2022-09-07 NOTE — PROGRESS NOTES
[facility] - [facility] ICU  Cardiology  Progress Note    Patient Name: Zackery Osullivan Jr.  MRN: [id number]  Admission Date: 8/22/2022  Hospital Length of Stay: 16 days  Code Status: Full Code   Attending Physician: Rowdy Davis IV, MD   Primary Care Physician: Isaiah Meeks MD  Expected Discharge Date:   Principal Problem:S/P MVR (mitral valve replacement)    Subjective:     Brief HPI:   Mr. Osullivan is a 77 year old male, known to Dr. Gerardo and Dr. Flowers, who presented to the hospital and underwent CAD/CABG and MVR due to diagnosis of MV CAD and significant MR. Also underwent LISA Ligation. Patient tolerated the surgery well, and was transferred to intensive care unit for further close post operative monitoring. [facility] is consulted for post op surgical cardiac management.    Hospital Course:   8.25.22: NAD noted. VSS with low normal BP. SR on tele. Denies SOB/Palps, +incisional CP.  8.26.22: NAD noted. VSS with low normal BP. SR on tele. Sitting in chair at bedside. Denies SOB/Palps, endorses incisional CP.  8.27.22: NAD Noted. Reports throat discomfort. SR on Tele. BP Low Normal.  8.28.22: NAD Noted. BP Low Normal. SR on Tele. GI Following. NSVT overnight.  SR 83.  8.29.22: NAD Noted. Patient with NG Tube. Surgical Team is following for evaluation of Ileus versus SBO. Hypotensive requiring low dose Levophed. SR on Tele. Amiodarone gtt is infusing. Started for NSVT, which he did not have last night according to the RN.  8.30.22: Remains NPO. Plans for small bowel follow through today  8.31.22: Patient underwent SBFT without evidence of SBO. He was given suppository with multiple BMs overnight. NGT has been removed and he is tolerating oral diet. Amiodarone drip in progress. Continues to require pressor support. He has been started on midodrine. Urine is dark. Patient is having elevated WBCs and has been started on ABX. Patient c/o pain/swelling to LUE  9.1.22: Still requiring pressor support.  "LUE + DVT. Lovenox has been restarted. Stool studies pending. Urine dark dania  9.2.22: Patient sitting up in bedside chair. He had NGT placed again due to   Abdominal pain/distension. KUB revealing persistent gaseous distension of abdomen with gas in loops of small and large intestine. Pressors have been weaned to off. Midodrine @ 10mg tid. AICD interrogated and settings adjustments made per [employer] rep  9.3.22: NAD Noted. Sitting in chair. BIPAP in Place. SR on Tele. BP Low Normal.  9.4.22 VSS. Requiring Bipap support. SR pet tele.   9.5.22: NAD. VSS.  2 Episodes of NSVT yesterday. "I am ok." Denies CP, SOB and Palps.   9.6.22: NAD. VSS. No Further Episodes of NSVT. "I am feeling well." + SOB/BiPAP. S/p Bronchoscopy with Lung Mass Identification  9.7.22: NAD. "I am ok." Denies CP and Palps. Remains on BiPAP. Bronch Cancelled secondary to BiPAP Status/O2 Requirements.     PMH: CAD (Multivessel), Ischemic Cardiomyopathy (ICD), VHD- MR, Hypertension, Hyperlipidemia, Chronic VI, COPD, Smoker, Obstructive Sleep Apnea, NSVT, Obesity  PSH: CABG/MVR, Skin Lesion, ICD Placement ([employer]), Tonsillectomy  Family History: Father- CAD, Brother- CAD, Sister- CAD  Social History: Tobacco- Former Smoker (Quit 3 Years Ago), Alcohol- Negative, Substance Abuse- Negative     Previous Cardiac Diagnostics:   TTE 09/01/22:  Difficult to accurately assess LVEF as patient was in Afib, RVR during acquisition of images. Limited study to assess LV function. Definity used. Estimated EF 30%. LV apex appears aneurysmal without LV thrombus.     Venous US LUE 08/31/22:  DVT to left axillary through proximal brachial veins.  A superficial vein thrombosis was identified in the left cephalic vein.   All other vessels compressed.     ECHO (8.28.22):  The left ventricle is mildly enlarged with concentric hypertrophy and mildly reduced LV systolic function.  The estimated ejection fraction is 40%.  Indeterminate left ventricular " diastolic function due to MVR.  Aneurysmal basal inferior wall/ RCA territory  Well seated bioprosthetic MV without significant stenosis or perivalvular leak. MG 4 mmHg.  Mild to moderate tricuspid regurgitation.  Mild left atrial enlargement.    CABG/MVR (8.22.22): MVR 29 mm Mosaic Porcine Valve; CABG LIMA to LAD & SVG to OM, LISA Ligation with Endo Stapler.     Left Heart Catheterization (8.2.22):  Left Main- 50% Distal Disease, LAD- 60% Proximal IFR 0.81, LCx- Nondominant (, Diagonal to OM Collateral), RCA- Dominant with , Bilateral Common Iliacs 30% Calcified Stenosis.     ECHO (4.18.22):  The study quality is average.   The left ventricle is normal in size. Global left ventricular systolic function is mildly decreased. The left ventricular ejection fraction is 40%. Left ventricular diastolic function is normal. Noted left ventricular hypertrophy. Asymmetric septal left ventricular hypertrophy is present. It is mild.   Mild to moderate (1-2+) mitral regurgitation. Somewhat eccentric jet noted, chordae appear hyperechoic and thickened.    Review of Systems   Constitutional: Positive for malaise/fatigue. Negative for fever.   Cardiovascular:  Negative for chest pain, claudication and dyspnea on exertion.   Respiratory:  Positive for shortness of breath.    All other systems reviewed and are negative.    Objective:     Vital Signs (Most Recent):  Temp: 97.9 °F (36.6 °C) (09/07/22 0800)  Pulse: 60 (09/07/22 1239)  Resp: (!) 22 (09/07/22 1239)  BP: 137/73 (09/07/22 1100)  SpO2: (!) 94 % (09/07/22 1239) Vital Signs (24h Range):  Temp:  [97.8 °F (36.6 °C)-98.4 °F (36.9 °C)] 97.9 °F (36.6 °C)  Pulse:  [60-62] 60  Resp:  [13-23] 22  SpO2:  [86 %-99 %] 94 %  BP: (106-150)/(47-74) 137/73     Weight: 100 kg (220 lb 7.4 oz)  Body mass index is 32.65 kg/m².    SpO2: (!) 94 %  O2 Device (Oxygen Therapy): BiPAP      Intake/Output Summary (Last 24 hours) at 9/7/2022 1322  Last data filed at 9/7/2022 1000  Gross per 24 hour    Intake 1081 ml   Output 1015 ml   Net 66 ml       Lines/Drains/Airways       Peripherally Inserted Central Catheter Line  Duration             PICC Triple Lumen 08/28/22 1516 right brachial 9 days              Drain  Duration                  Urethral Catheter 09/01/22 1200 16 Fr. 6 days         NG/OG Tube 09/01/22 1323 16 Fr. Left nostril 5 days                  Significant Labs:   CMP   Recent Labs   Lab 09/06/22  0126 09/06/22  1512 09/07/22  0116   * 138 139   K 5.2* 4.3 4.4   CO2 24 27 27   BUN 27.0* 34.1* 33.4*   CREATININE 0.98 0.79 0.81   CALCIUM 8.0* 7.9* 8.4*   ALBUMIN 1.6* 1.8* 1.6*   BILITOT 0.8 0.8 0.7   ALKPHOS 87 94 97   AST 38* 33 32   ALT 50 47 43      and CBC   Recent Labs   Lab 09/06/22  0126 09/07/22  0116   WBC 18.4* 21.6*   HGB 8.7* 8.8*   HCT 29.4* 29.0*    135       Telemetry:   to     Physical Exam  Vitals reviewed.   Constitutional:       Appearance: Normal appearance.   HENT:      Head: Normocephalic.   Cardiovascular:      Rate and Rhythm: Normal rate and regular rhythm.   Pulmonary:      Effort: Pulmonary effort is normal.      Breath sounds: Normal breath sounds.      Comments: BiPAP  Abdominal:      General: There is distension.   Musculoskeletal:         General: Normal range of motion.      Cervical back: Neck supple.   Skin:     General: Skin is warm and dry.      Capillary Refill: Capillary refill takes less than 2 seconds.   Neurological:      General: No focal deficit present.      Mental Status: He is alert and oriented to person, place, and time. Mental status is at baseline.   Psychiatric:         Behavior: Behavior normal.     Current Inpatient Medications:    Current Facility-Administered Medications:     0.9%  NaCl infusion (for blood administration), , Intravenous, Q24H PRN, Rowdy Davis IV, MD    acetaminophen oral solution 650 mg, 650 mg, Per OG tube, Q6H PRN, Rowdy Davis IV, MD    albuterol-ipratropium 2.5 mg-0.5 mg/3 mL nebulizer solution 3  mL, 3 mL, Nebulization, Q6H, Marlon Humphreys MD, 3 mL at 09/07/22 1239    amino acid 5% in 15% dextrose (CLINIMIX-E) solution (1L provides 50gm AA, 150gm CHO (510 kcal/L dextrose), Na 35, K 30, Mg 5, Ca 4.5, Acetate 80, Cl 39, Phos 15) (710 total kcal/L), , Intravenous, Continuous, Nba Licona MD, Last Rate: 85 mL/hr at 09/06/22 1810, New Bag at 09/06/22 1810    amiodarone tablet 200 mg, 200 mg, Oral, BID, Yuly Mccray, FNP, 200 mg at 09/07/22 0805    [START ON 9/10/2022] amiodarone tablet 200 mg, 200 mg, Oral, Daily, Yuly Mccray, FNP    aspirin chewable tablet 81 mg, 81 mg, Oral, Daily, Rowdy Davis IV, MD    atorvastatin tablet 80 mg, 80 mg, Oral, QHS, Jeniffer Gale, FNP, 80 mg at 09/06/22 2131    budesonide nebulizer solution 0.5 mg, 0.5 mg, Nebulization, Q12H, Marlon Humphreys MD, 0.5 mg at 09/07/22 0800    calcium gluconate 1 g in NS IVPB (premixed), 1 g, Intravenous, PRN, Rodwy Davis IV, MD    calcium gluconate 1 g in NS IVPB (premixed), 3 g, Intravenous, PRN, Rowdy Davis IV, MD    ceFEPIme (MAXIPIME) 2 g in dextrose 5 % in water (D5W) 5 % 50 mL IVPB (MB+), 2 g, Intravenous, Q8H, Fidel Mckeon MD, Last Rate: 100 mL/hr at 09/07/22 1219, 2 g at 09/07/22 1219    collagenase ointment, , Topical (Top), BID, Rowdy Davis IV, MD, Given at 09/07/22 0806    dextrose 10% bolus 250 mL, 25 g, Intravenous, PRN, Rowdy Davis IV, MD    dextrose 50% injection 12.5 g, 12.5 g, Intravenous, PRN, Rowdy Davis IV, MD, 12.5 g at 08/27/22 2206    docusate sodium capsule 100 mg, 100 mg, Oral, BID, Rowdy Davis IV, MD, 100 mg at 09/07/22 0900    enoxaparin injection 90 mg, 1 mg/kg, Subcutaneous, Q12H, Fidel Mckeon MD, 90 mg at 09/07/22 0929    folic acid tablet 1 mg, 1 mg, Oral, Daily, Rowdy Davis IV, MD, 1 mg at 09/07/22 0929    furosemide injection 20 mg, 20 mg, Intravenous, Q12H, MARTHA Hernandez, 20 mg at 09/07/22 1053    hydrALAZINE injection 10 mg, 10 mg,  Intravenous, Q6H PRN, NINA Alves, 10 mg at 09/05/22 1559    HYDROcodone-acetaminophen 5-325 mg per tablet 1 tablet, 1 tablet, Oral, Q4H PRN, Rowdy Davis IV, MD, 1 tablet at 09/06/22 1820    LIDOcaine HCL 4% external solution 4 mL, 4 mL, Topical (Top), PRN, ALEXSANDER Holm MD, 4 mL at 09/07/22 0807    LIDOcaine HCL 4% external solution 4 mL, 4 mL, Topical (Top), PRN, ALEXSANDER Holm MD    loperamide capsule 4 mg, 4 mg, Oral, Once, Rowdy Davis IV, MD    magnesium sulfate 2g in water 50mL IVPB (premix), 2 g, Intravenous, PRN, Rowdy Davis IV, MD    magnesium sulfate 2g in water 50mL IVPB (premix), 4 g, Intravenous, PRN, Rowdy Davis IV, MD    methylnaltrexone 12 mg/0.6 mL subcutaneous injection 12 mg, 12 mg, Subcutaneous, Every other day, MARTHA Limon, 12 mg at 09/06/22 0940    metoclopramide HCl 5 mg/5 mL solution 5 mg, 5 mg, Per NG tube, Q8H, Harshal Romero MD, 5 mg at 09/06/22 2130    metoprolol tartrate (LOPRESSOR) tablet 25 mg, 25 mg, Per NG tube, BID, MARTHA Hernández, 25 mg at 09/06/22 2130    midodrine tablet 10 mg, 10 mg, Oral, Q8H, Fidel Mckeon MD, 10 mg at 09/05/22 0527    morphine injection 2 mg, 2 mg, Intravenous, PRN, Rowdy Davis IV, MD, 2 mg at 09/07/22 0614    naloxone 0.4 mg/mL injection 0.4 mg, 0.4 mg, Intravenous, PRN, ALEXSANDER Holm MD, 0.4 mg at 09/05/22 1124    ondansetron injection 4 mg, 4 mg, Intravenous, Q12H PRN, Rowdy Davis IV, MD, 4 mg at 08/28/22 0307    oxyCODONE immediate release tablet 5 mg, 5 mg, Oral, Q4H PRN, Rowdy Davis IV, MD, 5 mg at 08/24/22 2016    pantoprazole injection 40 mg, 40 mg, Intravenous, BID, Isaiah Levine MD, 40 mg at 09/07/22 0805    polyethylene glycol packet 17 g, 17 g, Oral, BID, MARTHA Limon, 17 g at 09/07/22 0929    potassium chloride 20 mEq in 100 mL IVPB (FOR CENTRAL LINE ADMINISTRATION ONLY), 20 mEq, Intravenous, PRN, Rowdy Davis IV, MD    potassium chloride 20 mEq in 100 mL IVPB  (FOR CENTRAL LINE ADMINISTRATION ONLY), 40 mEq, Intravenous, PRN, Rowdy Davis IV, MD    potassium chloride 20 mEq in 100 mL IVPB (FOR CENTRAL LINE ADMINISTRATION ONLY), 20 mEq, Intravenous, PRN, Rowdy Davis IV, MD    Flushing PICC Protocol, , , Until Discontinued **AND** sodium chloride 0.9% flush 10 mL, 10 mL, Intravenous, Q6H, 10 mL at 09/07/22 1220 **AND** [DISCONTINUED] sodium chloride 0.9% flush 10 mL, 10 mL, Intravenous, PRN, Rowdy Davis IV, MD    sodium phosphate 15 mmol in dextrose 5 % 250 mL IVPB, 15 mmol, Intravenous, PRN, Rowdy Davis IV, MD    sodium phosphate 20.01 mmol in dextrose 5 % 250 mL IVPB, 20.01 mmol, Intravenous, PRN, Rowdy Davis IV, MD    sodium phosphate 30 mmol in dextrose 5 % 250 mL IVPB, 30 mmol, Intravenous, PRN, Rowdy Davis IV, MD    Facility-Administered Medications Ordered in Other Encounters:     diphenhydrAMINE capsule 50 mg, 50 mg, Oral, On Call Procedure, Reinaldo Gerardo MD, 50 mg at 08/02/22 0739    sodium chloride 0.9% flush 10 mL, 10 mL, Intravenous, PRN, Reinaldo Gerardo MD        Assessment:   CAD (Multivessel) - Status Post CABG (LIMA to LAD, SVG to OM) (8.22.22)    - S/P LISA Ligation  Hypotension (Improved) - Off Pressors    -  H/O Hypertension   Valvular Heart Disease-  MR Status Post Bio MVR (#29 mm Mosaic Porcine) (8.22.22)    - Well seated bioprosthetic MV without significant stenosis or perivalvular leak. MG 4 mmHg.  Ischemic Cardiomyopathy EF 40% Status Post ICD ([employer])    - Aneurysmal basal inferior wall/ RCA territory--needs OAC  Newly Diagnosed Lung Mass (Bronch on 9.5.22)  NSVT    - History of VT    - On Amiodarone Outpatient  Ileus     - SBFT 8/30/22 negative for SBO  C-Diff + (9/1/22) - PCR Negative    - NGT placed again on 9/1/22  DVT LUE    - Left axillary through proximal brachial veins. Superficial vein thrombus left cephalic vein.   Hypoxic - Improving - Less O2 Requirements  Positive Sputum Culture -  Pseudomonas  Hyperlipidemia  Chronic Venous Insufficiency  Elevated BMI/Obesity  COPD  Former Smoker    - Quit 3 Years Ago  Anemia  Thrombocytopenia  Leukocytosis   Transaminitis  Unstageable sacral decubitus    Plan:   Antibiotics per ICU Team  Continue Amiodarone Tapered Dosing  Continue Statin and ASA  Supportive Care per ICU Team  Continue Metoprolol Tartrate 25mg PO BID (NSVT) - Can Not Use XL secondary to Failure Swallow Study  Plans for Bronch/Lung Mass Biopsy once O2 Requirements Improve   Will Continue to Follow Along  Pts Prognosis is Guarded  Labs in AM: CBC, CMP and Mg    Prieto Shelton, MARTHA  Cardiology  [facility] - [address] [place] [address]  09/07/2022

## 2022-09-07 NOTE — PROGRESS NOTES
[facility] - [facility]  Pulmonary Critical Care Note    Patient Name: Zackery Osullivan Jr.  MRN: [id number]  Admission Date: 8/22/2022  Hospital Length of Stay: 16 days  Code Status: Full Code  Attending Provider: Rowdy Davis IV, MD  Primary Care Provider: Isaiah Meeks MD     Subjective:     HPI:   This is a 77-year-old  male with past medical history of CAD, HTN, HLD, and HFrEF who presented to [facility] for a CABG x2 and mitral valve replacement. Received 2 units pRBCs intraop. Initially admitted to ICU for post-op monitoring.  Patient transferred out of ICU once extubated and hemodynamically stable off vasopressors on 8/25/22. On 8/27/22, patient reporting throat pain, nausea and constipation. GI consulted. During this time, patient also having low/normal blood pressures. Had 17 beat run of V-tach evening of 8/27/22 in which amiodarone IV started since patient unable to tolerate PO medications. Morning of 8/28/22, MAPs <65 despite fluid resuscitation. Patient to be upgraded to ICU 8/28/22 due to the need for vasopressor support and close monitoring. CT abdomen with ileus. Small bowel follow through on 8/30 with no obstruction but delayed transit.      Hospital Course/Significant events:  C. Diff PCR was negative.  Pressors were weaned to off and he was downgrade on 9/3/2022; however on 9/6 pulmonary was reconsulted for desaturation and requiring CPAP usage. Imaging with concerns of persistent left upper lobe opacity, on admit Dr. Norman was concerned for potential mass. Sputum has also culture out Pseudmonas; on Cefepime. Imaging with Multifocal ground-glass opacities throughout the lungs bilaterally suspicious for multifocal pneumonia, Moderately sized bilateral pleural effusions with lower lobe atelectasis and Collapse of the superior aspect of the left upper lobe with parenchymal hyperdensity with abrupt occlusion of the left upper lobe bronchus. Underwent bedside bronch and  was found a necrotic tumor occluding the left upper lobe orifice. Lovenox was placed on hold.     24 Hour Interval History:  Still on CPAP. Scheduled for formal bronchoscopy on 2022 for diagnosis of suspected lung malignancy. Had to be placed on BIPAP last night currently on  40%, Nursing did not try to take off this Am.     Past Medical History:   Diagnosis Date    Arthritis     spine    CAD (coronary artery disease)     COPD (chronic obstructive pulmonary disease)     HLD (hyperlipidemia)     HTN (hypertension)     Ischemic cardiomyopathy     Mitral regurgitation     HEATHER on CPAP     Stroke     Venous insufficiency     Ventricular tachycardia        Past Surgical History:   Procedure Laterality Date    bilateral inguinal stents      CARDIAC DEFIBRILLATOR PLACEMENT Left     CATARACT EXTRACTION Bilateral     CATHETERIZATION OF BOTH LEFT AND RIGHT HEART  2022    Diagnostic Only; Dr. Akin PETERSEN      CORONARY ARTERY BYPASS GRAFT (CABG) N/A 2022    Procedure: CORONARY ARTERY BYPASS GRAFT (CABG);  Surgeon: Rowdy Davis IV, MD;  Location: [facility] OR;  Service: Cardiovascular;  Laterality: N/A;  possible MVR & LLAA  //  ECHO NOTIFIED    LEFT HEART CATHETERIZATION Left 2022    Procedure: CATHETERIZATION, HEART, LEFT;  Surgeon: Reinaldo Gerardo MD;  Location: [facility] CATH LAB;  Service: Cardiology;  Laterality: Left;  LHC +/- PCI    MITRAL VALVE REPLACEMENT N/A 2022    Procedure: REPLACEMENT, MITRAL VALVE;  Surgeon: Rowdy Davis IV, MD;  Location: [facility] OR;  Service: Cardiovascular;  Laterality: N/A;  possible MVR and LLAA    REMOVAL OF IMPLANTED CARDIOVERTER-DEFIBRILLATOR (ICD)      TONSILLECTOMY         Social History     Socioeconomic History    Marital status:    Tobacco Use    Smoking status: Former     Years: 55.00     Types: Cigarettes     Start date:      Quit date:      Years since quittin.6    Smokeless tobacco: Never   Substance and Sexual Activity     Alcohol use: Yes     Alcohol/week: 3.0 standard drinks     Types: 3 Glasses of wine per week    Drug use: Never           Current Outpatient Medications   Medication Instructions    amiodarone (PACERONE) 200 mg, Oral, 2 times daily    aspirin (ECOTRIN) 81 mg, Oral, Nightly    atorvastatin (LIPITOR) 80 mg, Oral, Nightly    clopidogreL (PLAVIX) 75 mg, Oral, Nightly    ezetimibe (ZETIA) 10 mg, Oral, Nightly    fluticasone propionate (FLONASE) 50 mcg/actuation nasal spray 1 spray, Each Nostril, Daily PRN    furosemide (LASIX) 20 mg, Oral, Daily    metoprolol succinate (TOPROL-XL) 25 MG 24 hr tablet 1 tablet, Oral, Daily    mometasone (ASMANEX TWISTHALER) 220 mcg/ actuation (30) inhaler 2 puffs, Inhalation, Daily, Controller    sacubitriL-valsartan (ENTRESTO) 49-51 mg per tablet 0.5 tablets, Oral, 2 times daily    spironolactone (ALDACTONE) 12.5 mg, Oral, Daily    umeclidinium-vilanteroL (ANORO ELLIPTA) 62.5-25 mcg/actuation DsDv 1 puff, Inhalation, Daily, Controller       Current Inpatient Medications   albuterol-ipratropium  3 mL Nebulization Q6H    amiodarone  200 mg Oral BID    [START ON 9/10/2022] amiodarone  200 mg Oral Daily    aspirin  81 mg Oral Daily    atorvastatin  80 mg Oral QHS    budesonide  0.5 mg Nebulization Q12H    ceFEPime (MAXIPIME) IVPB  2 g Intravenous Q8H    collagenase   Topical (Top) BID    docusate sodium  100 mg Oral BID    enoxaparin  1 mg/kg Subcutaneous Q12H    folic acid  1 mg Oral Daily    LIDOcaine HCl 2%  15 mL Mucous Membrane Once    loperamide  4 mg Oral Once    methylnaltrexone  12 mg Subcutaneous Every other day    metoclopramide HCl  5 mg Per NG tube Q8H    metoprolol tartrate  25 mg Per NG tube BID    midodrine  10 mg Oral Q8H    pantoprazole  40 mg Intravenous BID    polyethylene glycol  17 g Oral BID    sodium chloride 0.9%  10 mL Intravenous Q6H       Current Intravenous Infusions   amino acid 5 % in 15% dextrose 85 mL/hr at 09/06/22 1810         Review of Systems    Constitutional:  Positive for malaise/fatigue.   Respiratory:  Positive for cough, sputum production and shortness of breath.    Cardiovascular:  Positive for leg swelling.   Gastrointestinal: Negative.    Genitourinary: Negative.    Musculoskeletal: Negative.         Objective:       Intake/Output Summary (Last 24 hours) at 9/7/2022 0903  Last data filed at 9/7/2022 0800  Gross per 24 hour   Intake 1081 ml   Output 890 ml   Net 191 ml         Vital Signs (Most Recent):  Temp: 97.9 °F (36.6 °C) (09/07/22 0800)  Pulse: 60 (09/07/22 0817)  Resp: 20 (09/07/22 0817)  BP: 132/66 (09/07/22 0600)  SpO2: 99 % (09/07/22 0817)    Body mass index is 32.65 kg/m².  Weight: 100 kg (220 lb 7.4 oz) Vital Signs (24h Range):  Temp:  [97.8 °F (36.6 °C)-98.4 °F (36.9 °C)] 97.9 °F (36.6 °C)  Pulse:  [60-66] 60  Resp:  [13-23] 20  SpO2:  [84 %-99 %] 99 %  BP: (106-153)/(47-74) 132/66     Physical Exam  Constitutional:       Appearance: He is ill-appearing.   HENT:      Head:      Comments: CPAP in place   Cardiovascular:      Rate and Rhythm: Normal rate and regular rhythm.   Pulmonary:      Comments: Diminished over left   Abdominal:      Palpations: Abdomen is soft.      Comments: Round, hypoactive BS   Musculoskeletal:      Right lower leg: Edema present.      Left lower leg: Edema present.   Neurological:      Mental Status: He is alert and oriented to person, place, and time.   Psychiatric:         Mood and Affect: Mood normal.         Behavior: Behavior normal.       Lines/Drains/Airways       Peripherally Inserted Central Catheter Line  Duration             PICC Triple Lumen 08/28/22 1516 right brachial 9 days              Drain  Duration                  NG/OG Tube 09/01/22 1323 16 Fr. Left nostril 5 days         Urethral Catheter 09/01/22 1200 16 Fr. 5 days                    Significant Labs:    Lab Results   Component Value Date    WBC 21.6 (H) 09/07/2022    HGB 8.8 (L) 09/07/2022    HCT 29.0 (L) 09/07/2022    MCV 99.3 (H)  09/07/2022     09/07/2022         BMP  Lab Results   Component Value Date     09/07/2022    K 4.4 09/07/2022    CO2 27 09/07/2022    BUN 33.4 (H) 09/07/2022    CREATININE 0.81 09/07/2022    CALCIUM 8.4 (L) 09/07/2022    EGFRNONAA >60 05/14/2020       ABG  No results for input(s): PH, PO2, PCO2, HCO3, BE in the last 168 hours.    Significant Imaging:CT Chest Without Contrast  Narrative: EXAMINATION:  CT CHEST WITHOUT CONTRAST    CLINICAL HISTORY:  Abnormal xray - lung opacity/opacities;  Presence of prosthetic heart valve    TECHNIQUE:  Axial images of the chest were obtained without IV contrast administration.  Coronal and sagittal reconstructions were provided.  Three dimensional and MIP images were obtained and evaluated.  Total DLP was 257 mGy-cm. Dose lowering technique and automated exposure control were utilized for this exam.    COMPARISON:  Chest radiograph 09/03/2022.    FINDINGS:  The airway is widely patent.  There is no mediastinal or hilar lymphadenopathy.  There is a left upper chest pacing device.  There is a right upper extremity PICC.  There is a nasogastric tube in the distal esophagus.  There are postsurgical changes following median sternotomy.    There are moderately sized bilateral pleural effusions with lower lobe atelectasis.  There is complete collapse of the left upper lobe with hyperdensity throughout the left upper lobe parenchyma.  There are multifocal ground-glass opacities throughout the lungs bilaterally with severe centrilobular emphysematous change.  There is an abrupt cut off of the left upper lobe bronchus within the left suprahilar region.  There is an area of soft tissue thickening best visualized on series 8 images 162 through 166.    The upper abdomen demonstrates stones in the bilateral kidneys without acute abnormality.  There is no lytic or blastic osseous lesion.  Impression: 1. Multifocal ground-glass opacities throughout the lungs bilaterally suspicious for  multifocal pneumonia.  2. Moderately sized bilateral pleural effusions with lower lobe atelectasis.  3. Collapse of the superior aspect of the left upper lobe with parenchymal hyperdensity.  There is an abrupt occlusion of the left upper lobe bronchus as detailed above.  Bronchoscopy may be beneficial as an underlying obstructing malignancy cannot be entirely excluded.    Electronically signed by: Rich Narayanan MD  Date:    09/04/2022  Time:    12:21  X-Ray Abdomen AP 1 View  Narrative: EXAMINATION:  XR ABDOMEN AP 1 VIEW    CLINICAL HISTORY:  ileus;    TECHNIQUE:  AP X-RAY OF THE ABDOMEN:    CPT 50841    FINDINGS:  Examination reveals some residual feces throughout the colon gas pattern is nonspecific with no clear evidence of ileus or obstruction no abnormal masses or calcifications identified  Impression: Nonspecific gas pattern    Electronically signed by: Rodney Brice  Date:    09/04/2022  Time:    09:15         Assessment/Plan:     Assessment  severe COPD per ATS criteria (previous FEV1 was 41%)  Abnormal CT of chest s/p bedside bronch with findings of necrotic tumor.   SBO vs severe ielus   LUE DVT    CAD/MR s/p Bioprosthetic MVR and CABG x 2 on 8/22/22  History of VT   HF with EF 40%   Pseudomonas pneumonia    Plan  Bronch scheduled for this AM   Continue recs per GI and CV sugery   Continue Cefepime   Lovenox on hold for bronch this AM   Continue nebs and Pulmicort   Will give lasix today for worsening edema     Silvia Baxter, ANP  Pulmonary Critical Care Medicine  [facility] - [facility]

## 2022-09-07 NOTE — CONSULTS
Inpatient Nutrition Assessment    Admit Date: 8/22/2022   Total duration of encounter: 16 days     Nutrition Recommendation/Prescription     -Once medically feasibly, start EN.   TF recs:  Impact Peptide 1.5, goal rate of 60 mL/hr. At goal (based on 20 hr per day run time), TF will provide:  1800 kcal (111% est needs)  113 gm protein (100% est needs)  924 mL free water (57% est needs)  - Start with trickle feeds and advance per MD.   - Can wean TPN once pt is able to tolerate TF @ half goal rate.   TPN Recommendation:  Clinimix +E 5/15 @ 85ml/hr + 250ml 20% lipids IVPB qM,Th  Provides:  1590kcal (98% est needs)  102gm protein (94% est needs)  2040ml fluid (126% est needs)    Communication of Recommendations: reviewed with nurse    Nutrition Assessment     Malnutrition Assessment/Nutrition-Focused Physical Exam    Malnutrition in the context of acute illness or injury  Degree of Malnutrition: does not meet criteria  Energy Intake: does not meet criteria  Interpretation of Weight Loss: does not meet criteria  Body Fat:does not meet criteria  Area of Body Fat Loss: does not meet criteria  Muscle Mass Loss: does not meet criteria  Area of Muscle Mass Loss: does not meet criteria  Fluid Accumulation: mild  Edema: generalized   Reduced  Strength: unable to obtain  A minimum of two characteristics is recommended for diagnosis of either severe or non-severe malnutrition.    Chart Review    Reason Seen: physician consult and follow-up    Diagnosis:  #Small bowel obstruction vs severe ileus   #Acute kidney injury   #CAD s/p 2v CABG 08/22/2022  #Mitral regurgitation s/p bioprosthetic MVR 08/22/2022  #Heart failure with reduced ejection fraction    Relevant Medical History: [place], HLD, CAD, ischemic CM, MR, CVA, COPD, HEATHER, spinal arthritis     Nutrition-Related Medications: docusate, folic acid, furosemide  Calorie Containing IV Medications: TPN    Nutrition-Related Labs:  9/4 Cl 110, BUN 28.9, Glu 147  9/7: K 4.4, Glu 125,  "GFR>60, phos 2.9, Mag 2.1    Diet/PN Order: amino acid 5% in 15% dextrose (CLINIMIX-E) solution (1L provides 50gm AA, 150gm CHO (510 kcal/L dextrose), Na 35, K 30, Mg 5, Ca 4.5, Acetate 80, Cl 39, Phos 15) (710 total kcal/L)  Oral Supplement Order: none at this time  Tube Feeding Order: none at this time  Appetite/Oral Intake: not applicable/not applicable  Factors Affecting Nutritional Intake: NPO  Food/Adventism/Cultural Preferences: none reported    Skin Integrity: bruised (ecchymotic), wound (scattered bruising, sacrum wound)  Wound(s):      Altered Skin Integrity 08/26/22 0621 Buttocks-Tissue loss description: Partial thickness noted wound on buttocks    Comments    9/2/22: Noted diet recently advanced to liquids. NPO since 8/28/22. Will monitor for tolerance. If not able to tolerate and advance diet, will need to consider PN.  9/4/22: Pt now on TPN. Discussed with RN and Pharmacy.  9/7/22: Consult to start TF; Pt on BiPap; remains on TPN.     Anthropometrics    Height: 5' 8.9" (175 cm) Height Method: Stated  Last Weight: 100 kg (220 lb 7.4 oz) (09/04/22 1400) Weight Method: Bed Scale  BMI (Calculated): 32.7  BMI Classification: obese grade I (BMI 30-34.9)        Ideal Body Weight (IBW), Male: 159.4 lb  % Ideal Body Weight, Male (lb): 124.48 %                 Usual Weight Provided By: unable to obtain usual weight at this time    Wt Readings from Last 5 Encounters:   09/04/22 100 kg (220 lb 7.4 oz)   08/09/22 87.1 kg (192 lb)   08/02/22 87.9 kg (193 lb 12.6 oz)   11/01/21 98 kg (215 lb 15.8 oz)     Weight Change(s) Since Admission:  Admit Weight: 88 kg (194 lb) (08/17/22 1427)  9/2/22: 92.6kg  9/7/22: 100 kg    Estimated Needs    Weight Used For Calorie Calculations: 90 kg (198 lb 6.6 oz)  Energy Calorie Requirements (kcal): 1620 kcals (MSJ)  Energy Need Method: [facility]  Weight Used For Protein Calculations: 90 kg (198 lb 6.6 oz)  Protein Requirements: 108-180 g (1.2-2.0 g/kg CBW)  Fluid Requirements " (mL): 1620ml (1ml/kcal)  Temp: 97.9 °F (36.6 °C)       Enteral Nutrition    Patient not receiving enteral nutrition at this time.    Parenteral Nutrition    Standard Formula: Clinimix E 5/15  Custom Formula: not applicable  Additives: multivitamin with vitamin K and trace elements (Zn, Cu, Mn, Se)  Rate/Volume: 85ml/hr  Lipids: 250 ml 20% IV lipid emulsion twice weekly  Total Nutrition Provided by Parenteral Nutrition:  Calories Provided   1590 kcal/d, 98% needs   Protein Provided  102 g/d, 94% needs   Dextrose Provided  305   g/d, GIR 2.3 mg CHO/kg/min   Fluid Provided  2040 ml/d, 126% needs       Evaluation of Received Nutrient Intake    Calories: meeting estimated needs  Protein: meeting estimated needs    Patient Education    Not applicable.    Nutrition Diagnosis     PES: Inadequate oral intake related to current condition as evidenced by NPO/clear liquid since 8/22. (continues)    Interventions/Goals     Intervention(s): modified composition of enteral nutrition, modified rate of enteral nutrition, modified composition of parenteral nutrition, modified rate of parenteral nutrition, and collaboration with other providers  Goal: Meet greater than 75% of nutritional needs by follow-up. (goal progressing)    Monitoring & Evaluation     Dietitian will monitor energy intake.  Nutrition Risk/Follow-Up: moderate (follow-up in 3-5 days)

## 2022-09-08 NOTE — PROGRESS NOTES
[facility] - [facility]  Pulmonary Critical Care Note    Patient Name: Zackery Osullivan Jr.  MRN: [id number]  Admission Date: 8/22/2022  Hospital Length of Stay: 17 days  Code Status: Full Code  Attending Provider: Rowdy Davis IV, MD  Primary Care Provider: Isaiah Meeks MD     Subjective:     HPI:   This is a 77-year-old  male with past medical history of CAD, HTN, HLD, and HFrEF who presented to [facility] for a CABG x2 and mitral valve replacement. Received 2 units pRBCs intraop. Initially admitted to ICU for post-op monitoring.  Patient transferred out of ICU once extubated and hemodynamically stable off vasopressors on 8/25/22. On 8/27/22, patient reporting throat pain, nausea and constipation. GI consulted. During this time, patient also having low/normal blood pressures. Had 17 beat run of V-tach evening of 8/27/22 in which amiodarone IV started since patient unable to tolerate PO medications. Morning of 8/28/22, MAPs <65 despite fluid resuscitation. Patient upgraded to ICU 8/28/22 due to the need for vasopressor support and close monitoring. CT abdomen with ileus. Small bowel follow through on 8/30 with no obstruction but delayed transit.      Hospital Course/Significant events:  Pressors were weaned to off and he was downgrade on 9/3/2022; however on 9/6 pulmonary was reconsulted for desaturation and requiring CPAP usage. Imaging with concerns of persistent left upper lobe opacity, on admit Dr. Norman was concerned for potential mass. Sputum culture with Pseudomonas; on Cefepime. Imaging with Multifocal ground-glass opacities throughout the lungs bilaterally suspicious for multifocal pneumonia, Moderately sized bilateral pleural effusions with lower lobe atelectasis and Collapse of the superior aspect of the left upper lobe with parenchymal hyperdensity with abrupt occlusion of the left upper lobe bronchus. 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 Lovenox was placed on hold.     24 Hour Interval History:  He was placed on BiPAP yesterday and has remained. He is currently requiring 50% FiO2. He has been unable to undergo bronchoscopy for biopsy due to respiratory status. Patient reports he is feeling okay this morning despite requiring continuous BiPAP. Oxygen saturations are in the mid 90s.      Past Medical History:   Diagnosis Date    Arthritis     spine    CAD (coronary artery disease)     COPD (chronic obstructive pulmonary disease)     HLD (hyperlipidemia)     HTN (hypertension)     Ischemic cardiomyopathy     Mitral regurgitation     HEATHER on CPAP     Stroke     Venous insufficiency     Ventricular tachycardia        Past Surgical History:   Procedure Laterality Date    bilateral inguinal stents      CARDIAC DEFIBRILLATOR PLACEMENT Left     CATARACT EXTRACTION Bilateral     CATHETERIZATION OF BOTH LEFT AND RIGHT HEART  2022    Diagnostic Only; Dr. Gerardo    COLONOSCOPY      CORONARY ARTERY BYPASS GRAFT (CABG) N/A 2022    Procedure: CORONARY ARTERY BYPASS GRAFT (CABG);  Surgeon: Rowdy Davis IV, MD;  Location: [facility] OR;  Service: Cardiovascular;  Laterality: N/A;  possible MVR & LLAA  //  ECHO NOTIFIED    LEFT HEART CATHETERIZATION Left 2022    Procedure: CATHETERIZATION, HEART, LEFT;  Surgeon: Reinaldo Gerardo MD;  Location: [facility] CATH LAB;  Service: Cardiology;  Laterality: Left;  LHC +/- PCI    MITRAL VALVE REPLACEMENT N/A 2022    Procedure: REPLACEMENT, MITRAL VALVE;  Surgeon: Rowdy Davis IV, MD;  Location: [facility] OR;  Service: Cardiovascular;  Laterality: N/A;  possible MVR and LLAA    REMOVAL OF IMPLANTED CARDIOVERTER-DEFIBRILLATOR (ICD)      TONSILLECTOMY         Social History     Socioeconomic History    Marital status:    Tobacco Use    Smoking status: Former     Years: 55.00     Types: Cigarettes     Start date:      Quit date:      Years since quittin.6    Smokeless  tobacco: Never   Substance and Sexual Activity    Alcohol use: Yes     Alcohol/week: 3.0 standard drinks     Types: 3 Glasses of wine per week    Drug use: Never           Current Outpatient Medications   Medication Instructions    amiodarone (PACERONE) 200 mg, Oral, 2 times daily    aspirin (ECOTRIN) 81 mg, Oral, Nightly    atorvastatin (LIPITOR) 80 mg, Oral, Nightly    clopidogreL (PLAVIX) 75 mg, Oral, Nightly    ezetimibe (ZETIA) 10 mg, Oral, Nightly    fluticasone propionate (FLONASE) 50 mcg/actuation nasal spray 1 spray, Each Nostril, Daily PRN    furosemide (LASIX) 20 mg, Oral, Daily    metoprolol succinate (TOPROL-XL) 25 MG 24 hr tablet 1 tablet, Oral, Daily    mometasone (ASMANEX TWISTHALER) 220 mcg/ actuation (30) inhaler 2 puffs, Inhalation, Daily, Controller    sacubitriL-valsartan (ENTRESTO) 49-51 mg per tablet 0.5 tablets, Oral, 2 times daily    spironolactone (ALDACTONE) 12.5 mg, Oral, Daily    umeclidinium-vilanteroL (ANORO ELLIPTA) 62.5-25 mcg/actuation DsDv 1 puff, Inhalation, Daily, Controller       Current Inpatient Medications   albuterol-ipratropium  3 mL Nebulization Q6H    amiodarone  200 mg Oral BID    [START ON 9/10/2022] amiodarone  200 mg Oral Daily    aspirin  81 mg Oral Daily    atorvastatin  80 mg Oral QHS    budesonide  0.5 mg Nebulization Q12H    ceFEPime (MAXIPIME) IVPB  2 g Intravenous Q8H    collagenase   Topical (Top) BID    docusate sodium  100 mg Oral BID    enoxaparin  1 mg/kg Subcutaneous Q12H    folic acid  1 mg Oral Daily    loperamide  4 mg Oral Once    methylnaltrexone  12 mg Subcutaneous Every other day    metoclopramide HCl  5 mg Per NG tube Q8H    metoprolol tartrate  25 mg Per NG tube BID    midodrine  10 mg Oral Q8H    pantoprazole  40 mg Intravenous BID    polyethylene glycol  17 g Oral BID    sodium chloride 0.9%  10 mL Intravenous Q6H       Current Intravenous Infusions   amino acid 5 % in 15% dextrose 85 mL/hr at 09/07/22 1720         Review of Systems    Constitutional:  Positive for malaise/fatigue.   Respiratory:  Positive for cough, sputum production and shortness of breath.    Cardiovascular:  Positive for leg swelling.   Gastrointestinal: Negative.    Genitourinary: Negative.    Musculoskeletal: Negative.         Objective:       Intake/Output Summary (Last 24 hours) at 9/8/2022 0846  Last data filed at 9/8/2022 0500  Gross per 24 hour   Intake 0 ml   Output 1725 ml   Net -1725 ml         Vital Signs (Most Recent):  Temp: 97.5 °F (36.4 °C) (09/08/22 0726)  Pulse: 62 (09/08/22 0726)  Resp: 20 (09/08/22 0726)  BP: 133/74 (09/08/22 0726)  SpO2: (!) 94 % (09/08/22 0726)    Body mass index is 32.65 kg/m².  Weight: 100 kg (220 lb 7.4 oz) Vital Signs (24h Range):  Temp:  [97.3 °F (36.3 °C)-98 °F (36.7 °C)] 97.5 °F (36.4 °C)  Pulse:  [] 62  Resp:  [14-24] 20  SpO2:  [94 %-96 %] 94 %  BP: (109-147)/(57-79) 133/74     Physical Exam  Constitutional:       Appearance: He is ill-appearing.   HENT:      Head:      Comments: CPAP in place   Cardiovascular:      Rate and Rhythm: Normal rate and regular rhythm.   Pulmonary:      Comments: Diminished over left   Abdominal:      Palpations: Abdomen is soft.      Comments: Round, hypoactive BS   Musculoskeletal:      Right lower leg: Edema present.      Left lower leg: Edema present.   Neurological:      Mental Status: He is alert and oriented to person, place, and time.   Psychiatric:         Mood and Affect: Mood normal.         Behavior: Behavior normal.       Lines/Drains/Airways       Peripherally Inserted Central Catheter Line  Duration             PICC Triple Lumen 08/28/22 1516 right brachial 10 days              Drain  Duration                  NG/OG Tube 09/01/22 1323 16 Fr. Left nostril 6 days         Urethral Catheter 09/01/22 1200 16 Fr. 6 days                    Significant Labs:    Lab Results   Component Value Date    WBC 32.3 (H) 09/07/2022    HGB 9.6 (L) 09/07/2022    HCT 31.4 (L) 09/07/2022    MCV 98.7  (H) 09/07/2022     09/07/2022         BMP  Lab Results   Component Value Date     09/07/2022    K 5.0 09/07/2022    CO2 26 09/07/2022    BUN 35.5 (H) 09/07/2022    CREATININE 0.93 09/07/2022    CALCIUM 8.6 (L) 09/07/2022    EGFRNONAA >60 05/14/2020       ABG  No results for input(s): PH, PO2, PCO2, HCO3, BE in the last 168 hours.    Significant Imaging:X-Ray Chest 1 View  Narrative: EXAMINATION:  XR CHEST 1 VIEW    CLINICAL HISTORY:  kjjk;    TECHNIQUE:  One view.    COMPARISON:  September 3, 2022.    FINDINGS:  Cardiopericardial silhouette is within normal limits.  Sternotomy changes.  Supporting tubes and lines are in similar location.  Left chest implanted cardiac device electrodes terminate within the right atrium and the right ventricle.  Left upper lung lobe dense consolidation with masslike appearance is without interval difference.  Bilateral lungs patchy opacities which are relatively confluent within the right upper lung zone are again without significant interval difference.  Right lower lung zone calcified granuloma.  There are bilateral pleural effusions.  No pneumothorax.  Impression: No significant interval change.    Electronically signed by: Steven Duvall  Date:    09/07/2022  Time:    11:58         Assessment/Plan:     Assessment  Severe COPD per ATS criteria (previous FEV1 was 41%)  Abnormal CT of chest s/p bedside bronch with findings of necrotic tumor.   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   CAD/MR s/p Bioprosthetic MVR and CABG x 2 on 8/22/22  6.   History of VT   7.   HF with EF 40%   8.   Pseudomonas pneumonia    Plan  Continue recs per GI and CV sugery   Continue Cefepime (Day # 9)   Continue nebs and Pulmicort   Will hold off on formal bronchoscopy until respiratory status improves. High potential for possible re-upgrade to ICU if respiratory status declines further.    KIMBERLI Leos  Pulmonary Critical Care Medicine  [facility] - [facility]

## 2022-09-08 NOTE — PROGRESS NOTES
Spoke to nurse reguarding pt. Pt currently on bipap and attempting to wean for possible bronch today. Assisted nurse with repositioning of pt comfortably on side. Reinforced sternal precautions and incentive spirometer to pt and wife. Cardiac rehab deferred until improvement of respiratory status.

## 2022-09-08 NOTE — PT/OT/SLP PROGRESS
Physical Therapy      Patient Name:  Zackery Osullivan Jr.   MRN:  [id number]    Patient's SPO2 90-91% on BIPAP; RN reports his sats just dropped d/t rolling for his bed bath. Will hold mobility at this time.

## 2022-09-08 NOTE — PROGRESS NOTES
[facility]   Cardiology  Progress Note    Patient Name: Zackery Osullivan Jr.  MRN: [id number]  Admission Date: 8/22/2022  Hospital Length of Stay: 17 days  Code Status: Full Code   Attending Physician: Rowdy Davis IV, MD   Primary Care Physician: Isaiah Meeks MD  Expected Discharge Date:   Principal Problem:S/P MVR (mitral valve replacement)    Subjective:     Brief HPI:   Mr. Osullivan is a 77 year old male, known to Dr. Gerardo and Dr. Flowers, who presented to the hospital and underwent CAD/CABG and MVR due to diagnosis of MV CAD and significant MR. Also underwent LISA Ligation. Patient tolerated the surgery well, and was transferred to intensive care unit for further close post operative monitoring. [facility] is consulted for post op surgical cardiac management.    Hospital Course:   8.25.22: NAD noted. VSS with low normal BP. SR on tele. Denies SOB/Palps, +incisional CP.  8.26.22: NAD noted. VSS with low normal BP. SR on tele. Sitting in chair at bedside. Denies SOB/Palps, endorses incisional CP.  8.27.22: NAD Noted. Reports throat discomfort. SR on Tele. BP Low Normal.  8.28.22: NAD Noted. BP Low Normal. SR on Tele. GI Following. NSVT overnight.  SR 83.  8.29.22: NAD Noted. Patient with NG Tube. Surgical Team is following for evaluation of Ileus versus SBO. Hypotensive requiring low dose Levophed. SR on Tele. Amiodarone gtt is infusing. Started for NSVT, which he did not have last night according to the RN.  8.30.22: Remains NPO. Plans for small bowel follow through today  8.31.22: Patient underwent SBFT without evidence of SBO. He was given suppository with multiple BMs overnight. NGT has been removed and he is tolerating oral diet. Amiodarone drip in progress. Continues to require pressor support. He has been started on midodrine. Urine is dark. Patient is having elevated WBCs and has been started on ABX. Patient c/o pain/swelling to LUE  9.1.22: Still requiring pressor support. LUE + DVT.  "Lovenox has been restarted. Stool studies pending. Urine dark dania  9.2.22: Patient sitting up in bedside chair. He had NGT placed again due to   Abdominal pain/distension. KUB revealing persistent gaseous distension of abdomen with gas in loops of small and large intestine. Pressors have been weaned to off. Midodrine @ 10mg tid. AICD interrogated and settings adjustments made per iRx Reminder rep  9.3.22: NAD Noted. Sitting in chair. BIPAP in Place. SR on Tele. BP Low Normal.  9.4.22 VSS. Requiring Bipap support. SR pet tele.   9.5.22: NAD. VSS.  2 Episodes of NSVT yesterday. "I am ok." Denies CP, SOB and Palps.   9.6.22: NAD. VSS. No Further Episodes of NSVT. "I am feeling well." + SOB/BiPAP. S/p Bronchoscopy with Lung Mass Identification  9.7.22: NAD. "I am ok." Denies CP and Palps. Remains on BiPAP. Bronch Cancelled secondary to BiPAP Status/O2 Requirements.   9.8.22: NAD. Remains on Bipap. Bronch cancelled again today s/t Bipap use. Denies CP or palps. Pt.'s wife reports his skin keeps weeping & his gown is wet.     PMH: CAD (Multivessel), Ischemic Cardiomyopathy (ICD), VHD- MR, Hypertension, Hyperlipidemia, Chronic VI, COPD, Smoker, Obstructive Sleep Apnea, NSVT, Obesity  PSH: CABG/MVR, Skin Lesion, ICD Placement ([employer]), Tonsillectomy  Family History: Father- CAD, Brother- CAD, Sister- CAD  Social History: Tobacco- Former Smoker (Quit 3 Years Ago), Alcohol- Negative, Substance Abuse- Negative     Previous Cardiac Diagnostics:   TTE 09/01/22:  Difficult to accurately assess LVEF as patient was in Afib, RVR during acquisition of images. Limited study to assess LV function. Definity used. Estimated EF 30%. LV apex appears aneurysmal without LV thrombus.     Venous US LUE 08/31/22:  DVT to left axillary through proximal brachial veins.  A superficial vein thrombosis was identified in the left cephalic vein.   All other vessels compressed.     ECHO (8.28.22):  The left ventricle is mildly " enlarged with concentric hypertrophy and mildly reduced LV systolic function.  The estimated ejection fraction is 40%.  Indeterminate left ventricular diastolic function due to MVR.  Aneurysmal basal inferior wall/ RCA territory  Well seated bioprosthetic MV without significant stenosis or perivalvular leak. MG 4 mmHg.  Mild to moderate tricuspid regurgitation.  Mild left atrial enlargement.    CABG/MVR (8.22.22): MVR 29 mm Mosaic Porcine Valve; CABG LIMA to LAD & SVG to OM, LISA Ligation with Endo Stapler.     Left Heart Catheterization (8.2.22):  Left Main- 50% Distal Disease, LAD- 60% Proximal IFR 0.81, LCx- Nondominant (, Diagonal to OM Collateral), RCA- Dominant with , Bilateral Common Iliacs 30% Calcified Stenosis.     ECHO (4.18.22):  The study quality is average.   The left ventricle is normal in size. Global left ventricular systolic function is mildly decreased. The left ventricular ejection fraction is 40%. Left ventricular diastolic function is normal. Noted left ventricular hypertrophy. Asymmetric septal left ventricular hypertrophy is present. It is mild.   Mild to moderate (1-2+) mitral regurgitation. Somewhat eccentric jet noted, chordae appear hyperechoic and thickened.    Review of Systems   Constitutional: Positive for malaise/fatigue. Negative for fever.   Cardiovascular:  Negative for chest pain, claudication and dyspnea on exertion.   Respiratory:  Positive for shortness of breath.    All other systems reviewed and are negative.    Objective:     Vital Signs (Most Recent):  Temp: 97.5 °F (36.4 °C) (09/08/22 0726)  Pulse: 62 (09/08/22 0854)  Resp: 17 (09/08/22 0854)  BP: 133/74 (09/08/22 0726)  SpO2: (!) 94 % (09/08/22 0854) Vital Signs (24h Range):  Temp:  [97.3 °F (36.3 °C)-98 °F (36.7 °C)] 97.5 °F (36.4 °C)  Pulse:  [] 62  Resp:  [15-24] 17  SpO2:  [94 %-96 %] 94 %  BP: (109-147)/(57-79) 133/74     Weight: 100 kg (220 lb 7.4 oz)  Body mass index is 32.65 kg/m².    SpO2: (!) 94  %  O2 Device (Oxygen Therapy): BiPAP      Intake/Output Summary (Last 24 hours) at 9/8/2022 0946  Last data filed at 9/8/2022 0500  Gross per 24 hour   Intake 0 ml   Output 1725 ml   Net -1725 ml       Lines/Drains/Airways       Peripherally Inserted Central Catheter Line  Duration             PICC Triple Lumen 08/28/22 1516 right brachial 10 days              Drain  Duration                  NG/OG Tube 09/01/22 1323 16 Fr. Left nostril 6 days         Urethral Catheter 09/01/22 1200 16 Fr. 6 days                  Significant Labs:   CMP   Recent Labs   Lab 09/06/22  1512 09/07/22  0116 09/07/22  1450    139 139   K 4.3 4.4 5.0   CO2 27 27 26   BUN 34.1* 33.4* 35.5*   CREATININE 0.79 0.81 0.93   CALCIUM 7.9* 8.4* 8.6*   ALBUMIN 1.8* 1.6* 1.7*   BILITOT 0.8 0.7 0.8   ALKPHOS 94 97 110   AST 33 32 40*   ALT 47 43 42      and CBC   Recent Labs   Lab 09/07/22  0116 09/07/22  1450   WBC 21.6* 32.3*   HGB 8.8* 9.6*   HCT 29.0* 31.4*    131       Telemetry:  SR to ST    Physical Exam  Vitals reviewed.   Constitutional:       Appearance: Normal appearance.   HENT:      Head: Normocephalic.   Eyes:      Extraocular Movements: Extraocular movements intact.   Cardiovascular:      Rate and Rhythm: Normal rate and regular rhythm.      Heart sounds: Normal heart sounds.   Pulmonary:      Effort: Pulmonary effort is normal.      Breath sounds: Normal breath sounds.      Comments: BiPAP; Diminished breath sounds on left  Abdominal:      General: There is distension.   Musculoskeletal:         General: Normal range of motion.      Cervical back: Neck supple.      Right lower leg: Edema present.      Left lower leg: Edema present.   Skin:     General: Skin is warm and dry.      Capillary Refill: Capillary refill takes less than 2 seconds.      Comments: Weeping skin   Neurological:      General: No focal deficit present.      Mental Status: He is alert and oriented to person, place, and time. Mental status is at baseline.    Psychiatric:         Behavior: Behavior normal.     Current Inpatient Medications:    Current Facility-Administered Medications:     0.9%  NaCl infusion (for blood administration), , Intravenous, Q24H PRN, Rowdy Davis IV, MD    acetaminophen oral solution 650 mg, 650 mg, Per OG tube, Q6H PRN, Rowdy Davis IV, MD    albuterol-ipratropium 2.5 mg-0.5 mg/3 mL nebulizer solution 3 mL, 3 mL, Nebulization, Q6H, Marlon Humphreys MD, 3 mL at 09/08/22 0854    amino acid 5% in 15% dextrose (CLINIMIX-E) solution (1L provides 50gm AA, 150gm CHO (510 kcal/L dextrose), Na 35, K 30, Mg 5, Ca 4.5, Acetate 80, Cl 39, Phos 15) (710 total kcal/L), , Intravenous, Continuous, Rowdy Davis IV, MD, Last Rate: 85 mL/hr at 09/07/22 1720, New Bag at 09/07/22 1720    amiodarone tablet 200 mg, 200 mg, Oral, BID, LAYNE WaddellP, 200 mg at 09/07/22 2135    [START ON 9/10/2022] amiodarone tablet 200 mg, 200 mg, Oral, Daily, KIMBERLI Waddell    aspirin chewable tablet 81 mg, 81 mg, Oral, Daily, Rowdy Davis IV, MD    atorvastatin tablet 80 mg, 80 mg, Oral, QHS, Jeniffer Gale, FNP, 80 mg at 09/07/22 2134    budesonide nebulizer solution 0.5 mg, 0.5 mg, Nebulization, Q12H, Marlon Humphreys MD, 0.5 mg at 09/08/22 0854    calcium gluconate 1 g in NS IVPB (premixed), 1 g, Intravenous, PRN, Rowdy Davis IV, MD    calcium gluconate 1 g in NS IVPB (premixed), 3 g, Intravenous, PRN, Rowdy Davis IV, MD    ceFEPIme (MAXIPIME) 2 g in dextrose 5 % in water (D5W) 5 % 50 mL IVPB (MB+), 2 g, Intravenous, Q8H, Fidel Mckeon MD, Stopped at 09/08/22 0659    collagenase ointment, , Topical (Top), BID, Rowdy Davis IV, MD, Given at 09/07/22 0806    dextrose 10% bolus 250 mL, 25 g, Intravenous, PRN, Rowdy Davis IV, MD    dextrose 50% injection 12.5 g, 12.5 g, Intravenous, PRN, Rowdy Davis IV, MD, 12.5 g at 08/27/22 2206    docusate sodium capsule 100 mg, 100 mg, Oral, BID, Rowdy Davis IV, MD, 100 mg  at 09/07/22 2135    enoxaparin injection 90 mg, 1 mg/kg, Subcutaneous, Q12H, Fidel Mckeon MD, 90 mg at 09/08/22 0629    folic acid tablet 1 mg, 1 mg, Oral, Daily, Rowdy Davis IV, MD, 1 mg at 09/07/22 0929    hydrALAZINE injection 10 mg, 10 mg, Intravenous, Q6H PRN, NINA Alves, 10 mg at 09/05/22 1559    HYDROcodone-acetaminophen 5-325 mg per tablet 1 tablet, 1 tablet, Oral, Q4H PRN, Rowdy Davis IV, MD, 1 tablet at 09/06/22 1820    LIDOcaine HCL 4% external solution 4 mL, 4 mL, Topical (Top), PRN, ALEXSANDER Holm MD, 4 mL at 09/07/22 0807    LIDOcaine HCL 4% external solution 4 mL, 4 mL, Topical (Top), PRN, ALEXSANDER Holm MD    loperamide capsule 4 mg, 4 mg, Oral, Once, Rowdy Davis IV, MD    magnesium sulfate 2g in water 50mL IVPB (premix), 2 g, Intravenous, PRN, Rowdy Davis IV, MD    magnesium sulfate 2g in water 50mL IVPB (premix), 4 g, Intravenous, PRN, Rowdy Davis IV, MD    methylnaltrexone 12 mg/0.6 mL subcutaneous injection 12 mg, 12 mg, Subcutaneous, Every other day, MARTHA Limon, 12 mg at 09/06/22 0940    metoclopramide HCl 5 mg/5 mL solution 5 mg, 5 mg, Per NG tube, Q8H, Harshal Romero MD, 5 mg at 09/08/22 0629    metoprolol tartrate (LOPRESSOR) tablet 25 mg, 25 mg, Per NG tube, BID, MARTHA Hernández, 25 mg at 09/07/22 2134    midodrine tablet 10 mg, 10 mg, Oral, Q8H, Fidel Mckeon MD, 10 mg at 09/08/22 0629    morphine injection 2 mg, 2 mg, Intravenous, PRN, Rowdy Davis IV, MD, 2 mg at 09/07/22 0614    naloxone 0.4 mg/mL injection 0.4 mg, 0.4 mg, Intravenous, PRN, ALEXSNADER Holm MD, 0.4 mg at 09/05/22 1124    ondansetron injection 4 mg, 4 mg, Intravenous, Q12H PRN, Rowdy Davis IV, MD, 4 mg at 08/28/22 0307    oxyCODONE immediate release tablet 5 mg, 5 mg, Oral, Q4H PRN, Rowdy Davis IV, MD, 5 mg at 09/07/22 1519    pantoprazole injection 40 mg, 40 mg, Intravenous, BID, Isaiah Levine MD, 40 mg at 09/07/22 2135    polyethylene  glycol packet 17 g, 17 g, Oral, BID, Velia Shelton, ANP, 17 g at 09/07/22 2136    potassium chloride 20 mEq in 100 mL IVPB (FOR CENTRAL LINE ADMINISTRATION ONLY), 20 mEq, Intravenous, PRN, Rowdy Davis IV, MD    potassium chloride 20 mEq in 100 mL IVPB (FOR CENTRAL LINE ADMINISTRATION ONLY), 40 mEq, Intravenous, PRN, Rowdy Davis IV, MD    potassium chloride 20 mEq in 100 mL IVPB (FOR CENTRAL LINE ADMINISTRATION ONLY), 20 mEq, Intravenous, PRN, Rowdy Davis IV, MD    Flushing PICC Protocol, , , Until Discontinued **AND** sodium chloride 0.9% flush 10 mL, 10 mL, Intravenous, Q6H, 10 mL at 09/08/22 0629 **AND** [DISCONTINUED] sodium chloride 0.9% flush 10 mL, 10 mL, Intravenous, PRN, Rowdy Davis IV, MD    sodium phosphate 15 mmol in dextrose 5 % 250 mL IVPB, 15 mmol, Intravenous, PRN, Rowdy Davis IV, MD    sodium phosphate 20.01 mmol in dextrose 5 % 250 mL IVPB, 20.01 mmol, Intravenous, PRN, Rowdy Davis IV, MD    sodium phosphate 30 mmol in dextrose 5 % 250 mL IVPB, 30 mmol, Intravenous, PRN, Rowdy Davis IV, MD    Facility-Administered Medications Ordered in Other Encounters:     diphenhydrAMINE capsule 50 mg, 50 mg, Oral, On Call Procedure, Reinaldo Gerardo MD, 50 mg at 08/02/22 0739    sodium chloride 0.9% flush 10 mL, 10 mL, Intravenous, PRN, Reinaldo Gerardo MD        Assessment:   CAD (Multivessel) - Status Post CABG (LIMA to LAD, SVG to OM) (8.22.22)    - S/P LISA Ligation  Hypotension (Improved) - Off Pressors    -  H/O Hypertension   Valvular Heart Disease-  MR Status Post Bio MVR (#29 mm Mosaic Porcine) (8.22.22)    - Well seated bioprosthetic MV without significant stenosis or perivalvular leak. MG 4 mmHg.  Ischemic Cardiomyopathy EF 40% Status Post ICD ([employer])    - Aneurysmal basal inferior wall/ RCA territory--needs OAC  Newly Diagnosed Lung Mass (Bronch on 9.5.22)  NSVT    - History of VT    - On Amiodarone Outpatient  Ileus     - SBFT 8/30/22 negative for  SBO  C-Diff + (9/1/22) - PCR Negative    - NGT placed again on 9/1/22  DVT LUE    - Left axillary through proximal brachial veins. Superficial vein thrombus left cephalic vein.   Hypoxic - Improving - Less O2 Requirements  Positive Sputum Culture - Pseudomonas  Hyperlipidemia  Chronic Venous Insufficiency  Elevated BMI/Obesity  COPD  Former Smoker    - Quit 3 Years Ago  Anemia  Thrombocytopenia  Leukocytosis   Transaminitis  Unstageable sacral decubitus    Plan:   Antibiotics per ICU Team  Continue Amiodarone Tapered Dosing  Continue Statin and ASA  Continue Metoprolol Tartrate 25mg PO BID (NSVT) - Can Not Use XL secondary to Failure Swallow Study  Plans for Bronch/Lung Mass Biopsy once O2 Requirements Improve. Pt still on bipap.   Plans to start tube feedings and discontinue TPN. Lasix daily.   Pts Prognosis is Guarded    KIMBERLI Wiseman  Cardiology  [facility]   09/08/2022

## 2022-09-08 NOTE — PROGRESS NOTES
CT SURGERY PROGRESS NOTE  Zackery Osullivan   77 y.o.  1945    Patients Procedure: Procedure(s) (LRB):  CORONARY ARTERY BYPASS GRAFT (CABG) (N/A)  REPLACEMENT, MITRAL VALVE (N/A)    Subjective  Interval History: Patient in bed using BiPAP. Currently receiving TPN. Wife endorses that he his bed is constantly soaked.     Review of Systems   Constitutional: Negative.    Respiratory:  Positive for shortness of breath. Negative for cough and sputum production.    Cardiovascular:  Negative for chest pain, palpitations, claudication and leg swelling.   Gastrointestinal:  Negative for abdominal pain, nausea and vomiting.   Genitourinary: Negative.    Skin: Negative.         Incision C/D/I     Medication List  Infusions   amino acid 5 % in 15% dextrose 85 mL/hr at 09/07/22 1720     Scheduled   albuterol-ipratropium  3 mL Nebulization Q6H    amiodarone  200 mg Oral BID    [START ON 9/10/2022] amiodarone  200 mg Oral Daily    aspirin  81 mg Oral Daily    atorvastatin  80 mg Oral QHS    budesonide  0.5 mg Nebulization Q12H    ceFEPime (MAXIPIME) IVPB  2 g Intravenous Q8H    collagenase   Topical (Top) BID    docusate sodium  100 mg Oral BID    enoxaparin  1 mg/kg Subcutaneous Q12H    folic acid  1 mg Oral Daily    furosemide (LASIX) injection  40 mg Intravenous Once    furosemide (LASIX) injection  40 mg Intravenous Once    [START ON 9/9/2022] furosemide (LASIX) injection  40 mg Intravenous Daily    loperamide  4 mg Oral Once    metoclopramide HCl  5 mg Per NG tube Q8H    metoprolol tartrate  25 mg Per NG tube BID    midodrine  10 mg Oral Q8H    pantoprazole  40 mg Intravenous BID    polyethylene glycol  17 g Oral BID    sodium chloride 0.9%  10 mL Intravenous Q6H       Objective:  Recent Vitals:  Temp:  [97.3 °F (36.3 °C)-98 °F (36.7 °C)] 97.5 °F (36.4 °C)  Pulse:  [] 62  Resp:  [15-24] 16  SpO2:  [94 %-96 %] 94 %  BP: (109-147)/(57-79) 133/74    Physical Exam  Vitals and nursing note reviewed.   Constitutional:        General: He is awake. He is not in acute distress.     Appearance: Normal appearance. He is not toxic-appearing or diaphoretic.   HENT:      Head: Normocephalic and atraumatic.   Eyes:      Extraocular Movements: Extraocular movements intact.      Pupils: Pupils are equal, round, and reactive to light.   Cardiovascular:      Rate and Rhythm: Normal rate and regular rhythm.      Pulses: Normal pulses.           Radial pulses are 2+ on the right side and 2+ on the left side.        Dorsalis pedis pulses are 2+ on the right side and 2+ on the left side.      Heart sounds: Normal heart sounds.   Pulmonary:      Effort: Pulmonary effort is normal.      Breath sounds: Normal breath sounds.   Musculoskeletal:      Right lower leg: No edema.      Left lower leg: No edema.   Skin:     General: Skin is warm and dry.      Comments: INCISION C/D/I   Neurological:      General: No focal deficit present.      Mental Status: He is alert and oriented to person, place, and time.   Psychiatric:         Mood and Affect: Mood and affect normal.        I/O last 24 hrs:  Intake/Output - Last 3 Shifts         09/06 0700  09/07 0659 09/07 0700  09/08 0659 09/08 0700  09/09 0659    P.O.  0     I.V. (mL/kg) 79 (0.8)      IV Piggyback       TPN 1002      Total Intake(mL/kg) 1081 (10.8) 0 (0)     Urine (mL/kg/hr) 645 (0.3) 1850 (0.8)     Drains 120      Stool  0     Total Output 765 1850     Net +316 -1850            Stool Occurrence  0 x             Labs  BMP:   Recent Labs   Lab 09/08/22  0903      K 4.8   CO2 25   BUN 41.0*   CREATININE 1.05   CALCIUM 8.0*   MG 2.30       CBC:   Recent Labs   Lab 09/07/22  1450   WBC 32.3*   RBC 3.18*   HGB 9.6*   HCT 31.4*      MCV 98.7*   MCH 30.2   MCHC 30.6*       All pertinent labs from the last 24 hours have been reviewed.      Imaging:   CXR: No results found in the last 24 hours.  I have reviewed all pertinent imaging results/findings within the past 24  hours.        ASSESSMENT/PLAN:  - Wean BiPAP as able  - daily IV lasix  - Bronch pending improved respiration  - tube feeds  - IS/OOB/Ambulate as able    Case and plan of care discussed with MD Ovidio Pedersen PA-C

## 2022-09-09 NOTE — DISCHARGE SUMMARY
[facility] 3rd Floor Medical Telemetry  Cardiothoracic Surgery  Discharge Summary      Patient Name: Zackery Osullivan Jr.  MRN: [id number]  Admission Date: 8/22/2022  Hospital Length of Stay: 18 days  Discharge Date and Time: No discharge date for patient encounter.  Attending Physician: Rowdy Simeon IV, MD   Discharging Provider: Rich Azul PA-C  Primary Care Provider: Isaiah Meeks MD    HPI:   No notes on file    Procedure(s) (LRB):  CORONARY ARTERY BYPASS GRAFT (CABG) (N/A)  REPLACEMENT, MITRAL VALVE (N/A)      Indwelling Lines/Drains at time of discharge:   Lines/Drains/Airways     Peripherally Inserted Central Catheter Line  Duration           PICC Triple Lumen 08/28/22 1516 right brachial 12 days          Drain  Duration                NG/OG Tube 09/01/22 1323 16 Fr. Left nostril 8 days         Urethral Catheter 09/01/22 1200 16 Fr. 8 days              Hospital Course: No notes on file    Goals of Care Treatment Preferences:  Code Status: DNR    Living Will: Yes              Consults (From admission, onward)        Status Ordering Provider     Inpatient consult to Palliative Care  Once        Provider:  Mookie Reaves MD    Completed MILTON MARES     Inpatient consult to Registered Dietitian/Nutritionist  Once        Provider:  (Not yet assigned)    Completed GARRETT GARCIA     Inpatient consult to Respiratory Care  Once        Provider:  (Not yet assigned)    Acknowledged NATHEN CHILDERS     Inpatient consult to Pulmonology  Once        Provider:  ALEXSANDER Holm MD    Completed ROWDY SIMEON IV     Inpatient consult to Gastroenterology  Once        Provider:  Isaiah Levine MD    Completed ALEXSANDER HOLM     Inpatient consult to Wound Care Physician  Once        Provider:  Pilar Lipscomb    Acknowledged MARTY PARKS     Inpatient consult to General Surgery  Once        Provider:  Octavio Salvador MD    Acknowledged JOSE PARR      Inpatient consult to PICC team ([facility])  Once        Provider:  (Not yet assigned)    Acknowledged MARGI VAUGHAN     Inpatient consult to Gastroenterology  Once        Provider:  Isaiah Levine MD    Completed KACIE ALLEN     Inpatient consult to Midline team  Once        Provider:  (Not yet assigned)    Acknowledged JACK SIMEON IV     Inpatient consult to Social Work/Case Management  Once        Provider:  (Not yet assigned)    Completed JACK SIMEON IV     Inpatient consult to Cardiology  Once        Provider:  Reinaldo Gerardo MD    Completed JACK SIMEON IV     Inpatient consult to Cardiac Rehab  Once        Provider:  (Not yet assigned)    Acknowledged JACK SIMEON IV          Significant Diagnostic Studies:     Pending Diagnostic Studies:     Procedure Component Value Units Date/Time    FL Esophagram Complete [[id number]]     Order Status: Sent Lab Status: No result     X-Ray Chest 1 View [[id number]]     Order Status: Sent Lab Status: No result           No new Assessment & Plan notes have been filed under this hospital service since the last note was generated.  Service: Cardiothoracic Surgery    Final Active Diagnoses:    Diagnosis Date Noted POA    PRINCIPAL PROBLEM:  S/P MVR (mitral valve replacement) [Z95.2] 09/03/2022 Not Applicable    Pressure ulcer of buttock, unstageable [L89.300] 08/31/2022 Yes    Pressure injury of left buttock, unstageable [L89.320] 08/31/2022 Unknown    Left arm pain [M79.602] 08/31/2022 Unknown    Diarrhea [R19.7] 08/31/2022 Unknown    Sepsis [A41.9] 08/31/2022 Unknown      Problems Resolved During this Admission:      Discharged Condition: poor    Disposition: Hospice/Home    Follow Up:    Patient Instructions:   No discharge procedures on file.  Medications:  Reconciled Home Medications:      Medication List      START taking these medications    aspirin 81 MG Chew  Take 1 tablet (81 mg total) by mouth once daily.  Start taking on:  September 10, 2022  Replaces: aspirin 81 MG EC tablet     loperamide 2 mg capsule  Commonly known as: IMODIUM  Take 2 capsules (4 mg total) by mouth once. for 1 dose     metoprolol tartrate 25 MG tablet  Commonly known as: LOPRESSOR  Take 1 tablet (25 mg total) by mouth 2 (two) times daily.     midodrine 10 MG tablet  Commonly known as: PROAMATINE  Take 1 tablet (10 mg total) by mouth every 8 (eight) hours.        CHANGE how you take these medications    amiodarone 200 MG Tab  Commonly known as: PACERONE  Take 1 tablet (200 mg total) by mouth once daily.  Start taking on: September 10, 2022  What changed: when to take this        CONTINUE taking these medications    atorvastatin 80 MG tablet  Commonly known as: LIPITOR  Take 80 mg by mouth nightly.     furosemide 20 MG tablet  Commonly known as: LASIX  Take 20 mg by mouth once daily.        STOP taking these medications    aspirin 81 MG EC tablet  Commonly known as: ECOTRIN  Replaced by: aspirin 81 MG Chew        ASK your doctor about these medications    ANORO ELLIPTA 62.5-25 mcg/actuation Dsdv  Generic drug: umeclidinium-vilanteroL  Inhale 1 puff into the lungs once daily. Controller     clopidogreL 75 mg tablet  Commonly known as: PLAVIX  Take 75 mg by mouth nightly.     ezetimibe 10 mg tablet  Commonly known as: ZETIA  Take 10 mg by mouth nightly.     fluticasone propionate 50 mcg/actuation nasal spray  Commonly known as: FLONASE  1 spray by Each Nostril route daily as needed for Rhinitis.     metoprolol succinate 25 MG 24 hr tablet  Commonly known as: TOPROL-XL  Take 1 tablet by mouth once daily.     mometasone 220 mcg/ actuation (30) inhaler  Commonly known as: ASMANEX TWISTHALER  Inhale 2 puffs into the lungs once daily. Controller     sacubitriL-valsartan 49-51 mg per tablet  Commonly known as: ENTRESTO  Take 0.5 tablets by mouth 2 (two) times daily.     spironolactone 25 MG tablet  Commonly known as: ALDACTONE  Take 12.5 mg by mouth once daily.           Time spent on the discharge of patient: 30 minutes    Rich Azul PA-C  Cardiothoracic Surgery  [facility] - 3rd Floor Medical Telemetry

## 2022-09-09 NOTE — PROGRESS NOTES
[facility] - 7 South ICU  Pulmonary Critical Care Note    Patient Name: Zackery Osullivan Jr.  MRN: [id number]  Admission Date: 8/22/2022  Hospital Length of Stay: 18 days  Code Status: Full Code  Attending Provider: Rowdy Davis IV, MD  Primary Care Provider: Isaiah Meeks MD     Subjective:     HPI:   This is a 77-year-old  male with past medical history of CAD, HTN, HLD, and HFrEF [facility] and mitral valve replacement. Received 2 units pRBCs intraop. Initially admitted to ICU for post-op monitoring.  Patient transferred out of ICU once extubated and hemodynamically stable off vasopressors on 8/25/22. On 8/27/22, patient reporting throat pain, nausea and constipation. GI consulted. During this time, patient also having low/normal blood pressures. Had 17 beat run of V-tach evening of 8/27/22 in which amiodarone IV started since patient unable to tolerate PO medications. Morning of 8/28/22, MAPs <65 despite fluid resuscitation. Patient upgraded to ICU 8/28/22 due to the need for vasopressor support and close monitoring. CT abdomen with ileus. Small bowel follow through on 8/30 with no obstruction but delayed transit.      Hospital Course/Significant events:  Pressors were weaned to off and he was downgrade on 9/3/2022; however on 9/6 pulmonary was reconsulted for desaturation and requiring CPAP usage. Imaging with concerns of persistent left upper lobe opacity, on admit Dr. Norman was concerned for potential mass. Sputum culture with Pseudomonas; on Cefepime. Imaging with Multifocal ground-glass opacities throughout the lungs bilaterally suspicious for multifocal pneumonia, Moderately sized bilateral pleural effusions with lower lobe atelectasis and Collapse of the superior aspect of the left upper lobe with parenchymal hyperdensity with abrupt occlusion of the left upper lobe bronchus. 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 Lovenox was placed on hold.     24 Hour Interval History:  Biopsy of lung mass has been on hold for worsening oxygenation. He is currently on 65% FIO2 via BiPAP and appears very weak.       Past Medical History:   Diagnosis Date    Arthritis     spine    CAD (coronary artery disease)     COPD (chronic obstructive pulmonary disease)     HLD (hyperlipidemia)     HTN (hypertension)     Ischemic cardiomyopathy     Mitral regurgitation     HEATHER on CPAP     Stroke     Venous insufficiency     Ventricular tachycardia        Past Surgical History:   Procedure Laterality Date    bilateral inguinal stents      CARDIAC DEFIBRILLATOR PLACEMENT Left     CATARACT EXTRACTION Bilateral     CATHETERIZATION OF BOTH LEFT AND RIGHT HEART  2022    Diagnostic Only; Dr. Gerardo    COLONOSCOPY      CORONARY ARTERY BYPASS GRAFT (CABG) N/A 2022    Procedure: CORONARY ARTERY BYPASS GRAFT (CABG);  Surgeon: Rowdy Davis IV, MD;  Location: [facility] OR;  Service: Cardiovascular;  Laterality: N/A;  possible MVR & LLAA  //  ECHO NOTIFIED    LEFT HEART CATHETERIZATION Left 2022    Procedure: CATHETERIZATION, HEART, LEFT;  Surgeon: Reinaldo Gerardo MD;  Location: [facility] CATH LAB;  Service: Cardiology;  Laterality: Left;  C +/- PCI    MITRAL VALVE REPLACEMENT N/A 2022    Procedure: REPLACEMENT, MITRAL VALVE;  Surgeon: Rowdy Davis IV, MD;  Location: [facility] OR;  Service: Cardiovascular;  Laterality: N/A;  possible MVR and LLAA    REMOVAL OF IMPLANTED CARDIOVERTER-DEFIBRILLATOR (ICD)      TONSILLECTOMY         Social History     Socioeconomic History    Marital status:    Tobacco Use    Smoking status: Former     Years: 55.00     Types: Cigarettes     Start date:      Quit date:      Years since quittin.6    Smokeless tobacco: Never   Substance and Sexual Activity    Alcohol use: Yes     Alcohol/week: 3.0 standard drinks     Types: 3 Glasses of wine per week    Drug use: Never            Current Outpatient Medications   Medication Instructions    amiodarone (PACERONE) 200 mg, Oral, 2 times daily    aspirin (ECOTRIN) 81 mg, Oral, Nightly    atorvastatin (LIPITOR) 80 mg, Oral, Nightly    clopidogreL (PLAVIX) 75 mg, Oral, Nightly    ezetimibe (ZETIA) 10 mg, Oral, Nightly    fluticasone propionate (FLONASE) 50 mcg/actuation nasal spray 1 spray, Each Nostril, Daily PRN    furosemide (LASIX) 20 mg, Oral, Daily    metoprolol succinate (TOPROL-XL) 25 MG 24 hr tablet 1 tablet, Oral, Daily    mometasone (ASMANEX TWISTHALER) 220 mcg/ actuation (30) inhaler 2 puffs, Inhalation, Daily, Controller    sacubitriL-valsartan (ENTRESTO) 49-51 mg per tablet 0.5 tablets, Oral, 2 times daily    spironolactone (ALDACTONE) 12.5 mg, Oral, Daily    umeclidinium-vilanteroL (ANORO ELLIPTA) 62.5-25 mcg/actuation DsDv 1 puff, Inhalation, Daily, Controller       Current Inpatient Medications   albuterol-ipratropium  3 mL Nebulization Q6H    amiodarone  200 mg Oral BID    [START ON 9/10/2022] amiodarone  200 mg Oral Daily    aspirin  81 mg Oral Daily    atorvastatin  80 mg Oral QHS    budesonide  0.5 mg Nebulization Q12H    ceFEPime (MAXIPIME) IVPB  2 g Intravenous Q8H    collagenase   Topical (Top) BID    docusate sodium  100 mg Oral BID    enoxaparin  1 mg/kg Subcutaneous Q12H    folic acid  1 mg Oral Daily    furosemide (LASIX) injection  40 mg Intravenous Daily    loperamide  4 mg Oral Once    metoclopramide HCl  5 mg Per NG tube Q8H    metoprolol tartrate  25 mg Per NG tube BID    midodrine  10 mg Oral Q8H    pantoprazole  40 mg Intravenous BID    polyethylene glycol  17 g Oral BID    sodium chloride 0.9%  10 mL Intravenous Q6H       Current Intravenous Infusions          Review of Systems   Constitutional:  Positive for malaise/fatigue.   Respiratory:  Positive for cough, sputum production and shortness of breath.    Cardiovascular:  Positive for leg swelling.   Gastrointestinal: Negative.    Genitourinary:  Negative.    Musculoskeletal: Negative.         Objective:       Intake/Output Summary (Last 24 hours) at 9/9/2022 0904  Last data filed at 9/9/2022 0500  Gross per 24 hour   Intake 0 ml   Output 1375 ml   Net -1375 ml         Vital Signs (Most Recent):  Temp: 97.5 °F (36.4 °C) (09/09/22 0738)  Pulse: 66 (09/09/22 0819)  Resp: 17 (09/09/22 0819)  BP: (!) 101/45 (09/09/22 0738)  SpO2: (!) 93 % (09/09/22 0819)    Body mass index is 32.65 kg/m².  Weight: 100 kg (220 lb 7.4 oz) Vital Signs (24h Range):  Temp:  [96.9 °F (36.1 °C)-98.2 °F (36.8 °C)] 97.5 °F (36.4 °C)  Pulse:  [60-90] 66  Resp:  [16-22] 17  SpO2:  [65 %-98 %] 93 %  BP: (101-133)/(45-76) 101/45     Physical Exam  Constitutional:       Appearance: He is ill-appearing.   HENT:      Head:      Comments: CPAP in place   Cardiovascular:      Rate and Rhythm: Normal rate and regular rhythm.   Pulmonary:      Comments: Diminished over left   Abdominal:      Palpations: Abdomen is soft.      Comments: Round, hypoactive BS   Musculoskeletal:      Right lower leg: Edema present.      Left lower leg: Edema present.   Neurological:      Mental Status: He is alert and oriented to person, place, and time.   Psychiatric:         Mood and Affect: Mood normal.         Behavior: Behavior normal.       Lines/Drains/Airways       Peripherally Inserted Central Catheter Line  Duration             PICC Triple Lumen 08/28/22 1516 right brachial 11 days              Drain  Duration                  NG/OG Tube 09/01/22 1323 16 Fr. Left nostril 7 days         Urethral Catheter 09/01/22 1200 16 Fr. 7 days                    Significant Labs:    Lab Results   Component Value Date    WBC 24.5 (H) 09/09/2022    HGB 9.1 (L) 09/09/2022    HCT 30.1 (L) 09/09/2022    .0 (H) 09/09/2022     (L) 09/09/2022         BMP  Lab Results   Component Value Date     09/09/2022    K 5.1 09/09/2022    CO2 23 09/09/2022    BUN 55.0 (H) 09/09/2022    CREATININE 1.60 (H) 09/09/2022     CALCIUM 8.5 (L) 09/09/2022    EGFRNONAA >60 05/14/2020       ABG  No results for input(s): PH, PO2, PCO2, HCO3, BE in the last 168 hours.    Significant Imaging:X-Ray Chest 1 View  Narrative: EXAMINATION:  XR CHEST 1 VIEW    CLINICAL HISTORY:  catalino;    TECHNIQUE:  One view.    COMPARISON:  September 3, 2022.    FINDINGS:  Cardiopericardial silhouette is within normal limits.  Sternotomy changes.  Supporting tubes and lines are in similar location.  Left chest implanted cardiac device electrodes terminate within the right atrium and the right ventricle.  Left upper lung lobe dense consolidation with masslike appearance is without interval difference.  Bilateral lungs patchy opacities which are relatively confluent within the right upper lung zone are again without significant interval difference.  Right lower lung zone calcified granuloma.  There are bilateral pleural effusions.  No pneumothorax.  Impression: No significant interval change.    Electronically signed by: Steven Duvall  Date:    09/07/2022  Time:    11:58         Assessment/Plan:     Assessment  Severe COPD per ATS criteria (previous FEV1 was 41%)  Abnormal CT of chest s/p bedside bronch with findings of necrotic tumor not biopsied due to anticoagulation  SBO vs severe ielus, resolved  LUE DVT    5.   CAD/MR s/p Bioprosthetic MVR and CABG x 2 on 8/22/22  6.   History of VT   7.   HF with EF 40%   8.   Pseudomonas pneumonia    Plan  Continue recs per GI and CV sugery   Continue Cefepime (Day # 10) for 14 days  Continue nebs and Pulmicort   Will hold off on formal bronchoscopy until respiratory status improves.   Long discussion with patients wife at bedside, he will now be a DNR, if condition continues to deteriorate they may consider comfort measures but we will see how he does over the next day or so.     Charline Perez, LAYNEP  Pulmonary Critical Care Medicine  [facility] - [facility] ICU

## 2022-09-09 NOTE — PLAN OF CARE
Nurse states pt is now a DNR. Wife asked to speak with me reg POC. I met with wife,Lia. She is inquiring about dc options and req information about Hospice. Educated her on hospice options including , home with  hospice, hospice house and nh with hospice. She states it would have to be one of the hosice homes. We discussed informational visit and she is recptive but wants to speak with the Pulmonologist first. She is in agreement with me sending ref. Ref sent to [facility] and [facility] through [facility]. Both informed of above and that I will contact them once PUL sees pt.

## 2022-09-09 NOTE — PROGRESS NOTES
[facility]   Cardiology  Progress Note    Patient Name: Zackery Osullivan Jr.  MRN: [id number]  Admission Date: 8/22/2022  Hospital Length of Stay: 18 days  Code Status: DNR   Attending Physician: Rowdy Davis IV, MD   Primary Care Physician: Isaiah Meeks MD  Expected Discharge Date:   Principal Problem:S/P MVR (mitral valve replacement)    Subjective:     Brief HPI:   Mr. Osullivan is a 77 year old male, known to Dr. Gerardo and Dr. Flowers, who presented to the hospital and underwent CAD/CABG and MVR due to diagnosis of MV CAD and significant MR. Also underwent LISA Ligation. Patient tolerated the surgery well, and was transferred to intensive care unit for further close post operative monitoring. [facility] is consulted for post op surgical cardiac management.    Hospital Course:   8.25.22: NAD noted. VSS with low normal BP. SR on tele. Denies SOB/Palps, +incisional CP.  8.26.22: NAD noted. VSS with low normal BP. SR on tele. Sitting in chair at bedside. Denies SOB/Palps, endorses incisional CP.  8.27.22: NAD Noted. Reports throat discomfort. SR on Tele. BP Low Normal.  8.28.22: NAD Noted. BP Low Normal. SR on Tele. GI Following. NSVT overnight.  SR 83.  8.29.22: NAD Noted. Patient with NG Tube. Surgical Team is following for evaluation of Ileus versus SBO. Hypotensive requiring low dose Levophed. SR on Tele. Amiodarone gtt is infusing. Started for NSVT, which he did not have last night according to the RN.  8.30.22: Remains NPO. Plans for small bowel follow through today  8.31.22: Patient underwent SBFT without evidence of SBO. He was given suppository with multiple BMs overnight. NGT has been removed and he is tolerating oral diet. Amiodarone drip in progress. Continues to require pressor support. He has been started on midodrine. Urine is dark. Patient is having elevated WBCs and has been started on ABX. Patient c/o pain/swelling to LUE  9.1.22: Still requiring pressor support. LUE + DVT. Lovenox  "has been restarted. Stool studies pending. Urine dark dania  9.2.22: Patient sitting up in bedside chair. He had NGT placed again due to   Abdominal pain/distension. KUB revealing persistent gaseous distension of abdomen with gas in loops of small and large intestine. Pressors have been weaned to off. Midodrine @ 10mg tid. AICD interrogated and settings adjustments made per [employer] rep  9.3.22: NAD Noted. Sitting in chair. BIPAP in Place. SR on Tele. BP Low Normal.  9.4.22 VSS. Requiring Bipap support. SR pet tele.   9.5.22: NAD. VSS.  2 Episodes of NSVT yesterday. "I am ok." Denies CP, SOB and Palps.   9.6.22: NAD. VSS. No Further Episodes of NSVT. "I am feeling well." + SOB/BiPAP. S/p Bronchoscopy with Lung Mass Identification  9.7.22: NAD. "I am ok." Denies CP and Palps. Remains on BiPAP. Bronch Cancelled secondary to BiPAP Status/O2 Requirements.   9.8.22: NAD. Remains on Bipap. Bronch cancelled again today s/t Bipap use. Denies CP or palps. Pt.'s wife reports his skin keeps weeping & his gown is wet.   9.9.22: Patient resting on bipap. He remain edematous.     PMH: CAD (Multivessel), Ischemic Cardiomyopathy (ICD), VHD- MR, Hypertension, Hyperlipidemia, Chronic VI, COPD, Smoker, Obstructive Sleep Apnea, NSVT, Obesity  PSH: CABG/MVR, Skin Lesion, ICD Placement ([employer]), Tonsillectomy  Family History: Father- CAD, Brother- CAD, Sister- CAD  Social History: Tobacco- Former Smoker (Quit 3 Years Ago), Alcohol- Negative, Substance Abuse- Negative     Previous Cardiac Diagnostics:   TTE 09/01/22:  Difficult to accurately assess LVEF as patient was in Afib, RVR during acquisition of images. Limited study to assess LV function. Definity used. Estimated EF 30%. LV apex appears aneurysmal without LV thrombus.     Venous US LUE 08/31/22:  DVT to left axillary through proximal brachial veins.  A superficial vein thrombosis was identified in the left cephalic vein.   All other vessels compressed.     ECHO " (8.28.22):  The left ventricle is mildly enlarged with concentric hypertrophy and mildly reduced LV systolic function.  The estimated ejection fraction is 40%.  Indeterminate left ventricular diastolic function due to MVR.  Aneurysmal basal inferior wall/ RCA territory  Well seated bioprosthetic MV without significant stenosis or perivalvular leak. MG 4 mmHg.  Mild to moderate tricuspid regurgitation.  Mild left atrial enlargement.    CABG/MVR (8.22.22): MVR 29 mm Mosaic Porcine Valve; CABG LIMA to LAD & SVG to OM, LISA Ligation with Endo Stapler.     Left Heart Catheterization (8.2.22):  Left Main- 50% Distal Disease, LAD- 60% Proximal IFR 0.81, LCx- Nondominant (, Diagonal to OM Collateral), RCA- Dominant with , Bilateral Common Iliacs 30% Calcified Stenosis.     ECHO (4.18.22):  The study quality is average.   The left ventricle is normal in size. Global left ventricular systolic function is mildly decreased. The left ventricular ejection fraction is 40%. Left ventricular diastolic function is normal. Noted left ventricular hypertrophy. Asymmetric septal left ventricular hypertrophy is present. It is mild.   Mild to moderate (1-2+) mitral regurgitation. Somewhat eccentric jet noted, chordae appear hyperechoic and thickened.    Review of Systems   Unable to perform ROS: Acuity of condition     Objective:     Vital Signs (Most Recent):  Temp: 97.5 °F (36.4 °C) (09/09/22 0738)  Pulse: 66 (09/09/22 0950)  Resp: 17 (09/09/22 0819)  BP: (!) 101/45 (09/09/22 0950)  SpO2: (!) 93 % (09/09/22 0819) Vital Signs (24h Range):  Temp:  [96.9 °F (36.1 °C)-98.2 °F (36.8 °C)] 97.5 °F (36.4 °C)  Pulse:  [60-90] 66  Resp:  [16-22] 17  SpO2:  [65 %-98 %] 93 %  BP: (101-123)/(45-76) 101/45     Weight: 100 kg (220 lb 7.4 oz)  Body mass index is 32.65 kg/m².    SpO2: (!) 93 %  O2 Device (Oxygen Therapy): BiPAP      Intake/Output Summary (Last 24 hours) at 9/9/2022 1042  Last data filed at 9/9/2022 0500  Gross per 24 hour   Intake 0  ml   Output 1375 ml   Net -1375 ml       Lines/Drains/Airways       Peripherally Inserted Central Catheter Line  Duration             PICC Triple Lumen 08/28/22 1516 right brachial 11 days              Drain  Duration                  NG/OG Tube 09/01/22 1323 16 Fr. Left nostril 7 days         Urethral Catheter 09/01/22 1200 16 Fr. 7 days                  Significant Labs:   CMP   Recent Labs   Lab 09/07/22  1450 09/08/22  0903 09/09/22  0411    139 139   K 5.0 4.8 5.1   CO2 26 25 23   BUN 35.5* 41.0* 55.0*   CREATININE 0.93 1.05 1.60*   CALCIUM 8.6* 8.0* 8.5*   ALBUMIN 1.7* 1.6*  --    BILITOT 0.8 0.7  --    ALKPHOS 110 115  --    AST 40* 32  --    ALT 42 36  --       and CBC   Recent Labs   Lab 09/07/22  1450 09/09/22  0411   WBC 32.3* 24.5*   HGB 9.6* 9.1*   HCT 31.4* 30.1*    123*       Telemetry:  SR to ST    Physical Exam  Vitals reviewed.   Constitutional:       Appearance: Normal appearance. He is obese. He is ill-appearing.   HENT:      Head: Normocephalic.   Eyes:      Extraocular Movements: Extraocular movements intact.   Cardiovascular:      Rate and Rhythm: Normal rate and regular rhythm.      Heart sounds: Normal heart sounds.   Pulmonary:      Effort: Pulmonary effort is normal.      Comments: BiPAP; Diminished breath sounds on left  Abdominal:      General: There is distension.   Musculoskeletal:         General: Normal range of motion.      Cervical back: Neck supple.      Right lower leg: Edema present.      Left lower leg: Edema present.   Skin:     General: Skin is warm and dry.      Capillary Refill: Capillary refill takes less than 2 seconds.      Comments: Weeping skin  Midline sternal incision JESSICA. Scabbed areas along incision line   Neurological:      General: No focal deficit present.      Mental Status: He is oriented to person, place, and time. Mental status is at baseline.   Psychiatric:         Behavior: Behavior normal.     Current Inpatient Medications:    Current  Facility-Administered Medications:     0.9%  NaCl infusion (for blood administration), , Intravenous, Q24H PRN, Rowdy Davis IV, MD    acetaminophen oral solution 650 mg, 650 mg, Per OG tube, Q6H PRN, Rowdy Davis IV, MD    albuterol-ipratropium 2.5 mg-0.5 mg/3 mL nebulizer solution 3 mL, 3 mL, Nebulization, Q6H, Marlon Humphreys MD, 3 mL at 09/09/22 0819    amiodarone tablet 200 mg, 200 mg, Oral, BID, KIMBERLI Waddell, 200 mg at 09/09/22 0950    [START ON 9/10/2022] amiodarone tablet 200 mg, 200 mg, Oral, Daily, KIMBERLI Waddell    aspirin chewable tablet 81 mg, 81 mg, Oral, Daily, Rowdy Davis IV, MD, 81 mg at 09/09/22 0949    atorvastatin tablet 80 mg, 80 mg, Oral, QHS, KIMBERLI Pratt, 80 mg at 09/08/22 2147    budesonide nebulizer solution 0.5 mg, 0.5 mg, Nebulization, Q12H, Marlon Humphreys MD, 0.5 mg at 09/09/22 0819    calcium gluconate 1 g in NS IVPB (premixed), 1 g, Intravenous, PRN, Rowdy Davis IV, MD    calcium gluconate 1 g in NS IVPB (premixed), 3 g, Intravenous, PRN, Rowdy Davis IV, MD    ceFEPIme (MAXIPIME) 2 g in dextrose 5 % in water (D5W) 5 % 50 mL IVPB (MB+), 2 g, Intravenous, Q8H, Fidel Mckeon MD, Stopped at 09/09/22 0136    collagenase ointment, , Topical (Top), BID, Rowdy Davis IV, MD, Given at 09/09/22 0950    dextrose 10% bolus 250 mL, 25 g, Intravenous, PRN, Rowdy Davis IV, MD    dextrose 50% injection 12.5 g, 12.5 g, Intravenous, PRN, Rowdy Davis IV, MD, 12.5 g at 08/27/22 2206    docusate sodium capsule 100 mg, 100 mg, Oral, BID, Rowdy Davis IV, MD, 100 mg at 09/09/22 0949    enoxaparin injection 90 mg, 1 mg/kg, Subcutaneous, Q12H, Fidel Mckeon MD, 90 mg at 09/09/22 0627    folic acid tablet 1 mg, 1 mg, Oral, Daily, Rowdy Davis IV, MD, 1 mg at 09/09/22 0949    furosemide injection 40 mg, 40 mg, Intravenous, Daily, Rowdy Davis IV, MD, 40 mg at 09/09/22 0950    hydrALAZINE injection 10 mg, 10 mg,  Intravenous, Q6H PRN, NINA Alves, 10 mg at 09/05/22 1559    HYDROcodone-acetaminophen 5-325 mg per tablet 1 tablet, 1 tablet, Oral, Q4H PRN, Rowdy Davis IV, MD, 1 tablet at 09/06/22 1820    LIDOcaine HCL 4% external solution 4 mL, 4 mL, Topical (Top), PRN, ALEXSANDER Holm MD, 4 mL at 09/07/22 0807    LIDOcaine HCL 4% external solution 4 mL, 4 mL, Topical (Top), PRN, ALEXSANDER Holm MD    loperamide capsule 4 mg, 4 mg, Oral, Once, Rowdy Davis IV, MD    magnesium sulfate 2g in water 50mL IVPB (premix), 2 g, Intravenous, PRN, Rowdy Davis IV, MD    magnesium sulfate 2g in water 50mL IVPB (premix), 4 g, Intravenous, PRN, Rowdy Davis IV, MD    metoclopramide HCl 5 mg/5 mL solution 5 mg, 5 mg, Per NG tube, Q8H, Harshal Romero MD, 5 mg at 09/09/22 0628    metoprolol tartrate (LOPRESSOR) tablet 25 mg, 25 mg, Per NG tube, BID, MARTHA Hernández, 25 mg at 09/09/22 0950    midodrine tablet 10 mg, 10 mg, Oral, Q8H, Fidel Mckeon MD, 10 mg at 09/09/22 0627    morphine injection 2 mg, 2 mg, Intravenous, PRN, Rowdy Davis IV, MD, 2 mg at 09/07/22 0614    naloxone 0.4 mg/mL injection 0.4 mg, 0.4 mg, Intravenous, PRN, ALEXSANDER Holm MD, 0.4 mg at 09/05/22 1124    ondansetron injection 4 mg, 4 mg, Intravenous, Q12H PRN, Rowdy Davis IV, MD, 4 mg at 08/28/22 0307    oxyCODONE immediate release tablet 5 mg, 5 mg, Oral, Q4H PRN, Rowdy Davis IV, MD, 5 mg at 09/08/22 1008    pantoprazole injection 40 mg, 40 mg, Intravenous, BID, Isaiah Levine MD, 40 mg at 09/09/22 0950    polyethylene glycol packet 17 g, 17 g, Oral, BID, MARTHA Limon, 17 g at 09/09/22 0950    potassium chloride 20 mEq in 100 mL IVPB (FOR CENTRAL LINE ADMINISTRATION ONLY), 20 mEq, Intravenous, PRN, Rowdy Davis IV, MD    potassium chloride 20 mEq in 100 mL IVPB (FOR CENTRAL LINE ADMINISTRATION ONLY), 40 mEq, Intravenous, PRN, Rowdy Davis IV, MD    potassium chloride 20 mEq in 100 mL IVPB (FOR CENTRAL  LINE ADMINISTRATION ONLY), 20 mEq, Intravenous, PRN, Rowdy Davis IV, MD    Flushing PICC Protocol, , , Until Discontinued **AND** sodium chloride 0.9% flush 10 mL, 10 mL, Intravenous, Q6H, 10 mL at 09/08/22 1746 **AND** [DISCONTINUED] sodium chloride 0.9% flush 10 mL, 10 mL, Intravenous, PRN, Rowdy Davis IV, MD    sodium phosphate 15 mmol in dextrose 5 % 250 mL IVPB, 15 mmol, Intravenous, PRN, Rowdy Davis IV, MD    sodium phosphate 20.01 mmol in dextrose 5 % 250 mL IVPB, 20.01 mmol, Intravenous, PRN, Rowdy Davis IV, MD    sodium phosphate 30 mmol in dextrose 5 % 250 mL IVPB, 30 mmol, Intravenous, PRN, Rowdy Davis IV, MD    Facility-Administered Medications Ordered in Other Encounters:     diphenhydrAMINE capsule 50 mg, 50 mg, Oral, On Call Procedure, Reinaldo Gerardo MD, 50 mg at 08/02/22 0739    sodium chloride 0.9% flush 10 mL, 10 mL, Intravenous, PRN, Reinaldo Gerardo MD        Assessment:   CAD (Multivessel) - Status Post CABG (LIMA to LAD, SVG to OM) (8.22.22)    - S/P LISA Ligation  Hypotension (Improved) - Off Pressors but on midodrine tid    -  H/O Hypertension   Valvular Heart Disease-  MR Status Post Bio MVR (#29 mm Mosaic Porcine) (8.22.22)    - Well seated bioprosthetic MV without significant stenosis or perivalvular leak. MG 4 mmHg.  Ischemic Cardiomyopathy EF 40% Status Post ICD ([employer])    - Aneurysmal basal inferior wall/ RCA territory--needs OAC  Newly Diagnosed Lung Mass (Bronch on 9.5.22)  --bronch revealing necrotic tumor occluding MANDO orifice  NSVT    - History of VT    - On Amiodarone Outpatient  Ileus     - SBFT 8/30/22 negative for SBO  C-Diff + (9/1/22) - PCR Negative    - NGT placed again on 9/1/22  DVT LUE    - Left axillary through proximal brachial veins. Superficial vein thrombus left cephalic vein.   Hypoxic -   Positive Sputum Culture - Pseudomonas  Hyperlipidemia  Chronic Venous Insufficiency  Elevated BMI/Obesity  COPD  Former Smoker    - Quit  3 Years Ago  Anemia  Thrombocytopenia  Leukocytosis   Transaminitis  Unstageable sacral decubitus    Plan:   Antibiotics per ICU Team  Continue Amiodarone Tapered Dosing  Continue Statin and ASA  Continue Metoprolol Tartrate 25mg PO BID (NSVT) - Can Not Use XL secondary to Failure Swallow Study. Start Lasix 40 mg IVP daily  Plans for Bronch/Lung Mass Biopsy once O2 Requirements Improve. Pt still on bipap.   Pts Prognosis is Guarded and he is now a DNR    Yuly Mccray, LAYNEP  Cardiology  [facility]   09/09/2022

## 2022-09-09 NOTE — CONSULTS
Consults  Patient Name: Zackery Osullivan Jr.   MRN: [id number]   Admission Date: 8/22/2022   Hospital Length of Stay: 18   Attending Provider: Rowdy Davis IV, MD   Consulting Provider: Rosa AG  Reason for Consult: Goals of Care  Primary Care Physician: Isaiah Meeks MD     Principal Problem: S/P MVR (mitral valve replacement)     Patient information was obtained from relative(s) and ER records.      Final diagnoses:  [I25.10] CAD (coronary artery disease)     Assessment/Plan:     I reviewed the patient and family's understanding of the seriousness of the illness and its expected prognosis. We discussed the patient's goals of care and treatment preferences. We discussed the difference between palliative and curative medicine. I explained the differences between home health, palliative and hospice care. I clarified current code status. I identified the surrogate decision maker or health care POA. I explained the difference between a living will (advanced directive) and LaPost. We discussed the patient's chosen code status as listed above and the contents of the LaPOST. I answered all questions and we formulated a plan including recommendations for symptom management and how to best achieve goals of care.       Met with wife and son--introduced service and had extensive conversation concerning history and current condition.  Wife states that patient had initially done well after surgery, but then began to experience repeated complications until necrotic mass was found. States that she spoke to Dr. Ma this morning and made decision to transition to hospice care.  Wife informs that she is speaking to both [facility] and [facility] to discuss placement and would like him transferred this afternoon.     Discussed hospice services at length and comfort measures.  Discussed that patient will need to be weaned to 50% FIO2 for transport.  Wife states that she has discussed bipap removal with transition  to nasal cannula with hospice.  Wife informed that she understands that patient will most likely not live for much longer.  Wife asking about what she should tell patient--discussed that she could inform him that he is transferring to facility to focus on comfort.  She informed that she will tell him that he is moving for long-term care.  Offered support and encouraged to call with any questions.     Recommendations:     Morphine 2 mg Q 1 PRN dyspnea      History of Present Illness:   Patient is a 76 yo  male with a PMHx of CAD, HTN, HLD and HFrEF who presented to [facility] for CABG x2 and MVR. Patient was transferred out of ICU when stable on 8/25/22.  ON 8/27/22 he began reporting throat pain, nausea/vomiting and constipation, for which GI was consulted.  CT showed ileus and small bowel series showed no obstruction with delayed transit.  On 8/27 patient had 17 beat run of V tach for which amiodarone IV initiated.  On 8/28 patient required upgrade to ICU for hypotension requiring vasopressors.      Patient was then transferred to the floor on 9/3/22 with pulmonary reconsulted on 9/6 for desaturation requiring CPAP.  Imaging with concerns of persistent left upper lobe opacity, for which Dr. Norman was concerned on admit for potential mass.  Further imaging revealed groung-glass opacities and moderately sized bilateral pleural effusions.  Bedside bronchoscopy revealed necrotic tumor occluding the left upper lobe orifice. Patient has been placed on bipap for worsening oxygenation and family made decision for DNR.  Patient is currently on bipap with feeding per NG tube.  Palliative care consulted for hospice education.         Active Ambulatory Problems     Diagnosis Date Noted    No Active Ambulatory Problems     Resolved Ambulatory Problems     Diagnosis Date Noted    No Resolved Ambulatory Problems     Past Medical History:   Diagnosis Date    Arthritis     CAD (coronary artery disease)     COPD (chronic  obstructive pulmonary disease)     HLD (hyperlipidemia)     HTN (hypertension)     Ischemic cardiomyopathy     Mitral regurgitation     HEATHER on CPAP     Stroke     Venous insufficiency     Ventricular tachycardia         Past Surgical History:   Procedure Laterality Date    bilateral inguinal stents      CARDIAC DEFIBRILLATOR PLACEMENT Left     CATARACT EXTRACTION Bilateral     CATHETERIZATION OF BOTH LEFT AND RIGHT HEART  08/02/2022    Diagnostic Only; Dr. Gerardo    COLONOSCOPY      CORONARY ARTERY BYPASS GRAFT (CABG) N/A 8/22/2022    Procedure: CORONARY ARTERY BYPASS GRAFT (CABG);  Surgeon: Rowdy Davis IV, MD;  Location: [facility] OR;  Service: Cardiovascular;  Laterality: N/A;  possible MVR & LLAA  //  ECHO NOTIFIED    LEFT HEART CATHETERIZATION Left 08/02/2022    Procedure: CATHETERIZATION, HEART, LEFT;  Surgeon: Reinaldo Gerardo MD;  Location: [facility] CATH LAB;  Service: Cardiology;  Laterality: Left;  C +/- PCI    MITRAL VALVE REPLACEMENT N/A 8/22/2022    Procedure: REPLACEMENT, MITRAL VALVE;  Surgeon: Rowdy Davis IV, MD;  Location: [facility] OR;  Service: Cardiovascular;  Laterality: N/A;  possible MVR and LLAA    REMOVAL OF IMPLANTED CARDIOVERTER-DEFIBRILLATOR (ICD)      TONSILLECTOMY          Review of patient's allergies indicates:  No Known Allergies       Current Facility-Administered Medications:     0.9%  NaCl infusion (for blood administration), , Intravenous, Q24H PRN, Rowdy Davis IV, MD    acetaminophen oral solution 650 mg, 650 mg, Per OG tube, Q6H PRN, Rowdy Davis IV, MD    albuterol-ipratropium 2.5 mg-0.5 mg/3 mL nebulizer solution 3 mL, 3 mL, Nebulization, Q6H, Marlon Humphreys MD, 3 mL at 09/09/22 0819    amiodarone tablet 200 mg, 200 mg, Oral, BID, KIMBERLI Waddell, 200 mg at 09/09/22 0950    [START ON 9/10/2022] amiodarone tablet 200 mg, 200 mg, Oral, Daily, KIMBERLI Waddell    aspirin chewable tablet 81 mg, 81 mg, Oral, Daily, Rowdy Davis IV, MD, 81 mg at  09/09/22 0949    atorvastatin tablet 80 mg, 80 mg, Oral, QHS, Jeniffer Gale, FNP, 80 mg at 09/08/22 2147    budesonide nebulizer solution 0.5 mg, 0.5 mg, Nebulization, Q12H, Marlon Humphreys MD, 0.5 mg at 09/09/22 0819    calcium gluconate 1 g in NS IVPB (premixed), 1 g, Intravenous, PRN, Rowdy Davis IV, MD    calcium gluconate 1 g in NS IVPB (premixed), 3 g, Intravenous, PRN, Rowdy Davis IV, MD    ceFEPIme (MAXIPIME) 2 g in dextrose 5 % in water (D5W) 5 % 50 mL IVPB (MB+), 2 g, Intravenous, Q8H, Rowdy Davis IV, MD    collagenase ointment, , Topical (Top), BID, Rowdy Davis IV, MD, Given at 09/09/22 0950    dextrose 10% bolus 250 mL, 25 g, Intravenous, PRN, Rowdy Davis IV, MD    dextrose 50% injection 12.5 g, 12.5 g, Intravenous, PRN, Rowdy Davis IV, MD, 12.5 g at 08/27/22 2206    docusate sodium capsule 100 mg, 100 mg, Oral, BID, Rowdy Davis IV, MD, 100 mg at 09/09/22 0949    enoxaparin injection 90 mg, 1 mg/kg, Subcutaneous, Q12H, Fidel Mckeon MD, 90 mg at 09/09/22 0627    folic acid tablet 1 mg, 1 mg, Oral, Daily, Rowdy Davis IV, MD, 1 mg at 09/09/22 0949    furosemide injection 40 mg, 40 mg, Intravenous, Daily, Rowdy Davis IV, MD, 40 mg at 09/09/22 0950    hydrALAZINE injection 10 mg, 10 mg, Intravenous, Q6H PRN, NINA Alves, 10 mg at 09/05/22 1559    HYDROcodone-acetaminophen 5-325 mg per tablet 1 tablet, 1 tablet, Oral, Q4H PRN, Rowdy Davis IV, MD, 1 tablet at 09/06/22 1820    LIDOcaine HCL 4% external solution 4 mL, 4 mL, Topical (Top), PRN, ALEXSANDER Holm MD, 4 mL at 09/07/22 0807    LIDOcaine HCL 4% external solution 4 mL, 4 mL, Topical (Top), PRN, ALEXSANDER Holm MD    loperamide capsule 4 mg, 4 mg, Oral, Once, Rowdy Davis IV, MD    magnesium sulfate 2g in water 50mL IVPB (premix), 2 g, Intravenous, PRN, Rowdy Davis IV, MD    magnesium sulfate 2g in water 50mL IVPB (premix), 4 g, Intravenous, PRN, Rowdy Davis IV, MD     metoclopramide HCl 5 mg/5 mL solution 5 mg, 5 mg, Per NG tube, Q8H, Harshal Romero MD, 5 mg at 09/09/22 0628    metoprolol tartrate (LOPRESSOR) tablet 25 mg, 25 mg, Per NG tube, BID, MARTHA Hernández, 25 mg at 09/09/22 0950    midodrine tablet 10 mg, 10 mg, Oral, Q8H, Fidel Mckeon MD, 10 mg at 09/09/22 0627    morphine injection 2 mg, 2 mg, Intravenous, PRN, Rowdy Davis IV, MD, 2 mg at 09/07/22 0614    naloxone 0.4 mg/mL injection 0.4 mg, 0.4 mg, Intravenous, PRN, ALEXSANDER Holm MD, 0.4 mg at 09/05/22 1124    ondansetron injection 4 mg, 4 mg, Intravenous, Q12H PRN, Rowdy Davis IV, MD, 4 mg at 08/28/22 0307    oxyCODONE immediate release tablet 5 mg, 5 mg, Oral, Q4H PRN, Rowdy Davis IV, MD, 5 mg at 09/08/22 1008    pantoprazole injection 40 mg, 40 mg, Intravenous, BID, Isaiah Levine MD, 40 mg at 09/09/22 0950    polyethylene glycol packet 17 g, 17 g, Oral, BID, MARTHA Limon, 17 g at 09/09/22 0950    potassium chloride 20 mEq in 100 mL IVPB (FOR CENTRAL LINE ADMINISTRATION ONLY), 20 mEq, Intravenous, PRN, Rowdy Davis IV, MD    potassium chloride 20 mEq in 100 mL IVPB (FOR CENTRAL LINE ADMINISTRATION ONLY), 40 mEq, Intravenous, PRN, Rowdy Davis IV, MD    potassium chloride 20 mEq in 100 mL IVPB (FOR CENTRAL LINE ADMINISTRATION ONLY), 20 mEq, Intravenous, PRN, Rowdy Davis IV, MD    Flushing PICC Protocol, , , Until Discontinued **AND** sodium chloride 0.9% flush 10 mL, 10 mL, Intravenous, Q6H, 10 mL at 09/08/22 1746 **AND** [DISCONTINUED] sodium chloride 0.9% flush 10 mL, 10 mL, Intravenous, PRN, Rowdy Davis IV, MD    sodium phosphate 15 mmol in dextrose 5 % 250 mL IVPB, 15 mmol, Intravenous, PRN, Rowdy Davis IV, MD    sodium phosphate 20.01 mmol in dextrose 5 % 250 mL IVPB, 20.01 mmol, Intravenous, PRN, Rowdy Davis IV, MD    sodium phosphate 30 mmol in dextrose 5 % 250 mL IVPB, 30 mmol, Intravenous, PRN, Rowdy Davis IV,  "MD    Facility-Administered Medications Ordered in Other Encounters:     diphenhydrAMINE capsule 50 mg, 50 mg, Oral, On Call Procedure, Reinaldo Gerardo MD, 50 mg at 08/02/22 0739    sodium chloride 0.9% flush 10 mL, 10 mL, Intravenous, PRN, Reinaldo Gerardo MD     sodium chloride, acetaminophen, calcium gluconate IVPB, calcium gluconate IVPB, dextrose 10%, dextrose 50%, hydrALAZINE, HYDROcodone-acetaminophen, LIDOcaine HCL 4%, LIDOcaine HCL 4%, magnesium sulfate IVPB, magnesium sulfate IVPB, morphine, naloxone, ondansetron, oxyCODONE, potassium chloride in water, potassium chloride in water, potassium chloride in water, sodium phosphate IVPB, sodium phosphate IVPB, sodium phosphate IVPB     History reviewed. No pertinent family history.     Review of Systems   Unable to perform ROS: Acuity of condition          Objective:   BP (!) 94/56   Pulse 60   Temp 97.3 °F (36.3 °C) (Oral)   Resp 17   Ht 5' 8.9" (1.75 m)   Wt 100 kg (220 lb 7.4 oz)   SpO2 96%   BMI 32.65 kg/m²      Physical Exam  Constitutional:       Appearance: He is ill-appearing.   HENT:      Head: Normocephalic.   Eyes:      Pupils: Pupils are equal, round, and reactive to light.   Cardiovascular:      Rate and Rhythm: Normal rate and regular rhythm.   Pulmonary:      Breath sounds: Rhonchi present.      Comments: bipap  Abdominal:      Palpations: Abdomen is soft.   Genitourinary:     Comments: maldonado  Musculoskeletal:      Right lower leg: Edema present.      Left lower leg: Edema present.      Comments: sarcopenia   Skin:     Coloration: Skin is pale.      Comments: Sacral ulcer   Neurological:      Comments: Opens eyes to voice         FAMILY CONTACTS:     Review of Symptoms  Review of Symptoms      Symptom Assessment (ESAS 0-10 Scale)  Pain:  0  Dyspnea:  0  Anxiety:  0  Nausea:  0  Depression:  0  Anorexia:  0  Fatigue:  0  Insomnia:  0  Restlessness:  0  Agitation:  0         Bowel Management Plan (BMP):  Yes      Performance Status:  " 10    Living Arrangements:  Lives with spouse    Psychosocial/Cultural: Patient is  with one adult son.  Patient is retired from oil field work.    Spiritual:  F - Cathi and Belief:  Yazidism/Cheondoism  I - Importance:  Yes  C - Community:  Yes  A - Address in Care:  Yes    Advance Care Planning   Advance Directives:   Living Will: Yes        Copy on chart: Yes    LaPOST: Yes      Decision Making:  Family answered questions        PAINAD: NA    Caregiver burden formerly assessed: yes        > 50% of 60 min of encounter was spent in chart review, face to face discussion of goals of care, symptom assessment, coordination of care and emotional support.         Rosa TILLMANP, [facility]  Palliative Medicine  [facility]

## 2022-09-09 NOTE — NURSING
Patients wife requesting hospice care. Case management and palliative care consulted per Dr. Ma. [facility]. Rich Hunter notified. D/c orders placed. [facility] notified. Wife requesting defibrillator be deactivated. Deactivated by [employer] rep. Rich notified and order placed. Report called to Leonidas at [facility]. [facility] ambulance called. PICC dressing changed prior to d/c. PICC will stay with patient. NG tube to be d/c'd prior to discharge. [place] will remain in place.

## 2022-09-09 NOTE — PT/OT/SLP PROGRESS
Physical Therapy      Patient Name:  Zackery Osullivan Jr.   MRN:  [id number]    PT to discontinue orders as pt is no longer appropriate for skilled services. Relayed this information to RN who was in agreement.  If for any reason services are necessary, please re-consult.

## 2022-09-10 NOTE — NURSING
[facility]. [facility] tube was removed. The bipap was removed and he was placed on a nonrebreather mask at 15L. Prior to removing his telemetry box his HR was 60 and his O2 was 92%. The pt was then transferred over to the stretcher and was taken by [place] [facility].

## 2022-09-12 NOTE — PHYSICIAN QUERY
PT Name: Zackery Osullivan Jr.  MR #: [id number]     DOCUMENTATION CLARIFICATION      CDS/: Angeline Torres   RN, CCDS            Contact information: [email or web address]  This form is a permanent document in the medical record.    Query Date: September 12, 2022    By submitting this query, we are merely seeking further clarification of documentation to reflect the severity of illness of your patient. Please utilize your independent clinical judgment when addressing the question(s) below.     The Medical Record contains the following:   Indicators   Supporting Clinical Findings Location in Medical Record   X Documentation of Sepsis, Septic Shock Sepsis 9/3 prog note  9/9 d/c summary   X Blood Culture NGTD 8/31 lab   X Respiratory Culture Many Pseudomonas aeruginosa 8/31 lab    Urine Culture      Other Culture     X Acute/Chronic Illness CAD, Mitral regurgitation  MVR and CABG x2    Hypovolemic shock requiring vasopressors    Pressure injury of left buttock, unstageable    Pseudomonas Pneumonia   8/22 op note      8/31 cc note      9/3 prog note      9/6-9/9 prog notes   X Medication/Treatment Cefipime 2 gm IVPB q 8 hrs Mar- Start: 08/31/22 1145 End: 09/09/22 1050   X Other Patient WBC count 21-23k this morning. . CXR some increased opacities in R lung with no other changes.  cefepime started for elevated wbc 8/31 prog note      Provider, please further specify the Sepsis  diagnosis:   [ x  ] Due to Pseudomonas   [   ] Due to (please specify): __________________________________   [   ] Sepsis Ruled Out   [   ] Other specification (please specify): ____________________   [   ] Clinically Undetermined       Please document in your progress notes daily for the duration of treatment until resolved, and include in your discharge summary.  Form No. [id number]

## 2022-09-15 NOTE — PHYSICIAN QUERY
"PT Name: Zackery Osullivan Jr.  MR #: [id number]    DOCUMENTATION CLARIFICATION     CDS/: Angeline Torres RN, CCDS             Contact information: [email or web address]  This form is a permanent document in the medical record.    Query Date: September 15, 2022  By submitting this query, we are merely seeking further clarification of documentation. Please utilize your independent clinical judgment when addressing the question(s) below.    The Medical Record contains the following:   Indicator Supporting Clinical Findings Location in Medical Record   X Documentation of "Debridement"   I debrided his wounds and found most of them to be unstagable at this time as he has dark, hard eschar noted which may mask how deep the wound bed truly is 8/31 wound care note    Documentation of "I&D"     X Other  He has pressure ulcers to his buttock region   His sacral wounds are certainly exacerbated by immobliity and diarrhea.    Sacral Pressure Ulcer  Pressure injury of left buttock, unstageable  Pressure ulcer of buttock, unstageable  Offload, debride, wound dressings recommended, nutritional status evaluated    Unstageable sacral decubitus 8/31 wound care note                              9/1-9/9 prog notes     Excisional debridement is the surgical removal or cutting away of such tissue, necrosis, or slough and is classified to the root operation "Excision." Use of a sharp instrument does not always indicate that an excisional debridement was performed. Minor removal of loose fragments with scissors or using a sharp instrument to scrape away tissue is not an excisional debridement. Excisional debridement involves the use of a scalpel to remove devitalized tissue.  Nonexcisional debridement is the nonoperative brushing, irrigating, scrubbing, or washing of devitalized tissue, necrosis, slough, or foreign material. Most nonexcisional debridement procedures are classified to the root operation "Extraction" (pulling or " stripping out or off all or a portion of a body part by the use of force).     Provider, please provide further clarification:  Site of wounds debrided and type of debridement:      [   ] Site of debridement:  specify: l________________________    [   ] Excisional Debridement of skin   [   ] Excisional Debridement of subcutaneous tissue/fascia   [   ] Excisional Debridement of muscle   [   ] Excisional Debridement of tendon        [   ] Site of debridement:  specify:  _________________________   [  X ] Non-excisional Debridement of skin   [   ] Non-excisional Debridement of subcutaneous tissue/fascia   [   ] Non-excisional Debridement of muscle   [   ] Non-excisional Debridement of tendon       [   ] Other Procedure (please specify): _____________   [  ] Clinically Undetermined     Reference:    ICD-10-CM/PCS Coding Clinic Third Quarter ICD-10, Effective with discharges: October 7, 2015 [employer] [facility] [employer] § Excisional and nonexcisional debridement (2015).    Form No. [id number]

## 2022-09-21 NOTE — PROCEDURES
Ongoing SW/CM Assessment/Plan of Care Note     See SW/CM flowsheets for goals and other objective data.    Patient/Family discharge goal (s):  Goal #1: Psychosocial needs assessed  Goal #2: Discharge to other institution(s) arranged  Goal #3: Communication facilitated    OT Recommendation:  Recommendation for Discharge Support: OT WI: 24 Hour assist       Disposition:  Planned Discharge Destination: Rehabilitation/Skilled Care    Progress note:   Writer spoke with Paola at [facility]- patient is able to return to facility once medically stable.     Per MD note, patient will be ready for discharge in 2-3 days.     Paola requested updated therapy notes for insurance authorization. Writer faxed notes per Paola's request. Awaiting outcome.    Social work following.   "Debridement    Date/Time: 8/22/2022 6:53 AM  Performed by: Sharon Cabral DO  Authorized by: Sharon Cabral DO     Time out: Immediately prior to procedure a "time out" was called to verify the correct patient, procedure, equipment, support staff and site/side marked as required.    Consent Done?:  Yes (Verbal)    Preparation: Patient was prepped and draped in usual sterile fashion    Local anesthesia used?: Yes    Local anesthetic:  Topical anesthetic    Wound Details:    Location:  Sacrum    Type of Debridement:  Non-excisional       Length (cm):  0.2       Area (sq cm):  0.04       Width (cm):  0.2       Percent Debrided (%):  100       Depth (cm):  0       Total Area Debrided (sq cm):  0.04    Depth of debridement:  Epidermis/Dermis    Tissue debrided:  Epidermis    Devitalized tissue debrided:  Biofilm, Necrotic/Eschar and Slough    Instruments:  Blade     Blade used to debride and score black eschar wounds as well as flat superior pink denuded  epithelial tissue to bleeding.  "

## 2022-09-21 NOTE — PHYSICIAN QUERY
PT Name: Zackery Osullivan Jr.  MR #: [id number]     Documentation Clarification      CDS/: Angeline Torres RN, CCDS              Contact information: [email or web address]    This form is a permanent document in the medical record.     Query Date: September 21, 2022    By submitting this query, we are merely seeking further clarification of documentation. Please utilize your independent clinical judgment when addressing the question(s) below.    The Medical Record reflects the following:    Supporting Clinical Findings Location in Medical Record   He has pressure ulcers to his buttock region   His sacral wounds are certainly exacerbated by immobliity and diarrhea.     Sacral Pressure Ulcer  Pressure injury of left buttock, unstageable  Pressure ulcer of buttock, unstageable  Offload, debride, wound dressings recommended, nutritional status evaluated     Unstageable sacral decubitus 8/31 wound care                    91/-9/9 prog notes     I debrided his wounds and found most of them to be unstagable at this time as he has dark, hard eschar noted which may mask how deep the wound bed truly is    [  X ] Non-excisional Debridement of skin    8/31 wound care        9/15 md query                                                                              Provider, please provide the Site of Non-excisional Debridement of skin.    [   ] Sacral Pressure Ulcer   [  X ]  Pressure injury of left buttock, unstageable   [  X ] Pressure ulcer of buttock, unstageable   [   ] Other (please specify): ____________   [  ] Clinically undetermined

## (undated) DEVICE — SOL PLASMALYTE PH 7.4 1000ML

## (undated) DEVICE — TUBE SUCTION 6.5 TIP 6FR

## (undated) DEVICE — Device

## (undated) DEVICE — CONTAINER SPECIMEN SCREW 4OZ

## (undated) DEVICE — SYS VIRTUOSAPH PLUS EVM

## (undated) DEVICE — CANNULA VESSEL

## (undated) DEVICE — PAD HEARTSTART DEFIB ADULT

## (undated) DEVICE — SEE MEDLINE ITEM 159606

## (undated) DEVICE — SENSOR LOW LEVEL OXYGEN

## (undated) DEVICE — GLOVE PROTEXIS LTX MICRO  7.5

## (undated) DEVICE — CANNULA MC2 OVAL NVENT 32/40FR

## (undated) DEVICE — SUT PROLENE 7-0 BV175-6

## (undated) DEVICE — GUIDE LAUNCHER 6FR EBU 3.5

## (undated) DEVICE — CANNULA MC2 NVENT .5IN 28/36FR

## (undated) DEVICE — BANDAGE ACE DOUBLE STER 6IN

## (undated) DEVICE — CATH EMPULSE ANGLED 5FR PIGTAI

## (undated) DEVICE — SYS WASTE FLD DISPOSAL 1400ML

## (undated) DEVICE — HOLDER STRIP-T SELF ADH 2X10IN

## (undated) DEVICE — BAG MEDI-PLAST DECANTER C-FLOW

## (undated) DEVICE — KIT CATHGARD DBL LUMN 9FRX11.5

## (undated) DEVICE — CATH ALL PUR URTHL 20FR

## (undated) DEVICE — DRESSING TELFA + BARR 4X6IN

## (undated) DEVICE — SOL ELECTROLYTE PH 7.4 500ML

## (undated) DEVICE — SOL NACL IRR 3000ML

## (undated) DEVICE — SUT 2 30IN SILK BLK BRAIDE

## (undated) DEVICE — APPLICATOR ENDOSCP 32CM5MM2TIP

## (undated) DEVICE — SUT MAXON GRN 0 CLSR 27IN

## (undated) DEVICE — SET ANGIO ACIST CVI ANGIOTOUCH

## (undated) DEVICE — GUIDEWIRE EMERALD .035IN 260CM

## (undated) DEVICE — SOL IRRI STRL WATER 1000ML

## (undated) DEVICE — MARKER ANASTOMARK COR FLX

## (undated) DEVICE — SOL LAC RINGERS 1000ML INJ

## (undated) DEVICE — GLOVE PROTEXIS HYDROGEL SZ6.5

## (undated) DEVICE — KIT SURGICAL TURNOVER

## (undated) DEVICE — CARTRIDGE SILV 4CHAN 2.0-3.5MG

## (undated) DEVICE — CATH CONTROLCATH 6FR 110CM

## (undated) DEVICE — DRESSING TELFA + RECT 6X10IN

## (undated) DEVICE — BLADE SCALP OPHTL BEVEL STR

## (undated) DEVICE — NDL ASPIRATOR AIR 16G

## (undated) DEVICE — GLOVE PROTEXIS BLUE LATEX 7

## (undated) DEVICE — GLOVE COTTON KNITTED CUFF

## (undated) DEVICE — SUT PROLENE BL 4-0 RB-1 36IN

## (undated) DEVICE — DRAPE SLUSH WARMER WITH DISC

## (undated) DEVICE — TRAY CATH FOL SIL TEMP 10 16FR

## (undated) DEVICE — RELOAD ECHELON ENDOPATH 45MM

## (undated) DEVICE — BANDAGE ACE NON LATEX 3IN

## (undated) DEVICE — KIT C.A.T.S. FAST START 4/CASE

## (undated) DEVICE — PAD DEFIB CADENCE ADULT R2

## (undated) DEVICE — STOPCOCK 4-WAY

## (undated) DEVICE — PUNCH AORTIC ROT TIP 4.8MM

## (undated) DEVICE — CONNECTOR Y STRL 3/8X3/8X3/8IN

## (undated) DEVICE — TUBE SUCTION MEDI-VAC STERILE

## (undated) DEVICE — APPLICATOR SURG 2 SPRY 1 PLUNG

## (undated) DEVICE — CATH OPTITORQUE RADIAL 5FR

## (undated) DEVICE — GLOVE PROTEXIS LTX MICRO 8

## (undated) DEVICE — SUT PROLENE 4-0 SH BLU 36IN

## (undated) DEVICE — WATER STRL IRR USP UROMTC 1000

## (undated) DEVICE — CABLE PACING ALLGTR CLIP 12FT

## (undated) DEVICE — STAPLER ECHELON FLEX GST 45MM

## (undated) DEVICE — CANNULA NASAL ADULT

## (undated) DEVICE — GLOVE PROTEXIS LTX MICRO  7

## (undated) DEVICE — WIRE INTRAMYOCARDIAL TEMP

## (undated) DEVICE — CARTRIDGE HEPARIN 2 CHNNL ACT

## (undated) DEVICE — BOWL STERILE LARGE 32OZ

## (undated) DEVICE — COR KNOT QUICK LOAD SINGLES

## (undated) DEVICE — SOL .9NACL PF 100 ML

## (undated) DEVICE — SOL IRR NACL .9% 3000ML

## (undated) DEVICE — SUT ETHBND XTRA 1 OS-8 30IN

## (undated) DEVICE — BANDAGE ADHESIVE FABRIC 2X4

## (undated) DEVICE — INSERT INTRACK CLAMP ULT 66MM

## (undated) DEVICE — KIT GLIDESHEATH SLEND 6FR 10CM

## (undated) DEVICE — DEVICE PLASMABLADE X 3.0S LT

## (undated) DEVICE — CANNULA NON-VENT 24FR 14.5IN

## (undated) DEVICE — BAND TR COMP DEVICE REG 24CM

## (undated) DEVICE — CARTRIDGE HEPARIN DOSE

## (undated) DEVICE — SUT VICRYL 3-0 8-18 PS-1

## (undated) DEVICE — SUT VICRYL 1 OB 36 CTX

## (undated) DEVICE — SOL NORMAL USPCA 0.9%

## (undated) DEVICE — GUIDEWIRE OMNI STR TIP 185CM